# Patient Record
Sex: FEMALE | Race: WHITE | Employment: UNEMPLOYED | URBAN - METROPOLITAN AREA
[De-identification: names, ages, dates, MRNs, and addresses within clinical notes are randomized per-mention and may not be internally consistent; named-entity substitution may affect disease eponyms.]

---

## 2018-05-22 ENCOUNTER — HOSPITAL ENCOUNTER (OUTPATIENT)
Dept: AUDIOLOGY | Age: 58
Discharge: HOME | End: 2018-05-22
Payer: COMMERCIAL

## 2018-11-20 ENCOUNTER — HOSPITAL ENCOUNTER (OUTPATIENT)
Dept: AUDIOLOGY | Age: 58
Discharge: HOME | End: 2018-11-20
Payer: COMMERCIAL

## 2018-11-20 PROCEDURE — V5266 BATTERY FOR HEARING DEVICE: HCPCS | Performed by: AUDIOLOGIST

## 2018-12-12 ENCOUNTER — HOSPITAL ENCOUNTER (OUTPATIENT)
Dept: AUDIOLOGY | Age: 58
Discharge: HOME | End: 2018-12-12
Payer: COMMERCIAL

## 2018-12-12 PROCEDURE — V5264 EAR MOLD/INSERT: HCPCS | Performed by: AUDIOLOGIST

## 2019-01-09 ENCOUNTER — HOSPITAL ENCOUNTER (OUTPATIENT)
Dept: AUDIOLOGY | Age: 59
Discharge: HOME | End: 2019-01-09
Payer: COMMERCIAL

## 2019-01-09 PROCEDURE — 9990000010 HC NO CHARGE VISIT: Performed by: AUDIOLOGIST

## 2019-05-14 ENCOUNTER — HOSPITAL ENCOUNTER (OUTPATIENT)
Dept: AUDIOLOGY | Age: 59
Discharge: HOME | End: 2019-05-14
Payer: COMMERCIAL

## 2019-05-14 PROCEDURE — 9990000010 HC NO CHARGE VISIT: Performed by: AUDIOLOGIST

## 2019-05-14 NOTE — PROGRESS NOTES
Hearing aid check and tubing change. All is well with the hearing aids. Follow up in 6 months for hearing aid check, scheduled 11/5/2019 at 2 pm.  She will call if needed. Electronically signed by JOHANNA Kiser on 5/14/2019 at 2:19 PM

## 2019-11-05 ENCOUNTER — HOSPITAL ENCOUNTER (OUTPATIENT)
Dept: AUDIOLOGY | Age: 59
Discharge: HOME | End: 2019-11-05
Payer: COMMERCIAL

## 2019-11-05 PROCEDURE — V5266 BATTERY FOR HEARING DEVICE: HCPCS | Performed by: AUDIOLOGIST

## 2019-11-05 PROCEDURE — 9990000010 HC NO CHARGE VISIT: Performed by: AUDIOLOGIST

## 2020-06-16 ENCOUNTER — HOSPITAL ENCOUNTER (OUTPATIENT)
Dept: AUDIOLOGY | Age: 60
Discharge: HOME | End: 2020-06-16
Payer: COMMERCIAL

## 2020-06-16 PROCEDURE — V5266 BATTERY FOR HEARING DEVICE: HCPCS | Performed by: AUDIOLOGIST

## 2020-12-15 ENCOUNTER — HOSPITAL ENCOUNTER (OUTPATIENT)
Dept: AUDIOLOGY | Age: 60
Discharge: HOME | End: 2020-12-15
Payer: COMMERCIAL

## 2020-12-15 PROCEDURE — 9990000010 HC NO CHARGE VISIT: Performed by: AUDIOLOGIST

## 2021-06-15 ENCOUNTER — HOSPITAL ENCOUNTER (OUTPATIENT)
Dept: AUDIOLOGY | Age: 61
Discharge: HOME | End: 2021-06-15
Payer: COMMERCIAL

## 2021-06-15 PROCEDURE — 9990000010 HC NO CHARGE VISIT: Performed by: AUDIOLOGIST

## 2021-06-15 NOTE — PROGRESS NOTES
Hearing aid check and tubing change. Discussed process for new hearing aids. She is eligible for new 4/2021. She will get script and then call for appt.   Electronically signed by Gianna Hummel on 6/15/2021 at 3:21 PM

## 2021-07-19 ENCOUNTER — HOSPITAL ENCOUNTER (OUTPATIENT)
Dept: AUDIOLOGY | Age: 61
Discharge: HOME | End: 2021-07-19
Payer: COMMERCIAL

## 2021-07-19 PROCEDURE — 92567 TYMPANOMETRY: CPT | Performed by: AUDIOLOGIST

## 2021-07-19 PROCEDURE — 92557 COMPREHENSIVE HEARING TEST: CPT | Performed by: AUDIOLOGIST

## 2021-07-19 NOTE — PROGRESS NOTES
AUDIOMETRIC EVALUATION    REASON FOR REFERRAL:  This patient was referred for audiometric testing by HOLLIS Diaz to monitor hearing sensitivity. Alvaro Carter has worn hearing aids for many years and is eligible for a new hearing aid prior authorization through [employer]. She was accompanied by her mother Joaquín De Guzman. RESULTS:  Pure tone audiometric testing using earphones was carried out. Results revealed air conduction thresholds averaging 25 dBHL at 250 Hz sloping to 55 dBHL at 500 Hz and then averaging 80 dBHL through 4000 Hz and 110 dBHL at 8000 Hz. Speech reception thresholds were obtained at 60 dBHL . Speech discrimination testing was performed at 90 dBHL. Obtained were scores of 84%. (Un)Masked Bone Conduction Testing revealed thresholds equal to air conduction thresholds. Tympanometry was administered and revealed tympanograms within normal limits. IMPRESSION:  Oseas Palomo results revealed a mild sloping to profound sensorineural hearing loss. This test is comparable to testing completed in 2015. Speech reception thresholds were in agreement with the pure tone averages. Speech discrimination was good. Tympanometry was within normal limits. An ENT consult is suggested if you feel it is clinically warranted. A re-evaluation is recommended if a change in hearing is noted. Once medical clearance is obtained a prior authorization for new binaural BTE hearing aids will be submitted to [employer]. The above results were reviewed with the patient and her mother. If I can be of further assistance or provide additional information, please do not hesitate to contact this office.       Thank you for the referral.        ___________________________________

## 2021-09-13 ENCOUNTER — HOSPITAL ENCOUNTER (OUTPATIENT)
Dept: AUDIOLOGY | Age: 61
Discharge: HOME | End: 2021-09-13
Payer: COMMERCIAL

## 2021-09-13 PROCEDURE — 9990000010 HC NO CHARGE VISIT: Performed by: AUDIOLOGIST

## 2021-09-13 NOTE — PROGRESS NOTES
No charge hearing aid fitting for 30 day trial.  Reviewed use and care. Adjusted programs down a lot for gain. They were too loud. Also adjusted audiogram in computer to help with gain. She said they were very comfortable.   Follow up 10/5/21 at 2 pm.  Electronically signed by Gianna Eastman on 9/13/2021 at 4:42 PM

## 2021-09-14 ENCOUNTER — HOSPITAL ENCOUNTER (OUTPATIENT)
Age: 61
Discharge: HOME | End: 2021-09-16

## 2021-09-14 PROCEDURE — 88360 TUMOR IMMUNOHISTOCHEM/MANUAL: CPT

## 2021-09-14 PROCEDURE — 88305 TISSUE EXAM BY PATHOLOGIST: CPT

## 2021-09-14 PROCEDURE — 88342 IMHCHEM/IMCYTCHM 1ST ANTB: CPT

## 2021-09-29 DIAGNOSIS — C50.911 INVASIVE DUCTAL CARCINOMA OF RIGHT BREAST (HCC): Primary | ICD-10-CM

## 2021-09-30 NOTE — PROGRESS NOTES
History reviewed. No pertinent past medical history. Past Surgical History:   Procedure Laterality Date    EYE SURGERY      US BREAST NEEDLE BIOPSY RIGHT  2021       Current Outpatient Medications   Medication Sig Dispense Refill    Cholecalciferol (VITAMIN D3) 50 MCG (2000 UT) CAPS Take by mouth      Garlic 10 MG CAPS Take by mouth      loratadine (CLARITIN) 10 MG capsule Take 10 mg by mouth daily      acetaminophen (TYLENOL) 500 MG tablet Take 500 mg by mouth every 6 hours as needed for Pain       No current facility-administered medications for this visit. No Known Allergies    History reviewed. No pertinent family history. Social History     Socioeconomic History    Marital status: Single     Spouse name: Not on file    Number of children: Not on file    Years of education: Not on file    Highest education level: Not on file   Occupational History    Not on file   Tobacco Use    Smoking status: Never Smoker    Smokeless tobacco: Never Used   Vaping Use    Vaping Use: Never used   Substance and Sexual Activity    Alcohol use: Never    Drug use: Never    Sexual activity: Not on file   Other Topics Concern    Not on file   Social History Narrative    Not on file     Social Determinants of Health     Financial Resource Strain:     Difficulty of Paying Living Expenses:    Food Insecurity:     Worried About Running Out of Food in the Last Year:     [address] in the Last Year:    Transportation Needs:     Lack of Transportation (Medical):      Lack of Transportation (Non-Medical):    Physical Activity:     Days of Exercise per Week:     Minutes of Exercise per Session:    Stress:     Feeling of Stress :    Social Connections:     Frequency of Communication with Friends and Family:     Frequency of Social Gatherings with Friends and Family:     Attends Nondenominational Services:     Active Member of Clubs or Organizations:     Attends Club or Organization Meetings:    [facility] Marital Status:    Intimate Partner Violence:     Fear of Current or Ex-Partner:     Emotionally Abused:     Physically Abused:     Sexually Abused:        Occupation: weed, build puzzles, 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 Good appetite and energy level. ENMT: Eyes: No diplopia; Nose: No epistaxis. Mouth: No sore throat. RESPIRATORY: No hemoptysis, shortness of breath, cough. CARDIOVASCULAR: No chest pain, pressure, or palpitations. GASTROINTESTINAL: No nausea/vomiting, abdominal pain, diarrhea/constipation. No blood in the stools. GENITOURINARY: No dysuria, urinary frequency, hematuria. NEURO: No syncope, presyncope, headache. Remainder:  ROS NEGATIVE    Patient denies previous history of DVT/PE. Review of systems as noted above completely reviewed with patient. No changes. Mother answers many of the questions. Objective:   Physical Exam  Vitals and nursing note reviewed. Exam conducted with a chaperone present. Constitutional:       General: She is not in acute distress. Appearance: She is well-developed. She is not diaphoretic. HENT:      Head: Normocephalic and atraumatic. Eyes:      Conjunctiva/sclera: Conjunctivae normal.      Pupils: Pupils are equal, round, and reactive to light. Neck:      Thyroid: No thyromegaly. Trachea: No tracheal deviation. Cardiovascular:      Rate and Rhythm: Normal rate and regular rhythm. Heart sounds: Normal heart sounds. Pulmonary:      Effort: Pulmonary effort is normal. No respiratory distress. Breath sounds: Normal breath sounds. Chest:      Breasts: Breasts are symmetrical.         Right: Mass present. No inverted nipple, nipple discharge, skin change or tenderness. Left: No inverted nipple, mass, nipple discharge, skin change or tenderness. Abdominal:      General: There is no distension. Palpations: Abdomen is soft.    Musculoskeletal:      Right shoulder: Normal range of motion. Left shoulder: Normal range of motion. Cervical back: Normal range of motion and neck supple. Lymphadenopathy:      Cervical: No cervical adenopathy. Upper Body:      Right upper body: No supraclavicular adenopathy. Left upper body: No supraclavicular adenopathy. Skin:     General: Skin is warm and dry. Coloration: Skin is not pale. Findings: No erythema. Neurological:      Mental Status: She is alert and oriented to person, place, and time. Psychiatric:         Behavior: Behavior normal.         Thought Content: Thought content normal.         Judgment: Judgment normal.         Assessment:      64 y.o. woman who underwent an US guided right breast core biopsy at 3 o'clock position on September 20 , 2021. 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  Preliminary Allan   score 3+2+1 = 6     Comment:   Tumor cells stain strongly positive for e-cadherin   immunostain. Intradepartmental consultation is obtained. Breast Cancer Marker Studies:         Estrogen Receptors (ER):       -Positive (>10% of cells demonstrate nuclear positivity):       Percentage of cells positive: >90%       Intensity: Strong         Progesterone Receptors (TX):       -Positive:       Percentage of cells positive: 2%       Intensity: Weak         Her-2/marquise (c-erb B-2) protein expression: Negative (1+)       MAMMOGRAM ULTRASOUND BIOPSY; [facility]      9/14/2021: MAMMOGRAM, RIGHT BREAST ULTRASOUND: [facility]            9/14/2021: LEFT BREAST ULTRASOUND: [facility]    Imaging reviewed with patient and her mother. Mass is barely visible on mammogram due to its posterior location. Clinical T2, stage II at this point. Long discussion about options going forward.   Patient needs to be transported by her mother, and her mother does not drive when the weather is bad. We discussed conservative therapy versus mastectomy, risks and benefits of both. Place consultation to radiation oncology so patient and her mother could decide whether they were interested in pursuing conservative therapy. Questions answered to the best my ability. Plan:      1. Metastatic workup to include CXR, CBC, CMP, completed with this visit. 2. Patient has met with our Breast Navigator for information and to receive literature. 3. If indicated outside slides will be obtained and reviewed by Pathology at [facility]. 4. She would likely be a candidate for surgical therapy based on consultation with radiation oncology and additional discussion with me. .    5. We had a discussion of the treatment options for breast cancer including risks, benefits, and recurrence rates for breast conservation therapy versus mastectomy. We discussed the indications and risks of sentinel lymph node biopsy and possibility of axillary lymph node dissection. We also discussed the possible role of systemic and radiation therapy. NCCN guidelines were utilized in our discussion. The patient was given the appropriate contact numbers and will call with any questions or concerns. Face-to-face time 55 minutes, greater than 50% in counseling, education, and coordination of care. My final recommendation will be communicated back to the referring physician by way of shared medical record and/or written letter via [address] [facility] mail. I personally and independently saw and examined patient and reviewed all pertinent laboratory data and imaging studies. I have reviewed and agree with the CNP history and review of systems as documented in the above note. This document is generated, in part, by voice recognition software and thus syntax and grammatical errors are possible. Cata Zepeda MD [facility]  October 4, 2021

## 2021-10-04 ENCOUNTER — TELEPHONE (OUTPATIENT)
Dept: CASE MANAGEMENT | Age: 61
End: 2021-10-04

## 2021-10-04 ENCOUNTER — OFFICE VISIT (OUTPATIENT)
Dept: BREAST CENTER | Age: 61
End: 2021-10-04
Payer: COMMERCIAL

## 2021-10-04 ENCOUNTER — HOSPITAL ENCOUNTER (OUTPATIENT)
Dept: GENERAL RADIOLOGY | Age: 61
Discharge: HOME | End: 2021-10-06
Payer: COMMERCIAL

## 2021-10-04 VITALS
WEIGHT: 119 LBS | RESPIRATION RATE: 20 BRPM | BODY MASS INDEX: 24.98 KG/M2 | TEMPERATURE: 98.4 F | OXYGEN SATURATION: 98 % | HEART RATE: 101 BPM | DIASTOLIC BLOOD PRESSURE: 82 MMHG | SYSTOLIC BLOOD PRESSURE: 126 MMHG | HEIGHT: 58 IN

## 2021-10-04 DIAGNOSIS — C50.911 MALIGNANT NEOPLASM OF RIGHT BREAST IN FEMALE, ESTROGEN RECEPTOR POSITIVE, UNSPECIFIED SITE OF BREAST (HCC): ICD-10-CM

## 2021-10-04 DIAGNOSIS — Z17.0 MALIGNANT NEOPLASM OF RIGHT BREAST IN FEMALE, ESTROGEN RECEPTOR POSITIVE, UNSPECIFIED SITE OF BREAST (HCC): ICD-10-CM

## 2021-10-04 DIAGNOSIS — C50.911 INVASIVE DUCTAL CARCINOMA OF RIGHT BREAST (HCC): ICD-10-CM

## 2021-10-04 LAB
ALBUMIN SERPL-MCNC: 4.5 G/DL (ref 3.5–5.2)
ALP BLD-CCNC: 80 U/L (ref 35–104)
ALT SERPL-CCNC: 15 U/L (ref 0–32)
ANION GAP SERPL CALCULATED.3IONS-SCNC: 11 MMOL/L (ref 7–16)
AST SERPL-CCNC: 23 U/L (ref 0–31)
BASOPHILS ABSOLUTE: 0.03 E9/L (ref 0–0.2)
BASOPHILS RELATIVE PERCENT: 0.3 % (ref 0–2)
BILIRUB SERPL-MCNC: 0.4 MG/DL (ref 0–1.2)
BUN BLDV-MCNC: 11 MG/DL (ref 6–23)
CALCIUM SERPL-MCNC: 9.7 MG/DL (ref 8.6–10.2)
CHLORIDE BLD-SCNC: 104 MMOL/L (ref 98–107)
CO2: 26 MMOL/L (ref 22–29)
CREAT SERPL-MCNC: 0.8 MG/DL (ref 0.5–1)
EOSINOPHILS ABSOLUTE: 0.05 E9/L (ref 0.05–0.5)
EOSINOPHILS RELATIVE PERCENT: 0.6 % (ref 0–6)
GFR AFRICAN AMERICAN: >60
GFR NON-AFRICAN AMERICAN: >60 ML/MIN/1.73
GLUCOSE BLD-MCNC: 111 MG/DL (ref 74–99)
HCT VFR BLD CALC: 46.6 % (ref 34–48)
HEMOGLOBIN: 15.1 G/DL (ref 11.5–15.5)
IMMATURE GRANULOCYTES #: 0.02 E9/L
IMMATURE GRANULOCYTES %: 0.2 % (ref 0–5)
LYMPHOCYTES ABSOLUTE: 1.74 E9/L (ref 1.5–4)
LYMPHOCYTES RELATIVE PERCENT: 19.9 % (ref 20–42)
MCH RBC QN AUTO: 28.2 PG (ref 26–35)
MCHC RBC AUTO-ENTMCNC: 32.4 % (ref 32–34.5)
MCV RBC AUTO: 86.9 FL (ref 80–99.9)
MONOCYTES ABSOLUTE: 0.58 E9/L (ref 0.1–0.95)
MONOCYTES RELATIVE PERCENT: 6.6 % (ref 2–12)
NEUTROPHILS ABSOLUTE: 6.34 E9/L (ref 1.8–7.3)
NEUTROPHILS RELATIVE PERCENT: 72.4 % (ref 43–80)
PDW BLD-RTO: 13.8 FL (ref 11.5–15)
PLATELET # BLD: 236 E9/L (ref 130–450)
PMV BLD AUTO: 10.3 FL (ref 7–12)
POTASSIUM SERPL-SCNC: 4.6 MMOL/L (ref 3.5–5)
RBC # BLD: 5.36 E12/L (ref 3.5–5.5)
SODIUM BLD-SCNC: 141 MMOL/L (ref 132–146)
TOTAL PROTEIN: 7.2 G/DL (ref 6.4–8.3)
WBC # BLD: 8.8 E9/L (ref 4.5–11.5)

## 2021-10-04 PROCEDURE — G8484 FLU IMMUNIZE NO ADMIN: HCPCS | Performed by: SURGERY

## 2021-10-04 PROCEDURE — 36415 COLL VENOUS BLD VENIPUNCTURE: CPT | Performed by: SURGERY

## 2021-10-04 PROCEDURE — 36415 COLL VENOUS BLD VENIPUNCTURE: CPT

## 2021-10-04 PROCEDURE — 71046 X-RAY EXAM CHEST 2 VIEWS: CPT

## 2021-10-04 PROCEDURE — 85025 COMPLETE CBC W/AUTO DIFF WBC: CPT

## 2021-10-04 PROCEDURE — 3017F COLORECTAL CA SCREEN DOC REV: CPT | Performed by: SURGERY

## 2021-10-04 PROCEDURE — 80053 COMPREHEN METABOLIC PANEL: CPT

## 2021-10-04 PROCEDURE — G8427 DOCREV CUR MEDS BY ELIG CLIN: HCPCS | Performed by: SURGERY

## 2021-10-04 PROCEDURE — G8420 CALC BMI NORM PARAMETERS: HCPCS | Performed by: SURGERY

## 2021-10-04 PROCEDURE — 99203 OFFICE O/P NEW LOW 30 MIN: CPT | Performed by: SURGERY

## 2021-10-04 PROCEDURE — 99204 OFFICE O/P NEW MOD 45 MIN: CPT | Performed by: SURGERY

## 2021-10-04 PROCEDURE — 1036F TOBACCO NON-USER: CPT | Performed by: SURGERY

## 2021-10-04 RX ORDER — ACETAMINOPHEN 500 MG
500 TABLET ORAL EVERY 6 HOURS PRN
COMMUNITY

## 2021-10-04 RX ORDER — ACETAMINOPHEN 160 MG
TABLET,DISINTEGRATING ORAL DAILY
COMMUNITY

## 2021-10-04 RX ORDER — LORATADINE 10 MG/1
10 CAPSULE, LIQUID FILLED ORAL DAILY
COMMUNITY

## 2021-10-04 NOTE — PATIENT INSTRUCTIONS
RELEASE OF RESULTS AND RECORDS  As of October 1, 2020, all results (x-ray reports, labwork, pathology reports) will be released through [address] in real time per federal law. Once your test results are final, they will be automatically released to your electronic medical record [facility] where you will be able to see those results and any clinical notes associated with that result. In some cases, you may see those results and notes before your provider or the staff have had a chance to review. We will make every effort to contact you, especially about any abnormal or confusing results. If you view a result before we have contacted you, please wait up to one business day for us to reach you before calling with questions. If anything in your notes seems inaccurate, please message us through [employer] with potential corrections. If you see a term or language in your clinical note that doesn't make sense, please use the online health library reference tool in your MyChart (under the Health tab)  to help you interpret the note.       Patient will be referred to Radiation Oncology to determine surgical path

## 2021-10-04 NOTE — TELEPHONE ENCOUNTER
Met with patient and mother regarding her recent breast cancer diagnosis at surgical consultation appointment with Dr. Zacarias Baltazar. Reviewed pathology report. Instructed on next steps including breast surgery options per Dr. Wolfgang Perez recommendations and any additional imaging that may be required. Provided with extensive literature including \"Be A Survivor: Your guide to breast cancer treatment\", chapter 4 reviewed,  Your Guide to Your Breast Cancer Pathology Report, American Cancer Society Exercises after breast surgery and Lymphedema Early Signs and Symptoms. Today patient received copy of their pathology report as well as a list of local medical oncology providers, information on diagnosis and local support group resources. Patient and mother also received list of local transportation resources. Mother states that she does not drive well at night and is worried about getting patient to her appointments. Provided patient with my contact information and encouraged to call me with questions or concerns. Patient verbalizes understanding and appreciative of nurse navigator visit.

## 2021-10-04 NOTE — LETTER
[address] [facility] [address] [address]  Phone: [phone]  Fax: [phone]    Julianne Clements MD    October 5, 2021     Herbert Ly, [address] [facility] [address]    Patient: George Can   MR Number: [id number]   YOB: 1960   Date of Visit: 10/4/2021       Dear Herbert Ly: Thank you for referring Charly De Leon to me for evaluation/treatment. Below are the relevant portions of my assessment and plan of care. 64 y.o. woman who underwent an US guided right breast core biopsy at 3 o'clock position on September 20 , 2021. 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  Preliminary Ola   score 3+2+1 = 6     Comment:   Tumor cells stain strongly positive for e-cadherin   immunostain. Intradepartmental consultation is obtained. Breast Cancer Marker Studies:         Estrogen Receptors (ER):       -Positive (>10% of cells demonstrate nuclear positivity):       Percentage of cells positive: >90%       Intensity: Strong         Progesterone Receptors ([place]):       -Positive:       Percentage of cells positive: 2%       Intensity: Weak         Her-2/marquise (c-erb B-2) protein expression: Negative (1+)       MAMMOGRAM ULTRASOUND BIOPSY; [facility]      9/14/2021: MAMMOGRAM, RIGHT BREAST ULTRASOUND: [facility]            9/14/2021: LEFT BREAST ULTRASOUND: [facility]    Imaging reviewed with patient and her mother. Mass is barely visible on mammogram due to its posterior location. Clinical T2, stage II at this point. Long discussion about options going forward. Patient needs to be transported by her mother, and her mother does not drive when the weather is bad. We discussed conservative therapy versus mastectomy, risks and benefits of both.   Place consultation to radiation oncology so patient and her mother could decide whether they were interested in pursuing conservative therapy. Questions answered to the best my ability. 1. Metastatic workup to include CXR, CBC, CMP, completed with this visit. 2. Patient has met with our Breast Navigator for information and to receive literature. 3. If indicated outside slides will be obtained and reviewed by Pathology at [facility]. 4. She would likely be a candidate for surgical therapy based on consultation with radiation oncology and additional discussion with me. .    5. We had a discussion of the treatment options for breast cancer including risks, benefits, and recurrence rates for breast conservation therapy versus mastectomy. We discussed the indications and risks of sentinel lymph node biopsy and possibility of axillary lymph node dissection. We also discussed the possible role of systemic and radiation therapy. NCCN guidelines were utilized in our discussion. The patient was given the appropriate contact numbers and will call with any questions or concerns. If you have questions, please do not hesitate to call me. I look forward to following Linh Riddle along with you.     Sincerely,  Bam Braxton MD

## 2021-10-05 ENCOUNTER — HOSPITAL ENCOUNTER (OUTPATIENT)
Dept: AUDIOLOGY | Age: 61
Discharge: HOME | End: 2021-10-05
Payer: COMMERCIAL

## 2021-10-05 PROCEDURE — V5261 HEARING AID, DIGIT, BIN, BTE: HCPCS | Performed by: AUDIOLOGIST

## 2021-10-05 PROCEDURE — V5160 DISPENSING FEE BINAURAL: HCPCS | Performed by: AUDIOLOGIST

## 2021-10-05 NOTE — PROGRESS NOTES
Hearing aid check - billing to insurance. She is pleased with hearing aids. She feels she hears better. The only issue is she states they restarted on own at random times. Called [employer] they had no explanation. She is going to track it and if continues Truett Null will do an express exchange. She will call me as needed.  Electronically signed by Gianna Bennett on 10/5/2021 at 2:30 PM

## 2021-10-11 ENCOUNTER — TELEPHONE (OUTPATIENT)
Dept: BREAST CENTER | Age: 61
End: 2021-10-11

## 2021-10-11 NOTE — TELEPHONE ENCOUNTER
Patient mother called office stating patient has decided on surgery. Informed patient mother we will let Dr Raimundo Ceballos know of this decision.     NL lumpectomy/SLN

## 2021-10-12 DIAGNOSIS — Z17.0 MALIGNANT NEOPLASM OF RIGHT BREAST IN FEMALE, ESTROGEN RECEPTOR POSITIVE, UNSPECIFIED SITE OF BREAST (HCC): Primary | ICD-10-CM

## 2021-10-12 DIAGNOSIS — C50.911 MALIGNANT NEOPLASM OF RIGHT BREAST IN FEMALE, ESTROGEN RECEPTOR POSITIVE, UNSPECIFIED SITE OF BREAST (HCC): Primary | ICD-10-CM

## 2021-10-21 ENCOUNTER — HOSPITAL ENCOUNTER (OUTPATIENT)
Dept: RADIATION ONCOLOGY | Age: 61
Discharge: HOME | End: 2021-10-21
Payer: COMMERCIAL

## 2021-10-21 VITALS
HEART RATE: 97 BPM | TEMPERATURE: 98.2 F | WEIGHT: 118.5 LBS | DIASTOLIC BLOOD PRESSURE: 80 MMHG | RESPIRATION RATE: 18 BRPM | OXYGEN SATURATION: 97 % | SYSTOLIC BLOOD PRESSURE: 146 MMHG | BODY MASS INDEX: 24.77 KG/M2

## 2021-10-21 PROCEDURE — 99205 OFFICE O/P NEW HI 60 MIN: CPT

## 2021-10-21 PROCEDURE — 99243 OFF/OP CNSLTJ NEW/EST LOW 30: CPT | Performed by: RADIOLOGY

## 2021-10-21 RX ORDER — GARLIC 400 MG
TABLET ORAL DAILY
COMMUNITY
End: 2022-08-19

## 2021-10-21 SDOH — ECONOMIC STABILITY: HOUSING INSECURITY: PLEASE ASSESS YOUR PATIENT'S LEVEL OF DISTRESS CONCERNING HOUSING (SCALE FROM 1-10): 0 (NONE)

## 2021-10-21 ASSESSMENT — PAIN DESCRIPTION - LOCATION: LOCATION: BREAST

## 2021-10-21 ASSESSMENT — PAIN DESCRIPTION - ORIENTATION: ORIENTATION: RIGHT;INNER

## 2021-10-21 ASSESSMENT — PAIN DESCRIPTION - DESCRIPTORS: DESCRIPTORS: OTHER (COMMENT)

## 2021-10-21 ASSESSMENT — PAIN SCALES - GENERAL: PAINLEVEL_OUTOF10: 2

## 2021-10-21 NOTE — CONSULTS
Radiation Oncology Consult Note         10/21/2021    Alex Guzman  64 y.o.   1960    REFERRING PROVIDER: Chema Richter    PCP:  Kolleen Gosselin, DO    CHIEF COMPLAINT:     ' I am here to get your recommendation. I do have a cancer in my right  breast and I prefer not to have mastectomy'      DIAGNOSIS:     Carcinoma of the right breast  biopsy-proven ,surgical staging pending    STAGING: Cancer Staging    Final staging pending      HISTORY OF PRESENT ILLNESS:     Ms. Nelli Hanley  is a 64 y.o. nulliparous woman with an abnormal annual mammogram.      Subsequently  underwent an US guided right breast core biopsy at 3 o'clock position on September 20 , 2021    Patient is here to discuss treatment options. She also had some questions regarding the radiation therapy. 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  Preliminary [place]     score 3+2+1 = 6 ar ol. ER CT positive and Her-2   Negative. Patient is here to discuss surgical options. IMPRESSION: DISCUSSION/PLAN:    55-year-old female with biopsy-proven carcinoma of the right breast was seen in consultation,     to discuss the treatment options. Patient has moderate-sized breasts. The positioning of the breasts on the chest wall allows us to treat with radiation therapy after lumpectomy and surgical staging, without significant difficulty,    This was  patient's choice as well. Patient and family had questions and they were all  answered to their satisfaction. We will , with the help of the the nurse navigator  and Dr. Selene Morales office arrange the surgery date. PAST MEDICAL HISTORY:    Patient has a deafness since age 6. She was using one hearing aid for 50 some years. Most recently she was given bilateral hearing aids and seems to be functioning extremely well.         Diagnosis Date    Breast cancer [facility])     Right breast    Cancer ([facility] [address]     Hyperlipidemia        PAST SURGICAL HISTORY:      Procedure Laterality Date    EYE SURGERY      US BREAST NEEDLE BIOPSY RIGHT       Gynecological history:     0 para 0 Menarche age 6 Menopause age 48  She has not taken any replacement hormonal therapy for hot flashes and other menopausal symptoms. .    Denies any history of nipple inversion or nipple discharge. No Known Allergies    MEDICATIONS:  Medications reviewed and reconciled. Current Outpatient Medications   Medication Sig Dispense Refill    Garlic (GARLIQUE) 764 MG TBEC Take by mouth      Cholecalciferol (VITAMIN D3) 50 MCG ( UT) CAPS Take by mouth      Garlic 10 MG CAPS Take by mouth      loratadine (CLARITIN) 10 MG capsule Take 10 mg by mouth daily      acetaminophen (TYLENOL) 500 MG tablet Take 500 mg by mouth every 6 hours as needed for Pain       No current facility-administered medications for this encounter. FAMILY HISTORY:  Family History   Problem Relation Age of Onset    Heart Disease Mother     High Blood Pressure Mother     High Cholesterol Mother     Other Father         COPD       SOCIAL HISTORY:       reports that she has never smoked. She has never used smokeless tobacco..   reports no history of alcohol use. .   reports no history of drug use. REVIEW OF SYSTEMS:     Obtained from the patient, chart review and nursing assessment. General / Breas : denies any symptoms related to the lump in her breast that was noted on mammogram.  Since the biopsy she does have pain which is not unexpected. Psychological extremely anxious  Ophthalmic no visual symptoms reported. ENT has deafness since childhood and recently started having better hearing since bilateral hearing aids .     Allergy and Immunology none applicable  Hematological and Lymphatic no symptoms related to lymph nodes  Endocrine not applicable   Respiratory is any respiratory symptoms  Cardiovascular denies any chest pain or chest pressure  Gastrointestinal no GI symptoms  Genito-Urinary no  symptoms  Musculoskeletal no leg pains or arthritic pains. Neurological no neurological symptoms  Dermatological denies skin problems. Review of other systems is unremarkable otherwise. PHYSICAL EXAMINATION:      Vitals:    10/21/21 0945   BP: (!) 146/80   Pulse: 97   Resp: 18   Temp: 98.2 °F (36.8 °C)   TempSrc: Temporal   SpO2: 97%   Weight: 118 lb 8 oz (53.8 kg)       Wt Readings from Last 3 Encounters:   10/21/21 118 lb 8 oz (53.8 kg)   10/04/21 119 lb (54 kg)       KARNOSKY PERFORMANCE STATUS: ECOG 0    Constitutional: A well developed, well nourished 64 y.o. female who is alert, oriented, cooperative and in no apparent distress. EENT:   Bilateral hearing aids otherwise unremarkable. EOMI VALENTE. No icterus. No conjunctival injections. External canals are patent and no discharge was appreciated. Septum was midline, mucosa was without erythema, exudates or cobblestoning. No thrush was noted. Neck: Supple without thyromegaly. Trachea was midline. No carotid bruits. No stridor or subglottic wheeze. Respiratory: Chest was symmetrical without dullness to percussion. Breath sounds bilaterally were clear to auscultation. No wheezes, rhonchi or rales. No intercostal retraction or use of accessory muscles. No egophony noted. Breasts:   Right breast upper inner quadrant is extremely tender from recent biopsy and bruise was noted. No nipple inversion  Left breast examination negative for palpable masses. Patient has moderate-sized breasts and lumpectomy would be ideal for her in view of the size and shape of the breasts. Cardiovascular: Nonpalpable PMI. Regular without murmur. Abdomen: Obese, rounded and soft without organomegaly. No rebound, guarding or  rigidity. Lymphatic: No lymphadenopathy. Musculoskeletal: Ambulates without assistance. Normal curvature of the spine. No gross muscle weakness.   Muscle size, tone and strength are normal. No involuntary movements. Extremities:  No lower extremity edema, ulcerations, tenderness, varicosities or erythema. Coordination appears adequate. Sensory function appears intact. Skin:  Warm and dry. Examination of color, turgor and pigmentation was relatively normal. No bruises or skin rashes. Old surgical scars are noted. Neurological/Psychiatric: General behavior, level of consciousness, thought content is normal. The patient's emotional status is normal.  Cranial nerves II-XII are grossly intact. PATHOLOGY REVIEWED:    09/20/21    Right breast, 3 o'clock position, core biopsies: Invasive carcinoma of no   special type (ductal with lobular features).  Preliminary [place]   score 3+2+1 = 6         TUMOR MARKERS:       Breast Cancer Marker Studies:      Estrogen Receptors (ER): -Positive (>10% of cells demonstrate nuclear positivity):   Percentage of cells positive: >90%     Intensity: Strong    Progesterone Receptors (CT):   -Positive:    Percentage of cells positive: 2%  Intensity: Weak   Hormone receptor studies are performed by immunohistochemistry on   formalin-fixed, paraffin-embedded tissue (Roche Benchmark Immunostainer,   [place] anti-ER clone [id number], anti-CT clone [id number], polymer-based detection   Comment:   Tumor cells stain strongly positive for e-cadherin   immunostain. Intradepartmental consultation is obtained. chemistry).  ER and CT are evaluated based on the percentage of cells   showing nuclear staining with >1% considered positive for each.       Her-2/marquise (c-erb B-2) protein expression: Negative (1+)     RADIATION SAFETY AND ONCOLOGIC TREATMENT SUPPORT:      - Previous radiation history:  No  - History of autoimmune or connective tissue disease:  No  - Nutritional support/ PEG:  Not applicable  - Dental evaluation:  Not applicable  -  requested:  Not asked for.  - Oncology Nurse navigator requested:  - Transportation for daily treatment:  Self      Thank you for the opportunity to participate in multidisciplinary management of this remarkable and pleasant patient. Please do not hesitate to contact me if you have any questions.     Your's cordially,    Teresita Law MD [facility]    Department of Radiation Oncology  PHYSICIANS [facility]) [facility]: [phone] (RIW: [phone])  [address] [place] [address] [facility]: [phone] (GYZ: [phone])  [facility]) [facility]:  [phone] (MDR:  [phone])

## 2021-10-21 NOTE — PROGRESS NOTES
Madiha  1960 64 y.o. Referring Physician: Reva Arguello    PCP: Janak Pacheco,      There were no vitals filed for this visit. Wt Readings from Last 3 Encounters:   10/04/21 119 lb (54 kg)        There is no height or weight on file to calculate BMI. Chief Complaint: No chief complaint on file. Cancer Staging  No matching staging information was found for the patient. Prior Radiation Therapy? NO    Concurrent Chemo/radiation? NO    Prior Chemotherapy? NO    Prior Hormonal Therapy? NO    Head and Neck Cancer? No, patient does NOT have HN cancer. LMP: 52's    Age at first Menses: 6    : 0    Para: 0        Current Outpatient Medications   Medication Sig Dispense Refill    Cholecalciferol (VITAMIN D3) 50 MCG (2000 UT) CAPS Take by mouth      Garlic 10 MG CAPS Take by mouth      loratadine (CLARITIN) 10 MG capsule Take 10 mg by mouth daily      acetaminophen (TYLENOL) 500 MG tablet Take 500 mg by mouth every 6 hours as needed for Pain       No current facility-administered medications for this encounter. No past medical history on file. Past Surgical History:   Procedure Laterality Date    EYE SURGERY      US BREAST NEEDLE BIOPSY RIGHT         No family history on file.     Social History     Socioeconomic History    Marital status: Single     Spouse name: Not on file    Number of children: Not on file    Years of education: Not on file    Highest education level: Not on file   Occupational History    Not on file   Tobacco Use    Smoking status: Never Smoker    Smokeless tobacco: Never Used   Vaping Use    Vaping Use: Never used   Substance and Sexual Activity    Alcohol use: Never    Drug use: Never    Sexual activity: Not on file   Other Topics Concern    Not on file   Social History Narrative    Not on file     Social Determinants of Health     Financial Resource Strain:     Difficulty of Paying Living Expenses:    Food Insecurity:     Worried About Running Out of Food in the Last Year:     [address] in the Last Year:    Transportation Needs:     Lack of Transportation (Medical):  Lack of Transportation (Non-Medical):    Physical Activity:     Days of Exercise per Week:     Minutes of Exercise per Session:    Stress:     Feeling of Stress :    Social Connections:     Frequency of Communication with Friends and Family:     Frequency of Social Gatherings with Friends and Family:     Attends Nondenominational Services:     Active Member of Clubs or Organizations:     Attends Club or Organization Meetings:     Marital Status:    Intimate Partner Violence:     Fear of Current or Ex-Partner:     Emotionally Abused:     Physically Abused:     Sexually Abused:            Occupation: doesn't work lives with mom  Retired:  NO        REVIEW OF SYSTEMS: <<For Level 5, 10 or more systems>>Celeste is a very pleasant 64years old female, she is here today with her mother Mark Ruffin to discuss receiving radiation therapy to right breast R/T breast cancer. She is alert and oriented x 4, c/o pain to right inner breast started after biopsy 2/10.  8/24/21 She underwent mammogram screening, results was addend 8/31/21 which states an irregular increased density lesion noted posterior medial aspect right breast.  9/14/21 US Right breast showed  A 2.6 x 1.9 x 1.8 cm mass corresponding to mammogram abnormality. The appearence  Can be seen secondary to an infectious process or malignancy. 9/14/21 she underwent  Right breast biopsy which revealed @ 3 O'clock position revealed Invasive carcinoma of no special type (ductal with lobular features) ER+ (90%), NE+ (2%), HER-2 negative. 10/04/21 Chest Xray completed for metastatic work up which showed No acute process. Patient and family is waiting notification from Dr Collin Marquis [facility] re: schedule for right breast lumpectomy.   There was a consultation scheduled for Dr Jens Skaggs which was cancelled and the reason was  \"was not needing referral to med/onc only Rad/Onc. Approximately spent > 20 minutes with patient and mom, answered all questions based on nursing perspective, they both verbalizes understanding. No other complaints, Dr Cely Villeda updated. I gave them samples and coupon for Aquaphor. Pacemaker/Defibulator/ICD:  No    Mediport: No        FALLS RISK SCREENING ASSESSMENT    Instructions:  Assess the patient and enter the appropriate indicators that are present for fall risk identification. Total the numbers entered and assign a fall risk score from Table 2.  Reassess patient at a minimum every 12 weeks or with status change. Assessment   Date  10/21/2021     1. Mental Ability: confusion/cognitively impaired No - 0       2. Elimination Issues: incontinence, frequency No - 0       3. Ambulatory: use of assistive devices (walker, cane, off-loading devices), attached to equipment (IV pole, oxygen) No - 0     4. Sensory Limitations: dizziness, vertigo, impaired vision No - 0       5. Age Less than 65 years - 0       6. Medication: diuretics, strong analgesics, hypnotics, sedatives, antihypertensive agents   No - 0   7. Falls:  recent history of falls within the last 3 months (not to include slipping or tripping)   No - 0   TOTAL 0    If score of 4 or greater was education given? No       TABLE 2   Risk Score Risk Level Plan of Care   0-3 Little or  No Risk 1. Provide assistance as indicated for ambulation activities  2. Reorient confused/cognitively impaired patient  3. Call-light/bell within patient's reach  4. Chair/bed in low position, stretcher/bed with siderails up except when performing patient care activities  5. Educate patient/family/caregiver on falls prevention  6.  Reassess in 12 weeks or with any noted change in patient condition which places them at a risk for a fall   4-6 Moderate Risk 1. Provide assistance as indicated for ambulation activities  2.   Reorient confused/cognitively impaired patient  3. Call-light/bell within patient's reach  4. Chair/bed in low position, stretcher/bed with siderails up except when performing patient care activities  5. Educate patient/family/caregiver on falls prevention  6. Falls risk precaution (Yellow sticker Level II) placed on patient chart   7 or   Higher High Risk 1. Place patient in easily observable treatment room  2. Patient attended at all times by family member or staff  3. Provide assistance as indicated for ambulation activities  4. Reorient confused/cognitively impaired patient  5. Call-light/bell within patient's reach  6. Chair/bed in low position, stretcher/bed with siderails up except when performing patient care activities  7. Educate patient/family/caregiver on falls prevention  8. Falls risk precaution (Yellow sticker Level III) placed on patient chart           MALNUTRITION RISK SCREENING ASSESSMENT    Instructions:  Assess the patient and enter the appropriate indicators that are present for nutrition risk identification. Total the numbers entered and assign a risk score. Follow the appropriate action for total score listed below. Assessment   Date  10/21/2021     1. Have you lost weight without trying? 1- Yes, 0.5 kg to 5 kg/     2. Have you been eating poorly because of a decreased appetite? 0- No   3. Do you have a diagnosis of head and neck cancer?       0- No                                                                                    TOTAL 1          Score of 0-1: No action  Score 2 or greater:  · For Non-Diabetic Patient: Recommend adding Ensure Complete 2 x daily and provide patient with Ensure wellness bag with coupons  · For Diabetic Patient: Recommend adding Glucerna Shake 2 x daily and provide patient with Glucerna Wellness bag with coupons  · Route to the dietitian via [facility]    · Are you having difficulty performing daily routine tasks due to fatigue or weakness (ie: bathing/showering, dressing, housework, meal prep, work, , etc): No     · Do you have any arm flexibility/ROM restrictions, swelling or pain that limit activity: No     · Any changes in memory, attention/focus that impact daily activities: No     · Do you avoid participation in leisure/social activity due to weakness, fatigue or pain: No     ARE ANY OF THE ABOVE ARE ANSWERED YES: No          PT ASSESSMENT FOR REFERRAL    · Have you had any recent falls in the past 2 months: No     · Do you have difficulty going up/down stairs: No     · Are you having difficulty walking: No     · Do you often hold onto furniture/environmental supports or feel off balance when you are walking: no     · Do you need to take rest breaks when you are walking: No     · Any pain on a scale of 1-10 that limits your mobility: Yes 2/10    ARE ANY OF THE ABOVE ARE ANSWERED YES: Yes - but NO PT referral request sent due to patient refusal.           LYMPHEDEMA SCREENING ASSESSMENT FOR PATIENTS WITH BREAST CANCER    The patient reports the following signs/symptoms of lymphedema: None    Please ask the provider to assess patient for lymphedema for any reported signs or symptoms so a referral to Lymphedema Therapy can be considered. PREHAB AUDIOLOGY REFERRAL    - Is patient planned to receive Cisplatin? No. This patient is not planned to start Cisplatin. - Is patient planned to receive radiation therapy that may be directed toward auditory canals or nerves? No. Patient is not planned to start radiation therapy to auditory canals or nerves. - Is patient complaining of new onset hearing loss? No. Patient is not complaining of new onset hearing loss. Patient education given on Radiation therapy, side effects and fall safety prevention utilizing slides and handouts. The patient expresses understanding and acceptance of instructions.  Alex Monreal RN 10/21/2021 9:44 AM Marybeth Bruner RN

## 2021-10-25 ENCOUNTER — TELEPHONE (OUTPATIENT)
Dept: BREAST CENTER | Age: 61
End: 2021-10-25

## 2021-10-25 NOTE — TELEPHONE ENCOUNTER
Patient surgery has been scheduled with surgery scheduling  11/3/21 @ 1:00pm / Nuclear 10:30am / Arrival 9:00am - Right breast lumpectomy - blue dye inj - right axillary SLN excision - possible right axillary dissection - General - neoprobe - US machine in OR. Patient notified of date and time of surgery. Patient was instructed to enter the [facility] entrance of the hospital. Patient was instructed NPO after midnight the night prior to surgery. Patient was instructed NO ASA products or blood thinners 3-5days prior to surgery. Patient was instructed to bring a sports bra with her day of surgery. Patient will be instructed by PAT about Covid 19 test.  Patient will be tested 72-96 hours prior to surgery, unless they have had their 2nd covid vaccine and are 2 weeks out from it. Prior authorization will be obtained.   Post op visit  11/16/21 @ 9:00am [facility]

## 2021-10-26 NOTE — PROGRESS NOTES
Patients mom states has received the last dose of the COVID vaccine and is 2 weeks post last dose. Patient instructed to bring the go2 media card or a picture of the card,  Day of surgery. Patients mom verbalized understanding.

## 2021-10-28 NOTE — PROGRESS NOTES
Kaden 36 PRE-ADMISSION TESTING GENERAL INSTRUCTIONS- [facility]-phone number:[phone]    GENERAL INSTRUCTIONS  [x] Antibacterial Soap shower Night before and/or AM of Surgery  [] Milo wipe instruction sheet and wipes given. [x] Nothing by mouth after midnight, including gum, candy, mints, or water. [x] You may brush your teeth, gargle, but do NOT swallow water. []Hibiclens shower  the night before and the morning of surgery. Do not use             Hibiclens on your face or head. []No smoking, chewing tobacco, illegal drugs, or alcohol within 24 hours of your surgery. [x] Jewelry, valuables or body piercing's should not be brought to the hospital. All body and/or tongue piercing's must be removed prior to arriving to hospital.  ALL hair pins must be removed. [x] Do not wear makeup, lotions, powders, deodorant. Nail polish as directed by the nurse. [x] Arrange transportation with a responsible adult  to and from the hospital. If you do not have a responsible adult  to transport you, you will need to make arrangements with a medical transportation company (i.e. [employer]. A Uber/taxi/bus is not appropriate unless you are accompanied by a responsible adult ). Arrange for someone to be with you for the remainder of the day and for 24 hours after your procedure due to having had anesthesia. Who will be your  for transportation?_____MOM_____________   Who will be staying with you for 24 hrs after your procedure?________BROTHER__________  [x] Bring insurance card and photo ID.  [] Transfusion Bracelet: Please bring with you to hospital, day of surgery  [] Bring urine specimen day of surgery. Any small container is acceptable. [] Use inhalers the morning of surgery and bring with you to hospital.  [] Bring copy of living will or healthcare power of  papers to be placed in your electronic record.   [] CPAP/BI-PAP: Please bring your machine if you are to spend the night in the hospital.     PARKING INSTRUCTIONS:   [x] Arrival Time:_1000____________  · [x] Parking lot '\"I\"  is located on [facility] (the corner of [facility] and [facility]). To enter, press the button and the gate will lift. A free token will be provided to exit the lot. One car per patient is allowed to park in this lot. All other cars are to park on [address] [place] [address] either in the parking garage or the handicap lot. [] To reach the [facility] lobby from [address] [place] [address], upon entering the hospital, take elevator B to the 3rd floor. EDUCATION INSTRUCTIONS:      [] Knee or hip replacement booklet & exercise pamphlets given. [] Michelle Horton placed in chart. [] Pre-admission Testing educational folder given  [] Incentive Spirometry,coughing & deep breathing exercises reviewed. []Medication information sheet(s)   []Fluoroscopy-Xray used in surgery reviewed with patient. Educational pamphlet placed in chart. []Pain: Post-op pain is normal and to be expected. You will be asked to rate your pain from 0-10(a zero is not acceptable-education is needed). Your post-op pain goal is:4  [] Ask your nurse for your pain medication. [] Joint camp offered. [] Joint replacement booklets given. [] Other:___________________________    MEDICATION INSTRUCTIONS:   [x]Bring a complete list of your medications, please write the last time you took the medicine, give this list to the nurse. [x] Take the following medications the morning of surgery with 1-2 ounces of water:   [x] Stop herbal supplements and vitamins 5 days before your surgery. [] DO NOT take any diabetic medicine the morning of surgery. Follow instructions for insulin the day before surgery. [] If you are diabetic and your blood sugar is low or you feel symptomatic, you may drink 1-2 ounces of apple juice or take a glucose tablet.   The morning of your procedure, you may call the pre-op area if you have concerns about your blood sugar [phone]. [] Use your inhalers the morning of surgery. Bring your emergency inhaler with you day of surgery. [] Follow physician instructions regarding any blood thinners you may be taking. WHAT TO EXPECT:  [x] The day of surgery you will be greeted and checked in by the [facility].  In addition, you will be registered in the [facility] by a Patient Access Representative. Please bring your photo ID and insurance card. A nurse will greet you in accordance to the time you are needed in the pre-op area to prepare you for surgery. Please do not be discouraged if you are not greeted in the order you arrive as there are many variables that are involved in patient preparation. Your patience is greatly appreciated as you wait for your nurse. Please bring in items such as: books, magazines, newspapers, electronics, or any other items  to occupy your time in the waiting area. [x]  Delays may occur with surgery and staff will make a sincere effort to keep you informed of delays. If any delays occur with your procedure, we apologize ahead of time for your inconvenience as we recognize the value of your time.

## 2021-11-01 ENCOUNTER — PREP FOR PROCEDURE (OUTPATIENT)
Dept: SURGERY | Age: 61
End: 2021-11-01

## 2021-11-01 RX ORDER — SODIUM CHLORIDE 0.9 % (FLUSH) 0.9 %
5-40 SYRINGE (ML) INJECTION PRN
Status: CANCELLED | OUTPATIENT
Start: 2021-11-01

## 2021-11-01 RX ORDER — SODIUM CHLORIDE 0.9 % (FLUSH) 0.9 %
5-40 SYRINGE (ML) INJECTION EVERY 12 HOURS SCHEDULED
Status: CANCELLED | OUTPATIENT
Start: 2021-11-01

## 2021-11-01 RX ORDER — SODIUM CHLORIDE 9 MG/ML
25 INJECTION, SOLUTION INTRAVENOUS PRN
Status: CANCELLED | OUTPATIENT
Start: 2021-11-01

## 2021-11-01 RX ORDER — SODIUM CHLORIDE, SODIUM LACTATE, POTASSIUM CHLORIDE, CALCIUM CHLORIDE 600; 310; 30; 20 MG/100ML; MG/100ML; MG/100ML; MG/100ML
INJECTION, SOLUTION INTRAVENOUS CONTINUOUS
Status: CANCELLED | OUTPATIENT
Start: 2021-11-01

## 2021-11-01 RX ORDER — ACETAMINOPHEN 325 MG/1
1000 TABLET ORAL ONCE
Status: CANCELLED | OUTPATIENT
Start: 2021-11-01 | End: 2021-11-01

## 2021-11-01 RX ORDER — CELECOXIB 200 MG/1
200 CAPSULE ORAL ONCE
Status: CANCELLED | OUTPATIENT
Start: 2021-11-01 | End: 2021-11-01

## 2021-11-01 NOTE — H&P
Patient ID: Leatha Escamilla is a 64 y.o. female.     HPI     History and Physical     Patient's Name/Date of Birth: Leatha Escamilla / 1960         Leatha Escamilla presents for conservative therapy for right breast invasive ductal cancer. (ER+/HI+/HER2-).       PCP: Baljinder Mckinnon DO, Gynecologist: never.     The lesion is located in the 3 o'clock position of the Right breast. The lesion was discovered by mammogram. The patient has not noted a change in BSE since presentation. Patient denies nipple discharge. Patient denies a personal history of breast cancer. Breast cancer risk factors include mom with lumpectomy, no cancer, gender, nulliparus  Ashkenazi Yazidism Ancestry: No.     Patient is hard of hearing. Her mother has never brought her to a gynecologist.     OBSTETRIC RELATED HISTORY:  Age of menarche was 6. Age of menopause was 48. Patient denies hormonal therapy. Patient is . Age of first live birth was NA  Patient NA breast feed. Is patient interested in fertility information about fertility preservation? No     CANCER SURVEILLANCE HISTORY:  Mammograms: Yes  Breast MRI's: No   Breast Biopsies: Yes   Colonoscopy: No   GI Polyps: No   EGD: No   Pelvic Exam: No never  Pap Smear: No never  Dermatology: Yes   Lung screening: no        Have you received the flu vaccination this year? Yes  Have you received the Pneumococcal vaccination in the last 5 years? yes  Have you received the COVID 19 vaccination this year? No     Estimated body mass index is 24.87 kg/m² as calculated from the following:    Height as of this encounter: 4' 10\" (1.473 m). Weight as of this encounter: 119 lb (54 kg). Bra Size: 34A     Patient drinks no caffeinated beverages. Patient does not smoke cigarettes. Patient does not use recreational drugs.        Lymphedema screening completed. See documentation in the flow sheets.         Past Medical History   History reviewed. No pertinent past medical history. 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 Good appetite and energy level. ENMT: Eyes: No diplopia; Nose: No epistaxis. Mouth: No sore throat. RESPIRATORY: No hemoptysis, shortness of breath, cough. CARDIOVASCULAR: No chest pain, pressure, or palpitations. GASTROINTESTINAL: No nausea/vomiting, abdominal pain, diarrhea/constipation. No blood in the stools. GENITOURINARY: No dysuria, urinary frequency, hematuria. NEURO: No syncope, presyncope, headache. Remainder:  ROS NEGATIVE     Patient denies previous history of DVT/PE. Review of systems as noted above completely reviewed with patient. No changes. Mother answers many of the questions.        Objective:   Physical Exam  Vitals and nursing note reviewed. Exam conducted with a chaperone present. Constitutional:       General: She is not in acute distress. Appearance: She is well-developed. She is not diaphoretic. HENT:      Head: Normocephalic and atraumatic. Eyes:      Conjunctiva/sclera: Conjunctivae normal.      Pupils: Pupils are equal, round, and reactive to light. Neck:      Thyroid: No thyromegaly. Trachea: No tracheal deviation. Cardiovascular:      Rate and Rhythm: Normal rate and regular rhythm. Heart sounds: Normal heart sounds. Pulmonary:      Effort: Pulmonary effort is normal. No respiratory distress. Breath sounds: Normal breath sounds. Chest:      Breasts: Breasts are symmetrical.         Right: Mass present. No inverted nipple, nipple discharge, skin change or tenderness. Left: No inverted nipple, mass, nipple discharge, skin change or tenderness.        Abdominal:      General: There is no distension. Palpations: Abdomen is soft. Musculoskeletal:      Right shoulder: Normal range of motion. Left shoulder: Normal range of motion. Cervical back: Normal range of motion and neck supple. Lymphadenopathy:      Cervical: No cervical adenopathy. Upper Body:      Right upper body: No supraclavicular adenopathy. Left upper body: No supraclavicular adenopathy. Skin:     General: Skin is warm and dry. Coloration: Skin is not pale. Findings: No erythema. Neurological:      Mental Status: She is alert and oriented to person, place, and time. Psychiatric:         Behavior: Behavior normal.         Thought Content: Thought content normal.         Judgment: Judgment normal.            Assessment:   64 y.o. woman who underwent an US guided right breast core biopsy at 3 o'clock position on September 20 , 2021. Pathological evaluation completed at [facility]:  PATHOLOGY  Diagnosis:   Right breast, 3 o'clock position, core biopsies: Invasive carcinoma of no   special type (ductal with lobular features).  Preliminary [place]   score 3+2+1 = 6     Comment:   Tumor cells stain strongly positive for e-cadherin   immunostain. Intradepartmental consultation is obtained. Breast Cancer Marker Studies:         Estrogen Receptors (ER):       -Positive (>10% of cells demonstrate nuclear positivity):       Percentage of cells positive: >90%       Intensity: Strong         Progesterone Receptors (LA):       -Positive:       Percentage of cells positive: 2%       Intensity: Weak         Her-2/marquise (c-erb B-2) protein expression: Negative (1+)        MAMMOGRAM ULTRASOUND BIOPSY; [facility]       9/14/2021: MAMMOGRAM, RIGHT BREAST ULTRASOUND: [facility]                9/14/2021: LEFT BREAST ULTRASOUND: [facility]    Imaging reviewed with patient and her mother. Mass is barely visible on mammogram due to its posterior location. Clinical T2, stage II at this point.   Long discussion about options going forward. Patient needs to be transported by her mother, and her mother does not drive when the weather is bad. We discussed conservative therapy versus mastectomy, risks and benefits of both. Place consultation to radiation oncology so patient and her mother could decide whether they were interested in pursuing conservative therapy. Questions answered to the best my ability. Plan: Right breast lumpectomy, blue dye injection, right axillary sentinel lymph node excision, possible right axillary dissection. The risks, benefits, expected outcomes, and alternatives to this procedure have been discussed with the patient, which include but are not limited to bleeding, infection, flap necrosis and medical complications to anesthesia or surgery such as pneumonia, heart problems, blood clots, etc. Patient also was told that with minimally invasive procedures adequate margins are not always obtained with the first operation, and she has a >20% chance of requiring a second procedure to obtain clear margins around her tumor.  Patient understood and desires to undergo the procedure.

## 2021-11-03 ENCOUNTER — HOSPITAL ENCOUNTER (OUTPATIENT)
Dept: NUCLEAR MEDICINE | Age: 61
Discharge: HOME | End: 2021-11-05
Payer: COMMERCIAL

## 2021-11-03 ENCOUNTER — HOSPITAL ENCOUNTER (OUTPATIENT)
Age: 61
Setting detail: OUTPATIENT SURGERY
Discharge: HOME | End: 2021-11-03
Attending: SURGERY | Admitting: SURGERY
Payer: COMMERCIAL

## 2021-11-03 ENCOUNTER — ANESTHESIA EVENT (OUTPATIENT)
Dept: OPERATING ROOM | Age: 61
End: 2021-11-03
Payer: COMMERCIAL

## 2021-11-03 ENCOUNTER — ANESTHESIA (OUTPATIENT)
Dept: OPERATING ROOM | Age: 61
End: 2021-11-03
Payer: COMMERCIAL

## 2021-11-03 VITALS
HEART RATE: 74 BPM | HEIGHT: 58 IN | TEMPERATURE: 98 F | SYSTOLIC BLOOD PRESSURE: 98 MMHG | BODY MASS INDEX: 24.77 KG/M2 | WEIGHT: 118 LBS | OXYGEN SATURATION: 95 % | RESPIRATION RATE: 16 BRPM | DIASTOLIC BLOOD PRESSURE: 60 MMHG

## 2021-11-03 VITALS — TEMPERATURE: 95.7 F | DIASTOLIC BLOOD PRESSURE: 66 MMHG | OXYGEN SATURATION: 97 % | SYSTOLIC BLOOD PRESSURE: 106 MMHG

## 2021-11-03 DIAGNOSIS — C50.911 MALIGNANT NEOPLASM OF RIGHT BREAST IN FEMALE, ESTROGEN RECEPTOR POSITIVE, UNSPECIFIED SITE OF BREAST (HCC): ICD-10-CM

## 2021-11-03 DIAGNOSIS — Z17.0 MALIGNANT NEOPLASM OF RIGHT BREAST IN FEMALE, ESTROGEN RECEPTOR POSITIVE, UNSPECIFIED SITE OF BREAST (HCC): ICD-10-CM

## 2021-11-03 PROCEDURE — 19301 PARTIAL MASTECTOMY: CPT | Performed by: SURGERY

## 2021-11-03 PROCEDURE — 2720000010 HC SURG SUPPLY STERILE: Performed by: SURGERY

## 2021-11-03 PROCEDURE — 2500000003 HC RX 250 WO HCPCS: Performed by: SURGERY

## 2021-11-03 PROCEDURE — 3700000001 HC ADD 15 MINUTES (ANESTHESIA): Performed by: SURGERY

## 2021-11-03 PROCEDURE — 3600000003 HC SURGERY LEVEL 3 BASE: Performed by: SURGERY

## 2021-11-03 PROCEDURE — 7100000010 HC PHASE II RECOVERY - FIRST 15 MIN: Performed by: SURGERY

## 2021-11-03 PROCEDURE — A9520 TC99 TILMANOCEPT DIAG 0.5MCI: HCPCS | Performed by: RADIOLOGY

## 2021-11-03 PROCEDURE — 6360000002 HC RX W HCPCS

## 2021-11-03 PROCEDURE — 2709999900 HC NON-CHARGEABLE SUPPLY: Performed by: SURGERY

## 2021-11-03 PROCEDURE — 3700000000 HC ANESTHESIA ATTENDED CARE: Performed by: SURGERY

## 2021-11-03 PROCEDURE — 88307 TISSUE EXAM BY PATHOLOGIST: CPT

## 2021-11-03 PROCEDURE — 7100000011 HC PHASE II RECOVERY - ADDTL 15 MIN: Performed by: SURGERY

## 2021-11-03 PROCEDURE — 6360000002 HC RX W HCPCS: Performed by: ANESTHESIOLOGY

## 2021-11-03 PROCEDURE — 2580000003 HC RX 258: Performed by: SURGERY

## 2021-11-03 PROCEDURE — 6360000002 HC RX W HCPCS: Performed by: SURGERY

## 2021-11-03 PROCEDURE — 6370000000 HC RX 637 (ALT 250 FOR IP)

## 2021-11-03 PROCEDURE — 38525 BIOPSY/REMOVAL LYMPH NODES: CPT | Performed by: SURGERY

## 2021-11-03 PROCEDURE — 7100000001 HC PACU RECOVERY - ADDTL 15 MIN: Performed by: SURGERY

## 2021-11-03 PROCEDURE — 3600000013 HC SURGERY LEVEL 3 ADDTL 15MIN: Performed by: SURGERY

## 2021-11-03 PROCEDURE — 7100000000 HC PACU RECOVERY - FIRST 15 MIN: Performed by: SURGERY

## 2021-11-03 PROCEDURE — 6370000000 HC RX 637 (ALT 250 FOR IP): Performed by: SURGERY

## 2021-11-03 PROCEDURE — 38792 RA TRACER ID OF SENTINL NODE: CPT

## 2021-11-03 PROCEDURE — 3430000000 HC RX DIAGNOSTIC RADIOPHARMACEUTICAL: Performed by: RADIOLOGY

## 2021-11-03 PROCEDURE — 38900 IO MAP OF SENT LYMPH NODE: CPT | Performed by: SURGERY

## 2021-11-03 RX ORDER — DEXAMETHASONE SODIUM PHOSPHATE 10 MG/ML
INJECTION INTRAMUSCULAR; INTRAVENOUS PRN
Status: DISCONTINUED | OUTPATIENT
Start: 2021-11-03 | End: 2021-11-03 | Stop reason: SDUPTHER

## 2021-11-03 RX ORDER — PROPOFOL 10 MG/ML
INJECTION, EMULSION INTRAVENOUS PRN
Status: DISCONTINUED | OUTPATIENT
Start: 2021-11-03 | End: 2021-11-03 | Stop reason: SDUPTHER

## 2021-11-03 RX ORDER — MIDAZOLAM HYDROCHLORIDE 1 MG/ML
INJECTION INTRAMUSCULAR; INTRAVENOUS PRN
Status: DISCONTINUED | OUTPATIENT
Start: 2021-11-03 | End: 2021-11-03 | Stop reason: SDUPTHER

## 2021-11-03 RX ORDER — METHYLENE BLUE 10 MG/ML
INJECTION INTRAVENOUS PRN
Status: DISCONTINUED | OUTPATIENT
Start: 2021-11-03 | End: 2021-11-03 | Stop reason: ALTCHOICE

## 2021-11-03 RX ORDER — SODIUM CHLORIDE 0.9 % (FLUSH) 0.9 %
5-40 SYRINGE (ML) INJECTION EVERY 12 HOURS SCHEDULED
Status: DISCONTINUED | OUTPATIENT
Start: 2021-11-03 | End: 2021-11-03 | Stop reason: HOSPADM

## 2021-11-03 RX ORDER — SODIUM CHLORIDE 9 MG/ML
25 INJECTION, SOLUTION INTRAVENOUS PRN
Status: DISCONTINUED | OUTPATIENT
Start: 2021-11-03 | End: 2021-11-03 | Stop reason: HOSPADM

## 2021-11-03 RX ORDER — ACETAMINOPHEN 500 MG
1000 TABLET ORAL ONCE
Status: COMPLETED | OUTPATIENT
Start: 2021-11-03 | End: 2021-11-03

## 2021-11-03 RX ORDER — HYDROCODONE BITARTRATE AND ACETAMINOPHEN 5; 325 MG/1; MG/1
1 TABLET ORAL EVERY 6 HOURS PRN
Status: COMPLETED | OUTPATIENT
Start: 2021-11-03 | End: 2021-11-03

## 2021-11-03 RX ORDER — PROMETHAZINE HYDROCHLORIDE 25 MG/ML
6.25 INJECTION, SOLUTION INTRAMUSCULAR; INTRAVENOUS
Status: DISCONTINUED | OUTPATIENT
Start: 2021-11-03 | End: 2021-11-03 | Stop reason: HOSPADM

## 2021-11-03 RX ORDER — SODIUM CHLORIDE 0.9 % (FLUSH) 0.9 %
5-40 SYRINGE (ML) INJECTION PRN
Status: DISCONTINUED | OUTPATIENT
Start: 2021-11-03 | End: 2021-11-03 | Stop reason: HOSPADM

## 2021-11-03 RX ORDER — CELECOXIB 100 MG/1
200 CAPSULE ORAL ONCE
Status: COMPLETED | OUTPATIENT
Start: 2021-11-03 | End: 2021-11-03

## 2021-11-03 RX ORDER — BUPIVACAINE HYDROCHLORIDE 2.5 MG/ML
INJECTION, SOLUTION EPIDURAL; INFILTRATION; INTRACAUDAL PRN
Status: DISCONTINUED | OUTPATIENT
Start: 2021-11-03 | End: 2021-11-03 | Stop reason: ALTCHOICE

## 2021-11-03 RX ORDER — LABETALOL HYDROCHLORIDE 5 MG/ML
5 INJECTION, SOLUTION INTRAVENOUS EVERY 10 MIN PRN
Status: DISCONTINUED | OUTPATIENT
Start: 2021-11-03 | End: 2021-11-03 | Stop reason: HOSPADM

## 2021-11-03 RX ORDER — LIDOCAINE AND PRILOCAINE 25; 25 MG/G; MG/G
CREAM TOPICAL ONCE
Status: DISCONTINUED | OUTPATIENT
Start: 2021-11-03 | End: 2021-11-03 | Stop reason: HOSPADM

## 2021-11-03 RX ORDER — KETOROLAC TROMETHAMINE 30 MG/ML
INJECTION, SOLUTION INTRAMUSCULAR; INTRAVENOUS PRN
Status: DISCONTINUED | OUTPATIENT
Start: 2021-11-03 | End: 2021-11-03 | Stop reason: SDUPTHER

## 2021-11-03 RX ORDER — FENTANYL CITRATE 50 UG/ML
INJECTION, SOLUTION INTRAMUSCULAR; INTRAVENOUS PRN
Status: DISCONTINUED | OUTPATIENT
Start: 2021-11-03 | End: 2021-11-03 | Stop reason: SDUPTHER

## 2021-11-03 RX ORDER — ONDANSETRON 2 MG/ML
INJECTION INTRAMUSCULAR; INTRAVENOUS PRN
Status: DISCONTINUED | OUTPATIENT
Start: 2021-11-03 | End: 2021-11-03 | Stop reason: SDUPTHER

## 2021-11-03 RX ORDER — HYDRALAZINE HYDROCHLORIDE 20 MG/ML
5 INJECTION INTRAMUSCULAR; INTRAVENOUS EVERY 10 MIN PRN
Status: DISCONTINUED | OUTPATIENT
Start: 2021-11-03 | End: 2021-11-03 | Stop reason: HOSPADM

## 2021-11-03 RX ORDER — MEPERIDINE HYDROCHLORIDE 25 MG/ML
12.5 INJECTION INTRAMUSCULAR; INTRAVENOUS; SUBCUTANEOUS EVERY 5 MIN PRN
Status: DISCONTINUED | OUTPATIENT
Start: 2021-11-03 | End: 2021-11-03 | Stop reason: HOSPADM

## 2021-11-03 RX ORDER — SODIUM CHLORIDE, SODIUM LACTATE, POTASSIUM CHLORIDE, CALCIUM CHLORIDE 600; 310; 30; 20 MG/100ML; MG/100ML; MG/100ML; MG/100ML
INJECTION, SOLUTION INTRAVENOUS CONTINUOUS
Status: DISCONTINUED | OUTPATIENT
Start: 2021-11-03 | End: 2021-11-03 | Stop reason: HOSPADM

## 2021-11-03 RX ORDER — LIDOCAINE HYDROCHLORIDE 20 MG/ML
INJECTION, SOLUTION INTRAVENOUS PRN
Status: DISCONTINUED | OUTPATIENT
Start: 2021-11-03 | End: 2021-11-03 | Stop reason: SDUPTHER

## 2021-11-03 RX ADMIN — KETOROLAC TROMETHAMINE 30 MG: 30 INJECTION, SOLUTION INTRAMUSCULAR; INTRAVENOUS at 14:03

## 2021-11-03 RX ADMIN — HYDROMORPHONE HYDROCHLORIDE 0.5 MG: 1 INJECTION, SOLUTION INTRAMUSCULAR; INTRAVENOUS; SUBCUTANEOUS at 14:40

## 2021-11-03 RX ADMIN — ONDANSETRON HYDROCHLORIDE 4 MG: 2 INJECTION, SOLUTION INTRAMUSCULAR; INTRAVENOUS at 12:54

## 2021-11-03 RX ADMIN — SODIUM CHLORIDE, POTASSIUM CHLORIDE, SODIUM LACTATE AND CALCIUM CHLORIDE: 600; 310; 30; 20 INJECTION, SOLUTION INTRAVENOUS at 10:26

## 2021-11-03 RX ADMIN — MIDAZOLAM 2 MG: 1 INJECTION INTRAMUSCULAR; INTRAVENOUS at 12:45

## 2021-11-03 RX ADMIN — FENTANYL CITRATE 100 MCG: 50 INJECTION, SOLUTION INTRAMUSCULAR; INTRAVENOUS at 12:48

## 2021-11-03 RX ADMIN — PROPOFOL 200 MG: 10 INJECTION, EMULSION INTRAVENOUS at 12:48

## 2021-11-03 RX ADMIN — CELECOXIB 200 MG: 100 CAPSULE ORAL at 10:18

## 2021-11-03 RX ADMIN — HYDROMORPHONE HYDROCHLORIDE 0.5 MG: 1 INJECTION, SOLUTION INTRAMUSCULAR; INTRAVENOUS; SUBCUTANEOUS at 14:50

## 2021-11-03 RX ADMIN — LIDOCAINE HYDROCHLORIDE 100 MG: 20 INJECTION, SOLUTION INTRAVENOUS at 12:48

## 2021-11-03 RX ADMIN — ACETAMINOPHEN 1000 MG: 500 TABLET ORAL at 10:19

## 2021-11-03 RX ADMIN — DEXAMETHASONE SODIUM PHOSPHATE 10 MG: 10 INJECTION INTRAMUSCULAR; INTRAVENOUS at 12:54

## 2021-11-03 RX ADMIN — HYDROCODONE BITARTRATE AND ACETAMINOPHEN 1 TABLET: 5; 325 TABLET ORAL at 15:56

## 2021-11-03 RX ADMIN — SODIUM CHLORIDE, POTASSIUM CHLORIDE, SODIUM LACTATE AND CALCIUM CHLORIDE: 600; 310; 30; 20 INJECTION, SOLUTION INTRAVENOUS at 13:23

## 2021-11-03 RX ADMIN — TILMANOCEPT 0.51 MILLICURIE: KIT at 12:31

## 2021-11-03 RX ADMIN — Medication 2000 MG: at 12:54

## 2021-11-03 ASSESSMENT — PAIN DESCRIPTION - DESCRIPTORS
DESCRIPTORS: ACHING;DISCOMFORT
DESCRIPTORS: OTHER (COMMENT)
DESCRIPTORS: ACHING
DESCRIPTORS: ACHING

## 2021-11-03 ASSESSMENT — PULMONARY FUNCTION TESTS
PIF_VALUE: 15
PIF_VALUE: 15
PIF_VALUE: 17
PIF_VALUE: 2
PIF_VALUE: 16
PIF_VALUE: 15
PIF_VALUE: 2
PIF_VALUE: 2
PIF_VALUE: 16
PIF_VALUE: 15
PIF_VALUE: 17
PIF_VALUE: 2
PIF_VALUE: 2
PIF_VALUE: 0
PIF_VALUE: 23
PIF_VALUE: 17
PIF_VALUE: 17
PIF_VALUE: 2
PIF_VALUE: 2
PIF_VALUE: 3
PIF_VALUE: 16
PIF_VALUE: 2
PIF_VALUE: 15
PIF_VALUE: 16
PIF_VALUE: 15
PIF_VALUE: 16
PIF_VALUE: 17
PIF_VALUE: 17
PIF_VALUE: 15
PIF_VALUE: 2
PIF_VALUE: 16
PIF_VALUE: 2
PIF_VALUE: 16
PIF_VALUE: 2
PIF_VALUE: 16
PIF_VALUE: 3
PIF_VALUE: 17
PIF_VALUE: 16
PIF_VALUE: 2
PIF_VALUE: 18
PIF_VALUE: 3
PIF_VALUE: 17
PIF_VALUE: 15
PIF_VALUE: 15
PIF_VALUE: 16
PIF_VALUE: 2
PIF_VALUE: 1
PIF_VALUE: 1
PIF_VALUE: 2
PIF_VALUE: 15
PIF_VALUE: 16
PIF_VALUE: 2
PIF_VALUE: 17
PIF_VALUE: 15
PIF_VALUE: 2
PIF_VALUE: 16
PIF_VALUE: 16
PIF_VALUE: 17
PIF_VALUE: 15
PIF_VALUE: 0
PIF_VALUE: 16
PIF_VALUE: 2
PIF_VALUE: 3
PIF_VALUE: 13
PIF_VALUE: 16
PIF_VALUE: 2
PIF_VALUE: 16
PIF_VALUE: 2
PIF_VALUE: 2
PIF_VALUE: 1
PIF_VALUE: 2
PIF_VALUE: 15
PIF_VALUE: 2
PIF_VALUE: 17
PIF_VALUE: 16
PIF_VALUE: 19
PIF_VALUE: 2
PIF_VALUE: 15
PIF_VALUE: 17
PIF_VALUE: 4
PIF_VALUE: 2
PIF_VALUE: 16
PIF_VALUE: 2
PIF_VALUE: 15
PIF_VALUE: 15

## 2021-11-03 ASSESSMENT — PAIN DESCRIPTION - LOCATION
LOCATION: BREAST

## 2021-11-03 ASSESSMENT — PAIN SCALES - GENERAL
PAINLEVEL_OUTOF10: 6
PAINLEVEL_OUTOF10: 8
PAINLEVEL_OUTOF10: 8
PAINLEVEL_OUTOF10: 4
PAINLEVEL_OUTOF10: 4
PAINLEVEL_OUTOF10: 0

## 2021-11-03 ASSESSMENT — PAIN DESCRIPTION - FREQUENCY
FREQUENCY: CONTINUOUS

## 2021-11-03 ASSESSMENT — PAIN DESCRIPTION - PAIN TYPE
TYPE: SURGICAL PAIN

## 2021-11-03 ASSESSMENT — PAIN DESCRIPTION - ORIENTATION
ORIENTATION: RIGHT;ANTERIOR
ORIENTATION: RIGHT
ORIENTATION: RIGHT;ANTERIOR

## 2021-11-03 ASSESSMENT — PAIN - FUNCTIONAL ASSESSMENT: PAIN_FUNCTIONAL_ASSESSMENT: 0-10

## 2021-11-03 ASSESSMENT — LIFESTYLE VARIABLES: SMOKING_STATUS: 0

## 2021-11-03 ASSESSMENT — PAIN DESCRIPTION - ONSET: ONSET: ON-GOING

## 2021-11-03 ASSESSMENT — PAIN DESCRIPTION - PROGRESSION: CLINICAL_PROGRESSION: GRADUALLY IMPROVING

## 2021-11-03 NOTE — PROGRESS NOTES
1630-Patient and family verbaize understanding of given discharge instructions. 1640- patient assisted in dressing and ambulates to bathroom to void large quantity. 1655- Patient discharged per wheelchair to car driven by brother in stable condition.

## 2021-11-03 NOTE — ANESTHESIA PRE PROCEDURE
Department of Anesthesiology  Preprocedure Note       Name:  Song Tobias   Age:  64 y.o.  :  1960                                          MRN:  [id number]         Date:  11/3/2021      Surgeon: Luis Lambert):  Carlitos Mai MD    Procedure: Procedure(s):  RIGHT BREAST LUMPECTOMY, BLUE DYE INJECTION, RIGHT AXILLARY SENTINEL LYMPH NODE INCSION, POSSIBLE RIGHT AXILLARY DISSECTION - Sam Zaidi, ULTRASOUND IN ROOM    Medications prior to admission:   Prior to Admission medications    Medication Sig Start Date End Date Taking?  Authorizing Provider   Garlic (GARLIQUE) 067 MG TBEC Take by mouth   Yes Historical Provider, MD   Cholecalciferol (VITAMIN D3) 50 MCG ( [place]) CAPS Take by mouth   Yes Historical Provider, MD   Garlic 10 MG CAPS Take by mouth    Yes Historical Provider, MD   loratadine (CLARITIN) 10 MG capsule Take 10 mg by mouth daily   Yes Historical Provider, MD   acetaminophen (TYLENOL) 500 MG tablet Take 500 mg by mouth every 6 hours as needed for Pain   Yes Historical Provider, MD       Current medications:    Current Facility-Administered Medications   Medication Dose Route Frequency Provider Last Rate Last Admin    0.9 % sodium chloride infusion  25 mL IntraVENous PRN Carlitos Mai MD        ceFAZolin (ANCEF) 2000 mg in sterile water 20 mL IV syringe  2,000 mg IntraVENous On Call to [address] [place] [address], MD        lactated ringers infusion   IntraVENous Continuous Carlitos Mai  mL/hr at 21 1026 New Bag at 21 1026    sodium chloride flush 0.9 % injection 5-40 mL  5-40 mL IntraVENous 2 times per day Carlitos Mai MD        sodium chloride flush 0.9 % injection 5-40 mL  5-40 mL IntraVENous PRN Carltios Mai MD         Facility-Administered Medications Ordered in Other Encounters   Medication Dose Route Frequency Provider Last Rate Last Admin    technetium Tc 99m tilmanocept (LYMPHOSEEK) injection 9.98 millicurie  734 micro curie Injection ONCE PRN Neo Kincaid DO           Allergies: No Known Allergies    Problem List:  There is no problem list on file for this patient. Past Medical History:        Diagnosis Date    Breast cancer ([facility] [address]     Right breast    Cancer ([facility] [address]     Hearing loss     Hyperlipidemia        Past Surgical History:        Procedure Laterality Date    EYE SURGERY      US BREAST NEEDLE BIOPSY RIGHT  2021       Social History:    Social History     Tobacco Use    Smoking status: Never Smoker    Smokeless tobacco: Never Used   Substance Use Topics    Alcohol use: Never                                Counseling given: Not Answered      Vital Signs (Current):   Vitals:    10/28/21 0947 11/03/21 1010   BP:  (!) 189/95   Pulse:  98   Resp:  20   SpO2:  99%   Weight: 118 lb (53.5 kg) 118 lb (53.5 kg)   Height: 4' 10\" (1.473 m) 4' 10\" (1.473 m)                                              BP Readings from Last 3 Encounters:   11/03/21 (!) 189/95   10/21/21 (!) 146/80   10/04/21 126/82       NPO Status: Time of last liquid consumption: 2000                        Time of last solid consumption: 1700                        Date of last liquid consumption: 11/02/21                        Date of last solid food consumption: 11/02/21    BMI:   Wt Readings from Last 3 Encounters:   11/03/21 118 lb (53.5 kg)   10/21/21 118 lb 8 oz (53.8 kg)   10/04/21 119 lb (54 kg)     Body mass index is 24.66 kg/m².     CBC:   Lab Results   Component Value Date    WBC 8.8 10/04/2021    RBC 5.36 10/04/2021    HGB 15.1 10/04/2021    HCT 46.6 10/04/2021    MCV 86.9 10/04/2021    RDW 13.8 10/04/2021     10/04/2021       CMP:   Lab Results   Component Value Date     10/04/2021    K 4.6 10/04/2021     10/04/2021    CO2 26 10/04/2021    BUN 11 10/04/2021    CREATININE 0.8 10/04/2021    GFRAA >60 10/04/2021    LABGLOM >60 10/04/2021    GLUCOSE 111 10/04/2021    PROT 7.2 10/04/2021    CALCIUM 9.7 10/04/2021    BILITOT 0.4 10/04/2021    ALKPHOS 80 10/04/2021    AST 23 10/04/2021    ALT 15 10/04/2021       POC Tests: No results for input(s): POCGLU, POCNA, POCK, POCCL, POCBUN, POCHEMO, POCHCT in the last 72 hours. Coags: No results found for: PROTIME, INR, APTT    HCG (If Applicable): No results found for: PREGTESTUR, PREGSERUM, HCG, HCGQUANT     ABGs: No results found for: PHART, PO2ART, YZV7NQB, RTJ2TAU, BEART, V1CJIIIL     Type & Screen (If Applicable):  No results found for: LABABO, LABRH    Drug/Infectious Status (If Applicable):  No results found for: HIV, HEPCAB    COVID-19 Screening (If Applicable): No results found for: COVID19        Anesthesia Evaluation  Patient summary reviewed and Nursing notes reviewed no history of anesthetic complications:   Airway: Mallampati: III  TM distance: <3 FB   Neck ROM: full  Mouth opening: > = 3 FB Dental:      Comment: Pt denies any loose, chipped or missing teeth    Pulmonary:Negative Pulmonary ROS breath sounds clear to auscultation  (+) decreased breath sounds,      (-) not a current smoker                           Cardiovascular:  Exercise tolerance: good (>4 METS),   (+) hyperlipidemia        Rhythm: regular  Rate: normal                    Neuro/Psych:   Negative Neuro/Psych ROS              GI/Hepatic/Renal: Neg GI/Hepatic/Renal ROS            Endo/Other:    (+) malignancy/cancer (breast ca). ROS comment: Hard of hearing  Abdominal:             Vascular: negative vascular ROS. Other Findings:             Anesthesia Plan      general     ASA 2       Induction: intravenous. Anesthetic plan and risks discussed with patient. Plan discussed with CRNA and attending. Ирина Mccormack RN   11/3/2021      DOS STAFF ADDENDUM:    Patient seen and chart reviewed. Physical exam and history updated as indicated. NPO status confirmed. Anesthesia options and plan discussed including risks benefits with patient/legal guardian and family as available. Concerns and questions addressed.   Consent verbalized to proceed.   Anesthesia plan, options and intraoperative/postoperative concerns discussed with care team.    Sameera Carter MD, MD  11/3/2021  12:10 PM

## 2021-11-03 NOTE — H&P
Patient ID: Cherrie Guzman is a 64 y.o. female.     HPI     History and Physical     Patient's Name/Date of Birth: Cherrie Guzman / 1960         Cherrie Guzman presents for conservative therapy for medial right breast invasive ductal cancer. (ER+/WA+/HER2-).        PCP: Robert Diaz DO, Gynecologist: never.     The lesion is located in the 3 o'clock position of the Right breast. The lesion was discovered by mammogram. The patient has not noted a change in BSE since presentation.  Patient denies nipple discharge. Riya Breaux personal history of breast cancer.  Breast cancer risk factors include mom with lumpectomy, no cancer, gender, nulliparus  Ashkenazi Yazidism Ancestry: No.     Patient is hard of hearing. Jarod Castle mother has never brought her to a gynecologist.     OBSTETRIC RELATED HISTORY:  Age of menarche was 11. Age of menopause was 55.    Patient denies hormonal therapy. Patient is . Age of first live birth was NA  Patient NA breast feed. Is patient interested in fertility information about fertility preservation? No     CANCER SURVEILLANCE HISTORY:  Mammograms: Yes  Breast MRI's: No   Breast Biopsies: Yes   Colonoscopy: No   GI Polyps: No   EGD: No   Pelvic Exam: No never  Pap Smear: No never  Dermatology: Yes   Lung screening: no        Have you received the flu vaccination this year? Yes  Have you received the Pneumococcal vaccination in the last 5 years? yes  Have you received the COVID 19 vaccination this year? No     Estimated body mass index is 24.87 kg/m² as calculated from the following:    Height as of this encounter: 4' 10\" (1.473 m).    Weight as of this encounter: 119 lb (54 kg). Bra Size: 34A     Patient drinks no caffeinated beverages. Patient does not smoke cigarettes. Patient does not use recreational drugs.        Lymphedema screening completed. See documentation in the flow sheets.         Past Medical History   History reviewed.  No pertinent past medical history.         Past Surgical History             Past Surgical History:   Procedure Laterality Date    EYE SURGERY        US BREAST NEEDLE BIOPSY RIGHT   2021            Current Facility-Administered Medications               Current Outpatient Medications   Medication Sig Dispense Refill    Cholecalciferol (VITAMIN D3) 50 MCG (2000 UT) CAPS Take by mouth        Garlic 10 MG CAPS Take by mouth        loratadine (CLARITIN) 10 MG capsule Take 10 mg by mouth daily        acetaminophen (TYLENOL) 500 MG tablet Take 500 mg by mouth every 6 hours as needed for Pain          No current facility-administered medications for this visit.            No Known Allergies     Family History   History reviewed. No pertinent family history.             Social History                   Socioeconomic History    Marital status: Single       Spouse name: Not on file    Number of children: Not on file    Years of education: Not on file    Highest education level: Not on file   Occupational History    Not on file   Tobacco Use    Smoking status: Never Smoker    Smokeless tobacco: Never Used   Vaping Use    Vaping Use: Never used   Substance and Sexual Activity    Alcohol use: Never    Drug use: Never    Sexual activity: Not on file   Other Topics Concern    Not on file   Social History Narrative    Not on file      Social Determinants of Health            Financial Resource Strain:     Difficulty of Paying Living Expenses:    Food Insecurity:     Worried About Running Out of Food in the Last Year:     [address] in the Last Year:    Transportation Needs:     Lack of Transportation (Medical):      Lack of Transportation (Non-Medical):    Physical Activity:     Days of Exercise per Week:     Minutes of Exercise per Session:    Stress:     Feeling of Stress :    Social Connections:     Frequency of Communication with Friends and Family:     Frequency of Social Gatherings with Friends and Family:     Attends Mor[employer] Services:     Active Member of Clubs or Organizations:     Attends Club or Organization Meetings:     Marital Status:    Intimate Partner Violence:     Fear of Current or Ex-Partner:     Emotionally Abused:     Physically Abused:     Sexually Abused:              Occupation: weed, build puzzles, 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 Good appetite and energy level. ENMT: Eyes: No diplopia; Nose: No epistaxis. Mouth: No sore throat. RESPIRATORY: No hemoptysis, shortness of breath, cough. CARDIOVASCULAR: No chest pain, pressure, or palpitations. GASTROINTESTINAL: No nausea/vomiting, abdominal pain, diarrhea/constipation.  No blood in the stools. GENITOURINARY: No dysuria, urinary frequency, hematuria. NEURO: No syncope, presyncope, headache. Remainder:  ROS NEGATIVE     Patient denies previous history of DVT/PE. Review of systems as noted above completely reviewed with patient.  No changes. Mother answers many of the questions.        Objective:   Physical Exam  Vitals and nursing note reviewed. Exam conducted with a chaperone present. Constitutional:       General: She is not in acute distress.     Appearance: She is well-developed. She is not diaphoretic. HENT:      Head: Normocephalic and atraumatic. Eyes:      Conjunctiva/sclera: Conjunctivae normal.      Pupils: Pupils are equal, round, and reactive to light. Neck:      Thyroid: No thyromegaly.      Trachea: No tracheal deviation. Cardiovascular:      Rate and Rhythm: Normal rate and regular rhythm.      Heart sounds: Normal heart sounds. Pulmonary:      Effort: Pulmonary effort is normal. No respiratory distress.      Breath sounds: Normal breath sounds. Chest:      Breasts: Breasts are symmetrical.         Right: Mass present.  No inverted nipple, nipple discharge, skin change or tenderness.         Left: No inverted nipple, mass, nipple discharge, skin change or tenderness.       Abdominal:      General: There is no distension.      Palpations: Abdomen is soft. Musculoskeletal:      Right shoulder: Normal range of motion.      Left shoulder: Normal range of motion.      Cervical back: Normal range of motion and neck supple. Lymphadenopathy:      Cervical: No cervical adenopathy.      Upper Body:      Right upper body: No supraclavicular adenopathy.      Left upper body: No supraclavicular adenopathy. Skin:     General: Skin is warm and dry.      Coloration: Skin is not pale.      Findings: No erythema. Neurological:      Mental Status: She is alert and oriented to person, place, and time. Psychiatric:         BehaviorJodine Abo     Thought Content: Thought content normal.         Judgment: Judgment normal.            Assessment:   61 y.o. woman who underwent an US guided right breast core biopsy at 3 o'clock position on September 20 , 2021. Pathological evaluation completed at [facility]:  PATHOLOGY  Diagnosis:   Right breast, 3 o'clock position, core biopsies: Invasive carcinoma of no   special type (ductal with lobular features).  Preliminary Bryant   score 3+2+1 = 6     Comment:   Tumor cells stain strongly positive for e-cadherin   immunostain. Intradepartmental consultation is obtained. Breast Cancer Marker Studies:         Estrogen Receptors (ER):       -Positive (>10% of cells demonstrate nuclear positivity):       Percentage of cells positive: >90%       Intensity: Strong         Progesterone Receptors (TN):       -Positive:       Percentage of cells positive: 2%       Intensity: Weak         Her-2/marquise (c-erb B-2) protein expression: Negative (1+)        MAMMOGRAM ULTRASOUND BIOPSY; [facility]       9/14/2021: MAMMOGRAM, RIGHT BREAST ULTRASOUND: [facility]                9/14/2021: LEFT BREAST ULTRASOUND: [facility]    Imaging reviewed with patient and her mother. Shannon Naik is barely visible on mammogram due to its posterior location.  Clinical T2, stage II at this point.  Long discussion about options going forward.  Patient needs to be transported by her mother, and her mother does not drive when the weather is bad.  We discussed conservative therapy versus mastectomy, risks and benefits of both.  Place consultation to radiation oncology so patient and her mother could decide whether they were interested in pursuing conservative therapy.  Questions answered to the best my ability.     Plan: Right breast lumpectomy, blue dye injection, right axillary sentinel lymph node excision, possible right axillary dissection.     The risks, benefits, expected outcomes, and alternatives to this procedure have been discussed with the patient, which include but are not limited to bleeding, infection, flap necrosis and medical complications to anesthesia or surgery such as pneumonia, heart problems, blood clots, etc. Patient also was told that with minimally invasive procedures adequate margins are not always obtained with the first operation, and she has a >20% chance of requiring a second procedure to obtain clear margins around her tumor.  Patient understood and desires to undergo the procedure.

## 2021-11-03 NOTE — PROGRESS NOTES
Patient admitted to [facility] from PACU. Cart locked and in low position. Side rails are up. Call light is within reach. Patient and family oriented to [facility] routine proir to discharge.

## 2021-11-04 ENCOUNTER — TELEPHONE (OUTPATIENT)
Dept: SURGERY | Age: 61
End: 2021-11-04

## 2021-11-11 NOTE — PROGRESS NOTES
Subjective:      Patient ID: Chandler Nguyen is a 64 y.o. female. HPI     History and Physical    Patient's Name/Date of Birth: Chandler Nguyen / 1960    Date:November 16, 2021  PCP: Bibi Barger DO, Gynecologist: never. Patient is here for a post-operative visit. She underwent right breast lumpectomy, blue dye injection, right axillary sentinel lymph node incision on November 3, 2021. Pathological evaluation completed at [facility]):    Pathology report discussed. Diagnosis:   A.  Right breast, 3:00, excision: Invasive carcinoma, no special type   (ductal), see comment. B.  [place] lymph node #1: Single lymph node, negative for neoplasm. Comment:   CANCER CASE SUMMARY   Procedure -excision   Specimen Laterality -right   Tumor Site, Invasive Carcinoma-3:00   Histologic Type-invasive carcinoma, no special type (ductal)   Histologic Grade ([place] Histologic Score):                    Tubule differentiation- score-3                    Nuclear pleomorphism- score 2                    Mitotic rate- score 2                    Overall Grade- grade 2, (score 7)   Tumor Size: Size of Largest Invasive Carcinoma-30 mm   Ductal Carcinoma In Situ (DCIS)-not identified   Tumor Extent-carcinoma invades skeletal muscle   Treatment Effect in the Breast-no known presurgical therapy   Margin status for invasive carcinoma: Inferior margin involved by   invasive carcinoma.  Posterior margin less than 1 mm from invasive   carcinoma. Regional Lymph Nodes: All regional lymph nodes negative for tumor   Total number of lymph nodes examined-1   Number of sentinel nodes examined-1   Pathologic Stage Classification (AJCC 8th Edition)- pT2 p(sn)N0   Additional Pathologic Findings-lymphovascular and perineural space   invasion   Special studies-ER positive, PA positive, HER-2 negative ([id number])   Additional Note: Intradepartmental consultation is obtained.          HISTORY  Chandler Nguyen presents for evaluation of newly diagnosed right breast invasive ductal cancer. (ER+/UT+/HER2-). Here today with her Mother, who she resides with. The lesion is located in the 3 o'clock position of the Right breast. The lesion was discovered by mammogram. The patient has not noted a change in BSE since presentation. Patient denies nipple discharge. Patient denies a personal history of breast cancer. Breast cancer risk factors include mom with lumpectomy, no cancer, gender, nulliparus  Ashkenazi Religious Ancestry: No.    Patient is hard of hearing. Her mother has never brought her to a gynecologist.    Jose Baumannjayden HISTORY:  Age of menarche was 6. Age of menopause was 48. Patient denies hormonal therapy. Patient is . Age of first live birth was NA  Patient NA breast feed. Is patient interested in fertility information about fertility preservation? No    CANCER SURVEILLANCE HISTORY:  Mammograms: Yes  Breast MRI's: No   Breast Biopsies: Yes   Colonoscopy: No   GI Polyps: No   EGD: No   Pelvic Exam: No never  Pap Smear: No never  Dermatology: Yes   Lung screening: no      Have you received the flu vaccination this year? Yes  Have you received the Pneumococcal vaccination in the last 5 years? yes  Have you received the COVID 19 vaccination this year? No    Estimated body mass index is 24.66 kg/m² as calculated from the following:    Height as of 11/3/21: 4' 10\" (1.473 m). Weight as of 11/3/21: 118 lb (53.5 kg). Bra Size: 34A    Patient drinks no caffeinated beverages. Patient does not smoke cigarettes. Patient does not use recreational drugs. Lymphedema screening completed. See documentation in the flow sheets.        Past Medical History:   Diagnosis Date    Breast cancer ([address]     Right breast    Cancer ([address]     Hearing loss     Hyperlipidemia        Past Surgical History:   Procedure Laterality Date    BREAST LUMPECTOMY Right 11/3/2021    RIGHT BREAST LUMPECTOMY, BLUE DYE INJECTION, RIGHT AXILLARY SENTINEL LYMPH NODE INCISION performed by Bam Braxton MD at [facility] RIGHT  2021       Current Outpatient Medications   Medication Sig Dispense Refill    Garlic (GARLIQUE) 579 MG TBEC Take by mouth      Cholecalciferol (VITAMIN D3) 50 MCG (2000 UT) CAPS Take by mouth      Garlic 10 MG CAPS Take by mouth       loratadine (CLARITIN) 10 MG capsule Take 10 mg by mouth daily      acetaminophen (TYLENOL) 500 MG tablet Take 500 mg by mouth every 6 hours as needed for Pain       No current facility-administered medications for this visit. No Known Allergies    Family History   Problem Relation Age of Onset    Heart Disease Mother     High Blood Pressure Mother     High Cholesterol Mother     Other Father         COPD       Social History     Socioeconomic History    Marital status: Single     Spouse name: Not on file    Number of children: Not on file    Years of education: Not on file    Highest education level: Not on file   Occupational History    Not on file   Tobacco Use    Smoking status: Never Smoker    Smokeless tobacco: Never Used   Vaping Use    Vaping Use: Never used   Substance and Sexual Activity    Alcohol use: Never    Drug use: Never    Sexual activity: Not on file   Other Topics Concern    Not on file   Social History Narrative    Not on file     Social Determinants of Health     Financial Resource Strain:     Difficulty of Paying Living Expenses: Not on file   Food Insecurity:     Worried About Running Out of Food in the Last Year: Not on file    Rae of Food in the Last Year: Not on file   Transportation Needs:     Lack of Transportation (Medical): Not on file    Lack of Transportation (Non-Medical):  Not on file   Physical Activity:     Days of Exercise per Week: Not on file    Minutes of Exercise per Session: Not on file   Stress:     Feeling of Stress : Not on file   Social Connections:     Frequency of Communication with Friends and Family: Not on file    Frequency of Social Gatherings with Friends and Family: Not on file    Attends Cheondoism Services: Not on file    Active Member of Clubs or Organizations: Not on file    Attends Club or Organization Meetings: Not on file    Marital Status: Not on file   Intimate Partner Violence:     Fear of Current or Ex-Partner: Not on file    Emotionally Abused: Not on file    Physically Abused: Not on file    Sexually Abused: Not on file   Housing Stability:     Unable to Pay for Housing in the Last Year: Not on file    Number of [place] in the Last Year: Not on file    Unstable Housing in the Last Year: Not on file       Occupation: weeds, build puzzles, 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 Good appetite and energy level. ENMT: Eyes: No diplopia; Nose: No epistaxis. Mouth: No sore throat. RESPIRATORY: No hemoptysis, shortness of breath, cough. CARDIOVASCULAR: No chest pain, pressure, or palpitations. GASTROINTESTINAL: No nausea/vomiting, abdominal pain, diarrhea/constipation. No blood in the stools. GENITOURINARY: No dysuria, urinary frequency, hematuria. NEURO: No syncope, presyncope, headache. Remainder:  ROS NEGATIVE    Patient denies previous history of DVT/PE. Review of systems as noted above completely reviewed with patient. No changes. Mother answers many of the questions. The patient voices no complaints. With questioning, she denies significant pain, fever, chills, wound drainage or discharge. Objective:   Well-healed breast and axillary incisions. No erythema, drainage, or seroma. Full range of motion right shoulder. Assessment:       64 y.o. woman who underwent right breast lumpectomy, blue dye injection, right axillary sentinel lymph node incision on November 3, 2021. Pathological evaluation [facility] at [facility]):    Pathology report discussed.     Diagnosis:   A.  Right breast, 3:00, excision: Invasive carcinoma, no special type   (ductal), see comment. B.  [place] lymph node #1: Single lymph node, negative for neoplasm. Comment:   CANCER CASE SUMMARY   Procedure -excision   Specimen Laterality -right   Tumor Site, Invasive Carcinoma-3:00   Histologic Type-invasive carcinoma, no special type (ductal)   Histologic Grade ([place] Histologic Score):                    Tubule differentiation- score-3                    Nuclear pleomorphism- score 2                    Mitotic rate- score 2                    Overall Grade- grade 2, (score 7)   Tumor Size: Size of Largest Invasive Carcinoma-30 mm   Ductal Carcinoma In Situ (DCIS)-not identified   Tumor Extent-carcinoma invades skeletal muscle   Treatment Effect in the Breast-no known presurgical therapy   Margin status for invasive carcinoma: Inferior margin involved by   invasive carcinoma.  Posterior margin less than 1 mm from invasive   carcinoma. Regional Lymph Nodes: All regional lymph nodes negative for tumor   Total number of lymph nodes examined-1   Number of sentinel nodes examined-1   Pathologic Stage Classification (AJCC 8th Edition)- pT2 p(sn)N0   Additional Pathologic Findings-lymphovascular and perineural space   invasion   Special studies-ER positive, NM positive, HER-2 negative ([id number])   Additional Note: Intradepartmental consultation is obtained. HISTORY  64 y.o. woman who underwent an US guided right breast core biopsy at 3 o'clock position on September 20 , 2021. Pathological evaluation [facility] at [facility]:  PATHOLOGY  Diagnosis:   Right breast, 3 o'clock position, core biopsies: Invasive carcinoma of no   special type (ductal with lobular features).  Preliminary [place]   score 3+2+1 = 6     Comment:   Tumor cells stain strongly positive for e-cadherin   immunostain. Intradepartmental consultation is obtained.      Breast Cancer Marker Studies:         Estrogen Receptors (ER):   Buggl  -Positive (>10% of cells demonstrate nuclear positivity):       Percentage of cells positive: >90%       Intensity: Strong         Progesterone Receptors (SD):       -Positive:       Percentage of cells positive: 2%       Intensity: Weak         Her-2/marquise (c-erb B-2) protein expression: Negative (1+)       MAMMOGRAM ULTRASOUND BIOPSY; [facility]      9/14/2021: MAMMOGRAM, RIGHT BREAST ULTRASOUND: [facility]            9/14/2021: LEFT BREAST ULTRASOUND: [facility]    Stage IA right breast cancer involving pectoralis muscle, a margin of which was excised with the specimen. Inferior margin of right breast lumpectomy will be reexcised. Patient has already seen radiation oncology in consultation. Referral placed to medical oncology. Mother requests to be seen in [facility]. Questions answered to the best my ability. Office visit after reexcision. Plan:       Plan:   1. Patient instructed to keep incision clean and dry well after bathing. Patient can start scar massage once incision is completely healed and strong enough to handle the motion (usually 10 - 14 days post operatively). Rub in a circular motion on and around the scar with firm, even pressure for 5 minutes four times per day. Use lotion or moisturizer to do the scar massage to allow ease with motion over the scar and prevent friction at the area. 2. Continue monthly breast self examination. Bring any changes to your physician's attention. 3. Routine screening imaging in August 2022.  4. Range of motion exercises for right shoulder encouraged. Lymphedema precautions discussed. 5. Education/Lifestyle recommendations: A regular exercise program is encouraged. Avoid excessive caffeine intake. Maintain a diet rich in vegetables and fruits avoiding processed and fast food. 6. Consultations: Oncology appointment will be scheduled with Oncologist of patient's and/or PCP's preference.   7. Continue regular appointments with PCP & Gynecologist.  8. Office follow-up visit should be scheduled postoperatively, in 6 months and PRN. I personally and independently saw and examined patient and reviewed all pertinent laboratory data and imaging studies. I have reviewed and agree with the CNP history and review of systems as documented in the above note. This document is generated, in part, by voice recognition software and thus syntax and grammatical errors are possible. Cata Tellez MD [place] [id number]  November 16, 2021

## 2021-11-12 ENCOUNTER — TELEPHONE (OUTPATIENT)
Dept: BREAST CENTER | Age: 61
End: 2021-11-12

## 2021-11-12 ENCOUNTER — TELEPHONE (OUTPATIENT)
Dept: SURGERY | Age: 61
End: 2021-11-12

## 2021-11-16 ENCOUNTER — OFFICE VISIT (OUTPATIENT)
Dept: BREAST CENTER | Age: 61
End: 2021-11-16

## 2021-11-16 VITALS
TEMPERATURE: 97.7 F | DIASTOLIC BLOOD PRESSURE: 70 MMHG | WEIGHT: 118 LBS | HEIGHT: 58 IN | RESPIRATION RATE: 20 BRPM | OXYGEN SATURATION: 98 % | SYSTOLIC BLOOD PRESSURE: 134 MMHG | HEART RATE: 58 BPM | BODY MASS INDEX: 24.77 KG/M2

## 2021-11-16 DIAGNOSIS — C50.211 MALIGNANT NEOPLASM OF UPPER-INNER QUADRANT OF RIGHT BREAST IN FEMALE, ESTROGEN RECEPTOR POSITIVE (HCC): ICD-10-CM

## 2021-11-16 DIAGNOSIS — Z17.0 MALIGNANT NEOPLASM OF UPPER-INNER QUADRANT OF RIGHT BREAST IN FEMALE, ESTROGEN RECEPTOR POSITIVE (HCC): ICD-10-CM

## 2021-11-16 DIAGNOSIS — C80.1 CANCER (HCC): ICD-10-CM

## 2021-11-16 DIAGNOSIS — Z17.0 MALIGNANT NEOPLASM OF RIGHT BREAST IN FEMALE, ESTROGEN RECEPTOR POSITIVE, UNSPECIFIED SITE OF BREAST (HCC): Primary | ICD-10-CM

## 2021-11-16 DIAGNOSIS — C50.911 MALIGNANT NEOPLASM OF RIGHT BREAST IN FEMALE, ESTROGEN RECEPTOR POSITIVE, UNSPECIFIED SITE OF BREAST (HCC): Primary | ICD-10-CM

## 2021-11-16 PROCEDURE — 99024 POSTOP FOLLOW-UP VISIT: CPT | Performed by: SURGERY

## 2021-11-16 NOTE — LETTER
[address] [address]. [address]  Phone: [phone]  Fax: [phone]    Corby Reyes MD    November 16, 2021     Benjie Mendoza, [address] [facility] [address]    Patient: Precious Nieto   MR Number: [id number]   YOB: 1960   Date of Visit: 11/16/2021       Dear Benjie Mendoza: Thank you for referring Abhijit Zhang to me for evaluation/treatment. Below are the relevant portions of my assessment and plan of care. 64 y.o. woman who underwent right breast lumpectomy, blue dye injection, right axillary sentinel lymph node incision on November 3, 2021. Pathological evaluation completed at [facility]):    Diagnosis:   A.  Right breast, 3:00, excision: Invasive carcinoma, no special type   (ductal), see comment. B.  Curtis lymph node #1: Single lymph node, negative for neoplasm. Comment:   CANCER CASE SUMMARY   Procedure -excision   Specimen Laterality -right   Tumor Site, Invasive Carcinoma-3:00   Histologic Type-invasive carcinoma, no special type (ductal)   Histologic Grade (Allan Histologic Score):                    Tubule differentiation- score-3                    Nuclear pleomorphism- score 2                    Mitotic rate- score 2                    Overall Grade- grade 2, (score 7)   Tumor Size: Size of Largest Invasive Carcinoma-30 mm   Ductal Carcinoma In Situ (DCIS)-not identified   Tumor Extent-carcinoma invades skeletal muscle   Treatment Effect in the Breast-no known presurgical therapy   Margin status for invasive carcinoma: Inferior margin involved by   invasive carcinoma.  Posterior margin less than 1 mm from invasive   carcinoma. Regional Lymph Nodes:  All regional lymph nodes negative for tumor   Total number of lymph nodes examined-1   Number of sentinel nodes examined-1   Pathologic Stage Classification (AJCC 8th Edition)- pT2 p(sn)N0   Additional Pathologic Findings-lymphovascular and perineural space   invasion   Special studies-ER positive, TX positive, HER-2 negative ([id number])   Additional Note: Intradepartmental consultation is obtained. HISTORY  64 y.o. woman who underwent an US guided right breast core biopsy at 3 o'clock position on September 20 , 2021. Pathological evaluation completed at [facility]:  PATHOLOGY  Diagnosis:   Right breast, 3 o'clock position, core biopsies: Invasive carcinoma of no   special type (ductal with lobular features).  Preliminary Allan   score 3+2+1 = 6     Comment:   Tumor cells stain strongly positive for e-cadherin   immunostain. Intradepartmental consultation is obtained. Breast Cancer Marker Studies:         Estrogen Receptors (ER):       -Positive (>10% of cells demonstrate nuclear positivity):       Percentage of cells positive: >90%       Intensity: Strong         Progesterone Receptors (TX):       -Positive:       Percentage of cells positive: 2%       Intensity: Weak         Her-2/marquise (c-erb B-2) protein expression: Negative (1+)       MAMMOGRAM ULTRASOUND BIOPSY; [facility]      9/14/2021: MAMMOGRAM, RIGHT BREAST ULTRASOUND: [facility]            9/14/2021: LEFT BREAST ULTRASOUND: [facility]    Stage IA right breast cancer involving pectoralis muscle, a margin of which was excised with the specimen. Inferior margin of right breast lumpectomy will be reexcised. Patient has already seen radiation oncology in consultation. Referral placed to medical oncology. Mother requests to be seen in [facility]. Questions answered to the best my ability. Office visit after reexcision. Plan:   1. Patient instructed to keep incision clean and dry well after bathing. Patient can start scar massage once incision is completely healed and strong enough to handle the motion (usually 10 - 14 days post operatively). Rub in a circular motion on and around the scar with firm, even pressure for 5 minutes four times per day.  Use lotion or moisturizer to do the scar massage to allow ease with motion over the scar and prevent friction at the area. 2. Continue monthly breast self examination. Bring any changes to your physician's attention. 3. Routine screening imaging in August 2022.  4. Range of motion exercises for right shoulder encouraged. Lymphedema precautions discussed. 5. Education/Lifestyle recommendations: A regular exercise program is encouraged. Avoid excessive caffeine intake. Maintain a diet rich in vegetables and fruits avoiding processed and fast food. 6. Consultations: Oncology appointment will be scheduled with Oncologist of patient's and/or PCP's preference. 7. Continue regular appointments with PCP & Gynecologist.  8. Office follow-up visit should be scheduled postoperatively, in 6 months and PRN. If you have questions, please do not hesitate to call me. I look forward to following Nury Beck along with you.     Sincerely,  Nj Wilkins MD

## 2021-11-24 NOTE — PROGRESS NOTES
[facility] 36 PRE-ADMISSION TESTING GENERAL INSTRUCTIONS- [facility]-phone number:[phone]    GENERAL INSTRUCTIONS  [x] Antibacterial Soap shower Night before and/or AM of Surgery  [] Milo wipe instruction sheet and wipes given. [x] Nothing by mouth after midnight, including gum, candy, mints, or water. [x] You may brush your teeth, gargle, but do NOT swallow water. []Hibiclens shower  the night before and the morning of surgery. Do not use             Hibiclens on your face or head. [x]No smoking, chewing tobacco, illegal drugs, or alcohol within 24 hours of your surgery. [x] Jewelry, valuables or body piercing's should not be brought to the hospital. All body and/or tongue piercing's must be removed prior to arriving to hospital.  ALL hair pins must be removed. [x] Do not wear makeup, lotions, powders, deodorant. Nail polish as directed by the nurse. [x] Arrange transportation with a responsible adult  to and from the hospital. If you do not have a responsible adult  to transport you, you will need to make arrangements with a medical transportation company (i.e. [employer]. A Uber/taxi/bus is not appropriate unless you are accompanied by a responsible adult ). Arrange for someone to be with you for the remainder of the day and for 24 hours after your procedure due to having had anesthesia. Who will be your  for transportation?____Mary______________   Who will be staying with you for 24 hrs after your procedure?__________________  [x] Bring insurance card and photo ID.  [] Transfusion Bracelet: Please bring with you to hospital, day of surgery  [] Bring urine specimen day of surgery. Any small container is acceptable. [] Use inhalers the morning of surgery and bring with you to hospital.  [] Bring copy of living will or healthcare power of  papers to be placed in your electronic record.   [] CPAP/BI-PAP: Please bring your machine if you are to spend the night in the hospital.     PARKING INSTRUCTIONS:   [x] Arrival Time:__1200___________  · [x] Parking lot '\"I\"  is located on [facility] (the corner of [facility] and [facility]). To enter, press the button and the gate will lift. A free token will be provided to exit the lot. One car per patient is allowed to park in this lot. All other cars are to park on [address] [place] [address] either in the parking garage or the handicap lot. [] To reach the [facility] lobby from [address] [place] [address], upon entering the hospital, take elevator B to the 3rd floor. EDUCATION INSTRUCTIONS:      [] Knee or hip replacement booklet & exercise pamphlets given. [] Michelle Horton placed in chart. [] Pre-admission Testing educational folder given  [] Incentive Spirometry,coughing & deep breathing exercises reviewed. []Medication information sheet(s)   []Fluoroscopy-Xray used in surgery reviewed with patient. Educational pamphlet placed in chart. []Pain: Post-op pain is normal and to be expected. You will be asked to rate your pain from 0-10(a zero is not acceptable-education is needed). Your post-op pain goal is:  [] Ask your nurse for your pain medication. [] Joint camp offered. [] Joint replacement booklets given. [] Other:___________________________    MEDICATION INSTRUCTIONS:   [x]Bring a complete list of your medications, please write the last time you took the medicine, give this list to the nurse. [x] Take the following medications the morning of surgery with 1-2 ounces of water: Tylenol (if needed)  [x] Stop herbal supplements and vitamins 5 days before your surgery. [] DO NOT take any diabetic medicine the morning of surgery. Follow instructions for insulin the day before surgery. [] If you are diabetic and your blood sugar is low or you feel symptomatic, you may drink 1-2 ounces of apple juice or take a glucose tablet.   The morning of your procedure, you may call the pre-op area if you have concerns about your blood sugar [phone]. [] Use your inhalers the morning of surgery. Bring your emergency inhaler with you day of surgery. [x] Follow physician instructions regarding any blood thinners you may be taking. WHAT TO EXPECT:  [x] The day of surgery you will be greeted and checked in by the [employer].  In addition, you will be registered in the [place] by a Patient Access Representative. Please bring your photo ID and insurance card. A nurse will greet you in accordance to the time you are needed in the pre-op area to prepare you for surgery. Please do not be discouraged if you are not greeted in the order you arrive as there are many variables that are involved in patient preparation. Your patience is greatly appreciated as you wait for your nurse. Please bring in items such as: books, magazines, newspapers, electronics, or any other items  to occupy your time in the waiting area. [x]  Delays may occur with surgery and staff will make a sincere effort to keep you informed of delays. If any delays occur with your procedure, we apologize ahead of time for your inconvenience as we recognize the value of your time.

## 2021-11-29 ENCOUNTER — TELEPHONE (OUTPATIENT)
Dept: BREAST CENTER | Age: 61
End: 2021-11-29

## 2021-11-29 NOTE — TELEPHONE ENCOUNTER
Patient surgery has been scheduled with surgery scheduling 12/1/21 @ 2:00pm / Arrival 12:00pm - Re-excision right breast inferior margin - LMAC. Patient notified of date and time of surgery. Patient was instructed to enter the [facility] entrance of the hospital. Patient was instructed NPO after midnight the night prior to surgery. Patient was instructed NO ASA products or blood thinners 3-5days prior to surgery. Patient was instructed to bring a sports bra with her day of surgery. Patient will be instructed by PAT about Covid 19 test.  Patient will be tested 72-96 hours prior to surgery, unless they have had their 2nd covid vaccine and are 2 weeks out from it. No prior authorization required. Post op visit 12/20/21 @ 2:00pm [facility].

## 2021-11-30 RX ORDER — SODIUM CHLORIDE 0.9 % (FLUSH) 0.9 %
5-40 SYRINGE (ML) INJECTION PRN
Status: CANCELLED | OUTPATIENT
Start: 2021-11-30

## 2021-11-30 RX ORDER — SODIUM CHLORIDE, SODIUM LACTATE, POTASSIUM CHLORIDE, CALCIUM CHLORIDE 600; 310; 30; 20 MG/100ML; MG/100ML; MG/100ML; MG/100ML
INJECTION, SOLUTION INTRAVENOUS CONTINUOUS
Status: CANCELLED | OUTPATIENT
Start: 2021-11-30

## 2021-11-30 RX ORDER — SODIUM CHLORIDE 0.9 % (FLUSH) 0.9 %
5-40 SYRINGE (ML) INJECTION EVERY 12 HOURS SCHEDULED
Status: CANCELLED | OUTPATIENT
Start: 2021-11-30

## 2021-11-30 RX ORDER — SODIUM CHLORIDE 9 MG/ML
25 INJECTION, SOLUTION INTRAVENOUS PRN
Status: CANCELLED | OUTPATIENT
Start: 2021-11-30

## 2021-11-30 RX ORDER — ACETAMINOPHEN 325 MG/1
1000 TABLET ORAL ONCE
Status: CANCELLED | OUTPATIENT
Start: 2021-11-30 | End: 2021-11-30

## 2021-12-01 ENCOUNTER — ANESTHESIA EVENT (OUTPATIENT)
Dept: OPERATING ROOM | Age: 61
End: 2021-12-01
Payer: COMMERCIAL

## 2021-12-01 ENCOUNTER — ANESTHESIA (OUTPATIENT)
Dept: OPERATING ROOM | Age: 61
End: 2021-12-01
Payer: COMMERCIAL

## 2021-12-01 ENCOUNTER — HOSPITAL ENCOUNTER (OUTPATIENT)
Age: 61
Setting detail: OUTPATIENT SURGERY
Discharge: HOME | End: 2021-12-01
Attending: SURGERY | Admitting: SURGERY
Payer: COMMERCIAL

## 2021-12-01 VITALS
SYSTOLIC BLOOD PRESSURE: 127 MMHG | DIASTOLIC BLOOD PRESSURE: 66 MMHG | HEART RATE: 82 BPM | OXYGEN SATURATION: 98 % | RESPIRATION RATE: 16 BRPM | TEMPERATURE: 98.5 F | HEIGHT: 56 IN | BODY MASS INDEX: 26.54 KG/M2 | WEIGHT: 118 LBS

## 2021-12-01 VITALS
SYSTOLIC BLOOD PRESSURE: 90 MMHG | RESPIRATION RATE: 16 BRPM | DIASTOLIC BLOOD PRESSURE: 66 MMHG | OXYGEN SATURATION: 99 %

## 2021-12-01 PROCEDURE — 3700000000 HC ANESTHESIA ATTENDED CARE: Performed by: SURGERY

## 2021-12-01 PROCEDURE — 2500000003 HC RX 250 WO HCPCS: Performed by: SURGERY

## 2021-12-01 PROCEDURE — 3600000013 HC SURGERY LEVEL 3 ADDTL 15MIN: Performed by: SURGERY

## 2021-12-01 PROCEDURE — 2580000003 HC RX 258: Performed by: SURGERY

## 2021-12-01 PROCEDURE — 3600000003 HC SURGERY LEVEL 3 BASE: Performed by: SURGERY

## 2021-12-01 PROCEDURE — 6360000002 HC RX W HCPCS: Performed by: ANESTHESIOLOGIST ASSISTANT

## 2021-12-01 PROCEDURE — 2720000010 HC SURG SUPPLY STERILE: Performed by: SURGERY

## 2021-12-01 PROCEDURE — 3700000001 HC ADD 15 MINUTES (ANESTHESIA): Performed by: SURGERY

## 2021-12-01 PROCEDURE — 19301 PARTIAL MASTECTOMY: CPT | Performed by: SURGERY

## 2021-12-01 PROCEDURE — 88305 TISSUE EXAM BY PATHOLOGIST: CPT

## 2021-12-01 PROCEDURE — 7100000010 HC PHASE II RECOVERY - FIRST 15 MIN: Performed by: SURGERY

## 2021-12-01 PROCEDURE — 7100000011 HC PHASE II RECOVERY - ADDTL 15 MIN: Performed by: SURGERY

## 2021-12-01 PROCEDURE — 2709999900 HC NON-CHARGEABLE SUPPLY: Performed by: SURGERY

## 2021-12-01 PROCEDURE — 6360000002 HC RX W HCPCS: Performed by: SURGERY

## 2021-12-01 RX ORDER — BUPIVACAINE HYDROCHLORIDE 5 MG/ML
INJECTION, SOLUTION EPIDURAL; INTRACAUDAL PRN
Status: DISCONTINUED | OUTPATIENT
Start: 2021-12-01 | End: 2021-12-01 | Stop reason: ALTCHOICE

## 2021-12-01 RX ORDER — SODIUM CHLORIDE 9 MG/ML
25 INJECTION, SOLUTION INTRAVENOUS PRN
Status: DISCONTINUED | OUTPATIENT
Start: 2021-12-01 | End: 2021-12-01 | Stop reason: HOSPADM

## 2021-12-01 RX ORDER — ACETAMINOPHEN 500 MG
1000 TABLET ORAL ONCE
Status: DISCONTINUED | OUTPATIENT
Start: 2021-12-01 | End: 2021-12-01 | Stop reason: HOSPADM

## 2021-12-01 RX ORDER — SODIUM CHLORIDE 0.9 % (FLUSH) 0.9 %
5-40 SYRINGE (ML) INJECTION EVERY 12 HOURS SCHEDULED
Status: DISCONTINUED | OUTPATIENT
Start: 2021-12-01 | End: 2021-12-01 | Stop reason: HOSPADM

## 2021-12-01 RX ORDER — PROPOFOL 10 MG/ML
INJECTION, EMULSION INTRAVENOUS PRN
Status: DISCONTINUED | OUTPATIENT
Start: 2021-12-01 | End: 2021-12-01 | Stop reason: SDUPTHER

## 2021-12-01 RX ORDER — LIDOCAINE HYDROCHLORIDE 20 MG/ML
INJECTION, SOLUTION INTRAVENOUS PRN
Status: DISCONTINUED | OUTPATIENT
Start: 2021-12-01 | End: 2021-12-01 | Stop reason: SDUPTHER

## 2021-12-01 RX ORDER — FENTANYL CITRATE 50 UG/ML
INJECTION, SOLUTION INTRAMUSCULAR; INTRAVENOUS PRN
Status: DISCONTINUED | OUTPATIENT
Start: 2021-12-01 | End: 2021-12-01 | Stop reason: SDUPTHER

## 2021-12-01 RX ORDER — SODIUM CHLORIDE, SODIUM LACTATE, POTASSIUM CHLORIDE, CALCIUM CHLORIDE 600; 310; 30; 20 MG/100ML; MG/100ML; MG/100ML; MG/100ML
INJECTION, SOLUTION INTRAVENOUS CONTINUOUS
Status: DISCONTINUED | OUTPATIENT
Start: 2021-12-01 | End: 2021-12-01 | Stop reason: HOSPADM

## 2021-12-01 RX ORDER — MIDAZOLAM HYDROCHLORIDE 1 MG/ML
INJECTION INTRAMUSCULAR; INTRAVENOUS PRN
Status: DISCONTINUED | OUTPATIENT
Start: 2021-12-01 | End: 2021-12-01 | Stop reason: SDUPTHER

## 2021-12-01 RX ORDER — SODIUM CHLORIDE 0.9 % (FLUSH) 0.9 %
5-40 SYRINGE (ML) INJECTION PRN
Status: DISCONTINUED | OUTPATIENT
Start: 2021-12-01 | End: 2021-12-01 | Stop reason: HOSPADM

## 2021-12-01 RX ADMIN — SODIUM CHLORIDE 25 ML: 9 INJECTION, SOLUTION INTRAVENOUS at 12:23

## 2021-12-01 RX ADMIN — MIDAZOLAM 2 MG: 1 INJECTION INTRAMUSCULAR; INTRAVENOUS at 15:07

## 2021-12-01 RX ADMIN — Medication 2000 MG: at 15:15

## 2021-12-01 RX ADMIN — FENTANYL CITRATE 50 MCG: 50 INJECTION, SOLUTION INTRAMUSCULAR; INTRAVENOUS at 15:13

## 2021-12-01 RX ADMIN — PROPOFOL 20 MG: 10 INJECTION, EMULSION INTRAVENOUS at 15:27

## 2021-12-01 RX ADMIN — LIDOCAINE HYDROCHLORIDE 80 MG: 20 INJECTION, SOLUTION INTRAVENOUS at 15:13

## 2021-12-01 RX ADMIN — SODIUM CHLORIDE: 9 INJECTION, SOLUTION INTRAVENOUS at 15:07

## 2021-12-01 RX ADMIN — PROPOFOL 30 MG: 10 INJECTION, EMULSION INTRAVENOUS at 15:13

## 2021-12-01 RX ADMIN — FENTANYL CITRATE 50 MCG: 50 INJECTION, SOLUTION INTRAMUSCULAR; INTRAVENOUS at 15:07

## 2021-12-01 ASSESSMENT — PULMONARY FUNCTION TESTS
PIF_VALUE: 17
PIF_VALUE: 32
PIF_VALUE: 2
PIF_VALUE: 31
PIF_VALUE: 18
PIF_VALUE: 1
PIF_VALUE: 79
PIF_VALUE: 79
PIF_VALUE: 46
PIF_VALUE: 0
PIF_VALUE: 0
PIF_VALUE: 79
PIF_VALUE: 79
PIF_VALUE: 2
PIF_VALUE: 0
PIF_VALUE: 23
PIF_VALUE: 0
PIF_VALUE: 5
PIF_VALUE: 21
PIF_VALUE: 0
PIF_VALUE: 32
PIF_VALUE: 2
PIF_VALUE: 0
PIF_VALUE: 46
PIF_VALUE: 29
PIF_VALUE: 25
PIF_VALUE: 0
PIF_VALUE: 31
PIF_VALUE: 29
PIF_VALUE: 0
PIF_VALUE: 20

## 2021-12-01 ASSESSMENT — LIFESTYLE VARIABLES: SMOKING_STATUS: 0

## 2021-12-01 ASSESSMENT — PAIN SCALES - GENERAL
PAINLEVEL_OUTOF10: 0

## 2021-12-01 ASSESSMENT — PAIN - FUNCTIONAL ASSESSMENT: PAIN_FUNCTIONAL_ASSESSMENT: 0-10

## 2021-12-01 NOTE — OP NOTE
Operative Note      Patient: Jacklyn Cruz  YOB: 1960  MRN: [id number]    Date of Procedure: 12/1/2021    Pre-Op Diagnosis: BREAST CANCER    Post-Op Diagnosis: Same       Procedure(s):  RE-EXCISION RIGHT BREAST INFERIOR MARGIN    Surgeon(s):  Jeannette Ames MD    Assistant:   Resident: Sherell Duane, MD; Leighton Art MD    Anesthesia: Monitor Anesthesia Care    Estimated Blood Loss (mL): Minimal    Complications: None    Specimens:   ID Type Source Tests Collected by Time Destination   A : RIGHT BREAST INFERIOR MARGIN Tissue Breast SURGICAL PATHOLOGY Jeannette Ames MD 12/1/2021 1531      Implants:  * No implants in log *      Drains: * No LDAs found *    Findings: small seroma cavity. Inferior margin from lumpectomy cavity sent    History: This is a 64year old female with history of right breast invasive ductal cancer (ER+/CT+/HER2-). She underwent lumpectomy with sentinel lymph node biopsy on November 3 and had a positive inferior margin on pathology. After discussion with the patient and her mother, she opted for right breast lumpectomy with reexcision of inferior margin. Risks, benefits, and alternatives were discussed with she and her patient understood and were agreeable to proceed. Detailed Description of Procedure: The patient was then taken to the operating room and positioned supine on the operating table. Pneumatic compression devices were placed on the lower extremities bilaterally. The patient was placed under deep conscious sedation and administered Ancef intravenously on induction. The chest, neck and axillae were prepped with ChloraPrep and draped in the usual sterile fashion after 3 minutes. Prior to skin incision, a timeout was performed. 0.25% Marcaine was infiltrated for local anesthesia. Skin incision was made with PEAK Plasmablade cautery through the previous lumpectomy scar. Dissection was carried down with cautery to the lumpectomy cavity.  Seroma fluid was evacuated. The inferior wall of the cavity was outlined with cautery as the inferior margin for lumpectomy. The margin was excised with cautery and oriented with 6 colors of paint. The cavity was irrigated with saline and hemostasis was assured. The incision was closed with deep dermal 4-0 vicryl suture and running subcuticular 5-0 Vicryl. Steri strips and sterile dressings were placed. The patient was awakened from anesthesia and transferred to PACU in stable condition. Discharge home is anticipated. Dr. Saeid Mark was present for the procedure.     Lissa Tiwari MD  12/1/2021  4:04 PM

## 2021-12-01 NOTE — ANESTHESIA POSTPROCEDURE EVALUATION
Department of Anesthesiology  Postprocedure Note    Patient: Kristina Davidson  MRN: [id number]  YOB: 1960  Date of evaluation: 12/1/2021  Time:  12:48 PM     Procedure Summary     Date: 12/01/21 Room / Location: [facility] OR [facility] / [facility]    Anesthesia Start:  Anesthesia Stop:     Procedure: RE-EXCISION RIGHT BREAST INFERIOR MARGIN (Right ) Diagnosis: (BREAST CANCER)    Surgeons: Anne-Marie Dawn MD Responsible Provider:     Anesthesia Type: Not recorded ASA Status: Not recorded          Anesthesia Type: No value filed. Carola Phase I: Carola Score: 10    Carola Phase II:      Last vitals: Reviewed and per EMR flowsheets.        Anesthesia Post Evaluation    Patient location during evaluation: PACU  Patient participation: complete - patient participated  Level of consciousness: awake  Pain score: 3  Airway patency: patent  Nausea & Vomiting: no nausea and no vomiting  Complications: no  Cardiovascular status: blood pressure returned to baseline  Respiratory status: acceptable  Hydration status: euvolemic

## 2021-12-01 NOTE — ANESTHESIA PRE PROCEDURE
Department of Anesthesiology  Preprocedure Note       Name:  Nakul Jackson   Age:  64 y.o.  :  1960                                          MRN:  [id number]         Date:  2021      Surgeon: Rajani Jaquez):  Juana Alvarez MD    Procedure: Procedure(s):  RE-EXCISION RIGHT BREAST INFERIOR MARGIN    Medications prior to admission:   Prior to Admission medications    Medication Sig Start Date End Date Taking?  Authorizing Provider   Garlic (GARLIQUE) 829 MG TBEC Take by mouth daily    Yes Historical Provider, MD   Cholecalciferol (VITAMIN D3) 50 MCG (2000) CAPS Take by mouth daily    Yes Historical Provider, MD   loratadine (CLARITIN) 10 MG capsule Take 10 mg by mouth daily   Yes Historical Provider, MD   acetaminophen (TYLENOL) 500 MG tablet Take 500 mg by mouth every 6 hours as needed for Pain   Yes Historical Provider, MD       Current medications:    Current Facility-Administered Medications   Medication Dose Route Frequency Provider Last Rate Last Admin    0.9 % sodium chloride infusion  25 mL IntraVENous PRN Juana Alvarez  mL/hr at 21 1223 25 mL at 21 1223    acetaminophen (TYLENOL) tablet 1,000 mg  1,000 mg Oral Once Juana Alvarez MD        ceFAZolin (ANCEF) 2000 mg in sterile water 20 mL IV syringe  2,000 mg IntraVENous On Call to [address] [place] [address], MD        lactated ringers infusion   IntraVENous Continuous Juana Alvarez MD        sodium chloride flush 0.9 % injection 5-40 mL  5-40 mL IntraVENous 2 times per day Juana Alvarez MD        sodium chloride flush 0.9 % injection 5-40 mL  5-40 mL IntraVENous PRN Juana Alvarez MD           Allergies:  No Known Allergies    Problem List:    Patient Active Problem List   Diagnosis Code    Cancer [facility] C80.1    Malignant neoplasm of upper-inner quadrant of right breast in female, estrogen receptor positive ([facility] C50.211, Z17.0       Past Medical History:        Diagnosis Date    Breast cancer ([facility]     Right breast    Cancer ([facility]     Hearing loss     Hyperlipidemia        Past Surgical History:        Procedure Laterality Date    BREAST LUMPECTOMY Right 11/3/2021    RIGHT BREAST LUMPECTOMY, BLUE DYE INJECTION, RIGHT AXILLARY SENTINEL LYMPH NODE INCISION performed by Nora Barriga MD at [place] [address] RIGHT  2021       Social History:    Social History     Tobacco Use    Smoking status: Never Smoker    Smokeless tobacco: Never Used   Substance Use Topics    Alcohol use: Never                                Counseling given: Not Answered      Vital Signs (Current):   Vitals:    11/24/21 1543 12/01/21 1212 12/01/21 1215   BP:  (!) 177/91 (!) 161/85   Pulse:  91    Resp:  16    Temp:  99.2 °F (37.3 °C)    TempSrc:  Temporal    SpO2:  96%    Weight: 118 lb (53.5 kg) 118 lb (53.5 kg)    Height: 4' 10\" (1.473 m) 4' 8\" (1.422 m)                                               BP Readings from Last 3 Encounters:   12/01/21 (!) 161/85   11/16/21 134/70   11/03/21 98/60       NPO Status: Time of last liquid consumption: 2000                        Time of last solid consumption: 2000                        Date of last liquid consumption: 11/30/21                        Date of last solid food consumption: 11/30/21    BMI:   Wt Readings from Last 3 Encounters:   12/01/21 118 lb (53.5 kg)   11/16/21 118 lb (53.5 kg)   11/03/21 118 lb (53.5 kg)     Body mass index is 26.46 kg/m².     CBC:   Lab Results   Component Value Date    WBC 8.8 10/04/2021    RBC 5.36 10/04/2021    HGB 15.1 10/04/2021    HCT 46.6 10/04/2021    MCV 86.9 10/04/2021    RDW 13.8 10/04/2021     10/04/2021       CMP:   Lab Results   Component Value Date     10/04/2021    K 4.6 10/04/2021     10/04/2021    CO2 26 10/04/2021    BUN 11 10/04/2021    CREATININE 0.8 10/04/2021    GFRAA >60 10/04/2021    LABGLOM >60 10/04/2021    GLUCOSE 111 10/04/2021    PROT 7.2 10/04/2021    CALCIUM 9.7 10/04/2021    BILITOT 0.4 10/04/2021    ALKPHOS 80 10/04/2021    AST 23 10/04/2021    ALT 15 10/04/2021       POC Tests: No results for input(s): POCGLU, POCNA, POCK, POCCL, POCBUN, POCHEMO, POCHCT in the last 72 hours. Coags: No results found for: PROTIME, INR, APTT    HCG (If Applicable): No results found for: PREGTESTUR, PREGSERUM, HCG, HCGQUANT     ABGs: No results found for: PHART, PO2ART, AOZ9YPU, WCT4YEK, BEART, H2HRYRLO     Type & Screen (If Applicable):  No results found for: LABABO, LABRH    Drug/Infectious Status (If Applicable):  No results found for: HIV, HEPCAB    COVID-19 Screening (If Applicable): No results found for: COVID19        Anesthesia Evaluation  Patient summary reviewed and Nursing notes reviewed no history of anesthetic complications:   Airway: Mallampati: III  TM distance: <3 FB   Neck ROM: full  Mouth opening: > = 3 FB Dental:      Comment: Pt denies any loose, chipped or missing teeth    Pulmonary:Negative Pulmonary ROS breath sounds clear to auscultation  (+) decreased breath sounds,      (-) not a current smoker                           Cardiovascular:  Exercise tolerance: good (>4 METS),   (+) hyperlipidemia        Rhythm: regular  Rate: normal                    Neuro/Psych:   Negative Neuro/Psych ROS              GI/Hepatic/Renal: Neg GI/Hepatic/Renal ROS            Endo/Other:    (+) malignancy/cancer (breast ca). ROS comment: Hard of hearing  Abdominal:             Vascular: negative vascular ROS. Other Findings:               Anesthesia Plan      general and MAC     ASA 2       Induction: intravenous. Anesthetic plan and risks discussed with patient. Plan discussed with CRNA. Kishore Nichole MD   12/1/2021      DOS STAFF ADDENDUM:    Patient seen and chart reviewed. Physical exam and history updated as indicated. NPO status confirmed. Anesthesia options and plan discussed including risks benefits with patient/legal guardian and family as available.   Concerns and questions addressed. Consent verbalized to proceed.   Anesthesia plan, options and intraoperative/postoperative concerns discussed with care team.    Jolly Alvares MD, MD  12/1/2021  2:11 PM

## 2021-12-01 NOTE — H&P
Patient ID: Cherrie Guzman is a 64 y.o. female.     HPI     History and Physical     Patient's Name/Date of Birth: Cherrie Guzman / 1960       Cherrie Gzuman presents for margin re-excision after conservative therapy for medial right breast invasive ductal cancer. (ER+/NV+/HER2-).        PCP: Robert Diaz DO, Gynecologist: never.     The lesion was located in the 3 o'clock position of the Right breast. The lesion was discovered by mammogram. The patient has not noted a change in BSE since presentation.  Patient denies nipple discharge. Riya Breaux personal history of breast cancer.  Breast cancer risk factors include mom with lumpectomy, no cancer, gender, nulliparus  Ashkenazi [employer] Ancestry: No.     Patient is hard of hearing. Jarod Castle mother has never brought her to a gynecologist.     OBSTETRIC RELATED HISTORY:  Age of menarche was 11. Age of menopause was 55.    Patient denies hormonal therapy. Patient is . Age of first live birth was NA  Patient NA breast feed. Is patient interested in fertility information about fertility preservation? No     CANCER SURVEILLANCE HISTORY:  Mammograms: Yes  Breast MRI's: No   Breast Biopsies: Yes   Colonoscopy: No   GI Polyps: No   EGD: No   Pelvic Exam: No never  Pap Smear: No never  Dermatology: Yes   Lung screening: no        Have you received the flu vaccination this year? Yes  Have you received the Pneumococcal vaccination in the last 5 years? yes  Have you received the COVID 19 vaccination this year? No     Estimated body mass index is 24.87 kg/m² as calculated from the following:    Height as of this encounter: 4' 10\" (1.473 m).    Weight as of this encounter: 119 lb (54 kg). Bra Size: 34A     Patient drinks no caffeinated beverages. Patient does not smoke cigarettes. Patient does not use recreational drugs.        Lymphedema screening completed. See documentation in the flow sheets.         Past Medical History   History reviewed.  No pertinent past medical history.         Past Surgical History             Past Surgical History:   Procedure Laterality Date    EYE SURGERY        US BREAST NEEDLE BIOPSY RIGHT   2021            Current Facility-Administered Medications               Current Outpatient Medications   Medication Sig Dispense Refill    Cholecalciferol (VITAMIN D3) 50 MCG (2000 UT) CAPS Take by mouth        Garlic 10 MG CAPS Take by mouth        loratadine (CLARITIN) 10 MG capsule Take 10 mg by mouth daily        acetaminophen (TYLENOL) 500 MG tablet Take 500 mg by mouth every 6 hours as needed for Pain          No current facility-administered medications for this visit.            No Known Allergies     Family History   History reviewed. No pertinent family history.             Social History                   Socioeconomic History    Marital status: Single       Spouse name: Not on file    Number of children: Not on file    Years of education: Not on file    Highest education level: Not on file   Occupational History    Not on file   Tobacco Use    Smoking status: Never Smoker    Smokeless tobacco: Never Used   Vaping Use    Vaping Use: Never used   Substance and Sexual Activity    Alcohol use: Never    Drug use: Never    Sexual activity: Not on file   Other Topics Concern    Not on file   Social History Narrative    Not on file      Social Determinants of Health            Financial Resource Strain:     Difficulty of Paying Living Expenses:    Food Insecurity:     Worried About Running Out of Food in the Last Year:     [address] in the Last Year:    Transportation Needs:     Lack of Transportation (Medical):      Lack of Transportation (Non-Medical):    Physical Activity:     Days of Exercise per Week:     Minutes of Exercise per Session:    Stress:     Feeling of Stress :    Social Connections:     Frequency of Communication with Friends and Family:     Frequency of Social Gatherings with Friends and Family:     Attends Jew Services:     Active Member of Clubs or Organizations:     Attends Club or Organization Meetings:     Marital Status:    Intimate Partner Violence:     Fear of Current or Ex-Partner:     Emotionally Abused:     Physically Abused:     Sexually Abused:              Occupation: weed, build puzzles, 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 Good appetite and energy level. ENMT: Eyes: No diplopia; Nose: No epistaxis. Mouth: No sore throat. RESPIRATORY: No hemoptysis, shortness of breath, cough. CARDIOVASCULAR: No chest pain, pressure, or palpitations. GASTROINTESTINAL: No nausea/vomiting, abdominal pain, diarrhea/constipation.  No blood in the stools. GENITOURINARY: No dysuria, urinary frequency, hematuria. NEURO: No syncope, presyncope, headache. Remainder:  ROS NEGATIVE     Patient denies previous history of DVT/PE. Review of systems as noted above completely reviewed with patient.  No changes. Mother answers many of the questions.        Objective:   Physical Exam  Vitals and nursing note reviewed. Exam conducted with a chaperone present. Constitutional:       General: She is not in acute distress.     Appearance: She is well-developed. She is not diaphoretic. HENT:      Head: Normocephalic and atraumatic. Eyes:      Conjunctiva/sclera: Conjunctivae normal.      Pupils: Pupils are equal, round, and reactive to light. Neck:      Thyroid: No thyromegaly.      Trachea: No tracheal deviation. Cardiovascular:      Rate and Rhythm: Normal rate and regular rhythm.      Heart sounds: Normal heart sounds. Pulmonary:      Effort: Pulmonary effort is normal. No respiratory distress.      Breath sounds: Normal breath sounds. Chest:      Breasts: Breasts are symmetrical.         Right: Healing incision present.  No inverted nipple, nipple discharge, skin change or tenderness.         Left: No inverted nipple, mass, nipple discharge, skin change or tenderness.   Abdominal:      General: There is no distension.      Palpations: Abdomen is soft. Musculoskeletal:      Right shoulder: Normal range of motion.      Left shoulder: Normal range of motion.      Cervical back: Normal range of motion and neck supple. Lymphadenopathy:      Cervical: No cervical adenopathy.      Upper Body:      Right upper body: No supraclavicular adenopathy.      Left upper body: No supraclavicular adenopathy. Skin:     General: Skin is warm and dry.      Coloration: Skin is not pale.      Findings: No erythema. Neurological:      Mental Status: She is alert and oriented to person, place, and time. Psychiatric:         BehaviorReotonielca Goltz     Thought Content: Thought content normal.         Judgment: Judgment normal.            Assessment:   61 y.o. woman who underwent an US guided right breast core biopsy at 3 o'clock position on September 20 , 2021. Pathological evaluation completed at [facility]:  PATHOLOGY  Diagnosis:   Right breast, 3 o'clock position, core biopsies: Invasive carcinoma of no   special type (ductal with lobular features).  Preliminary [place]   score 3+2+1 = 6     Comment:   Tumor cells stain strongly positive for e-cadherin   immunostain. Intradepartmental consultation is obtained.      Breast Cancer Marker Studies:         Estrogen Receptors (ER):       -Positive (>10% of cells demonstrate nuclear positivity):       Percentage of cells positive: >90%       Intensity: Strong         Progesterone Receptors ([place]):       -Positive:       Percentage of cells positive: 2%       Intensity: Weak         Her-2/marquise (c-erb B-2) protein expression: Negative (1+)        MAMMOGRAM ULTRASOUND BIOPSY; [facility]       9/14/2021: MAMMOGRAM, RIGHT BREAST ULTRASOUND: [facility]                9/14/2021: LEFT BREAST ULTRASOUND: [facility]    Imaging reviewed with patient and her mother. Roxy Garcia is barely visible on mammogram due to its posterior location.  Clinical T2, stage II at this point.  Long discussion about options going forward.  Patient needs to be transported by her mother, and her mother does not drive when the weather is bad.  We discussed conservative therapy versus mastectomy, risks and benefits of both.  Place consultation to radiation oncology so patient and her mother could decide whether they were interested in pursuing conservative therapy.  Questions answered to the best my ability.     11/3/21 Right breast lumpectomy, blue dye injection, right axillary sentinel lymph node excision:   Comment:   CANCER CASE SUMMARY   Procedure -excision   Specimen Laterality -right   Tumor Site, Invasive Carcinoma-3:00   Histologic Type-invasive carcinoma, no special type (ductal)   Histologic Grade ([place] Histologic Score):                    Tubule differentiation- score-3                    Nuclear pleomorphism- score 2                    Mitotic rate- score 2                    Overall Grade- grade 2, (score 7)   Tumor Size: Size of Largest Invasive Carcinoma-30 mm   Ductal Carcinoma In Situ (DCIS)-not identified   Tumor Extent-carcinoma invades skeletal muscle   Treatment Effect in the Breast-no known presurgical therapy   Margin status for invasive carcinoma: Inferior margin involved by   invasive carcinoma.  Posterior margin less than 1 mm from invasive carcinoma. Regional Lymph Nodes: All regional lymph nodes negative for tumor   Total number of lymph nodes examined-1   Number of sentinel nodes examined-1   Pathologic Stage Classification (AJCC 8th Edition)- pT2 p(sn)N0   Additional Pathologic Findings-lymphovascular and perineural space   invasion   Special studies-ER positive, [place] positive, HER-2 negative ([id number])     Plan: Inferior right breast lumpectomy margin re-excision.      The risks, benefits, expected outcomes, and alternatives to this procedure have been discussed with the patient, which include but are not limited to bleeding, infection, flap necrosis and medical complications to anesthesia or surgery such as pneumonia, heart problems, blood clots, etc. Patient also was told that with minimally invasive procedures adequate margins are not always obtained with the first operation, and she has a >20% chance of requiring a second procedure to obtain clear margins around her tumor.  Patient understood and desires to undergo the procedure.

## 2021-12-02 ENCOUNTER — TELEPHONE (OUTPATIENT)
Dept: SURGERY | Age: 61
End: 2021-12-02

## 2021-12-02 NOTE — ANESTHESIA POSTPROCEDURE EVALUATION
Department of Anesthesiology  Postprocedure Note    Patient: Luis Quinteros  MRN: [id number]  YOB: 1960  Date of evaluation: 12/2/2021  Time:  6:09 AM     Procedure Summary     Date: 12/01/21 Room / Location: [place] [address] / [facility]    Anesthesia Start: 1500 Anesthesia Stop: 8337    Procedure: RE-EXCISION RIGHT BREAST INFERIOR MARGIN (Right Breast) Diagnosis: (BREAST CANCER)    Surgeons: Serena Martinez MD Responsible Provider: Rosa Teague DO    Anesthesia Type: general, MAC ASA Status: 2          Anesthesia Type: general, MAC    Carola Phase I: Carola Score: 10    Carola Phase II: Carola Score: 10    Last vitals: Reviewed and per EMR flowsheets.        Anesthesia Post Evaluation    Patient location during evaluation: PACU  Patient participation: complete - patient participated  Level of consciousness: awake and alert  Pain score: 1  Airway patency: patent  Nausea & Vomiting: no nausea and no vomiting  Complications: no  Cardiovascular status: hemodynamically stable  Respiratory status: acceptable  Hydration status: euvolemic

## 2021-12-02 NOTE — TELEPHONE ENCOUNTER
Called to check on patient post-op. Per Mother, Patient has voiced no complaints. Reviewed post-operative instructions.

## 2021-12-07 ENCOUNTER — TELEPHONE (OUTPATIENT)
Dept: BREAST CENTER | Age: 61
End: 2021-12-07

## 2021-12-08 ENCOUNTER — TELEPHONE (OUTPATIENT)
Dept: BREAST CENTER | Age: 61
End: 2021-12-08

## 2021-12-08 NOTE — TELEPHONE ENCOUNTER
Mom returned call from Dr. Herb Rothman. Discussed pathology results and clear margins, mom very appreciative.

## 2021-12-14 ENCOUNTER — TELEPHONE (OUTPATIENT)
Dept: CASE MANAGEMENT | Age: 61
End: 2021-12-14

## 2021-12-14 NOTE — TELEPHONE ENCOUNTER
Patient new consult education packet assembled for upcoming appointment at [address] [place] [address] with Dr. Celina Castillo.   Nurse Navigator is scheduled to meet with patient at consultation appointment on 12/15/2021 at 2:15 pm.

## 2021-12-15 ENCOUNTER — TELEPHONE (OUTPATIENT)
Dept: CASE MANAGEMENT | Age: 61
End: 2021-12-15

## 2021-12-15 ENCOUNTER — OFFICE VISIT (OUTPATIENT)
Dept: ONCOLOGY | Age: 61
End: 2021-12-15
Payer: COMMERCIAL

## 2021-12-15 ENCOUNTER — TELEPHONE (OUTPATIENT)
Dept: ONCOLOGY | Age: 61
End: 2021-12-15

## 2021-12-15 VITALS
OXYGEN SATURATION: 100 % | WEIGHT: 119 LBS | TEMPERATURE: 98.1 F | DIASTOLIC BLOOD PRESSURE: 90 MMHG | RESPIRATION RATE: 18 BRPM | HEART RATE: 97 BPM | HEIGHT: 56 IN | BODY MASS INDEX: 26.77 KG/M2 | SYSTOLIC BLOOD PRESSURE: 150 MMHG

## 2021-12-15 DIAGNOSIS — Z17.0 MALIGNANT NEOPLASM OF UPPER-INNER QUADRANT OF RIGHT BREAST IN FEMALE, ESTROGEN RECEPTOR POSITIVE (HCC): Primary | ICD-10-CM

## 2021-12-15 DIAGNOSIS — C50.211 MALIGNANT NEOPLASM OF UPPER-INNER QUADRANT OF RIGHT BREAST IN FEMALE, ESTROGEN RECEPTOR POSITIVE (HCC): Primary | ICD-10-CM

## 2021-12-15 PROCEDURE — G8419 CALC BMI OUT NRM PARAM NOF/U: HCPCS | Performed by: INTERNAL MEDICINE

## 2021-12-15 PROCEDURE — G8484 FLU IMMUNIZE NO ADMIN: HCPCS | Performed by: INTERNAL MEDICINE

## 2021-12-15 PROCEDURE — 1036F TOBACCO NON-USER: CPT | Performed by: INTERNAL MEDICINE

## 2021-12-15 PROCEDURE — G8427 DOCREV CUR MEDS BY ELIG CLIN: HCPCS | Performed by: INTERNAL MEDICINE

## 2021-12-15 PROCEDURE — 99205 OFFICE O/P NEW HI 60 MIN: CPT | Performed by: INTERNAL MEDICINE

## 2021-12-15 PROCEDURE — 3017F COLORECTAL CA SCREEN DOC REV: CPT | Performed by: INTERNAL MEDICINE

## 2021-12-15 NOTE — TELEPHONE ENCOUNTER
Met with pt and pt's mother in conjunction with initial medical oncology visit re: positive distress screen. Pt is 78-year-old female being evaluated for breast CA. Pt's mood appeared euthymic with full affect, she appeared A&Ox4, and she was willing/able to participate in session. She appeared appropriately dressed/groomed and was able to ambulate/transfer without assistance. Pt's mother expressed concern about transportation to tx. Stated that she has her own health issues and is not comfortable driving in the snow. Provided information on local transportation resources including transportation benefit under her [employer] Mother expressed concern about pt having to wait for pickup following radiation tx. Encouraged mother to contact company to inquire about policy re: radiation tx/short appointment. Will await tx plan and be available to provide additional resources as needed.       Donna Peraza, MSW, LISW-S  Oncology Social Worker

## 2021-12-15 NOTE — PROGRESS NOTES
[facility] MEDICAL ONCOLOGY  [address] [place] [id number]  Dept: [phone]  Loc: [phone]  Attending Consult Note      Reason for Visit: Right breast cancer. Referring Physician: Remington Cai MD    PCP:  Patrice Daley DO    History of Present Illness:    Ale Gonzáles is a pleasant 71-year-old female patient, with a past medical history significant for hearing loss and hyperlipidemia, who had presented with an abnormal screening mammogram,  MAMMOGRAM ULTRASOUND BIOPSY; [facility]       9/14/2021: MAMMOGRAM, RIGHT BREAST ULTRASOUND: [facility]                9/14/2021: LEFT BREAST ULTRASOUND: [facility]    She underwent an US guided right breast core biopsy at 3 o'clock position on September 20 , 2021. Pathological evaluation completed at [facility]:  PATHOLOGY  Diagnosis:   Right breast, 3 o'clock position, core biopsies: Invasive carcinoma of no   special type (ductal with lobular features).  Preliminary [place]   score 3+2+1 = 6     Comment:   Tumor cells stain strongly positive for e-cadherin   immunostain. Intradepartmental consultation is obtained.      Breast Cancer Marker Studies:         Estrogen Receptors (ER):       -Positive (>10% of cells demonstrate nuclear positivity):       Percentage of cells positive: >90%       Intensity: Strong         Progesterone Receptors ([place]):       -Positive:       Percentage of cells positive: 2%       Intensity: Weak         Her-2/marquise (c-erb B-2) protein expression: Negative (1+)     The patient underwent on 11/3/2021 right breast lumpectomy with axillary sentinel lymph node biopsy, pathology:    CANCER CASE SUMMARY   Procedure -excision   Specimen Laterality -right   Tumor Site, Invasive Carcinoma-3:00   Histologic Type-invasive carcinoma, no special type (ductal)   Histologic Grade ([place] Histologic Score):                    Tubule differentiation- score-3                    Nuclear pleomorphism- score 2                    Mitotic rate- score 2                    Overall Grade- grade 2, (score 7)   Tumor Size: Size of Largest Invasive Carcinoma-30 mm   Ductal Carcinoma In Situ (DCIS)-not identified   Tumor Extent-carcinoma invades skeletal muscle   Treatment Effect in the Breast-no known presurgical therapy   Margin status for invasive carcinoma: Inferior margin involved by   invasive carcinoma.  Posterior margin less than 1 mm from invasive   carcinoma. Regional Lymph Nodes: All regional lymph nodes negative for tumor   Total number of lymph nodes examined-1   Number of sentinel nodes examined-1   Pathologic Stage Classification (AJCC 8th Edition)- pT2 p(sn)N0   Additional Pathologic Findings-lymphovascular and perineural space   invasion   Special studies-ER positive, [place] positive, HER-2 negative     The patient underwent on 12/1/2021 lumpectomy margin reexcision, was negative for residual carcinoma. Oncotype DX was done, recurrence score is 26, distant recurrence risk at 9 years with endocrine therapy alone is 16%, absolute chemotherapy benefit is greater than 15%. The patient is doing well overall, she is accompanied by her mother. She is anxious. Review of Systems;  CONSTITUTIONAL: No fever, chills. Good appetite and energy level. ENMT: Eyes: No diplopia; Nose: No epistaxis. Mouth: No sore throat. RESPIRATORY: No hemoptysis, shortness of breath, cough. CARDIOVASCULAR: No chest pain, palpitations. GASTROINTESTINAL: No nausea/vomiting, abdominal pain, diarrhea/constipation. GENITOURINARY: No dysuria, urinary frequency, hematuria. NEURO: No syncope, presyncope, headache.   Remainder:  ROS NEGATIVE    Past Medical History:      Diagnosis Date    Breast cancer ([facility] [address]     Right breast    Cancer ([address]     Hearing impaired     Hearing loss     Hyperlipidemia      Patient Active Problem List   Diagnosis    Cancer ([address]    Malignant neoplasm of upper-inner quadrant of right breast in female, estrogen receptor positive [facility])        Past Surgical History:      Procedure Laterality Date    BREAST LUMPECTOMY Right 11/3/2021    RIGHT BREAST LUMPECTOMY, BLUE DYE INJECTION, RIGHT AXILLARY SENTINEL LYMPH NODE INCISION performed by Salma Cortes MD at . [address] LUMPECTOMY Right 12/1/2021    RE-EXCISION RIGHT BREAST INFERIOR MARGIN performed by Salma Cortes MD at [facility] RIGHT  2021       Family History:  Family History   Problem Relation Age of Onset    Heart Disease Mother     High Blood Pressure Mother     High Cholesterol Mother     Other Father         COPD    Dementia Maternal Grandmother        Medications:  Reviewed and reconciled. Social History:  Social History     Socioeconomic History    Marital status: Single     Spouse name: Not on file    Number of children: Not on file    Years of education: Not on file    Highest education level: Not on file   Occupational History    Not on file   Tobacco Use    Smoking status: Never Smoker    Smokeless tobacco: Never Used   Vaping Use    Vaping Use: Never used   Substance and Sexual Activity    Alcohol use: Never    Drug use: Never    Sexual activity: Not on file   Other Topics Concern    Not on file   Social History Narrative    Not on file     Social Determinants of Health     Financial Resource Strain:     Difficulty of Paying Living Expenses: Not on file   Food Insecurity:     Worried About [address] in the Last Year: Not on file    Rae of Food in the Last Year: Not on file   Transportation Needs:     Lack of Transportation (Medical): Not on file    Lack of Transportation (Non-Medical):  Not on file   Physical Activity:     Days of Exercise per Week: Not on file    Minutes of Exercise per Session: Not on file   Stress:     Feeling of Stress : Not on file   Social Connections:     Frequency of Communication with Friends and Family: Not on file    Frequency of Social Gatherings with Friends and Family: Not on file    Attends Faith Services: Not on file    Active Member of Clubs or Organizations: Not on file    Attends Club or Organization Meetings: Not on file    Marital Status: Not on file   Intimate Partner Violence:     Fear of Current or Ex-Partner: Not on file    Emotionally Abused: Not on file    Physically Abused: Not on file    Sexually Abused: Not on file   Housing Stability:     Unable to Pay for Housing in the Last Year: Not on file    Number of [place] in the Last Year: Not on file    Unstable Housing in the Last Year: Not on file       Allergies:  No Known Allergies    Physical Exam:  BP (!) 150/90 (Site: Left Upper Arm, Position: Sitting, Cuff Size: Medium Adult)   Pulse 97   Temp 98.1 °F (36.7 °C) (Infrared)   Resp 18   Ht 4' 8\" (1.422 m)   Wt 119 lb (54 kg)   LMP 10/28/2021   SpO2 100%   BMI 26.68 kg/m²   GENERAL: Alert, oriented x 3, not in acute distress. HEENT: PERRLA; EOMI. Oropharynx clear. NECK: Supple. No palpable cervical or supraclavicular lymphadenopathy. LUNGS: Good air entry bilaterally. No wheezing, crackles or rhonchi. BREASTS: Left breast exam is negative for any skin changes, no nipple discharge, no palpable masses, no palpable left axillary lymphadenopathy, the right breast exam is remarkable for an incision from her lumpectomy, it is healing nicely, no nipple discharge, no palpable masses, no palpable right axillary lymphadenopathy. CARDIOVASCULAR: Regular rate. No murmurs, rubs or gallops. ABDOMEN: Soft. Non-tender, non-distended. Positive bowel sounds. EXTREMITIES: Without clubbing, cyanosis, or edema. NEUROLOGIC: No focal deficits.      ECOG PS 1      Impression/Plan:    Jose Luis Phoenix is a pleasant 49-year-old female patient, with a past medical history significant for hearing loss and hyperlipidemia, who had presented with an abnormal screening mammogram, she was diagnosed with a right breast invasive ductal carcinoma, she underwent on 11/3/2021 right breast lumpectomy with axillary sentinel lymph node biopsy, tumor size was 3 cm, grade 2, carcinoma invaded skeletal muscle, inferior margin was positive, posterior margin was less than 1 mm from invasive carcinoma, she had margins reexcision done, was negative for residual carcinoma, 1 sentinel lymph node was removed, was negative for metastatic disease, final pathologic stage pT2 p(sn)N0, ER positive greater than 90%, OR +2%, HER-2/marquise negative, 1+ by IHC, Oncotype DX was done, recurrence score is 26, risk of distant recurrence at 9 years with endocrine therapy alone is 16%, chemotherapy benefit is greater than 15%. I discussed with the patient and her mother her diagnosis, characteristics of her tumor, prognosis and recommendations for treatment, adjuvant endocrine therapy with an AI is recommended, the side effects of the Arimidex were reviewed with the patient. We reviewed the Oncotype DX results, recurrence score is 26, adjuvant chemotherapy is recommended, benefit is greater than 15%, I discussed with the patient TC regimen, the side effects and the schedule were reviewed with her, she will have a chemo teach done. She would like to start after the holidays. RTC with chemo start. Thank you for allowing us to participate in the care of Ms. Guzman.     Krystle Rodriguez MD   HEMATOLOGY/MEDICAL ONCOLOGY  [facility] MEDICAL ONCOLOGY  [address] [place] [id number]  Dept: [id number]: [phone]

## 2021-12-15 NOTE — PROGRESS NOTES
Madiha  1960 64 y.o. Referring Physician: Dr. Vania Piña    PCP: Janak Pacheco,     Vitals:    12/15/21 1423   BP: (!) 150/90   Pulse: 97   Resp: 18   Temp: 98.1 °F (36.7 °C)   SpO2: 100%        Wt Readings from Last 3 Encounters:   12/15/21 119 lb (54 kg)   21 118 lb (53.5 kg)   21 118 lb (53.5 kg)        Body mass index is 26.68 kg/m². Chief Complaint:   Chief Complaint   Patient presents with    New Patient     rt breast CA         Cancer Staging  Malignant neoplasm of upper-inner quadrant of right breast in female, estrogen receptor positive ([facility]  Staging form: Breast, AJCC 8th Edition  - Clinical: No stage assigned - Unsigned  - Pathologic stage from 2021: Stage IA (pT2, pN0(sn), cM0, G2, ER+, SD+, HER2-) - Signed by Marychuy Velasquez MD on 2021      Prior Radiation Therapy? NO    Concurrent Chemo/radiation? NO    Prior Chemotherapy? NO    Prior Hormonal Therapy? NO    Head and Neck Cancer? No, patient does NOT have HN cancer.       LMP: 48    Age at first Menses: 6    : 0    Para: 0          Current Outpatient Medications:     Garlic (GARLIQUE) 585 MG TBEC, Take by mouth daily , Disp: , Rfl:     Cholecalciferol (VITAMIN D3) 50 MCG ( UT) CAPS, Take by mouth daily , Disp: , Rfl:     loratadine (CLARITIN) 10 MG capsule, Take 10 mg by mouth daily, Disp: , Rfl:     acetaminophen (TYLENOL) 500 MG tablet, Take 500 mg by mouth every 6 hours as needed for Pain, Disp: , Rfl:        Past Medical History:   Diagnosis Date    Breast cancer ([facility]     Right breast    Cancer ([facility]     Hearing loss     Hyperlipidemia        Past Surgical History:   Procedure Laterality Date    BREAST LUMPECTOMY Right 11/3/2021    RIGHT BREAST LUMPECTOMY, BLUE DYE INJECTION, RIGHT AXILLARY SENTINEL LYMPH NODE INCISION performed by Marychuy Velasquez MD at . [address] LUMPECTOMY Right 2021    RE-EXCISION RIGHT BREAST INFERIOR MARGIN performed by [facility]chuy Velasquez MD at [facility] OR    EYE SURGERY       BREAST NEEDLE BIOPSY RIGHT  2021       Family History   Problem Relation Age of Onset    Heart Disease Mother     High Blood Pressure Mother     High Cholesterol Mother     Other Father         COPD       Social History     Socioeconomic History    Marital status: Single     Spouse name: Not on file    Number of children: Not on file    Years of education: Not on file    Highest education level: Not on file   Occupational History    Not on file   Tobacco Use    Smoking status: Never Smoker    Smokeless tobacco: Never Used   Vaping Use    Vaping Use: Never used   Substance and Sexual Activity    Alcohol use: Never    Drug use: Never    Sexual activity: Not on file   Other Topics Concern    Not on file   Social History Narrative    Not on file     Social Determinants of Health     Financial Resource Strain:     Difficulty of Paying Living Expenses: Not on file   Food Insecurity:     Worried About [address] in the Last Year: Not on file    [address] [employer] in the Last Year: Not on file   Transportation Needs:     Lack of Transportation (Medical): Not on file    Lack of Transportation (Non-Medical):  Not on file   Physical Activity:     Days of Exercise per Week: Not on file    Minutes of Exercise per Session: Not on file   Stress:     Feeling of Stress : Not on file   Social Connections:     Frequency of Communication with Friends and Family: Not on file    Frequency of Social Gatherings with Friends and Family: Not on file    Attends Sikhism Services: Not on file    Active Member of Clubs or Organizations: Not on file    Attends Club or Organization Meetings: Not on file    Marital Status: Not on file   Intimate Partner Violence:     Fear of Current or Ex-Partner: Not on file    Emotionally Abused: Not on file    Physically Abused: Not on file    Sexually Abused: Not on file   Housing Stability:     Unable to Pay for Housing in the Last Year: Not on file    Number of Places Lived in the Last Year: Not on file    Unstable Housing in the Last Year: Not on file           Occupation: Does not work  Retired:  NO          Pacemaker/Defibulator/ICD:  No    Mediport: No           FALLS RISK SCREENING ASSESSMENT    Instructions:  Assess the patient and [facility] the appropriate indicators that are present for fall risk identification. Total the numbers circled and assign a fall risk score from Table 2.  Reassess patient at a minimum every 12 weeks or with status change. Assessment   Date  12/15/2021     1. Mental Ability: confusion/cognitively impaired No - 0       2. Elimination Issues: incontinence, frequency No - 0       3. Ambulatory: use of assistive devices (walker, cane, off-loading devices), attached to equipment (IV pole, oxygen) No - 0     4. Sensory Limitations: dizziness, vertigo, impaired vision No - 0       5. Age Less than 65 years - 0       6. Medication: diuretics, strong analgesics, hypnotics, sedatives, antihypertensive agents   No - 0   7. Falls:  recent history of falls within the last 3 months (not to include slipping or tripping)   No - 0   TOTAL 0    If score of 4 or greater was education given? No       TABLE 2   Risk Score Risk Level Plan of Care   0-3 Little or  No Risk 1. Provide assistance as indicated for ambulation activities  2. Reorient confused/cognitively impaired patient  3. Call-light/bell within patient's reach  4. Chair/bed in low position, stretcher/bed with siderails up except when performing patient care activities  5. Educate patient/family/caregiver on falls prevention  6.  Reassess in 12 weeks or with any noted change in patient condition which places them at a risk for a fall   4-6 Moderate Risk 1. Provide assistance as indicated for ambulation activities  2. Reorient confused/cognitively impaired patient  3. Call-light/bell within patient's reach  4.   Chair/bed in low position, stretcher/bed with siderails up except when performing patient care activities  5. Educate patient/family/caregiver on falls prevention  6. Falls risk precaution (Yellow sticker Level II) placed on patient chart   7 or   Higher High Risk 1. Place patient in easily observable treatment room  2. Patient attended at all times by family member or staff  3. Provide assistance as indicated for ambulation activities  4. Reorient confused/cognitively impaired patient  5. Call-light/bell within patient's reach  6. Chair/bed in low position, stretcher/bed with siderails up except when performing patient care activities  7. Educate patient/family/caregiver on falls prevention  8. Falls risk precaution (Yellow sticker Level III) placed on patient chart           MALNUTRITION RISK SCREENING ASSESSMENT    Instructions:  Assess the patient and enter the appropriate indicators that are present for nutrition risk identification. Total the numbers entered and assign a risk score. Follow the appropriate action for total score listed below. Assessment   Date  12/15/2021     1. Have you lost weight without trying? 1- Yes, 0.5 kg to 5 kg     2. Have you been eating poorly because of a decreased appetite? 0- No   3. Do you have a diagnosis of head and neck cancer?       0- No                                                                                    TOTAL 1        Score of 0-1: No action  Score 2 or greater:  · For Non-Diabetic Patient: Recommend adding Ensure Enlive 2 x daily and provide patient with Ensure wellness bag with coupons  · For Diabetic Patient: Recommend adding Glucerna Shake 2 x daily and provide patient with Glucerna Wellness bag with coupons  · Route to the dietitian via [address]    · Are you having  difficulty performing daily routine tasks  due to fatigue or weakness (ie: bathing/showering, dressing, housework, meal prep, work, child Singh Breaux): No     · Do you have any arm flexibility/ROM restrictions, swelling or pain that limit activity: No     · Any changes in memory, attention/focus that impact daily activities: No     · Do you avoid participation in leisure/social activity due weakness, fatigue or pain: No     ARE ANY OF THE ABOVE ARE ANSWERED YES: No          PT ASSESSMENT FOR REFERRAL    · Have you had any recent falls in past 2 months: No     · Do you have difficulty  going up/down stairs: No     · Are you having difficulty walking: No     · Do you often hold onto furniture/environmental supports or feel off balance when you are walking: No     · Do you need to take rest breaks when you are walking: No     · Any pain on scale of 1-10 that limits your mobility: No 0/10    ARE ANY OF THE ABOVE ARE ANSWERED YES: No           PREHAB REFERRALS FOR NEOADJUVANT BREAST CANCER PATIENTS    Is this patient a breast cancer patient requiring neoadjuvant chemotherapy: No, this patient does NOT require neoadjuvant chemotherapy. LYMPHEDEMA SCREENING ASSESSMENT FOR PATIENTS WITH BREAST CANCER    The patient reports the following signs/symptoms of lymphedema: None    Please ask the provider to assess patient for lymphedema for any reported signs or symptoms so a referral to Lymphedema Therapy can be considered. PREHAB AUDIOLOGY REFERRAL    - Is patient planned to receive Cisplatin? No. This patient is not planned to start Cisplatin. - Is patient complaining of new onset hearing loss? No. Patient is not complaining of new onset hearing loss.       Theodora Witt RN

## 2021-12-16 NOTE — TELEPHONE ENCOUNTER
Met with patient and Mother, [facility], during her initial consultation with Dr.El Mota for her recent breast cancer diagnosis. Introduced myself and explained my role with patients receiving treatment at our center. Patient was friendly and receptive. Completed nursing assessment for the center including medication review and medical, social, and surgical histories. Discussed additional ancillary services available at the center. Declines any referrals at this time. Provided information regarding recommended next steps per Dr Shelly Buchanan, provided literature on docetaxel and cyclophosphamide via IV cancer cancer treatment education website. Additionally, patient and mother provided with extensive literature including Patient Resource Breast Cancer booklet, Lymphedema handout, [address] pamphlet, ACS Chemotherapy booklet, ACS For Women Facing Breast Cancer booklet, ACS TLC catalog, cosmetic and comfort tips during chemotherapy, eating and drinking during radiation and chemotherapy, frankly speaking eating well during cancer treatments booklet. Provided patient with my contact information, office hours, and encouragement to call me with questions or concerns. Patient verbalizes understanding and appreciative of nurse navigator visit. Emotional support provided and greater than 45 minutes spent with patient. Nurse navigator will continue to follow. Nelly Tiwari.  SANDRO HermanN, RN - Oncology Nurse Navigator

## 2021-12-20 ENCOUNTER — OFFICE VISIT (OUTPATIENT)
Dept: BREAST CENTER | Age: 61
End: 2021-12-20

## 2021-12-20 VITALS
DIASTOLIC BLOOD PRESSURE: 76 MMHG | BODY MASS INDEX: 26.97 KG/M2 | HEIGHT: 56 IN | WEIGHT: 119.9 LBS | SYSTOLIC BLOOD PRESSURE: 128 MMHG | HEART RATE: 98 BPM | TEMPERATURE: 97.7 F | RESPIRATION RATE: 20 BRPM | OXYGEN SATURATION: 99 %

## 2021-12-20 DIAGNOSIS — Z48.817 AFTERCARE FOLLOWING SURGERY OF THE SKIN OR SUBCUTANEOUS TISSUE: Primary | ICD-10-CM

## 2021-12-20 PROCEDURE — 99024 POSTOP FOLLOW-UP VISIT: CPT | Performed by: SURGERY

## 2021-12-22 ENCOUNTER — TELEPHONE (OUTPATIENT)
Dept: INFUSION THERAPY | Age: 61
End: 2021-12-22

## 2021-12-22 NOTE — TELEPHONE ENCOUNTER
Left a message for  [facility] [phone] regards to [address] case number [id number], with regards will order this medication if prior authorization is obtained. Awaiting a return call to [phone].

## 2021-12-22 NOTE — TELEPHONE ENCOUNTER
Spoke with Dr. Narciso Méndez with regards to patient's Shirlie Cart. He will approve this case, awaiting final approval fax.

## 2021-12-22 NOTE — TELEPHONE ENCOUNTER
Dr. Lurdes Ibrahim had returned call but was on another line and missed call. Left a message for doctor to please return call at [phone].

## 2022-01-04 ENCOUNTER — HOSPITAL ENCOUNTER (OUTPATIENT)
Age: 62
Discharge: HOME | End: 2022-01-04
Payer: COMMERCIAL

## 2022-01-04 DIAGNOSIS — C50.211 MALIGNANT NEOPLASM OF UPPER-INNER QUADRANT OF RIGHT BREAST IN FEMALE, ESTROGEN RECEPTOR POSITIVE (HCC): Primary | ICD-10-CM

## 2022-01-04 DIAGNOSIS — Z17.0 MALIGNANT NEOPLASM OF UPPER-INNER QUADRANT OF RIGHT BREAST IN FEMALE, ESTROGEN RECEPTOR POSITIVE (HCC): ICD-10-CM

## 2022-01-04 DIAGNOSIS — Z17.0 MALIGNANT NEOPLASM OF UPPER-INNER QUADRANT OF RIGHT BREAST IN FEMALE, ESTROGEN RECEPTOR POSITIVE (HCC): Primary | ICD-10-CM

## 2022-01-04 DIAGNOSIS — C50.211 MALIGNANT NEOPLASM OF UPPER-INNER QUADRANT OF RIGHT BREAST IN FEMALE, ESTROGEN RECEPTOR POSITIVE (HCC): ICD-10-CM

## 2022-01-04 LAB
ALBUMIN SERPL-MCNC: 4.4 G/DL (ref 3.5–5.2)
ALP BLD-CCNC: 78 U/L (ref 35–104)
ALT SERPL-CCNC: 11 U/L (ref 0–32)
ANION GAP SERPL CALCULATED.3IONS-SCNC: 14 MMOL/L (ref 7–16)
AST SERPL-CCNC: 16 U/L (ref 0–31)
BASOPHILS ABSOLUTE: 0.04 E9/L (ref 0–0.2)
BASOPHILS RELATIVE PERCENT: 0.4 % (ref 0–2)
BILIRUB SERPL-MCNC: 0.3 MG/DL (ref 0–1.2)
BUN BLDV-MCNC: 15 MG/DL (ref 6–23)
CALCIUM SERPL-MCNC: 9.1 MG/DL (ref 8.6–10.2)
CHLORIDE BLD-SCNC: 102 MMOL/L (ref 98–107)
CO2: 23 MMOL/L (ref 22–29)
CREAT SERPL-MCNC: 0.8 MG/DL (ref 0.5–1)
EOSINOPHILS ABSOLUTE: 0.1 E9/L (ref 0.05–0.5)
EOSINOPHILS RELATIVE PERCENT: 1.1 % (ref 0–6)
GFR AFRICAN AMERICAN: >60
GFR NON-AFRICAN AMERICAN: >60 ML/MIN/1.73
GLUCOSE BLD-MCNC: 106 MG/DL (ref 74–99)
HCT VFR BLD CALC: 46.7 % (ref 34–48)
HEMOGLOBIN: 15 G/DL (ref 11.5–15.5)
IMMATURE GRANULOCYTES #: 0.02 E9/L
IMMATURE GRANULOCYTES %: 0.2 % (ref 0–5)
LYMPHOCYTES ABSOLUTE: 2.1 E9/L (ref 1.5–4)
LYMPHOCYTES RELATIVE PERCENT: 23.6 % (ref 20–42)
MCH RBC QN AUTO: 27.7 PG (ref 26–35)
MCHC RBC AUTO-ENTMCNC: 32.1 % (ref 32–34.5)
MCV RBC AUTO: 86.2 FL (ref 80–99.9)
MONOCYTES ABSOLUTE: 0.57 E9/L (ref 0.1–0.95)
MONOCYTES RELATIVE PERCENT: 6.4 % (ref 2–12)
NEUTROPHILS ABSOLUTE: 6.06 E9/L (ref 1.8–7.3)
NEUTROPHILS RELATIVE PERCENT: 68.3 % (ref 43–80)
PDW BLD-RTO: 13.9 FL (ref 11.5–15)
PLATELET # BLD: 296 E9/L (ref 130–450)
PMV BLD AUTO: 10.4 FL (ref 7–12)
POTASSIUM SERPL-SCNC: 3.8 MMOL/L (ref 3.5–5)
RBC # BLD: 5.42 E12/L (ref 3.5–5.5)
SODIUM BLD-SCNC: 139 MMOL/L (ref 132–146)
TOTAL PROTEIN: 7.1 G/DL (ref 6.4–8.3)
WBC # BLD: 8.9 E9/L (ref 4.5–11.5)

## 2022-01-04 PROCEDURE — 80053 COMPREHEN METABOLIC PANEL: CPT

## 2022-01-04 PROCEDURE — 85025 COMPLETE CBC W/AUTO DIFF WBC: CPT

## 2022-01-04 PROCEDURE — 36415 COLL VENOUS BLD VENIPUNCTURE: CPT

## 2022-01-04 NOTE — PROGRESS NOTES
Spoke with patient about adjuvant chemotherapy (docetaxel and cyclophosphamide). We went over the protocol to be used, what to expect as far as side effects, and when to call with problems. This was intended to reinforce the education done by Dr. Erica Baxter. Patient was provided medication handouts to the patient to take home and told patient to call if there are any questions once they had a chance to absorb the information. Patient had the opportunity to ask questions with all questions being answered to their satisfaction. The patient expresses understanding and acceptance of instructions.     Electronically signed by Armin Soto [facility] on 1/4/2022 at 2:31 PM

## 2022-01-05 ENCOUNTER — TELEPHONE (OUTPATIENT)
Dept: CASE MANAGEMENT | Age: 62
End: 2022-01-05

## 2022-01-05 ENCOUNTER — OFFICE VISIT (OUTPATIENT)
Dept: ONCOLOGY | Age: 62
End: 2022-01-05
Payer: COMMERCIAL

## 2022-01-05 ENCOUNTER — HOSPITAL ENCOUNTER (OUTPATIENT)
Dept: INFUSION THERAPY | Age: 62
Discharge: HOME | End: 2022-01-05
Payer: COMMERCIAL

## 2022-01-05 VITALS
HEIGHT: 58 IN | BODY MASS INDEX: 24.98 KG/M2 | HEART RATE: 85 BPM | WEIGHT: 119 LBS | DIASTOLIC BLOOD PRESSURE: 90 MMHG | SYSTOLIC BLOOD PRESSURE: 154 MMHG | OXYGEN SATURATION: 100 % | TEMPERATURE: 97.9 F | RESPIRATION RATE: 12 BRPM

## 2022-01-05 VITALS — SYSTOLIC BLOOD PRESSURE: 127 MMHG | HEART RATE: 89 BPM | DIASTOLIC BLOOD PRESSURE: 81 MMHG | RESPIRATION RATE: 14 BRPM

## 2022-01-05 DIAGNOSIS — Z17.0 MALIGNANT NEOPLASM OF UPPER-INNER QUADRANT OF RIGHT BREAST IN FEMALE, ESTROGEN RECEPTOR POSITIVE (HCC): Primary | ICD-10-CM

## 2022-01-05 DIAGNOSIS — C50.211 MALIGNANT NEOPLASM OF UPPER-INNER QUADRANT OF RIGHT BREAST IN FEMALE, ESTROGEN RECEPTOR POSITIVE (HCC): Primary | ICD-10-CM

## 2022-01-05 PROCEDURE — G8484 FLU IMMUNIZE NO ADMIN: HCPCS | Performed by: INTERNAL MEDICINE

## 2022-01-05 PROCEDURE — 99214 OFFICE O/P EST MOD 30 MIN: CPT | Performed by: INTERNAL MEDICINE

## 2022-01-05 PROCEDURE — G8427 DOCREV CUR MEDS BY ELIG CLIN: HCPCS | Performed by: INTERNAL MEDICINE

## 2022-01-05 PROCEDURE — 96417 CHEMO IV INFUS EACH ADDL SEQ: CPT

## 2022-01-05 PROCEDURE — 6360000002 HC RX W HCPCS: Performed by: INTERNAL MEDICINE

## 2022-01-05 PROCEDURE — 1036F TOBACCO NON-USER: CPT | Performed by: INTERNAL MEDICINE

## 2022-01-05 PROCEDURE — 2580000003 HC RX 258: Performed by: INTERNAL MEDICINE

## 2022-01-05 PROCEDURE — 96372 THER/PROPH/DIAG INJ SC/IM: CPT

## 2022-01-05 PROCEDURE — G8420 CALC BMI NORM PARAMETERS: HCPCS | Performed by: INTERNAL MEDICINE

## 2022-01-05 PROCEDURE — 96375 TX/PRO/DX INJ NEW DRUG ADDON: CPT

## 2022-01-05 PROCEDURE — 3017F COLORECTAL CA SCREEN DOC REV: CPT | Performed by: INTERNAL MEDICINE

## 2022-01-05 PROCEDURE — 96413 CHEMO IV INFUSION 1 HR: CPT

## 2022-01-05 RX ORDER — DEXAMETHASONE 4 MG/1
4 TABLET ORAL 2 TIMES DAILY WITH MEALS
Qty: 36 TABLET | Refills: 1 | Status: SHIPPED | OUTPATIENT
Start: 2022-01-05 | End: 2022-01-08

## 2022-01-05 RX ORDER — ONDANSETRON 2 MG/ML
8 INJECTION INTRAMUSCULAR; INTRAVENOUS
Status: CANCELLED | OUTPATIENT
Start: 2022-01-05

## 2022-01-05 RX ORDER — ALBUTEROL SULFATE 90 UG/1
4 AEROSOL, METERED RESPIRATORY (INHALATION) PRN
Status: CANCELLED | OUTPATIENT
Start: 2022-01-05

## 2022-01-05 RX ORDER — SODIUM CHLORIDE 9 MG/ML
INJECTION, SOLUTION INTRAVENOUS CONTINUOUS
Status: CANCELLED | OUTPATIENT
Start: 2022-01-05

## 2022-01-05 RX ORDER — HEPARIN SODIUM (PORCINE) LOCK FLUSH IV SOLN 100 UNIT/ML 100 UNIT/ML
500 SOLUTION INTRAVENOUS PRN
Status: CANCELLED | OUTPATIENT
Start: 2022-01-05

## 2022-01-05 RX ORDER — ACETAMINOPHEN 325 MG/1
650 TABLET ORAL
Status: CANCELLED | OUTPATIENT
Start: 2022-01-05

## 2022-01-05 RX ORDER — DIPHENHYDRAMINE HYDROCHLORIDE 50 MG/ML
50 INJECTION INTRAMUSCULAR; INTRAVENOUS
Status: CANCELLED | OUTPATIENT
Start: 2022-01-05

## 2022-01-05 RX ORDER — SODIUM CHLORIDE 9 MG/ML
20 INJECTION, SOLUTION INTRAVENOUS ONCE
Status: CANCELLED | OUTPATIENT
Start: 2022-01-05 | End: 2022-01-05

## 2022-01-05 RX ORDER — SODIUM CHLORIDE 0.9 % (FLUSH) 0.9 %
5-40 SYRINGE (ML) INJECTION PRN
Status: DISCONTINUED | OUTPATIENT
Start: 2022-01-05 | End: 2022-01-06 | Stop reason: HOSPADM

## 2022-01-05 RX ORDER — DEXAMETHASONE SODIUM PHOSPHATE 10 MG/ML
10 INJECTION INTRAMUSCULAR; INTRAVENOUS ONCE
Status: COMPLETED | OUTPATIENT
Start: 2022-01-05 | End: 2022-01-05

## 2022-01-05 RX ORDER — MEPERIDINE HYDROCHLORIDE 50 MG/ML
12.5 INJECTION INTRAMUSCULAR; INTRAVENOUS; SUBCUTANEOUS PRN
Status: CANCELLED | OUTPATIENT
Start: 2022-01-05

## 2022-01-05 RX ORDER — DIPHENHYDRAMINE HYDROCHLORIDE 50 MG/ML
INJECTION INTRAMUSCULAR; INTRAVENOUS
Status: DISCONTINUED
Start: 2022-01-05 | End: 2022-01-05 | Stop reason: WASHOUT

## 2022-01-05 RX ORDER — EPINEPHRINE 1 MG/ML
0.3 INJECTION, SOLUTION, CONCENTRATE INTRAVENOUS PRN
Status: CANCELLED | OUTPATIENT
Start: 2022-01-05

## 2022-01-05 RX ORDER — SODIUM CHLORIDE 0.9 % (FLUSH) 0.9 %
5-40 SYRINGE (ML) INJECTION PRN
Status: CANCELLED | OUTPATIENT
Start: 2022-01-05

## 2022-01-05 RX ORDER — SODIUM CHLORIDE 9 MG/ML
25 INJECTION, SOLUTION INTRAVENOUS PRN
Status: CANCELLED | OUTPATIENT
Start: 2022-01-05

## 2022-01-05 RX ORDER — SODIUM CHLORIDE 9 MG/ML
20 INJECTION, SOLUTION INTRAVENOUS ONCE
Status: COMPLETED | OUTPATIENT
Start: 2022-01-05 | End: 2022-01-05

## 2022-01-05 RX ORDER — PALONOSETRON 0.05 MG/ML
0.25 INJECTION, SOLUTION INTRAVENOUS ONCE
Status: CANCELLED | OUTPATIENT
Start: 2022-01-05

## 2022-01-05 RX ORDER — SODIUM CHLORIDE 9 MG/ML
5-40 INJECTION INTRAVENOUS PRN
Status: CANCELLED | OUTPATIENT
Start: 2022-01-05

## 2022-01-05 RX ORDER — PROCHLORPERAZINE MALEATE 10 MG
10 TABLET ORAL EVERY 6 HOURS PRN
Qty: 30 TABLET | Refills: 2 | Status: SHIPPED
Start: 2022-01-05 | End: 2022-06-20

## 2022-01-05 RX ORDER — PALONOSETRON 0.05 MG/ML
0.25 INJECTION, SOLUTION INTRAVENOUS ONCE
Status: COMPLETED | OUTPATIENT
Start: 2022-01-05 | End: 2022-01-05

## 2022-01-05 RX ORDER — ONDANSETRON HYDROCHLORIDE 8 MG/1
8 TABLET, FILM COATED ORAL EVERY 12 HOURS PRN
Qty: 30 TABLET | Refills: 1 | Status: SHIPPED
Start: 2022-01-05 | End: 2022-06-20

## 2022-01-05 RX ADMIN — CYCLOPHOSPHAMIDE 800 MG: 200 INJECTION, SOLUTION INTRAVENOUS at 13:39

## 2022-01-05 RX ADMIN — PEGFILGRASTIM-BMEZ 6 MG: 6 INJECTION SUBCUTANEOUS at 14:18

## 2022-01-05 RX ADMIN — PALONOSETRON HYDROCHLORIDE 0.25 MG: 0.25 INJECTION, SOLUTION INTRAVENOUS at 11:49

## 2022-01-05 RX ADMIN — DEXAMETHASONE SODIUM PHOSPHATE 10 MG: 10 INJECTION INTRAMUSCULAR; INTRAVENOUS at 11:49

## 2022-01-05 RX ADMIN — SODIUM CHLORIDE, PRESERVATIVE FREE 10 ML: 5 INJECTION INTRAVENOUS at 11:49

## 2022-01-05 RX ADMIN — DOCETAXEL 110 MG: 20 INJECTION INTRAVENOUS at 12:23

## 2022-01-05 RX ADMIN — SODIUM CHLORIDE 20 ML/HR: 9 INJECTION, SOLUTION INTRAVENOUS at 11:40

## 2022-01-05 RX ADMIN — SODIUM CHLORIDE, PRESERVATIVE FREE 10 ML: 5 INJECTION INTRAVENOUS at 11:40

## 2022-01-05 NOTE — PROGRESS NOTES
[facility] [facility]  [address] [place], [place] [address] [address]  Dept: [phone]  Loc: [phone]  Attending Progress Note      Reason for Visit: Right breast cancer. Referring Physician: Karie Ramey MD    PCP:  Saige Sams DO    History of Present Illness:     Edgar Gallo is a pleasant 70-year-old female patient, with a past medical history significant for hearing loss and hyperlipidemia, who had presented with an abnormal screening mammogram,  MAMMOGRAM ULTRASOUND BIOPSY; [facility]       9/14/2021: MAMMOGRAM, RIGHT BREAST ULTRASOUND: [facility]                9/14/2021: LEFT BREAST ULTRASOUND: [facility]    She underwent an US guided right breast core biopsy at 3 o'clock position on September 20 , 2021. Pathological evaluation completed at 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  Preliminary Allan   score 3+2+1 = 6     Comment:   Tumor cells stain strongly positive for e-cadherin   immunostain. Intradepartmental consultation is obtained.      Breast Cancer Marker Studies:         Estrogen Receptors (ER):       -Positive (>10% of cells demonstrate nuclear positivity):       Percentage of cells positive: >90%       Intensity: Strong         Progesterone Receptors (IL):       -Positive:       Percentage of cells positive: 2%       Intensity: Weak         Her-2/marquise (c-erb B-2) protein expression: Negative (1+)     The patient underwent on 11/3/2021 right breast lumpectomy with axillary sentinel lymph node biopsy, pathology:    CANCER CASE SUMMARY   Procedure -excision   Specimen Laterality -right   Tumor Site, Invasive Carcinoma-3:00   Histologic Type-invasive carcinoma, no special type (ductal)   Histologic Grade (Allan Histologic Score):                    Tubule differentiation- score-3                    Nuclear pleomorphism- score 2                    Mitotic rate- score 2                    Overall Grade- grade 2, (score 7)   Tumor Size: Size of Largest Invasive Carcinoma-30 mm   Ductal Carcinoma In Situ (DCIS)-not identified   Tumor Extent-carcinoma invades skeletal muscle   Treatment Effect in the Breast-no known presurgical therapy   Margin status for invasive carcinoma: Inferior margin involved by   invasive carcinoma.  Posterior margin less than 1 mm from invasive   carcinoma. Regional Lymph Nodes: All regional lymph nodes negative for tumor   Total number of lymph nodes examined-1   Number of sentinel nodes examined-1   Pathologic Stage Classification (AJCC 8th Edition)- pT2 p(sn)N0   Additional Pathologic Findings-lymphovascular and perineural space   invasion   Special studies-ER positive, ID positive, HER-2 negative     The patient underwent on 12/1/2021 lumpectomy margin reexcision, was negative for residual carcinoma. Oncotype DX was done, recurrence score is 26, distant recurrence risk at 9 years with endocrine therapy alone is 16%, absolute chemotherapy benefit is greater than 15%. The patient is doing well overall, she is accompanied by her mother. Will be started today 1/5/2022 on adjuvant chemotherapy with Taxotere and Cytoxan. Review of Systems;  CONSTITUTIONAL: No fever, chills. Good appetite and energy level. ENMT: Eyes: No diplopia; Nose: No epistaxis. Mouth: No sore throat. RESPIRATORY: No hemoptysis, shortness of breath, cough. CARDIOVASCULAR: No chest pain, palpitations. GASTROINTESTINAL: No nausea/vomiting, abdominal pain, diarrhea/constipation. GENITOURINARY: No dysuria, urinary frequency, hematuria. NEURO: No syncope, presyncope, headache.   Remainder:  ROS NEGATIVE    Past Medical History:      Diagnosis Date    Breast cancer ([facility]     Right breast    Cancer ([facility]     Hearing impaired     Hearing loss     Hyperlipidemia      Patient Active Problem List   Diagnosis    [facility]    Malignant neoplasm of upper-inner quadrant of right breast in female, estrogen receptor positive ([facility]        Past Surgical History:      Procedure Laterality Date    BREAST LUMPECTOMY Right 11/3/2021    RIGHT BREAST LUMPECTOMY, BLUE DYE INJECTION, RIGHT AXILLARY SENTINEL LYMPH NODE INCISION performed by Elizabeth Izquierdo MD at . [address] LUMPECTOMY Right 12/1/2021    RE-EXCISION RIGHT BREAST INFERIOR MARGIN performed by Elizabeth Izquierdo MD at [facility] RIGHT  2021       Family History:  Family History   Problem Relation Age of Onset    Heart Disease Mother     High Blood Pressure Mother     High Cholesterol Mother     Other Father         COPD    Dementia Maternal Grandmother        Medications:  Reviewed and reconciled. Social History:  Social History     Socioeconomic History    Marital status: Single     Spouse name: Not on file    Number of children: Not on file    Years of education: Not on file    Highest education level: Not on file   Occupational History    Not on file   Tobacco Use    Smoking status: Never Smoker    Smokeless tobacco: Never Used   Vaping Use    Vaping Use: Never used   Substance and Sexual Activity    Alcohol use: Never    Drug use: Never    Sexual activity: Not on file   Other Topics Concern    Not on file   Social History Narrative    Not on file     Social Determinants of Health     Financial Resource Strain:     Difficulty of Paying Living Expenses: Not on file   Food Insecurity:     Worried About [address] in the Last Year: Not on file    [employer] in the Last Year: Not on file   Transportation Needs:     Lack of Transportation (Medical): Not on file    Lack of Transportation (Non-Medical):  Not on file   Physical Activity:     Days of Exercise per Week: Not on file    Minutes of Exercise per Session: Not on file   Stress:     Feeling of Stress : Not on file   Social Connections:  Frequency of Communication with Friends and Family: Not on file    Frequency of Social Gatherings with Friends and Family: Not on file    Attends Buddhism Services: Not on file    Active Member of Clubs or Organizations: Not on file    Attends Club or Organization Meetings: Not on file    Marital Status: Not on file   Intimate Partner Violence:     Fear of Current or Ex-Partner: Not on file    Emotionally Abused: Not on file    Physically Abused: Not on file    Sexually Abused: Not on file   Housing Stability:     Unable to Pay for Housing in the Last Year: Not on file    Number of [place] in the Last Year: Not on file    Unstable Housing in the Last Year: Not on file       Allergies:  No Known Allergies    Physical Exam:  BP (!) 154/90 (Site: Right Upper Arm, Position: Sitting)   Pulse 85   Temp 97.9 °F (36.6 °C) (Temporal)   Resp 12   Ht 4' 10\" (1.473 m)   Wt 119 lb (54 kg)   LMP 10/28/2021   SpO2 100%   BMI 24.87 kg/m²   GENERAL: Alert, oriented x 3, not in acute distress. HEENT: PERRLA; EOMI. Oropharynx clear. NECK: Supple. No palpable cervical or supraclavicular lymphadenopathy. LUNGS: Good air entry bilaterally. No wheezing, crackles or rhonchi. CARDIOVASCULAR: Regular rate. No murmurs, rubs or gallops. ABDOMEN: Soft. Non-tender, non-distended. Positive bowel sounds. EXTREMITIES: Without clubbing, cyanosis, or edema. NEUROLOGIC: No focal deficits.      ECOG PS 1      Impression/Plan:    Todd Amador is a pleasant 26-year-old female patient, with a past medical history significant for hearing loss and hyperlipidemia, who had presented with an abnormal screening mammogram, she was diagnosed with a right breast invasive ductal carcinoma, she underwent on 11/3/2021 right breast lumpectomy with axillary sentinel lymph node biopsy, tumor size was 3 cm, grade 2, carcinoma invaded skeletal muscle, inferior margin was positive, posterior margin was less than 1 mm from invasive carcinoma, she had margins reexcision done, was negative for residual carcinoma, 1 sentinel lymph node was removed, was negative for metastatic disease, final pathologic stage pT2 p(sn)N0, ER positive greater than 90%, SC +2%, HER-2/marquise negative, 1+ by IHC, Oncotype DX was done, recurrence score is 26, risk of distant recurrence at 9 years with endocrine therapy alone is 16%, chemotherapy benefit is greater than 15%. I discussed with the patient and her mother her diagnosis, characteristics of her tumor, prognosis and recommendations for treatment, adjuvant endocrine therapy with an AI is recommended, the side effects of the Arimidex were reviewed with the patient. We reviewed the Oncotype DX results, recurrence score is 26, adjuvant chemotherapy is recommended, benefit is greater than 15%, I discussed with the patient TC regimen, the side effects and the schedule were reviewed with he. Today 1/5/2022, the patient received on adjuvant chemotherapy with TC, labs reviewed, proceed with treatment, reviewed with her again the potential side effects, antiemetics prescriptions were sent to her pharmacy. RTC in 1 week. Thank you for allowing us to participate in the care of Ms. Guzman.     Nessa Patel MD   HEMATOLOGY/MEDICAL [address] [place] [address] MEDICAL ONCOLOGY  [address] [place] [id number]  Dept: [phone]: [phone]

## 2022-01-05 NOTE — DISCHARGE INSTR - COC
WUDRKV:597463926}    Nursing Mobility/ADLs:  Walking   {P DME AVPU:829669314}  Transfer  {P DME SXCP:606827334}  Bathing  {P DME ALWA:482230259}  Dressing  {CHP DME AEFQ:374847718}  Toileting  {P DME DHZY:043156731}  Feeding  {[facility] DME AZZH:312614405}  Med Admin  {P DME TCTC:423209766}  Med Delivery   {[facility] MED Delivery:833964890}    Wound Care Documentation and Therapy:        Elimination:  Continence: Bowel: {YES / OY:91698}  Bladder: {YES / RB:24326}  Urinary Catheter: {Urinary Catheter:225380690}   Colostomy/Ileostomy/Ileal Conduit: {YES / MQ:50824}       Date of Last BM: ***  No intake or output data in the 24 hours ending 22 1435  No intake/output data recorded.     Safety Concerns:     [address] [employer] Safety Concerns:751243855}    Impairments/Disabilities:      [address] [employer] Impairments/Disabilities:214626653}    Nutrition Therapy:  Current Nutrition Therapy:   [address] [employer] Diet List:857530434}    Routes of Feeding: {[facility] DME Other Feedings:636411586}  Liquids: {Slp liquid thickness:17359}  Daily Fluid Restriction: {P DME Yes amt example:067450135}  Last Modified Barium Swallow with Video (Video Swallowing Test): {Done Not Done ZTKO:205059407}    Treatments at the Time of Hospital Discharge:   Respiratory Treatments: ***  Oxygen Therapy:  {Therapy; copd oxygen:69454}  Ventilator:    {[facility] Vent SYRZ:407722850}    Rehab Therapies: {THERAPEUTIC INTERVENTION:8510797896}  Weight Bearing Status/Restrictions: [employer] Weight Bearin}  Other Medical Equipment (for information only, NOT a DME order):  {EQUIPMENT:649883618}  Other Treatments: ***    Patient's personal belongings (please select all that are sent with patient):  {[facility] DME Belongings:773015434}    RN SIGNATURE:  {Esignature:135625965}    CASE MANAGEMENT/SOCIAL WORK SECTION    Inpatient Status Date: ***    Readmission Risk Assessment Score:  Readmission Risk              Risk of Unplanned Readmission:  0           Discharging to Facility/ Agency Name:   Address:  Phone:  Fax:    Dialysis Facility (if applicable)   Name:  Address:  Dialysis Schedule:  Phone:  Fax:    / signature: {Esignature:702993156}    PHYSICIAN SECTION    Prognosis: {Prognosis:1099643397}    Condition at Discharge: Deanna Zaragoza Patient Condition:428065753}    Rehab Potential (if transferring to Rehab): {Prognosis:6368519486}    Recommended Labs or Other Treatments After Discharge: ***    Physician Certification: I certify the above information and transfer of Cherylene Bookman  is necessary for the continuing treatment of the diagnosis listed and that she requires {Admit to Appropriate Level of Care:82963} for {GREATER/LESS:230422674} 30 days.      Update Admission H&P: {[facility]514[facility]}    PHYSICIAN SIGNATURE:  {Esignature:240414276}

## 2022-01-05 NOTE — TELEPHONE ENCOUNTER
Met with patient and mother during first chemotherapy infusion. Provided with Chemotherapy and You booklet, local wig resources, food examples to eat and avoid during chemotherapy, and what you should know after chemotherapy. Patients mother states she does the cooking at home and very rarely eat out and denies needing dietary at this time but support is available, if needed. Provided patient with [facility]) Lab sites and encouraged patient to get labs 1-2 days prior to appointments. Patients mother has concerns about nausea/vomiting at home, instructed to  antinausea medications at pharmacy. Encouraged to call if patient is experiencing severe vomiting for additional IV hydration if needed. Patient and mother verbalized understanding and had no further questions or concerns at this time. Encouraged to call with any concerns, will continue to follow. Kanchan Arora.  SANDRO AlvarezN, RN - Oncology Nurse Navigator

## 2022-01-06 ENCOUNTER — TELEPHONE (OUTPATIENT)
Dept: CASE MANAGEMENT | Age: 62
End: 2022-01-06

## 2022-01-06 NOTE — TELEPHONE ENCOUNTER
Received call from patients mother, Angela Tapia, who had some concerns and needed prescription clarification on the decadron. Reinforced prescription instructions that the decadron is to be taken for 3 days, day before chemotherapy, day of chemo and following day after chemo and it to be taken 1 pill twice a day. Kim verbalized understanding and repeated correct instructions back to me. Patient states she is feeling good after chemotherapy treatment yesterday. No further concerns, will continue to follow. Gretta Ferguson.  SANDRO KhannaN, RN - Oncology Nurse Navigator

## 2022-01-12 ENCOUNTER — HOSPITAL ENCOUNTER (OUTPATIENT)
Dept: INFUSION THERAPY | Age: 62
Discharge: HOME | End: 2022-01-12
Payer: COMMERCIAL

## 2022-01-12 ENCOUNTER — OFFICE VISIT (OUTPATIENT)
Dept: ONCOLOGY | Age: 62
End: 2022-01-12
Payer: COMMERCIAL

## 2022-01-12 ENCOUNTER — TELEPHONE (OUTPATIENT)
Dept: ONCOLOGY | Age: 62
End: 2022-01-12

## 2022-01-12 VITALS
TEMPERATURE: 97.8 F | HEIGHT: 56 IN | SYSTOLIC BLOOD PRESSURE: 130 MMHG | BODY MASS INDEX: 26.32 KG/M2 | HEART RATE: 117 BPM | DIASTOLIC BLOOD PRESSURE: 96 MMHG | WEIGHT: 117 LBS | RESPIRATION RATE: 16 BRPM | OXYGEN SATURATION: 99 %

## 2022-01-12 DIAGNOSIS — Z17.0 MALIGNANT NEOPLASM OF UPPER-INNER QUADRANT OF RIGHT BREAST IN FEMALE, ESTROGEN RECEPTOR POSITIVE (HCC): Primary | ICD-10-CM

## 2022-01-12 DIAGNOSIS — C50.211 MALIGNANT NEOPLASM OF UPPER-INNER QUADRANT OF RIGHT BREAST IN FEMALE, ESTROGEN RECEPTOR POSITIVE (HCC): Primary | ICD-10-CM

## 2022-01-12 DIAGNOSIS — Z17.0 MALIGNANT NEOPLASM OF UPPER-INNER QUADRANT OF RIGHT BREAST IN FEMALE, ESTROGEN RECEPTOR POSITIVE (HCC): ICD-10-CM

## 2022-01-12 DIAGNOSIS — C50.211 MALIGNANT NEOPLASM OF UPPER-INNER QUADRANT OF RIGHT BREAST IN FEMALE, ESTROGEN RECEPTOR POSITIVE (HCC): ICD-10-CM

## 2022-01-12 LAB
ALBUMIN SERPL-MCNC: 4.1 G/DL (ref 3.5–5.2)
ALP BLD-CCNC: 150 U/L (ref 35–104)
ALT SERPL-CCNC: 17 U/L (ref 0–32)
ANION GAP SERPL CALCULATED.3IONS-SCNC: 11 MMOL/L (ref 7–16)
AST SERPL-CCNC: 23 U/L (ref 0–31)
ATYPICAL LYMPHOCYTE RELATIVE PERCENT: 4 % (ref 0–4)
BASOPHILS ABSOLUTE: 0 E9/L (ref 0–0.2)
BASOPHILS RELATIVE PERCENT: 0 % (ref 0–2)
BILIRUB SERPL-MCNC: 0.3 MG/DL (ref 0–1.2)
BUN BLDV-MCNC: 15 MG/DL (ref 6–23)
CALCIUM SERPL-MCNC: 9.1 MG/DL (ref 8.6–10.2)
CHLORIDE BLD-SCNC: 102 MMOL/L (ref 98–107)
CO2: 24 MMOL/L (ref 22–29)
CREAT SERPL-MCNC: 1 MG/DL (ref 0.5–1)
EOSINOPHILS ABSOLUTE: 0 E9/L (ref 0.05–0.5)
EOSINOPHILS RELATIVE PERCENT: 0 % (ref 0–6)
GFR AFRICAN AMERICAN: >60
GFR NON-AFRICAN AMERICAN: 56 ML/MIN/1.73
GLUCOSE BLD-MCNC: 136 MG/DL (ref 74–99)
HCT VFR BLD CALC: 45.7 % (ref 34–48)
HEMOGLOBIN: 15.2 G/DL (ref 11.5–15.5)
LYMPHOCYTES ABSOLUTE: 4.44 E9/L (ref 1.5–4)
LYMPHOCYTES RELATIVE PERCENT: 10 % (ref 20–42)
MCH RBC QN AUTO: 28.5 PG (ref 26–35)
MCHC RBC AUTO-ENTMCNC: 33.3 % (ref 32–34.5)
MCV RBC AUTO: 85.6 FL (ref 80–99.9)
METAMYELOCYTES RELATIVE PERCENT: 5 % (ref 0–1)
MONOCYTES ABSOLUTE: 1.9 E9/L (ref 0.1–0.95)
MONOCYTES RELATIVE PERCENT: 6 % (ref 2–12)
MYELOCYTE PERCENT: 3 % (ref 0–0)
NEUTROPHILS ABSOLUTE: 22.82 E9/L (ref 1.8–7.3)
NEUTROPHILS RELATIVE PERCENT: 64 % (ref 43–80)
OVALOCYTES: ABNORMAL
PDW BLD-RTO: 14 FL (ref 11.5–15)
PLATELET # BLD: 183 E9/L (ref 130–450)
PMV BLD AUTO: 10.6 FL (ref 7–12)
POIKILOCYTES: ABNORMAL
POTASSIUM SERPL-SCNC: 4.3 MMOL/L (ref 3.5–5)
PROMYELOCYTES PERCENT: 8 % (ref 0–0)
RBC # BLD: 5.34 E12/L (ref 3.5–5.5)
SODIUM BLD-SCNC: 137 MMOL/L (ref 132–146)
TOTAL PROTEIN: 6.5 G/DL (ref 6.4–8.3)
WBC # BLD: 31.7 E9/L (ref 4.5–11.5)

## 2022-01-12 PROCEDURE — G8427 DOCREV CUR MEDS BY ELIG CLIN: HCPCS | Performed by: INTERNAL MEDICINE

## 2022-01-12 PROCEDURE — 1036F TOBACCO NON-USER: CPT | Performed by: INTERNAL MEDICINE

## 2022-01-12 PROCEDURE — G8419 CALC BMI OUT NRM PARAM NOF/U: HCPCS | Performed by: INTERNAL MEDICINE

## 2022-01-12 PROCEDURE — 3017F COLORECTAL CA SCREEN DOC REV: CPT | Performed by: INTERNAL MEDICINE

## 2022-01-12 PROCEDURE — 99214 OFFICE O/P EST MOD 30 MIN: CPT | Performed by: INTERNAL MEDICINE

## 2022-01-12 PROCEDURE — G8484 FLU IMMUNIZE NO ADMIN: HCPCS | Performed by: INTERNAL MEDICINE

## 2022-01-12 PROCEDURE — 36415 COLL VENOUS BLD VENIPUNCTURE: CPT

## 2022-01-12 PROCEDURE — 85025 COMPLETE CBC W/AUTO DIFF WBC: CPT

## 2022-01-12 PROCEDURE — 80053 COMPREHEN METABOLIC PANEL: CPT

## 2022-01-12 PROCEDURE — 36415 COLL VENOUS BLD VENIPUNCTURE: CPT | Performed by: INTERNAL MEDICINE

## 2022-01-12 PROCEDURE — 99212 OFFICE O/P EST SF 10 MIN: CPT | Performed by: INTERNAL MEDICINE

## 2022-01-12 NOTE — TELEPHONE ENCOUNTER
Met with pt and pt's mother in conjunction with medical oncology as SW follow up. Pt is 80-year-old female being treated for breast cancer. Pt's mood appeared euthymic with full affect, she appeared A&Ox4, and she was willing/able to participate in session. She appeared appropriately dressed/groomed and was able to ambulate/transfer without assistance. Pt and pt's mother discussed recent event in which pt got dizzy and \"almost blacked out. \"  Pt stated that she has been sore and has had increased fatigue  Discussed importance of self care and setting reasonable expectations while undergoing treatment. Mother discussed ongoing concerns with her own health and difficulty managing appointments. No additional needs identified at this time. Reviewed role of oncology SW and encouraged pt to notify this provider if additional needs arise.     SHANNAN Cisneros, JUDITH-S  Oncology Social Worker

## 2022-01-18 ENCOUNTER — TELEPHONE (OUTPATIENT)
Dept: CASE MANAGEMENT | Age: 62
End: 2022-01-18

## 2022-01-26 ENCOUNTER — HOSPITAL ENCOUNTER (OUTPATIENT)
Dept: INFUSION THERAPY | Age: 62
Discharge: HOME | End: 2022-01-26
Payer: COMMERCIAL

## 2022-01-26 ENCOUNTER — OFFICE VISIT (OUTPATIENT)
Dept: ONCOLOGY | Age: 62
End: 2022-01-26
Payer: COMMERCIAL

## 2022-01-26 ENCOUNTER — HOSPITAL ENCOUNTER (OUTPATIENT)
Dept: INFUSION THERAPY | Age: 62
End: 2022-01-26

## 2022-01-26 ENCOUNTER — TELEPHONE (OUTPATIENT)
Dept: CASE MANAGEMENT | Age: 62
End: 2022-01-26

## 2022-01-26 VITALS — DIASTOLIC BLOOD PRESSURE: 88 MMHG | SYSTOLIC BLOOD PRESSURE: 116 MMHG | RESPIRATION RATE: 16 BRPM | HEART RATE: 99 BPM

## 2022-01-26 VITALS
WEIGHT: 119 LBS | DIASTOLIC BLOOD PRESSURE: 83 MMHG | HEART RATE: 85 BPM | OXYGEN SATURATION: 100 % | BODY MASS INDEX: 26.77 KG/M2 | TEMPERATURE: 98.1 F | HEIGHT: 56 IN | SYSTOLIC BLOOD PRESSURE: 135 MMHG

## 2022-01-26 DIAGNOSIS — C50.211 MALIGNANT NEOPLASM OF UPPER-INNER QUADRANT OF RIGHT BREAST IN FEMALE, ESTROGEN RECEPTOR POSITIVE (HCC): ICD-10-CM

## 2022-01-26 DIAGNOSIS — C50.211 MALIGNANT NEOPLASM OF UPPER-INNER QUADRANT OF RIGHT BREAST IN FEMALE, ESTROGEN RECEPTOR POSITIVE (HCC): Primary | ICD-10-CM

## 2022-01-26 DIAGNOSIS — Z17.0 MALIGNANT NEOPLASM OF UPPER-INNER QUADRANT OF RIGHT BREAST IN FEMALE, ESTROGEN RECEPTOR POSITIVE (HCC): Primary | ICD-10-CM

## 2022-01-26 DIAGNOSIS — Z17.0 MALIGNANT NEOPLASM OF UPPER-INNER QUADRANT OF RIGHT BREAST IN FEMALE, ESTROGEN RECEPTOR POSITIVE (HCC): ICD-10-CM

## 2022-01-26 LAB
ALBUMIN SERPL-MCNC: 4.3 G/DL (ref 3.5–5.2)
ALP BLD-CCNC: 73 U/L (ref 35–104)
ALT SERPL-CCNC: 13 U/L (ref 0–32)
ANION GAP SERPL CALCULATED.3IONS-SCNC: 12 MMOL/L (ref 7–16)
AST SERPL-CCNC: 14 U/L (ref 0–31)
BASOPHILS ABSOLUTE: 0.04 E9/L (ref 0–0.2)
BASOPHILS RELATIVE PERCENT: 0.2 % (ref 0–2)
BILIRUB SERPL-MCNC: 0.3 MG/DL (ref 0–1.2)
BUN BLDV-MCNC: 12 MG/DL (ref 6–23)
CALCIUM SERPL-MCNC: 9.5 MG/DL (ref 8.6–10.2)
CHLORIDE BLD-SCNC: 105 MMOL/L (ref 98–107)
CO2: 22 MMOL/L (ref 22–29)
CREAT SERPL-MCNC: 0.7 MG/DL (ref 0.5–1)
EOSINOPHILS ABSOLUTE: 0 E9/L (ref 0.05–0.5)
EOSINOPHILS RELATIVE PERCENT: 0 % (ref 0–6)
GFR AFRICAN AMERICAN: >60
GFR NON-AFRICAN AMERICAN: >60 ML/MIN/1.73
GLUCOSE BLD-MCNC: 182 MG/DL (ref 74–99)
HCT VFR BLD CALC: 42.6 % (ref 34–48)
HEMOGLOBIN: 13.5 G/DL (ref 11.5–15.5)
IMMATURE GRANULOCYTES #: 0.12 E9/L
IMMATURE GRANULOCYTES %: 0.6 % (ref 0–5)
LYMPHOCYTES ABSOLUTE: 0.96 E9/L (ref 1.5–4)
LYMPHOCYTES RELATIVE PERCENT: 4.8 % (ref 20–42)
MCH RBC QN AUTO: 28.1 PG (ref 26–35)
MCHC RBC AUTO-ENTMCNC: 31.7 % (ref 32–34.5)
MCV RBC AUTO: 88.6 FL (ref 80–99.9)
MONOCYTES ABSOLUTE: 0.8 E9/L (ref 0.1–0.95)
MONOCYTES RELATIVE PERCENT: 4 % (ref 2–12)
NEUTROPHILS ABSOLUTE: 18.25 E9/L (ref 1.8–7.3)
NEUTROPHILS RELATIVE PERCENT: 90.4 % (ref 43–80)
PDW BLD-RTO: 15.3 FL (ref 11.5–15)
PLATELET # BLD: 428 E9/L (ref 130–450)
PMV BLD AUTO: 9.6 FL (ref 7–12)
POTASSIUM SERPL-SCNC: 4.5 MMOL/L (ref 3.5–5)
RBC # BLD: 4.81 E12/L (ref 3.5–5.5)
SODIUM BLD-SCNC: 139 MMOL/L (ref 132–146)
TOTAL PROTEIN: 6.9 G/DL (ref 6.4–8.3)
WBC # BLD: 20.2 E9/L (ref 4.5–11.5)

## 2022-01-26 PROCEDURE — 96411 CHEMO IV PUSH ADDL DRUG: CPT

## 2022-01-26 PROCEDURE — 99214 OFFICE O/P EST MOD 30 MIN: CPT | Performed by: INTERNAL MEDICINE

## 2022-01-26 PROCEDURE — 85025 COMPLETE CBC W/AUTO DIFF WBC: CPT

## 2022-01-26 PROCEDURE — 80053 COMPREHEN METABOLIC PANEL: CPT

## 2022-01-26 PROCEDURE — 96372 THER/PROPH/DIAG INJ SC/IM: CPT

## 2022-01-26 PROCEDURE — 6360000002 HC RX W HCPCS: Performed by: INTERNAL MEDICINE

## 2022-01-26 PROCEDURE — 96417 CHEMO IV INFUS EACH ADDL SEQ: CPT

## 2022-01-26 PROCEDURE — G8427 DOCREV CUR MEDS BY ELIG CLIN: HCPCS | Performed by: INTERNAL MEDICINE

## 2022-01-26 PROCEDURE — G8484 FLU IMMUNIZE NO ADMIN: HCPCS | Performed by: INTERNAL MEDICINE

## 2022-01-26 PROCEDURE — 36415 COLL VENOUS BLD VENIPUNCTURE: CPT

## 2022-01-26 PROCEDURE — 3017F COLORECTAL CA SCREEN DOC REV: CPT | Performed by: INTERNAL MEDICINE

## 2022-01-26 PROCEDURE — 1036F TOBACCO NON-USER: CPT | Performed by: INTERNAL MEDICINE

## 2022-01-26 PROCEDURE — 2580000003 HC RX 258: Performed by: INTERNAL MEDICINE

## 2022-01-26 PROCEDURE — 96413 CHEMO IV INFUSION 1 HR: CPT

## 2022-01-26 PROCEDURE — G8419 CALC BMI OUT NRM PARAM NOF/U: HCPCS | Performed by: INTERNAL MEDICINE

## 2022-01-26 RX ORDER — PALONOSETRON 0.05 MG/ML
0.25 INJECTION, SOLUTION INTRAVENOUS ONCE
Status: COMPLETED | OUTPATIENT
Start: 2022-01-26 | End: 2022-01-26

## 2022-01-26 RX ORDER — HEPARIN SODIUM (PORCINE) LOCK FLUSH IV SOLN 100 UNIT/ML 100 UNIT/ML
500 SOLUTION INTRAVENOUS PRN
Status: CANCELLED | OUTPATIENT
Start: 2022-01-26

## 2022-01-26 RX ORDER — SODIUM CHLORIDE 9 MG/ML
INJECTION, SOLUTION INTRAVENOUS CONTINUOUS
Status: CANCELLED | OUTPATIENT
Start: 2022-01-26

## 2022-01-26 RX ORDER — EPINEPHRINE 1 MG/ML
0.3 INJECTION, SOLUTION, CONCENTRATE INTRAVENOUS PRN
Status: CANCELLED | OUTPATIENT
Start: 2022-01-26

## 2022-01-26 RX ORDER — SODIUM CHLORIDE 0.9 % (FLUSH) 0.9 %
5-40 SYRINGE (ML) INJECTION PRN
Status: DISCONTINUED | OUTPATIENT
Start: 2022-01-26 | End: 2022-01-27 | Stop reason: HOSPADM

## 2022-01-26 RX ORDER — SODIUM CHLORIDE 9 MG/ML
20 INJECTION, SOLUTION INTRAVENOUS ONCE
Status: CANCELLED | OUTPATIENT
Start: 2022-01-26 | End: 2022-01-26

## 2022-01-26 RX ORDER — SODIUM CHLORIDE 9 MG/ML
20 INJECTION, SOLUTION INTRAVENOUS ONCE
Status: DISCONTINUED | OUTPATIENT
Start: 2022-01-26 | End: 2022-01-27 | Stop reason: HOSPADM

## 2022-01-26 RX ORDER — SODIUM CHLORIDE 9 MG/ML
25 INJECTION, SOLUTION INTRAVENOUS PRN
Status: CANCELLED | OUTPATIENT
Start: 2022-01-26

## 2022-01-26 RX ORDER — DEXAMETHASONE SODIUM PHOSPHATE 10 MG/ML
10 INJECTION INTRAMUSCULAR; INTRAVENOUS ONCE
Status: COMPLETED | OUTPATIENT
Start: 2022-01-26 | End: 2022-01-26

## 2022-01-26 RX ORDER — SODIUM CHLORIDE 0.9 % (FLUSH) 0.9 %
5-40 SYRINGE (ML) INJECTION PRN
Status: CANCELLED | OUTPATIENT
Start: 2022-01-26

## 2022-01-26 RX ORDER — PALONOSETRON 0.05 MG/ML
0.25 INJECTION, SOLUTION INTRAVENOUS ONCE
Status: CANCELLED | OUTPATIENT
Start: 2022-01-26

## 2022-01-26 RX ORDER — ONDANSETRON 2 MG/ML
8 INJECTION INTRAMUSCULAR; INTRAVENOUS
Status: CANCELLED | OUTPATIENT
Start: 2022-01-26

## 2022-01-26 RX ORDER — MEPERIDINE HYDROCHLORIDE 50 MG/ML
12.5 INJECTION INTRAMUSCULAR; INTRAVENOUS; SUBCUTANEOUS PRN
Status: CANCELLED | OUTPATIENT
Start: 2022-01-26

## 2022-01-26 RX ORDER — DIPHENHYDRAMINE HYDROCHLORIDE 50 MG/ML
50 INJECTION INTRAMUSCULAR; INTRAVENOUS
Status: CANCELLED | OUTPATIENT
Start: 2022-01-26

## 2022-01-26 RX ORDER — SODIUM CHLORIDE 9 MG/ML
5-40 INJECTION INTRAVENOUS PRN
Status: CANCELLED | OUTPATIENT
Start: 2022-01-26

## 2022-01-26 RX ORDER — ACETAMINOPHEN 325 MG/1
650 TABLET ORAL
Status: CANCELLED | OUTPATIENT
Start: 2022-01-26

## 2022-01-26 RX ORDER — ALBUTEROL SULFATE 90 UG/1
4 AEROSOL, METERED RESPIRATORY (INHALATION) PRN
Status: CANCELLED | OUTPATIENT
Start: 2022-01-26

## 2022-01-26 RX ADMIN — CYCLOPHOSPHAMIDE 800 MG: 200 INJECTION, SOLUTION INTRAVENOUS at 13:15

## 2022-01-26 RX ADMIN — PALONOSETRON HYDROCHLORIDE 0.25 MG: 0.25 INJECTION, SOLUTION INTRAVENOUS at 11:26

## 2022-01-26 RX ADMIN — DOCETAXEL 110 MG: 20 INJECTION, SOLUTION INTRAVENOUS at 11:53

## 2022-01-26 RX ADMIN — SODIUM CHLORIDE, PRESERVATIVE FREE 10 ML: 5 INJECTION INTRAVENOUS at 11:28

## 2022-01-26 RX ADMIN — DEXAMETHASONE SODIUM PHOSPHATE 10 MG: 10 INJECTION INTRAMUSCULAR; INTRAVENOUS at 11:27

## 2022-01-26 RX ADMIN — PEGFILGRASTIM-BMEZ 6 MG: 6 INJECTION SUBCUTANEOUS at 13:57

## 2022-01-26 RX ADMIN — SODIUM CHLORIDE 20 ML/HR: 9 INJECTION, SOLUTION INTRAVENOUS at 11:13

## 2022-01-26 NOTE — TELEPHONE ENCOUNTER
Met with patient and mother, Barbara Dutton, during chemotherapy infusion today. Patient states she is doing well and has no concerns or needs at this time. Reports hair loss since last treatment and that she has upcoming appointment with [facility] next week to obtain a wig. Patient reports good appetite and denies difficulty staying hydrated. Patient encouraged to call clinic if any problems or concerns arise, verbalized understanding. Shae Garcia.  SANDRO ArroyoN, RN - Oncology Nurse Navigator

## 2022-01-26 NOTE — PROGRESS NOTES
[address] Counts include 234 beds at the [facility] MEDICAL ONCOLOGY  [address] [place], [place] [address] [address]  Dept: [phone]  Loc: [phone]  Attending Progress Note      Reason for Visit: Right breast cancer. Referring Physician: Johan Moran MD    PCP:  Cindy Rojas DO    History of Present Illness:     Monique García is a pleasant 31-year-old female patient, with a past medical history significant for hearing loss and hyperlipidemia, who had presented with an abnormal screening mammogram,  MAMMOGRAM ULTRASOUND BIOPSY; [facility]       9/14/2021: MAMMOGRAM, RIGHT BREAST ULTRASOUND: [facility]                9/14/2021: LEFT BREAST ULTRASOUND: [facility]    She underwent an US guided right breast core biopsy at 3 o'clock position on September 20 , 2021. 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  Preliminary [place]   score 3+2+1 = 6     Comment:   Tumor cells stain strongly positive for e-cadherin   immunostain. Intradepartmental consultation is obtained.      Breast Cancer Marker Studies:         Estrogen Receptors (ER):       -Positive (>10% of cells demonstrate nuclear positivity):       Percentage of cells positive: >90%       Intensity: Strong         Progesterone Receptors (WV):       -Positive:       Percentage of cells positive: 2%       Intensity: Weak         Her-2/marquise (c-erb B-2) protein expression: Negative (1+)     The patient underwent on 11/3/2021 right breast lumpectomy with axillary sentinel lymph node biopsy, pathology:    CANCER CASE SUMMARY   Procedure -excision   Specimen Laterality -right   Tumor Site, Invasive Carcinoma-3:00   Histologic Type-invasive carcinoma, no special type (ductal)   Histologic Grade (Allan Histologic Score):                    Tubule differentiation- score-3                    Nuclear pleomorphism- score 2                    Mitotic rate- score 2                    Overall Grade- grade 2, (score 7)   Tumor Size: Size of Largest Invasive Carcinoma-30 mm   Ductal Carcinoma In Situ (DCIS)-not identified   Tumor Extent-carcinoma invades skeletal muscle   Treatment Effect in the Breast-no known presurgical therapy   Margin status for invasive carcinoma: Inferior margin involved by   invasive carcinoma.  Posterior margin less than 1 mm from invasive   carcinoma. Regional Lymph Nodes: All regional lymph nodes negative for tumor   Total number of lymph nodes examined-1   Number of sentinel nodes examined-1   Pathologic Stage Classification (AJCC 8th Edition)- pT2 p(sn)N0   Additional Pathologic Findings-lymphovascular and perineural space   invasion   Special studies-ER positive, WV positive, HER-2 negative     The patient underwent on 12/1/2021 lumpectomy margin reexcision, was negative for residual carcinoma. Oncotype DX was done, recurrence score is 26, distant recurrence risk at 9 years with endocrine therapy alone is 16%, absolute chemotherapy benefit is greater than 15%. Patient was started on adjuvant chemotherapy with Taxotere and Cytoxan on 1/5/2022, she is accompanied by her mother, she had nausea, and bony pain. She is feeling well at this time, has no complaints. Review of Systems;  CONSTITUTIONAL: No fever, chills. Good appetite and energy level. ENMT: Eyes: No diplopia; Nose: No epistaxis. Mouth: No sore throat. RESPIRATORY: No hemoptysis, shortness of breath, cough. CARDIOVASCULAR: No chest pain, palpitations. GASTROINTESTINAL: No nausea or vomiting at this time, no abdominal pain, diarrhea/constipation. GENITOURINARY: No dysuria, urinary frequency, hematuria. NEURO: No syncope, presyncope, headache.   Remainder:  ROS NEGATIVE    Past Medical History:      Diagnosis Date    [facility] ([facility]     Right breast    Cancer ([facility]     Hearing impaired     Hearing loss     Hyperlipidemia      Patient Active Problem List   Diagnosis    Cancer ([facility]    Malignant neoplasm of upper-inner quadrant of right breast in female, estrogen receptor positive ([facility]        Past Surgical History:      Procedure Laterality Date    BREAST LUMPECTOMY Right 11/3/2021    RIGHT BREAST LUMPECTOMY, BLUE DYE INJECTION, RIGHT AXILLARY SENTINEL LYMPH NODE INCISION performed by Tae Douglas MD at . [address] LUMPECTOMY Right 12/1/2021    RE-EXCISION RIGHT BREAST INFERIOR MARGIN performed by [facility] Douglas MD [facility] RIGHT  2021       Family History:  Family History   Problem Relation Age of Onset    Heart Disease Mother     High Blood Pressure Mother     High Cholesterol Mother     Other Father         COPD    Dementia Maternal Grandmother        Medications:  Reviewed and reconciled. Social History:  Social History     Socioeconomic History    Marital status: Single     Spouse name: Not on file    Number of children: Not on file    Years of education: Not on file    Highest education level: Not on file   Occupational History    Not on file   Tobacco Use    Smoking status: Never Smoker    Smokeless tobacco: Never Used   Vaping Use    Vaping Use: Never used   Substance and Sexual Activity    Alcohol use: Never    Drug use: Never    Sexual activity: Not on file   Other Topics Concern    Not on file   Social History Narrative    Not on file     Social Determinants of Health     Financial Resource Strain:     Difficulty of Paying Living Expenses: Not on file   Food Insecurity:     Worried About [address] in the Last Year: Not on file    Rae of Food in the Last Year: Not on file   Transportation Needs:     Lack of Transportation (Medical): Not on file    Lack of Transportation (Non-Medical):  Not on file   Physical Activity:     Days of Exercise per Week: Not on file    Minutes of Exercise per Session: Not on file   Stress:     Feeling of Stress : Not on file   Social Connections:     Frequency of Communication with Friends and Family: Not on file    Frequency of Social Gatherings with Friends and Family: Not on file    Attends Jew Services: Not on file    Active Member of [address] [place] [address] [employer] or Organizations: Not on file    Attends Club or Organization Meetings: Not on file    Marital Status: Not on file   Intimate Partner Violence:     Fear of Current or Ex-Partner: Not on file    Emotionally Abused: Not on file    Physically Abused: Not on file    Sexually Abused: Not on file   Housing Stability:     Unable to Pay for Housing in the Last Year: Not on file    Number of [place] in the Last Year: Not on file    Unstable Housing in the Last Year: Not on file       Allergies:  No Known Allergies    Physical Exam:  /83   Pulse 85   Temp 98.1 °F (36.7 °C)   Ht 4' 8\" (1.422 m)   Wt 119 lb (54 kg)   LMP 10/28/2021   SpO2 100%   BMI 26.68 kg/m²   GENERAL: Alert, oriented x 3, not in acute distress. HEENT: PERRLA; EOMI. Oropharynx clear. NECK: Supple. No palpable cervical or supraclavicular lymphadenopathy. LUNGS: Good air entry bilaterally. No wheezing, crackles or rhonchi. CARDIOVASCULAR: Regular rate. No murmurs, rubs or gallops. ABDOMEN: Soft. Non-tender, non-distended. Positive bowel sounds. EXTREMITIES: Without clubbing, cyanosis, or edema. NEUROLOGIC: No focal deficits.      ECOG PS 1      Impression/Plan:    Yareli Valerio is a pleasant 66-year-old female patient, with a past medical history significant for hearing loss and hyperlipidemia, who had presented with an abnormal screening mammogram, she was diagnosed with a right breast invasive ductal carcinoma, she underwent on 11/3/2021 right breast lumpectomy with axillary sentinel lymph node biopsy, tumor size was 3 cm, grade 2, carcinoma invaded skeletal muscle, inferior margin was positive, posterior margin was less than 1 mm from invasive carcinoma, she had margins reexcision done, was negative for residual carcinoma, 1 sentinel lymph node was removed, was negative for metastatic disease, final pathologic stage pT2 p(sn)N0, ER positive greater than 90%, NV +2%, HER-2/marquise negative, 1+ by IHC, Oncotype DX was done, recurrence score is 26, risk of distant recurrence at 9 years with endocrine therapy alone is 16%, chemotherapy benefit is greater than 15%. I discussed with the patient and her mother her diagnosis, characteristics of her tumor, prognosis and recommendations for treatment, adjuvant endocrine therapy with an AI is recommended, the side effects of the Arimidex were reviewed with the patient. We reviewed the Oncotype DX results, recurrence score is 26, adjuvant chemotherapy is recommended, benefit is greater than 15%, I discussed with the patient TC regimen, the side effects and the schedule were reviewed with he. The patient was started on adjuvant chemotherapy with Taxotere and Cytoxan on 1/5/2022, she is accompanied by her mother, she is feeling well at this time, labs reviewed, blood counts are adequate for treatment, proceed with chemotherapy, TC cycle #2 today 1/26/2022, patient will take antiemetics as needed, Claritin and NSAIDs for bony pain. She will let me know if she has any new problems. RTC in 3 weeks. Thank you for allowing us to participate in the care of Ms. Guzman.     Josephine Love MD   HEMATOLOGY/MEDICAL [address] MEDICAL ONCOLOGY  [address] [place] [address]  Dept: [id number]: [phone]

## 2022-02-15 ENCOUNTER — HOSPITAL ENCOUNTER (OUTPATIENT)
Age: 62
Discharge: HOME | End: 2022-02-15
Payer: COMMERCIAL

## 2022-02-15 DIAGNOSIS — Z17.0 MALIGNANT NEOPLASM OF UPPER-INNER QUADRANT OF RIGHT BREAST IN FEMALE, ESTROGEN RECEPTOR POSITIVE (HCC): ICD-10-CM

## 2022-02-15 DIAGNOSIS — C50.211 MALIGNANT NEOPLASM OF UPPER-INNER QUADRANT OF RIGHT BREAST IN FEMALE, ESTROGEN RECEPTOR POSITIVE (HCC): ICD-10-CM

## 2022-02-15 LAB
ALBUMIN SERPL-MCNC: 4.2 G/DL (ref 3.5–5.2)
ALP BLD-CCNC: 72 U/L (ref 35–104)
ALT SERPL-CCNC: 9 U/L (ref 0–32)
ANION GAP SERPL CALCULATED.3IONS-SCNC: 13 MMOL/L (ref 7–16)
AST SERPL-CCNC: 13 U/L (ref 0–31)
BASOPHILS ABSOLUTE: 0.1 E9/L (ref 0–0.2)
BASOPHILS RELATIVE PERCENT: 0.8 % (ref 0–2)
BILIRUB SERPL-MCNC: 0.3 MG/DL (ref 0–1.2)
BUN BLDV-MCNC: 9 MG/DL (ref 6–23)
CALCIUM SERPL-MCNC: 9.4 MG/DL (ref 8.6–10.2)
CHLORIDE BLD-SCNC: 104 MMOL/L (ref 98–107)
CO2: 22 MMOL/L (ref 22–29)
CREAT SERPL-MCNC: 0.7 MG/DL (ref 0.5–1)
EOSINOPHILS ABSOLUTE: 0.03 E9/L (ref 0.05–0.5)
EOSINOPHILS RELATIVE PERCENT: 0.2 % (ref 0–6)
GFR AFRICAN AMERICAN: >60
GFR NON-AFRICAN AMERICAN: >60 ML/MIN/1.73
GLUCOSE BLD-MCNC: 136 MG/DL (ref 74–99)
HCT VFR BLD CALC: 40.9 % (ref 34–48)
HEMOGLOBIN: 13 G/DL (ref 11.5–15.5)
IMMATURE GRANULOCYTES #: 0.07 E9/L
IMMATURE GRANULOCYTES %: 0.5 % (ref 0–5)
LYMPHOCYTES ABSOLUTE: 0.63 E9/L (ref 1.5–4)
LYMPHOCYTES RELATIVE PERCENT: 4.9 % (ref 20–42)
MCH RBC QN AUTO: 28.5 PG (ref 26–35)
MCHC RBC AUTO-ENTMCNC: 31.8 % (ref 32–34.5)
MCV RBC AUTO: 89.7 FL (ref 80–99.9)
MONOCYTES ABSOLUTE: 0.34 E9/L (ref 0.1–0.95)
MONOCYTES RELATIVE PERCENT: 2.6 % (ref 2–12)
NEUTROPHILS ABSOLUTE: 11.77 E9/L (ref 1.8–7.3)
NEUTROPHILS RELATIVE PERCENT: 91 % (ref 43–80)
PDW BLD-RTO: 16 FL (ref 11.5–15)
PLATELET # BLD: 444 E9/L (ref 130–450)
PMV BLD AUTO: 9.7 FL (ref 7–12)
POTASSIUM SERPL-SCNC: 4.3 MMOL/L (ref 3.5–5)
RBC # BLD: 4.56 E12/L (ref 3.5–5.5)
SODIUM BLD-SCNC: 139 MMOL/L (ref 132–146)
TOTAL PROTEIN: 7 G/DL (ref 6.4–8.3)
WBC # BLD: 12.9 E9/L (ref 4.5–11.5)

## 2022-02-15 PROCEDURE — 85025 COMPLETE CBC W/AUTO DIFF WBC: CPT

## 2022-02-15 PROCEDURE — 80053 COMPREHEN METABOLIC PANEL: CPT

## 2022-02-15 PROCEDURE — 36415 COLL VENOUS BLD VENIPUNCTURE: CPT

## 2022-02-16 ENCOUNTER — OFFICE VISIT (OUTPATIENT)
Dept: ONCOLOGY | Age: 62
End: 2022-02-16
Payer: COMMERCIAL

## 2022-02-16 ENCOUNTER — TELEPHONE (OUTPATIENT)
Dept: ONCOLOGY | Age: 62
End: 2022-02-16

## 2022-02-16 ENCOUNTER — HOSPITAL ENCOUNTER (OUTPATIENT)
Dept: INFUSION THERAPY | Age: 62
Discharge: HOME | End: 2022-02-16
Payer: COMMERCIAL

## 2022-02-16 VITALS
DIASTOLIC BLOOD PRESSURE: 71 MMHG | RESPIRATION RATE: 16 BRPM | HEART RATE: 101 BPM | SYSTOLIC BLOOD PRESSURE: 117 MMHG | TEMPERATURE: 97.7 F

## 2022-02-16 VITALS
OXYGEN SATURATION: 98 % | HEART RATE: 114 BPM | TEMPERATURE: 97.3 F | DIASTOLIC BLOOD PRESSURE: 86 MMHG | WEIGHT: 119 LBS | SYSTOLIC BLOOD PRESSURE: 162 MMHG | HEIGHT: 56 IN | BODY MASS INDEX: 26.77 KG/M2

## 2022-02-16 DIAGNOSIS — C50.211 MALIGNANT NEOPLASM OF UPPER-INNER QUADRANT OF RIGHT BREAST IN FEMALE, ESTROGEN RECEPTOR POSITIVE (HCC): Primary | ICD-10-CM

## 2022-02-16 DIAGNOSIS — Z17.0 MALIGNANT NEOPLASM OF UPPER-INNER QUADRANT OF RIGHT BREAST IN FEMALE, ESTROGEN RECEPTOR POSITIVE (HCC): Primary | ICD-10-CM

## 2022-02-16 PROCEDURE — G8419 CALC BMI OUT NRM PARAM NOF/U: HCPCS | Performed by: INTERNAL MEDICINE

## 2022-02-16 PROCEDURE — 96413 CHEMO IV INFUSION 1 HR: CPT

## 2022-02-16 PROCEDURE — 96417 CHEMO IV INFUS EACH ADDL SEQ: CPT

## 2022-02-16 PROCEDURE — 1036F TOBACCO NON-USER: CPT | Performed by: INTERNAL MEDICINE

## 2022-02-16 PROCEDURE — 99214 OFFICE O/P EST MOD 30 MIN: CPT | Performed by: INTERNAL MEDICINE

## 2022-02-16 PROCEDURE — G8427 DOCREV CUR MEDS BY ELIG CLIN: HCPCS | Performed by: INTERNAL MEDICINE

## 2022-02-16 PROCEDURE — 96372 THER/PROPH/DIAG INJ SC/IM: CPT

## 2022-02-16 PROCEDURE — 2580000003 HC RX 258: Performed by: INTERNAL MEDICINE

## 2022-02-16 PROCEDURE — G8484 FLU IMMUNIZE NO ADMIN: HCPCS | Performed by: INTERNAL MEDICINE

## 2022-02-16 PROCEDURE — 3017F COLORECTAL CA SCREEN DOC REV: CPT | Performed by: INTERNAL MEDICINE

## 2022-02-16 PROCEDURE — 6360000002 HC RX W HCPCS: Performed by: INTERNAL MEDICINE

## 2022-02-16 PROCEDURE — 96375 TX/PRO/DX INJ NEW DRUG ADDON: CPT

## 2022-02-16 RX ORDER — EPINEPHRINE 1 MG/ML
0.3 INJECTION, SOLUTION, CONCENTRATE INTRAVENOUS PRN
Status: CANCELLED | OUTPATIENT
Start: 2022-02-16

## 2022-02-16 RX ORDER — ONDANSETRON 2 MG/ML
8 INJECTION INTRAMUSCULAR; INTRAVENOUS
Status: CANCELLED | OUTPATIENT
Start: 2022-02-16

## 2022-02-16 RX ORDER — SODIUM CHLORIDE 9 MG/ML
20 INJECTION, SOLUTION INTRAVENOUS ONCE
Status: DISCONTINUED | OUTPATIENT
Start: 2022-02-16 | End: 2022-02-17 | Stop reason: HOSPADM

## 2022-02-16 RX ORDER — SODIUM CHLORIDE 9 MG/ML
25 INJECTION, SOLUTION INTRAVENOUS PRN
Status: CANCELLED | OUTPATIENT
Start: 2022-02-16

## 2022-02-16 RX ORDER — PALONOSETRON HYDROCHLORIDE 0.05 MG/ML
0.25 INJECTION, SOLUTION INTRAVENOUS ONCE
Status: COMPLETED | OUTPATIENT
Start: 2022-02-16 | End: 2022-02-16

## 2022-02-16 RX ORDER — ACETAMINOPHEN 325 MG/1
650 TABLET ORAL
Status: CANCELLED | OUTPATIENT
Start: 2022-02-16

## 2022-02-16 RX ORDER — SODIUM CHLORIDE 9 MG/ML
INJECTION, SOLUTION INTRAVENOUS CONTINUOUS
Status: CANCELLED | OUTPATIENT
Start: 2022-02-16

## 2022-02-16 RX ORDER — SODIUM CHLORIDE 0.9 % (FLUSH) 0.9 %
5-40 SYRINGE (ML) INJECTION PRN
Status: DISCONTINUED | OUTPATIENT
Start: 2022-02-16 | End: 2022-02-17 | Stop reason: HOSPADM

## 2022-02-16 RX ORDER — MEPERIDINE HYDROCHLORIDE 50 MG/ML
12.5 INJECTION INTRAMUSCULAR; INTRAVENOUS; SUBCUTANEOUS PRN
Status: CANCELLED | OUTPATIENT
Start: 2022-02-16

## 2022-02-16 RX ORDER — TRIAMCINOLONE ACETONIDE 1 MG/G
CREAM TOPICAL
Qty: 80 G | Refills: 1 | Status: ON HOLD
Start: 2022-02-16 | End: 2022-08-19 | Stop reason: HOSPADM

## 2022-02-16 RX ORDER — HEPARIN SODIUM (PORCINE) LOCK FLUSH IV SOLN 100 UNIT/ML 100 UNIT/ML
500 SOLUTION INTRAVENOUS PRN
Status: CANCELLED | OUTPATIENT
Start: 2022-02-16

## 2022-02-16 RX ORDER — DEXAMETHASONE SODIUM PHOSPHATE 10 MG/ML
10 INJECTION INTRAMUSCULAR; INTRAVENOUS ONCE
Status: COMPLETED | OUTPATIENT
Start: 2022-02-16 | End: 2022-02-16

## 2022-02-16 RX ORDER — PALONOSETRON 0.05 MG/ML
0.25 INJECTION, SOLUTION INTRAVENOUS ONCE
Status: CANCELLED | OUTPATIENT
Start: 2022-02-16

## 2022-02-16 RX ORDER — SODIUM CHLORIDE 9 MG/ML
20 INJECTION, SOLUTION INTRAVENOUS ONCE
Status: CANCELLED | OUTPATIENT
Start: 2022-02-16 | End: 2022-02-16

## 2022-02-16 RX ORDER — ALBUTEROL SULFATE 90 UG/1
4 AEROSOL, METERED RESPIRATORY (INHALATION) PRN
Status: CANCELLED | OUTPATIENT
Start: 2022-02-16

## 2022-02-16 RX ORDER — DIPHENHYDRAMINE HYDROCHLORIDE 50 MG/ML
50 INJECTION INTRAMUSCULAR; INTRAVENOUS
Status: CANCELLED | OUTPATIENT
Start: 2022-02-16

## 2022-02-16 RX ORDER — SODIUM CHLORIDE 0.9 % (FLUSH) 0.9 %
5-40 SYRINGE (ML) INJECTION PRN
Status: CANCELLED | OUTPATIENT
Start: 2022-02-16

## 2022-02-16 RX ORDER — SODIUM CHLORIDE 9 MG/ML
5-40 INJECTION INTRAVENOUS PRN
Status: CANCELLED | OUTPATIENT
Start: 2022-02-16

## 2022-02-16 RX ADMIN — DOCETAXEL 100 MG: 20 INJECTION, SOLUTION, CONCENTRATE INTRAVENOUS at 10:38

## 2022-02-16 RX ADMIN — DEXAMETHASONE SODIUM PHOSPHATE 10 MG: 10 INJECTION INTRAMUSCULAR; INTRAVENOUS at 10:18

## 2022-02-16 RX ADMIN — CYCLOPHOSPHAMIDE 800 MG: 200 INJECTION, SOLUTION INTRAVENOUS at 12:01

## 2022-02-16 RX ADMIN — PALONOSETRON 0.25 MG: 0.25 INJECTION, SOLUTION INTRAVENOUS at 10:13

## 2022-02-16 RX ADMIN — PEGFILGRASTIM-BMEZ 6 MG: 6 INJECTION SUBCUTANEOUS at 12:47

## 2022-02-16 RX ADMIN — SODIUM CHLORIDE, PRESERVATIVE FREE 10 ML: 5 INJECTION INTRAVENOUS at 10:05

## 2022-02-16 RX ADMIN — SODIUM CHLORIDE, PRESERVATIVE FREE 10 ML: 5 INJECTION INTRAVENOUS at 10:18

## 2022-02-16 NOTE — PROGRESS NOTES
[address] [facility] MEDICAL ONCOLOGY  [address] [place] [address]  [place] [address]  Dept: [phone]  Loc: [phone]  Attending Progress Note      Reason for Visit: Right breast cancer. Referring Physician: Alma Rosa Cantu MD    PCP:  Desirae Devine DO    History of Present Illness:     Boo Melgar is a pleasant 60-year-old female patient, with a past medical history significant for hearing loss and hyperlipidemia, who had presented with an abnormal screening mammogram,  MAMMOGRAM ULTRASOUND BIOPSY; [facility]       9/14/2021: MAMMOGRAM, RIGHT BREAST ULTRASOUND: [facility]                9/14/2021: LEFT BREAST ULTRASOUND: [facility]    She underwent an US guided right breast core biopsy at 3 o'clock position on September 20 , 2021. Pathological evaluation completed at THE [facility]:  PATHOLOGY  Diagnosis:   Right breast, 3 o'clock position, core biopsies: Invasive carcinoma of no   special type (ductal with lobular features).  Preliminary [place]   score 3+2+1 = 6     Comment:   Tumor cells stain strongly positive for e-cadherin   immunostain. Intradepartmental consultation is obtained.      Breast Cancer Marker Studies:         Estrogen Receptors (ER):       -Positive (>10% of cells demonstrate nuclear positivity):       Percentage of cells positive: >90%       Intensity: Strong         Progesterone Receptors (NM):       -Positive:       Percentage of cells positive: 2%       Intensity: Weak         Her-2/marquise (c-erb B-2) protein expression: Negative (1+)     The patient underwent on 11/3/2021 right breast lumpectomy with axillary sentinel lymph node biopsy, pathology:    CANCER CASE SUMMARY   Procedure -excision   Specimen Laterality -right   Tumor Site, Invasive Carcinoma-3:00   Histologic Type-invasive carcinoma, no special type (ductal)   Histologic Grade ([place] Histologic Score):                    Tubule differentiation- score-3                    Nuclear pleomorphism- score 2                    Mitotic rate- score 2                    Overall Grade- grade 2, (score 7)   Tumor Size: Size of Largest Invasive Carcinoma-30 mm   Ductal Carcinoma In Situ (DCIS)-not identified   Tumor Extent-carcinoma invades skeletal muscle   Treatment Effect in the Breast-no known presurgical therapy   Margin status for invasive carcinoma: Inferior margin involved by   invasive carcinoma.  Posterior margin less than 1 mm from invasive   carcinoma. Regional Lymph Nodes: All regional lymph nodes negative for tumor   Total number of lymph nodes examined-1   Number of sentinel nodes examined-1   Pathologic Stage Classification (AJCC 8th Edition)- pT2 p(sn)N0   Additional Pathologic Findings-lymphovascular and perineural space   invasion   Special studies-ER positive, AL positive, HER-2 negative     The patient underwent on 12/1/2021 lumpectomy margin reexcision, was negative for residual carcinoma. Oncotype DX was done, recurrence score is 26, distant recurrence risk at 9 years with endocrine therapy alone is 16%, absolute chemotherapy benefit is greater than 15%. Patient was started on adjuvant chemotherapy with Taxotere and Cytoxan on 1/5/2022, she is accompanied by her mother, she had skin rash, and fatigue with the last treatment    Review of Systems;  CONSTITUTIONAL: No fever, chills. Good appetite, feeling tired. ENMT: Eyes: No diplopia; Nose: No epistaxis. Mouth: No sore throat. RESPIRATORY: No hemoptysis, shortness of breath, cough. CARDIOVASCULAR: No chest pain, palpitations. GASTROINTESTINAL: No nausea or vomiting at this time, no abdominal pain, diarrhea/constipation. GENITOURINARY: No dysuria, urinary frequency, hematuria. NEURO: No syncope, presyncope, headache.   Remainder:  ROS NEGATIVE    Past Medical History:      Diagnosis Date    [facility] ([facility]     Right breast    Cancer ([facility]     Hearing impaired     Hearing loss     Hyperlipidemia Patient Active Problem List   Diagnosis    Cancer [facility] [facility]    Malignant neoplasm of upper-inner quadrant of right breast in female, estrogen receptor positive ([facility] [facility]        Past Surgical History:      Procedure Laterality Date    BREAST LUMPECTOMY Right 11/3/2021    RIGHT BREAST LUMPECTOMY, BLUE DYE INJECTION, RIGHT AXILLARY SENTINEL LYMPH NODE INCISION performed by Radha Weeks MD at . [address] LUMPECTOMY Right 12/1/2021    RE-EXCISION RIGHT BREAST INFERIOR MARGIN performed by Radha Weeks MD at [facility] 46 RIGHT  2021       Family History:  Family History   Problem Relation Age of Onset    Heart Disease Mother     High Blood Pressure Mother     High Cholesterol Mother     Other Father         COPD    Dementia Maternal Grandmother        Medications:  Reviewed and reconciled. Social History:  Social History     Socioeconomic History    Marital status: Single     Spouse name: Not on file    Number of children: Not on file    Years of education: Not on file    Highest education level: Not on file   Occupational History    Not on file   Tobacco Use    Smoking status: Never Smoker    Smokeless tobacco: Never Used   Vaping Use    Vaping Use: Never used   Substance and Sexual Activity    Alcohol use: Never    Drug use: Never    Sexual activity: Not on file   Other Topics Concern    Not on file   Social History Narrative    Not on file     Social Determinants of Health     Financial Resource Strain:     Difficulty of Paying Living Expenses: Not on file   Food Insecurity:     Worried About [address] in the Last Year: Not on file    Rae of Food in the Last Year: Not on file   Transportation Needs:     Lack of Transportation (Medical): Not on file    Lack of Transportation (Non-Medical):  Not on file   Physical Activity:     Days of Exercise per Week: Not on file    Minutes of Exercise per Session: Not on file   Stress:     Feeling of Stress : Not on file   Social Connections:     Frequency of Communication with Friends and Family: Not on file    Frequency of Social Gatherings with Friends and Family: Not on file    Attends Hoahaoism Services: Not on file    Active Member of [address] [place] [address] [employer] or Organizations: Not on file    Attends Club or Organization Meetings: Not on file    Marital Status: Not on file   Intimate Partner Violence:     Fear of Current or Ex-Partner: Not on file    Emotionally Abused: Not on file    Physically Abused: Not on file    Sexually Abused: Not on file   Housing Stability:     Unable to Pay for Housing in the Last Year: Not on file    Number of [place] in the Last Year: Not on file    Unstable Housing in the Last Year: Not on file       Allergies:  No Known Allergies    Physical Exam:  BP (!) 162/86   Pulse 114   Temp 97.3 °F (36.3 °C)   Ht 4' 8\" (1.422 m)   Wt 119 lb (54 kg)   LMP 10/28/2021   SpO2 98%   BMI 26.68 kg/m²   GENERAL: Alert, oriented x 3, not in acute distress. HEENT: PERRLA; EOMI. Oropharynx clear. NECK: Supple. No palpable cervical or supraclavicular lymphadenopathy. LUNGS: Good air entry bilaterally. No wheezing, crackles or rhonchi. CARDIOVASCULAR: Regular rhythm, tachycardic. ABDOMEN: Soft. Non-tender, non-distended. Positive bowel sounds. EXTREMITIES: Without clubbing, cyanosis, or edema. NEUROLOGIC: No focal deficits.      ECOG PS 1      Impression/Plan:    Antwan Phoenix is a pleasant 70-year-old female patient, with a past medical history significant for hearing loss and hyperlipidemia, who had presented with an abnormal screening mammogram, she was diagnosed with a right breast invasive ductal carcinoma, she underwent on 11/3/2021 right breast lumpectomy with axillary sentinel lymph node biopsy, tumor size was 3 cm, grade 2, carcinoma invaded skeletal muscle, inferior margin was positive, posterior margin was less than 1 mm from invasive carcinoma, she had margins reexcision done, was negative for residual carcinoma, 1 sentinel lymph node was removed, was negative for metastatic disease, final pathologic stage pT2 p(sn)N0, ER positive greater than 90%, [place] +2%, HER-2/marquise negative, 1+ by IHC, Oncotype DX was done, recurrence score is 26, risk of distant recurrence at 9 years with endocrine therapy alone is 16%, chemotherapy benefit is greater than 15%. I discussed with the patient and her mother her diagnosis, characteristics of her tumor, prognosis and recommendations for treatment, adjuvant endocrine therapy with an AI is recommended, the side effects of the Arimidex were reviewed with the patient. We reviewed the Oncotype DX results, recurrence score is 26, adjuvant chemotherapy is recommended, benefit is greater than 15%, I discussed with the patient TC regimen, the side effects and the schedule were reviewed with he. The patient was started on adjuvant chemotherapy with Taxotere and Cytoxan on 1/5/2022, she is accompanied by her mother, she is feeling well at this time, labs reviewed, blood counts are adequate for treatment, proceed with chemotherapy, TC cycle #3 today 2/16/2022, prescribed topical triamcinolone to use as needed, the rash was likely secondary to Taxotere, and she has recurrent rash that does not resolve with the topical steroid she will let me know and we will prescribe oral steroids. She will use Claritin and NSAIDs for bony pain. She will let me know if she has any new problems. We will refer her to radiation oncology when she returns for her next visit. RTC in 3 weeks. Thank you for allowing us to participate in the care of Ms. Guzman.     Lety Vargas MD   HEMATOLOGY/MEDICAL [address] MEDICAL ONCOLOGY  [address] [place], [place] [address] [address]  Dept: [phone] [place]: [phone]

## 2022-02-16 NOTE — TELEPHONE ENCOUNTER
Met with pt and pt's mother in conjunction with chemotherapy infusion. Pt is 51-year-old female being treated for breast cancer. Pt's mother inquired about [facility] application. Application and certification letter provided (letter to be scanned to chart). Provided support to pt and attempted to engage pt in grounding exercises due to high anxiety surrounding IV insertion. No additional needs identified at this time. Reviewed role of oncology SW and encouraged pt to notify this provider if additional needs arise.     Johana Martins MSW, BEBEW-S  Oncology Social Worker

## 2022-03-08 ENCOUNTER — HOSPITAL ENCOUNTER (OUTPATIENT)
Age: 62
Discharge: HOME | End: 2022-03-08
Payer: COMMERCIAL

## 2022-03-08 DIAGNOSIS — C50.211 MALIGNANT NEOPLASM OF UPPER-INNER QUADRANT OF RIGHT BREAST IN FEMALE, ESTROGEN RECEPTOR POSITIVE (HCC): ICD-10-CM

## 2022-03-08 DIAGNOSIS — Z17.0 MALIGNANT NEOPLASM OF UPPER-INNER QUADRANT OF RIGHT BREAST IN FEMALE, ESTROGEN RECEPTOR POSITIVE (HCC): ICD-10-CM

## 2022-03-08 LAB
ALBUMIN SERPL-MCNC: 4.3 G/DL (ref 3.5–5.2)
ALP BLD-CCNC: 71 U/L (ref 35–104)
ALT SERPL-CCNC: 11 U/L (ref 0–32)
ANION GAP SERPL CALCULATED.3IONS-SCNC: 12 MMOL/L (ref 7–16)
AST SERPL-CCNC: 15 U/L (ref 0–31)
BASOPHILS ABSOLUTE: 0.06 E9/L (ref 0–0.2)
BASOPHILS RELATIVE PERCENT: 0.6 % (ref 0–2)
BILIRUB SERPL-MCNC: 0.3 MG/DL (ref 0–1.2)
BUN BLDV-MCNC: 12 MG/DL (ref 6–23)
CALCIUM SERPL-MCNC: 9.7 MG/DL (ref 8.6–10.2)
CHLORIDE BLD-SCNC: 105 MMOL/L (ref 98–107)
CO2: 22 MMOL/L (ref 22–29)
CREAT SERPL-MCNC: 0.7 MG/DL (ref 0.5–1)
EOSINOPHILS ABSOLUTE: 0.04 E9/L (ref 0.05–0.5)
EOSINOPHILS RELATIVE PERCENT: 0.4 % (ref 0–6)
GFR AFRICAN AMERICAN: >60
GFR NON-AFRICAN AMERICAN: >60 ML/MIN/1.73
GLUCOSE BLD-MCNC: 152 MG/DL (ref 74–99)
HCT VFR BLD CALC: 40.3 % (ref 34–48)
HEMOGLOBIN: 12.8 G/DL (ref 11.5–15.5)
IMMATURE GRANULOCYTES #: 0.04 E9/L
IMMATURE GRANULOCYTES %: 0.4 % (ref 0–5)
LYMPHOCYTES ABSOLUTE: 0.68 E9/L (ref 1.5–4)
LYMPHOCYTES RELATIVE PERCENT: 6.9 % (ref 20–42)
MCH RBC QN AUTO: 29 PG (ref 26–35)
MCHC RBC AUTO-ENTMCNC: 31.8 % (ref 32–34.5)
MCV RBC AUTO: 91.2 FL (ref 80–99.9)
MONOCYTES ABSOLUTE: 0.37 E9/L (ref 0.1–0.95)
MONOCYTES RELATIVE PERCENT: 3.8 % (ref 2–12)
NEUTROPHILS ABSOLUTE: 8.67 E9/L (ref 1.8–7.3)
NEUTROPHILS RELATIVE PERCENT: 87.9 % (ref 43–80)
PDW BLD-RTO: 16.4 FL (ref 11.5–15)
PLATELET # BLD: 388 E9/L (ref 130–450)
PMV BLD AUTO: 9.4 FL (ref 7–12)
POTASSIUM SERPL-SCNC: 4.3 MMOL/L (ref 3.5–5)
RBC # BLD: 4.42 E12/L (ref 3.5–5.5)
SODIUM BLD-SCNC: 139 MMOL/L (ref 132–146)
TOTAL PROTEIN: 6.8 G/DL (ref 6.4–8.3)
WBC # BLD: 9.9 E9/L (ref 4.5–11.5)

## 2022-03-08 PROCEDURE — 85025 COMPLETE CBC W/AUTO DIFF WBC: CPT

## 2022-03-08 PROCEDURE — 36415 COLL VENOUS BLD VENIPUNCTURE: CPT

## 2022-03-08 PROCEDURE — 80053 COMPREHEN METABOLIC PANEL: CPT

## 2022-03-09 ENCOUNTER — OFFICE VISIT (OUTPATIENT)
Dept: ONCOLOGY | Age: 62
End: 2022-03-09
Payer: COMMERCIAL

## 2022-03-09 ENCOUNTER — HOSPITAL ENCOUNTER (OUTPATIENT)
Dept: INFUSION THERAPY | Age: 62
Discharge: HOME | End: 2022-03-09

## 2022-03-09 VITALS
SYSTOLIC BLOOD PRESSURE: 157 MMHG | DIASTOLIC BLOOD PRESSURE: 97 MMHG | HEART RATE: 113 BPM | WEIGHT: 119 LBS | BODY MASS INDEX: 26.77 KG/M2 | HEIGHT: 56 IN | OXYGEN SATURATION: 98 % | TEMPERATURE: 97.6 F

## 2022-03-09 DIAGNOSIS — Z17.0 MALIGNANT NEOPLASM OF UPPER-INNER QUADRANT OF RIGHT BREAST IN FEMALE, ESTROGEN RECEPTOR POSITIVE (HCC): Primary | ICD-10-CM

## 2022-03-09 DIAGNOSIS — C50.211 MALIGNANT NEOPLASM OF UPPER-INNER QUADRANT OF RIGHT BREAST IN FEMALE, ESTROGEN RECEPTOR POSITIVE (HCC): Primary | ICD-10-CM

## 2022-03-09 PROCEDURE — G8484 FLU IMMUNIZE NO ADMIN: HCPCS | Performed by: INTERNAL MEDICINE

## 2022-03-09 PROCEDURE — 3017F COLORECTAL CA SCREEN DOC REV: CPT | Performed by: INTERNAL MEDICINE

## 2022-03-09 PROCEDURE — 99212 OFFICE O/P EST SF 10 MIN: CPT

## 2022-03-09 PROCEDURE — G8419 CALC BMI OUT NRM PARAM NOF/U: HCPCS | Performed by: INTERNAL MEDICINE

## 2022-03-09 PROCEDURE — 1036F TOBACCO NON-USER: CPT | Performed by: INTERNAL MEDICINE

## 2022-03-09 PROCEDURE — 99214 OFFICE O/P EST MOD 30 MIN: CPT | Performed by: INTERNAL MEDICINE

## 2022-03-09 PROCEDURE — G8427 DOCREV CUR MEDS BY ELIG CLIN: HCPCS | Performed by: INTERNAL MEDICINE

## 2022-03-09 RX ORDER — HYDROXYZINE HYDROCHLORIDE 25 MG/1
25 TABLET, FILM COATED ORAL EVERY 8 HOURS PRN
Qty: 30 TABLET | Refills: 0 | Status: SHIPPED | OUTPATIENT
Start: 2022-03-09 | End: 2022-03-19

## 2022-03-09 RX ORDER — CEPHALEXIN 500 MG/1
500 CAPSULE ORAL 4 TIMES DAILY
Qty: 28 CAPSULE | Refills: 0 | Status: SHIPPED
Start: 2022-03-09 | End: 2022-06-01 | Stop reason: ALTCHOICE

## 2022-03-09 NOTE — PROGRESS NOTES
[address] [facility] MEDICAL ONCOLOGY  [address] [place], [place] [address] [address]  Dept: [phone]  Loc: [phone]  Attending Progress Note      Reason for Visit: Right breast cancer. Referring Physician: Alma Rosa Cantu MD    PCP:  Desirae Devine DO    History of Present Illness:     Boo Melgar is a pleasant 59-year-old female patient, with a past medical history significant for hearing loss and hyperlipidemia, who had presented with an abnormal screening mammogram,  MAMMOGRAM ULTRASOUND BIOPSY; [facility]       9/14/2021: MAMMOGRAM, RIGHT BREAST ULTRASOUND: [facility]                9/14/2021: LEFT BREAST ULTRASOUND: [facility]    She underwent an US guided right breast core biopsy at 3 o'clock position on September 20 , 2021. 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  Preliminary [place]   score 3+2+1 = 6     Comment:   Tumor cells stain strongly positive for e-cadherin   immunostain. Intradepartmental consultation is obtained.      Breast Cancer Marker Studies:         Estrogen Receptors (ER):       -Positive (>10% of cells demonstrate nuclear positivity):       Percentage of cells positive: >90%       Intensity: Strong         Progesterone Receptors (CT):       -Positive:       Percentage of cells positive: 2%       Intensity: Weak         Her-2/marquise (c-erb B-2) protein expression: Negative (1+)     The patient underwent on 11/3/2021 right breast lumpectomy with axillary sentinel lymph node biopsy, pathology:    CANCER CASE SUMMARY   Procedure -excision   Specimen Laterality -right   Tumor Site, Invasive Carcinoma-3:00   Histologic Type-invasive carcinoma, no special type (ductal)   Histologic Grade ([place] Histologic Score):                    Tubule differentiation- score-3                    Nuclear pleomorphism- score 2                    Mitotic rate- score 2                    Overall Grade- grade 2, (score 7)   Tumor Size: Size of Largest Invasive Carcinoma-30 mm   Ductal Carcinoma In Situ (DCIS)-not identified   Tumor Extent-carcinoma invades skeletal muscle   Treatment Effect in the Breast-no known presurgical therapy   Margin status for invasive carcinoma: Inferior margin involved by   invasive carcinoma.  Posterior margin less than 1 mm from invasive   carcinoma. Regional Lymph Nodes: All regional lymph nodes negative for tumor   Total number of lymph nodes examined-1   Number of sentinel nodes examined-1   Pathologic Stage Classification (AJCC 8th Edition)- pT2 p(sn)N0   Additional Pathologic Findings-lymphovascular and perineural space   invasion   Special studies-ER positive, [place] positive, HER-2 negative     The patient underwent on 12/1/2021 lumpectomy margin reexcision, was negative for residual carcinoma. Oncotype DX was done, recurrence score is 26, distant recurrence risk at 9 years with endocrine therapy alone is 16%, absolute chemotherapy benefit is greater than 15%. Patient was started on adjuvant chemotherapy with Taxotere and Cytoxan on 1/5/2022, she is accompanied by her mother, she had skin rash, it is itchy, she has been scratching her skin. Review of Systems;  CONSTITUTIONAL: No fever, chills. Good appetite, feeling tired. ENMT: Eyes: No diplopia; Nose: No epistaxis. Mouth: No sore throat. RESPIRATORY: No hemoptysis, shortness of breath, cough. CARDIOVASCULAR: No chest pain, palpitations. GASTROINTESTINAL: No nausea or vomiting at this time, no abdominal pain, diarrhea/constipation. GENITOURINARY: No dysuria, urinary frequency, hematuria. NEURO: No syncope, presyncope, headache.   Remainder:  ROS NEGATIVE    Past Medical History:      Diagnosis Date    Breast cancer ([address]     Right breast    Cancer ([address]     Hearing impaired     Hearing loss     Hyperlipidemia      Patient Active Problem List   Diagnosis    Cancer ([facility] [address] [facility]    Malignant neoplasm of upper-inner quadrant of right breast in female, estrogen receptor positive ([facility] [address]        Past Surgical History:      Procedure Laterality Date    BREAST LUMPECTOMY Right 11/3/2021    RIGHT BREAST LUMPECTOMY, BLUE DYE INJECTION, RIGHT AXILLARY SENTINEL LYMPH NODE INCISION performed by Marii Chaudhry MD at . [address] LUMPECTOMY Right 12/1/2021    RE-EXCISION RIGHT BREAST INFERIOR MARGIN performed by Marii Chaudhry MD at [facility] 46 RIGHT  2021       Family History:  Family History   Problem Relation Age of Onset    Heart Disease Mother     High Blood Pressure Mother     High Cholesterol Mother     Other Father         COPD    Dementia Maternal Grandmother        Medications:  Reviewed and reconciled. Social History:  Social History     Socioeconomic History    Marital status: Single     Spouse name: Not on file    Number of children: Not on file    Years of education: Not on file    Highest education level: Not on file   Occupational History    Not on file   Tobacco Use    Smoking status: Never Smoker    Smokeless tobacco: Never Used   Vaping Use    Vaping Use: Never used   Substance and Sexual Activity    Alcohol use: Never    Drug use: Never    Sexual activity: Not on file   Other Topics Concern    Not on file   Social History Narrative    Not on file     Social Determinants of Health     Financial Resource Strain:     Difficulty of Paying Living Expenses: Not on file   Food Insecurity:     Worried About [address] in the Last Year: Not on file    Rae [employer] Food in the Last Year: Not on file   Transportation Needs:     Lack of Transportation (Medical): Not on file    Lack of Transportation (Non-Medical):  Not on file   Physical Activity:     Days of Exercise per Week: Not on file    Minutes of Exercise per Session: Not on file Stress:     Feeling of Stress : Not on file   Social Connections:     Frequency of Communication with Friends and Family: Not on file    Frequency of Social Gatherings with Friends and Family: Not on file    Attends Buddhism Services: Not on file    Active Member of [address] [place] [address] [employer] or Organizations: Not on file    Attends Club or Organization Meetings: Not on file    Marital Status: Not on file   Intimate Partner Violence:     Fear of Current or Ex-Partner: Not on file    Emotionally Abused: Not on file    Physically Abused: Not on file    Sexually Abused: Not on file   Housing Stability:     Unable to Pay for Housing in the Last Year: Not on file    Number of [place] in the Last Year: Not on file    Unstable Housing in the Last Year: Not on file       Allergies:  No Known Allergies    Physical Exam:  BP (!) 157/97   Pulse 113   Temp 97.6 °F (36.4 °C)   Ht 4' 8\" (1.422 m)   Wt 119 lb (54 kg)   LMP 10/28/2021   SpO2 98%   BMI 26.68 kg/m²   GENERAL: Alert, oriented x 3, not in acute distress. HEENT: PERRLA; EOMI. Oropharynx clear. NECK: Supple. No palpable cervical or supraclavicular lymphadenopathy. LUNGS: Good air entry bilaterally. No wheezing, crackles or rhonchi. CARDIOVASCULAR: Regular rhythm, tachycardic. ABDOMEN: Soft. Non-tender, non-distended. Positive bowel sounds. EXTREMITIES: Without clubbing, cyanosis, or edema. SKIN: Rash on her upper extremities, neck, abdomen, some of the lesions are getting infected. NEUROLOGIC: No focal deficits.      ECOG PS 1      Impression/Plan:    [address] is a pleasant 42-year-old female patient, with a past medical history significant for hearing loss and hyperlipidemia, who had presented with an abnormal screening mammogram, she was diagnosed with a right breast invasive ductal carcinoma, she underwent on 11/3/2021 right breast lumpectomy with axillary sentinel lymph node biopsy, tumor size was 3 cm, grade 2, carcinoma invaded skeletal muscle, inferior margin was positive, posterior margin was less than 1 mm from invasive carcinoma, she had margins reexcision done, was negative for residual carcinoma, 1 sentinel lymph node was removed, was negative for metastatic disease, final pathologic stage pT2 p(sn)N0, ER positive greater than 90%, [place] +2%, HER-2/marquise negative, 1+ by IHC, Oncotype DX was done, recurrence score is 26, risk of distant recurrence at 9 years with endocrine therapy alone is 16%, chemotherapy benefit is greater than 15%. I discussed with the patient and her mother her diagnosis, characteristics of her tumor, prognosis and recommendations for treatment, adjuvant endocrine therapy with an AI is recommended, the side effects of the Arimidex were reviewed with the patient. We reviewed the Oncotype DX results, recurrence score is 26, adjuvant chemotherapy is recommended, benefit is greater than 15%, I discussed with the patient TC regimen, the side effects and the schedule were reviewed with he. The patient was started on adjuvant chemotherapy with Taxotere and Cytoxan on 1/5/2022, she had received 3 cycles to date, today 3/9/2022, the patient scheduled for the last cycle of chemotherapy, she has skin rash secondary to the chemotherapy, but she has lesions are getting infected, will hold the chemotherapy, prescribed Keflex and Atarax. Hold the chemotherapy today, will reschedule for next week. She will let me know if she has any new problems. We will refer her to radiation oncology when she returns for her next visit. RTC in 1 week. Thank you for allowing us to participate in the care of Ms. Guzman.     Tejal Zavala MD   HEMATOLOGY/MEDICAL [address] MEDICAL ONCOLOGY  [address] [place], [place] [address] [address]  Dept: [phone]: [phone]

## 2022-03-15 ENCOUNTER — HOSPITAL ENCOUNTER (OUTPATIENT)
Age: 62
Discharge: HOME | End: 2022-03-15
Payer: COMMERCIAL

## 2022-03-15 DIAGNOSIS — C50.211 MALIGNANT NEOPLASM OF UPPER-INNER QUADRANT OF RIGHT BREAST IN FEMALE, ESTROGEN RECEPTOR POSITIVE (HCC): ICD-10-CM

## 2022-03-15 DIAGNOSIS — Z17.0 MALIGNANT NEOPLASM OF UPPER-INNER QUADRANT OF RIGHT BREAST IN FEMALE, ESTROGEN RECEPTOR POSITIVE (HCC): ICD-10-CM

## 2022-03-15 LAB
ALBUMIN SERPL-MCNC: 4.3 G/DL (ref 3.5–5.2)
ALP BLD-CCNC: 73 U/L (ref 35–104)
ALT SERPL-CCNC: 15 U/L (ref 0–32)
ANION GAP SERPL CALCULATED.3IONS-SCNC: 11 MMOL/L (ref 7–16)
ANISOCYTOSIS: ABNORMAL
AST SERPL-CCNC: 19 U/L (ref 0–31)
BASOPHILS ABSOLUTE: 0.05 E9/L (ref 0–0.2)
BASOPHILS RELATIVE PERCENT: 0.5 % (ref 0–2)
BILIRUB SERPL-MCNC: 0.5 MG/DL (ref 0–1.2)
BUN BLDV-MCNC: 13 MG/DL (ref 6–23)
BURR CELLS: ABNORMAL
CALCIUM SERPL-MCNC: 9.6 MG/DL (ref 8.6–10.2)
CHLORIDE BLD-SCNC: 103 MMOL/L (ref 98–107)
CO2: 23 MMOL/L (ref 22–29)
CREAT SERPL-MCNC: 0.8 MG/DL (ref 0.5–1)
EOSINOPHILS ABSOLUTE: 0.12 E9/L (ref 0.05–0.5)
EOSINOPHILS RELATIVE PERCENT: 1.1 % (ref 0–6)
GFR AFRICAN AMERICAN: >60
GFR NON-AFRICAN AMERICAN: >60 ML/MIN/1.73
GLUCOSE BLD-MCNC: 144 MG/DL (ref 74–99)
HCT VFR BLD CALC: 43.5 % (ref 34–48)
HEMOGLOBIN: 14.2 G/DL (ref 11.5–15.5)
IMMATURE GRANULOCYTES #: 0.02 E9/L
IMMATURE GRANULOCYTES %: 0.2 % (ref 0–5)
LYMPHOCYTES ABSOLUTE: 0.56 E9/L (ref 1.5–4)
LYMPHOCYTES RELATIVE PERCENT: 5.3 % (ref 20–42)
MCH RBC QN AUTO: 29.4 PG (ref 26–35)
MCHC RBC AUTO-ENTMCNC: 32.6 % (ref 32–34.5)
MCV RBC AUTO: 90.1 FL (ref 80–99.9)
MONOCYTES ABSOLUTE: 0.21 E9/L (ref 0.1–0.95)
MONOCYTES RELATIVE PERCENT: 2 % (ref 2–12)
NEUTROPHILS ABSOLUTE: 9.58 E9/L (ref 1.8–7.3)
NEUTROPHILS RELATIVE PERCENT: 90.9 % (ref 43–80)
OVALOCYTES: ABNORMAL
PDW BLD-RTO: 16.6 FL (ref 11.5–15)
PLATELET # BLD: 336 E9/L (ref 130–450)
PMV BLD AUTO: 9.5 FL (ref 7–12)
POIKILOCYTES: ABNORMAL
POTASSIUM SERPL-SCNC: 4.4 MMOL/L (ref 3.5–5)
RBC # BLD: 4.83 E12/L (ref 3.5–5.5)
SODIUM BLD-SCNC: 137 MMOL/L (ref 132–146)
TOTAL PROTEIN: 6.8 G/DL (ref 6.4–8.3)
WBC # BLD: 10.5 E9/L (ref 4.5–11.5)

## 2022-03-15 PROCEDURE — 80053 COMPREHEN METABOLIC PANEL: CPT

## 2022-03-15 PROCEDURE — 85025 COMPLETE CBC W/AUTO DIFF WBC: CPT

## 2022-03-15 PROCEDURE — 36415 COLL VENOUS BLD VENIPUNCTURE: CPT

## 2022-03-16 ENCOUNTER — OFFICE VISIT (OUTPATIENT)
Dept: ONCOLOGY | Age: 62
End: 2022-03-16
Payer: COMMERCIAL

## 2022-03-16 ENCOUNTER — TELEPHONE (OUTPATIENT)
Dept: ONCOLOGY | Age: 62
End: 2022-03-16

## 2022-03-16 ENCOUNTER — HOSPITAL ENCOUNTER (OUTPATIENT)
Dept: INFUSION THERAPY | Age: 62
Discharge: HOME | End: 2022-03-16
Payer: COMMERCIAL

## 2022-03-16 VITALS
DIASTOLIC BLOOD PRESSURE: 95 MMHG | WEIGHT: 119 LBS | HEIGHT: 56 IN | TEMPERATURE: 97.2 F | BODY MASS INDEX: 26.77 KG/M2 | HEART RATE: 124 BPM | SYSTOLIC BLOOD PRESSURE: 154 MMHG | OXYGEN SATURATION: 99 %

## 2022-03-16 VITALS — TEMPERATURE: 97.6 F | HEART RATE: 100 BPM | DIASTOLIC BLOOD PRESSURE: 71 MMHG | SYSTOLIC BLOOD PRESSURE: 120 MMHG

## 2022-03-16 DIAGNOSIS — Z17.0 MALIGNANT NEOPLASM OF UPPER-INNER QUADRANT OF RIGHT BREAST IN FEMALE, ESTROGEN RECEPTOR POSITIVE (HCC): Primary | ICD-10-CM

## 2022-03-16 DIAGNOSIS — C50.211 MALIGNANT NEOPLASM OF UPPER-INNER QUADRANT OF RIGHT BREAST IN FEMALE, ESTROGEN RECEPTOR POSITIVE (HCC): Primary | ICD-10-CM

## 2022-03-16 DIAGNOSIS — Z78.0 POSTMENOPAUSAL: ICD-10-CM

## 2022-03-16 PROCEDURE — 96413 CHEMO IV INFUSION 1 HR: CPT

## 2022-03-16 PROCEDURE — 3017F COLORECTAL CA SCREEN DOC REV: CPT | Performed by: INTERNAL MEDICINE

## 2022-03-16 PROCEDURE — 6360000002 HC RX W HCPCS: Performed by: INTERNAL MEDICINE

## 2022-03-16 PROCEDURE — G8484 FLU IMMUNIZE NO ADMIN: HCPCS | Performed by: INTERNAL MEDICINE

## 2022-03-16 PROCEDURE — 96375 TX/PRO/DX INJ NEW DRUG ADDON: CPT

## 2022-03-16 PROCEDURE — G8427 DOCREV CUR MEDS BY ELIG CLIN: HCPCS | Performed by: INTERNAL MEDICINE

## 2022-03-16 PROCEDURE — 96372 THER/PROPH/DIAG INJ SC/IM: CPT

## 2022-03-16 PROCEDURE — 2580000003 HC RX 258: Performed by: INTERNAL MEDICINE

## 2022-03-16 PROCEDURE — 1036F TOBACCO NON-USER: CPT | Performed by: INTERNAL MEDICINE

## 2022-03-16 PROCEDURE — 96417 CHEMO IV INFUS EACH ADDL SEQ: CPT

## 2022-03-16 PROCEDURE — 99214 OFFICE O/P EST MOD 30 MIN: CPT | Performed by: INTERNAL MEDICINE

## 2022-03-16 PROCEDURE — G8419 CALC BMI OUT NRM PARAM NOF/U: HCPCS | Performed by: INTERNAL MEDICINE

## 2022-03-16 RX ORDER — ACETAMINOPHEN 325 MG/1
650 TABLET ORAL
Status: CANCELLED | OUTPATIENT
Start: 2022-03-16

## 2022-03-16 RX ORDER — SODIUM CHLORIDE 9 MG/ML
5-40 INJECTION INTRAVENOUS PRN
Status: CANCELLED | OUTPATIENT
Start: 2022-03-16

## 2022-03-16 RX ORDER — SODIUM CHLORIDE 9 MG/ML
20 INJECTION, SOLUTION INTRAVENOUS ONCE
Status: CANCELLED | OUTPATIENT
Start: 2022-03-16 | End: 2022-03-16

## 2022-03-16 RX ORDER — SODIUM CHLORIDE 0.9 % (FLUSH) 0.9 %
5-40 SYRINGE (ML) INJECTION PRN
Status: CANCELLED | OUTPATIENT
Start: 2022-03-16

## 2022-03-16 RX ORDER — ALBUTEROL SULFATE 90 UG/1
4 AEROSOL, METERED RESPIRATORY (INHALATION) PRN
Status: CANCELLED | OUTPATIENT
Start: 2022-03-16

## 2022-03-16 RX ORDER — SODIUM CHLORIDE 9 MG/ML
25 INJECTION, SOLUTION INTRAVENOUS PRN
Status: CANCELLED | OUTPATIENT
Start: 2022-03-16

## 2022-03-16 RX ORDER — DIPHENHYDRAMINE HYDROCHLORIDE 50 MG/ML
50 INJECTION INTRAMUSCULAR; INTRAVENOUS
Status: CANCELLED | OUTPATIENT
Start: 2022-03-16

## 2022-03-16 RX ORDER — HEPARIN SODIUM (PORCINE) LOCK FLUSH IV SOLN 100 UNIT/ML 100 UNIT/ML
500 SOLUTION INTRAVENOUS PRN
Status: CANCELLED | OUTPATIENT
Start: 2022-03-16

## 2022-03-16 RX ORDER — PALONOSETRON HYDROCHLORIDE 0.05 MG/ML
0.25 INJECTION, SOLUTION INTRAVENOUS ONCE
Status: COMPLETED | OUTPATIENT
Start: 2022-03-16 | End: 2022-03-16

## 2022-03-16 RX ORDER — SODIUM CHLORIDE 9 MG/ML
20 INJECTION, SOLUTION INTRAVENOUS ONCE
Status: DISCONTINUED | OUTPATIENT
Start: 2022-03-16 | End: 2022-03-17 | Stop reason: HOSPADM

## 2022-03-16 RX ORDER — SODIUM CHLORIDE 9 MG/ML
INJECTION, SOLUTION INTRAVENOUS CONTINUOUS
Status: CANCELLED | OUTPATIENT
Start: 2022-03-16

## 2022-03-16 RX ORDER — DEXAMETHASONE SODIUM PHOSPHATE 10 MG/ML
10 INJECTION INTRAMUSCULAR; INTRAVENOUS ONCE
Status: COMPLETED | OUTPATIENT
Start: 2022-03-16 | End: 2022-03-16

## 2022-03-16 RX ORDER — ONDANSETRON 2 MG/ML
8 INJECTION INTRAMUSCULAR; INTRAVENOUS
Status: CANCELLED | OUTPATIENT
Start: 2022-03-16

## 2022-03-16 RX ORDER — EPINEPHRINE 1 MG/ML
0.3 INJECTION, SOLUTION, CONCENTRATE INTRAVENOUS PRN
Status: CANCELLED | OUTPATIENT
Start: 2022-03-16

## 2022-03-16 RX ORDER — MEPERIDINE HYDROCHLORIDE 50 MG/ML
12.5 INJECTION INTRAMUSCULAR; INTRAVENOUS; SUBCUTANEOUS PRN
Status: CANCELLED | OUTPATIENT
Start: 2022-03-16

## 2022-03-16 RX ORDER — SODIUM CHLORIDE 0.9 % (FLUSH) 0.9 %
5-40 SYRINGE (ML) INJECTION PRN
Status: DISCONTINUED | OUTPATIENT
Start: 2022-03-16 | End: 2022-03-17 | Stop reason: HOSPADM

## 2022-03-16 RX ORDER — PALONOSETRON 0.05 MG/ML
0.25 INJECTION, SOLUTION INTRAVENOUS ONCE
Status: CANCELLED | OUTPATIENT
Start: 2022-03-16

## 2022-03-16 RX ADMIN — CYCLOPHOSPHAMIDE 800 MG: 200 INJECTION, SOLUTION INTRAVENOUS at 12:47

## 2022-03-16 RX ADMIN — DEXAMETHASONE SODIUM PHOSPHATE 10 MG: 10 INJECTION INTRAMUSCULAR; INTRAVENOUS at 10:55

## 2022-03-16 RX ADMIN — SODIUM CHLORIDE 20 ML/HR: 9 INJECTION, SOLUTION INTRAVENOUS at 10:53

## 2022-03-16 RX ADMIN — DOCETAXEL 100 MG: 20 INJECTION, SOLUTION INTRAVENOUS at 11:20

## 2022-03-16 RX ADMIN — SODIUM CHLORIDE, PRESERVATIVE FREE 10 ML: 5 INJECTION INTRAVENOUS at 10:55

## 2022-03-16 RX ADMIN — PALONOSETRON 0.25 MG: 0.25 INJECTION, SOLUTION INTRAVENOUS at 10:54

## 2022-03-16 RX ADMIN — SODIUM CHLORIDE, PRESERVATIVE FREE 10 ML: 5 INJECTION INTRAVENOUS at 10:53

## 2022-03-16 RX ADMIN — PEGFILGRASTIM-BMEZ 6 MG: 6 INJECTION SUBCUTANEOUS at 13:33

## 2022-03-16 NOTE — TELEPHONE ENCOUNTER
Met with pt and pt's mother in conjunction with chemotherapy infusion as SW follow up. Pt is 60-year-old female being treated for breast cancer. Pt's mood appeared euthymic with full affect, she appeared A&Ox4, and she was willing/able to participate in session. She appeared appropriately dressed/groomed and was able to ambulate/transfer without assistance. Pt expressed excitement over completion of chemotherapy. Noted that she had been awarded financial assistance from [employer] and expressed gratitude for assistance. Discussed upcoming radiation txs. No additional needs identified at this time. Reviewed role of oncology SW and encouraged pt to notify this provider if additional needs arise.     Rosy Lynn, MSW, LISW-S  Oncology Social Worker
DISPLAY PLAN FREE TEXT

## 2022-03-16 NOTE — PROGRESS NOTES
[address] [facility] MEDICAL ONCOLOGY  [address] [place] [address]  Dept: [phone]  Loc: [phone]  Attending Progress Note      Reason for Visit: Right breast cancer. Referring Physician: Joaquín Ramey MD    PCP:  Candance Buck, DO    History of Present Illness:     Shimon England is a pleasant 68-year-old female patient, with a past medical history significant for hearing loss and hyperlipidemia, who had presented with an abnormal screening mammogram,  MAMMOGRAM ULTRASOUND BIOPSY; [facility]       9/14/2021: MAMMOGRAM, RIGHT BREAST ULTRASOUND: [facility]                9/14/2021: LEFT BREAST ULTRASOUND: [facility]    She underwent an US guided right breast core biopsy at 3 o'clock position on September 20 , 2021. Pathological evaluation completed at THE [facility]:  PATHOLOGY  Diagnosis:   Right breast, 3 o'clock position, core biopsies: Invasive carcinoma of no   special type (ductal with lobular features).  Preliminary [place]   score 3+2+1 = 6     Comment:   Tumor cells stain strongly positive for e-cadherin   immunostain. Intradepartmental consultation is obtained.      Breast Cancer Marker Studies:         Estrogen Receptors (ER):       -Positive (>10% of cells demonstrate nuclear positivity):       Percentage of cells positive: >90%       Intensity: Strong         Progesterone Receptors (NE):       -Positive:       Percentage of cells positive: 2%       Intensity: Weak         Her-2/marquise (c-erb B-2) protein expression: Negative (1+)     The patient underwent on 11/3/2021 right breast lumpectomy with axillary sentinel lymph node biopsy, pathology:    CANCER CASE SUMMARY   Procedure -excision   Specimen Laterality -right   Tumor Site, Invasive Carcinoma-3:00   Histologic Type-invasive carcinoma, no special type (ductal)   Histologic Grade (Allan Histologic Score):                    Tubule differentiation- score-3                    Nuclear pleomorphism- score 2                    Mitotic rate- score 2                    Overall Grade- grade 2, (score 7)   Tumor Size: Size of Largest Invasive Carcinoma-30 mm   Ductal Carcinoma In Situ (DCIS)-not identified   Tumor Extent-carcinoma invades skeletal muscle   Treatment Effect in the Breast-no known presurgical therapy   Margin status for invasive carcinoma: Inferior margin involved by   invasive carcinoma.  Posterior margin less than 1 mm from invasive   carcinoma. Regional Lymph Nodes: All regional lymph nodes negative for tumor   Total number of lymph nodes examined-1   Number of sentinel nodes examined-1   Pathologic Stage Classification (AJCC 8th Edition)- pT2 p(sn)N0   Additional Pathologic Findings-lymphovascular and perineural space   invasion   Special studies-ER positive, DC positive, HER-2 negative     The patient underwent on 12/1/2021 lumpectomy margin reexcision, was negative for residual carcinoma. Oncotype DX was done, recurrence score is 26, distant recurrence risk at 9 years with endocrine therapy alone is 16%, absolute chemotherapy benefit is greater than 15%. Patient was started on adjuvant chemotherapy with Taxotere and Cytoxan on 1/5/2022, she is accompanied by her mother, is doing much better there, no new issues with the skin. Review of Systems;  CONSTITUTIONAL: No fever, chills. Good appetite, feeling tired. ENMT: Eyes: No diplopia; Nose: No epistaxis. Mouth: No sore throat. RESPIRATORY: No hemoptysis, shortness of breath, cough. CARDIOVASCULAR: No chest pain, palpitations. GASTROINTESTINAL: No nausea or vomiting at this time, no abdominal pain, diarrhea/constipation. GENITOURINARY: No dysuria, urinary frequency, hematuria. NEURO: No syncope, presyncope, headache.   Remainder:  ROS NEGATIVE    Past Medical History:      Diagnosis Date    Breast cancer ([facility] [address]     Right breast    Cancer ([facility] [address]     Hearing impaired     Hearing loss     Hyperlipidemia Patient Active Problem List   Diagnosis    Cancer ([facility] [address]    Malignant neoplasm of upper-inner quadrant of right breast in female, estrogen receptor positive ([facility] [address]        Past Surgical History:      Procedure Laterality Date    BREAST LUMPECTOMY Right 11/3/2021    RIGHT BREAST LUMPECTOMY, BLUE DYE INJECTION, RIGHT AXILLARY SENTINEL LYMPH NODE INCISION performed by Alfredo Dennis MD at [address] LUMPECTOMY Right 12/1/2021    RE-EXCISION RIGHT BREAST INFERIOR MARGIN performed by Alfredo Dennis MD at [facility] RIGHT  2021       Family History:  Family History   Problem Relation Age of Onset    Heart Disease Mother     High Blood Pressure Mother     High Cholesterol Mother     Other Father         COPD    Dementia Maternal Grandmother        Medications:  Reviewed and reconciled. Social History:  Social History     Socioeconomic History    Marital status: Single     Spouse name: Not on file    Number of children: Not on file    Years of education: Not on file    Highest education level: Not on file   Occupational History    Not on file   Tobacco Use    Smoking status: Never Smoker    Smokeless tobacco: Never Used   Vaping Use    Vaping Use: Never used   Substance and Sexual Activity    Alcohol use: Never    Drug use: Never    Sexual activity: Not on file   Other Topics Concern    Not on file   Social History Narrative    Not on file     Social Determinants of Health     Financial Resource Strain:     Difficulty of Paying Living Expenses: Not on file   Food Insecurity:     Worried About [address] in the Last Year: Not on file    Rae of Food in the Last Year: Not on file   Transportation Needs:     Lack of Transportation (Medical): Not on file    Lack of Transportation (Non-Medical):  Not on file   Physical Activity:     Days of Exercise per Week: Not on file    Minutes of Exercise per Session: Not on file   Stress:     Feeling of Stress : Not on file   Social Connections:     Frequency of Communication with Friends and Family: Not on file    Frequency of Social Gatherings with Friends and Family: Not on file    Attends Restoration Services: Not on file    Active Member of Clubs or Organizations: Not on file    Attends Club or Organization Meetings: Not on file    Marital Status: Not on file   Intimate Partner Violence:     Fear of Current or Ex-Partner: Not on file    Emotionally Abused: Not on file    Physically Abused: Not on file    Sexually Abused: Not on file   Housing Stability:     Unable to Pay for Housing in the Last Year: Not on file    Number of [place] in the Last Year: Not on file    Unstable Housing in the Last Year: Not on file       Allergies:  No Known Allergies    Physical Exam:  BP (!) 154/95   Pulse 124   Temp 97.2 °F (36.2 °C)   Ht 4' 8\" (1.422 m)   Wt 119 lb (54 kg)   LMP 10/28/2021   SpO2 99%   BMI 26.68 kg/m²   GENERAL: Alert, oriented x 3, not in acute distress. HEENT: PERRLA; EOMI. Oropharynx clear. NECK: Supple. No palpable cervical or supraclavicular lymphadenopathy. LUNGS: Good air entry bilaterally. No wheezing, crackles or rhonchi. CARDIOVASCULAR: Regular rhythm, tachycardic. ABDOMEN: Soft. Non-tender, non-distended. Positive bowel sounds. EXTREMITIES: Without clubbing, cyanosis, or edema. NEUROLOGIC: No focal deficits.      ECOG PS 1      Impression/Plan:    Rigo Lezama is a pleasant 57-year-old female patient, with a past medical history significant for hearing loss and hyperlipidemia, who had presented with an abnormal screening mammogram, she was diagnosed with a right breast invasive ductal carcinoma, she underwent on 11/3/2021 right breast lumpectomy with axillary sentinel lymph node biopsy, tumor size was 3 cm, grade 2, carcinoma invaded skeletal muscle, inferior margin was positive, posterior margin was less than 1 mm from invasive carcinoma, she had margins reexcision done, was negative for residual carcinoma, 1 sentinel lymph node was removed, was negative for metastatic disease, final pathologic stage pT2 p(sn)N0, ER positive greater than 90%, MD +2%, HER-2/marquise negative, 1+ by IHC, Oncotype DX was done, recurrence score is 26, risk of distant recurrence at 9 years with endocrine therapy alone is 16%, chemotherapy benefit is greater than 15%. I discussed with the patient and her mother her diagnosis, characteristics of her tumor, prognosis and recommendations for treatment, adjuvant endocrine therapy with an AI is recommended, the side effects of the Arimidex were reviewed with the patient. We reviewed the Oncotype DX results, recurrence score is 26, adjuvant chemotherapy is recommended, benefit is greater than 15%, I discussed with the patient TC regimen, the side effects and the schedule were reviewed with he. The patient was started on adjuvant chemotherapy with Taxotere and Cytoxan on 1/5/2022, she had received 3 cycles to date, today 3/16/2022, the patient is doing well clinically, labs reviewed, blood counts are adequate for treatment, proceed with TC, cycle #4, recheck HR referral was placed to [address] [place] [address]. DEXA scan was ordered. RTC in 1 month. Thank you for allowing us to participate in the care of Ms. Guzman.     Shaquille Bennett MD   HEMATOLOGY/MEDICAL [address] MEDICAL ONCOLOGY  [address] [place] [address]  Dept: [phone] [address] Nik: [phone]

## 2022-03-23 ENCOUNTER — HOSPITAL ENCOUNTER (OUTPATIENT)
Dept: RADIATION ONCOLOGY | Age: 62
Discharge: HOME | End: 2022-03-23
Payer: COMMERCIAL

## 2022-03-23 VITALS
BODY MASS INDEX: 25.87 KG/M2 | OXYGEN SATURATION: 98 % | WEIGHT: 115.4 LBS | HEART RATE: 121 BPM | DIASTOLIC BLOOD PRESSURE: 82 MMHG | TEMPERATURE: 97.4 F | SYSTOLIC BLOOD PRESSURE: 160 MMHG | RESPIRATION RATE: 18 BRPM

## 2022-03-23 DIAGNOSIS — C50.211 MALIGNANT NEOPLASM OF UPPER-INNER QUADRANT OF RIGHT BREAST IN FEMALE, ESTROGEN RECEPTOR POSITIVE (HCC): Primary | ICD-10-CM

## 2022-03-23 DIAGNOSIS — Z17.0 MALIGNANT NEOPLASM OF UPPER-INNER QUADRANT OF RIGHT BREAST IN FEMALE, ESTROGEN RECEPTOR POSITIVE (HCC): Primary | ICD-10-CM

## 2022-03-23 PROCEDURE — 99204 OFFICE O/P NEW MOD 45 MIN: CPT | Performed by: RADIOLOGY

## 2022-03-23 PROCEDURE — 99205 OFFICE O/P NEW HI 60 MIN: CPT

## 2022-03-23 PROCEDURE — 77263 THER RADIOLOGY TX PLNG CPLX: CPT | Performed by: RADIOLOGY

## 2022-03-23 NOTE — PROGRESS NOTES
Madiha  1960 64 y.o. Referring Physician: Dr. Kt Mendez    PCP: Velvet Lu,      Vitals:    22 1050   BP: (!) 160/82   Pulse: 121   Resp: 18   Temp: 97.4 °F (36.3 °C)   SpO2: 98%        Wt Readings from Last 3 Encounters:   22 115 lb 6.4 oz (52.3 kg)   22 119 lb (54 kg)   22 119 lb (54 kg)        Body mass index is 25.87 kg/m². Chief Complaint: No chief complaint on file. Cancer Staging  Malignant neoplasm of upper-inner quadrant of right breast in female, estrogen receptor positive ([facility] [address]  Staging form: Breast, AJCC 8th Edition  - Clinical: No stage assigned - Unsigned  - Pathologic stage from 2021: Stage IA (pT2, pN0(sn), cM0, G2, ER+, UT+, HER2-) - Signed by Lizabeth Alvarez MD on 2021      Prior Radiation Therapy? NO    Concurrent Chemo/radiation? NO    Prior Chemotherapy? YES: Site Treated: Right Breast Cancer          Facility: [facility]          Date: 3/16/22    Prior Hormonal Therapy? NO    Head and Neck Cancer? No, patient does NOT have HN cancer. LMP: early 52's    Age at first Menses: 6 or 15     : 0    Para: 0        Current Outpatient Medications   Medication Sig Dispense Refill    cephALEXin (KEFLEX) 500 MG capsule Take 1 capsule by mouth 4 times daily 28 capsule 0    triamcinolone (KENALOG) 0.1 % cream Apply topically 2 times daily.  80 g 1    prochlorperazine (COMPAZINE) 10 MG tablet Take 1 tablet by mouth every 6 hours as needed (nausea, vomiting) 30 tablet 2    ondansetron (ZOFRAN) 8 MG tablet Take 1 tablet by mouth every 12 hours as needed for Nausea or Vomiting 30 tablet 1    Garlic (GARLIQUE) 518 MG TBEC Take by mouth daily       Cholecalciferol (VITAMIN D3) 50 MCG ( UT) CAPS Take by mouth daily       loratadine (CLARITIN) 10 MG capsule Take 10 mg by mouth daily      acetaminophen (TYLENOL) 500 MG tablet Take 500 mg by mouth every 6 hours as needed for Pain       No current facility-administered medications for this encounter. Past Medical History:   Diagnosis Date    Breast cancer ([facility] [address]     Right breast    Cancer ([facility] [address]     Hearing impaired     Hearing loss     Hyperlipidemia        Past Surgical History:   Procedure Laterality Date    BREAST LUMPECTOMY Right 11/3/2021    RIGHT BREAST LUMPECTOMY, BLUE DYE INJECTION, RIGHT AXILLARY SENTINEL LYMPH NODE INCISION performed by Miguel Angel Wu MD at [address] LUMPECTOMY Right 12/1/2021    RE-EXCISION RIGHT BREAST INFERIOR MARGIN performed by Miguel Angel Wu MD at [address] [place] [address] Way BIOPSY RIGHT  2021       Family History   Problem Relation Age of Onset    Heart Disease Mother     High Blood Pressure Mother     High Cholesterol Mother     Other Father         COPD    Dementia Maternal Grandmother     Cancer Maternal Cousin         colon       Social History     Socioeconomic History    Marital status: Single     Spouse name: Not on file    Number of children: 0    Years of education: Not on file    Highest education level: Not on file   Occupational History    Not on file   Tobacco Use    Smoking status: Never Smoker    Smokeless tobacco: Never Used   Vaping Use    Vaping Use: Never used   Substance and Sexual Activity    Alcohol use: Never    Drug use: Never    Sexual activity: Not on file   Other Topics Concern    Not on file   Social History Narrative    Not on file     Social Determinants of Health     Financial Resource Strain:     Difficulty of Paying Living Expenses: Not on file   Food Insecurity:     Worried About [address] in the Last Year: Not on file    [address] in the Last Year: Not on file   Transportation Needs:     Lack of Transportation (Medical): Not on file    Lack of Transportation (Non-Medical):  Not on file   Physical Activity:     Days of Exercise per Week: Not on file    Minutes of Exercise per Session: Not on file   Stress:     Feeling of Stress : Not on file   Social Connections:     Frequency of Communication with Friends and Family: Not on file    Frequency of Social Gatherings with Friends and Family: Not on file    Attends [employer] Services: Not on file    Active Member of Clubs or Organizations: Not on file    Attends Club or Organization Meetings: Not on file    Marital Status: Not on file   Intimate Partner Violence:     Fear of Current or Ex-Partner: Not on file    Emotionally Abused: Not on file    Physically Abused: Not on file    Sexually Abused: Not on file   Housing Stability:     Unable to Pay for Housing in the Last Year: Not on file    Number of [place] in the Last Year: Not on file    Unstable Housing in the Last Year: Not on file           Occupation: Unemployed  Retired:  NO        REVIEW OF SYSTEMS: <<For Level 5, 10 or more systems>>   Approximately 20 minutes spent with patient and her mother today to discuss Radiation Tharapy to the Right Breast. Patient is referred here today from Dr. Anam Fink. Patient had an abnormal mammogram on 9/14/21 that showed a 2.6 x 1.9 x1.8 cm mass to the mammographic abnormality, the appearance can be seen secondary to an infectious process or malignancy. Patient also had an US of Left Breast done on 9/14/21 which was negative . US of Right Breast confirms the hypoechola vascular mass in the 3 o'clock position. Patient then underwent a US guided Right Breast core biopsy on 9/20/21 which showed Right Breast 3 o'clock position IDC with lobular features, Preliminary Allan score =6. ER/MN+, HER-2 Negative. On 11/3/21 the patient then had a Right Breast Lumpectomy with axillary SLN biopsy which pathology showed Invasive Carcinoma at the 3 O'clock position, IDC, [place] grade 2, (score 7). Size of largest invasive Carcinoma-30mmDCIS is not identified, Carcinoma invades skeletal muscle. Margins for invasive carcinoma were inferior margin involved by invasive carcinoma. Posterior margin less than 1 mm from invasive carcinoma. 1 regional lymph node negative for tumor, pT2 p(sn)N0, lymphovascular and perineural space invasion, ER/NC+, HER-2 negative. On 12/01/21 patient then had a Lumpectomy reexcision of Rt. Breast which was negative for residual carcinoma. Oncotype DX score is 26. Patient following Dr. Dudley Gastelum for Med Onc and was started on adjuvant chemotherapy with Taxotere and Cytoxan on 1/05/22 and her last treatment was on 3/16/22. Arimidex recommended, but not started as of yet per patient and her mother. Patient verbalized understanding of care and all questions were answered from a nursing perspective. Consent form for Radiation Therapy to the Right Breast was signed and will plan to schedule CT Simulation in 2 weeks in [facility]. Pacemaker/Defibulator/ICD:  No    Mediport: No        FALLS RISK SCREENING ASSESSMENT    Instructions:  Assess the patient and enter the appropriate indicators that are present for fall risk identification. Total the numbers entered and assign a fall risk score from Table 2.  Reassess patient at a minimum every 12 weeks or with status change. Assessment   Date  3/23/2022     1. Mental Ability: confusion/cognitively impaired Yes - 3       2. Elimination Issues: incontinence, frequency No - 0       3. Ambulatory: use of assistive devices (walker, cane, off-loading devices), attached to equipment (IV pole, oxygen) No - 0     4. Sensory Limitations: dizziness, vertigo, impaired vision No - 0       5. Age Less than 65 years - 0       6. Medication: diuretics, strong analgesics, hypnotics, sedatives, antihypertensive agents   Yes - 3   7. Falls:  recent history of falls within the last 3 months (not to include slipping or tripping)   No - 0   TOTAL 6    If score of 4 or greater was education given? Yes       TABLE 2   Risk Score Risk Level Plan of Care   0-3 Little or  No Risk 1.   Provide assistance as indicated for ambulation activities  2. Reorient confused/cognitively impaired patient  3. Call-light/bell within patient's reach  4. Chair/bed in low position, stretcher/bed with siderails up except when performing patient care activities  5. Educate patient/family/caregiver on falls prevention  6.  Reassess in 12 weeks or with any noted change in patient condition which places them at a risk for a fall   4-6 Moderate Risk 1. Provide assistance as indicated for ambulation activities  2. Reorient confused/cognitively impaired patient  3. Call-light/bell within patient's reach  4. Chair/bed in low position, stretcher/bed with siderails up except when performing patient care activities  5. Educate patient/family/caregiver on falls prevention  6. Falls risk precaution (Yellow sticker Level II) placed on patient chart   7 or   Higher High Risk 1. Place patient in easily observable treatment room  2. Patient attended at all times by family member or staff  3. Provide assistance as indicated for ambulation activities  4. Reorient confused/cognitively impaired patient  5. Call-light/bell within patient's reach  6. Chair/bed in low position, stretcher/bed with siderails up except when performing patient care activities  7. Educate patient/family/caregiver on falls prevention  8. Falls risk precaution (Yellow sticker Level III) placed on patient chart           MALNUTRITION RISK SCREENING ASSESSMENT    Instructions:  Assess the patient and enter the appropriate indicators that are present for nutrition risk identification. Total the numbers entered and assign a risk score. Follow the appropriate action for total score listed below. Assessment   Date  3/23/2022     1. Have you lost weight without trying? 0- No     2. Have you been eating poorly because of a decreased appetite? 0- No   3. Do you have a diagnosis of head and neck cancer?       0- No TOTAL 0          Score of 0-1: No action  Score 2 or greater:  · For Non-Diabetic Patient: Recommend adding Ensure Complete 2 x daily and provide patient with Ensure wellness bag with coupons  · For Diabetic Patient: Recommend adding Glucerna Shake 2 x daily and provide patient with Glucerna Wellness bag with coupons  · Route to the dietitian via [address]    · Are you having difficulty performing daily routine tasks due to fatigue or weakness (ie: bathing/showering, dressing, housework, meal prep, work, , etc): Yes     · Do you have any arm flexibility/ROM restrictions, swelling or pain that limit activity: No     · Any changes in memory, attention/focus that impact daily activities: No     · Do you avoid participation in leisure/social activity due to weakness, fatigue or pain: No     ARE ANY OF THE ABOVE ARE ANSWERED YES: Yes - but NO OT referral request sent due to patient refusal.          PT ASSESSMENT FOR REFERRAL    · Have you had any recent falls in the past 2 months: No     · Do you have difficulty going up/down stairs: No     · Are you having difficulty walking: No     · Do you often hold onto furniture/environmental supports or feel off balance when you are walking: No     · Do you need to take rest breaks when you are walking: No     · Any pain on a scale of 1-10 that limits your mobility: No 0/10    ARE ANY OF THE ABOVE ARE ANSWERED YES: Yes - but NO PT referral request sent due to patient refusal.           LYMPHEDEMA SCREENING ASSESSMENT FOR PATIENTS WITH BREAST CANCER    The patient reports the following signs/symptoms of lymphedema: None    Please ask the provider to assess patient for lymphedema for any reported signs or symptoms so a referral to Lymphedema Therapy can be considered. PREHAB AUDIOLOGY REFERRAL    - Is patient planned to receive Cisplatin? No. This patient is not planned to start Cisplatin.     - Is patient planned to receive radiation therapy that may be directed toward auditory canals or nerves? No. Patient is not planned to start radiation therapy to auditory canals or nerves. - Is patient complaining of new onset hearing loss? No. Patient is not complaining of new onset hearing loss. Patient education given on Radiation Therapy to the Right Breast. The patient expresses understanding and acceptance of instructions.  Margie Talavera RN 3/23/2022 10:54 AM           Margie Talavera RN

## 2022-03-24 NOTE — PROGRESS NOTES
Radiation Oncology Consult Note         3/24/2022    Angeles Guzman  64 y.o.   1960    REFERRING PROVIDER: [employer]    PCP:  Darrin Schmitt DO    CHIEF COMPLAINT: I have breast cancer    DIAGNOSIS: Pathological stage pT2, pN0, M0, G2, invasive ductal carcinoma of the right breast, 3 cm in maximum diameter at 3 o'clock position, SP partial mastectomy and sentinel node biopsy, 1 out of 1 negative sentinel lymph nodes, lymphovascular and perineural space invasion, ER positive (strong), WI positive (weak), HER 2 -, Oncotype DEX score at 26, SP 4 cycles of TC the last 1 on 3/16/2022    STAGING: Cancer Staging  Malignant neoplasm of upper-inner quadrant of right breast in female, estrogen receptor positive ([facility] [address]  Staging form: Breast, AJCC 8th Edition  - Clinical: No stage assigned - Unsigned  - Pathologic stage from 11/16/2021: Stage IA (pT2, pN0(sn), cM0, G2, ER+, WI+, HER2-) - Signed by Marii Chaudhry MD on 11/16/2021      HISTORY OF PRESENT ILLNESS: Ms. Félix Diggs  is a 64y.o. year old female who had an abnormal mammogram on  9/14/2021 that showed at 2.6 mass at the level of the right breast..  The patient underwent core biopsy on 9/20/2021 which showed invasive ductal carcinoma with lobular features and a preliminary Allan score of 6, ER positive, WI weakly positive and HER 2 -. On 11/3/2021 she underwent right partial mastectomy and sentinel lymph node biopsy by Dr. Macy Bullock. According to pathology report, the tumor invaded the skeletal muscle and the inferior margin was positive for invasive carcinoma. The posterior margin was less than 1 mm from the invasive carcinoma. 1 out of 1 sentinel lymph nodes were negative. Final [place] score was 7. The patient underwent reexcision of the margins on 12/1/2021, no residual cancer was found.   An Oncotype test showed a score of 26 with a benefit from chemotherapy of more than 15% and therefore the patient underwent adjuvant chemotherapy with Taxotere and Cytoxan under the care of Dr. Lian English. The last cycle was completed on 3/16/2021. The patient was then referred to me for discussion and consideration of adjuvant radiation therapy. PAST MEDICAL HISTORY:      Diagnosis Date    Breast cancer ([facility] [address]     Right breast    Cancer ([facility] [address]     Hearing impaired     Hearing loss     Hyperlipidemia        PAST SURGICAL HISTORY:      Procedure Laterality Date    BREAST LUMPECTOMY Right 11/3/2021    RIGHT BREAST LUMPECTOMY, BLUE DYE INJECTION, RIGHT AXILLARY SENTINEL LYMPH NODE INCISION performed by Marii Chaudhry MD at . [address] LUMPECTOMY Right 12/1/2021    RE-EXCISION RIGHT BREAST INFERIOR MARGIN performed by Marii Chaudhry MD at [place] [address] RIGHT  2021       No Known Allergies    MEDICATIONS:  Medications reviewed and reconciled. Current Outpatient Medications   Medication Sig Dispense Refill    cephALEXin (KEFLEX) 500 MG capsule Take 1 capsule by mouth 4 times daily 28 capsule 0    triamcinolone (KENALOG) 0.1 % cream Apply topically 2 times daily. 80 g 1    prochlorperazine (COMPAZINE) 10 MG tablet Take 1 tablet by mouth every 6 hours as needed (nausea, vomiting) 30 tablet 2    ondansetron (ZOFRAN) 8 MG tablet Take 1 tablet by mouth every 12 hours as needed for Nausea or Vomiting 30 tablet 1    Garlic (GARLIQUE) 080 MG TBEC Take by mouth daily       Cholecalciferol (VITAMIN D3) 50 MCG (2000 UT) CAPS Take by mouth daily       loratadine (CLARITIN) 10 MG capsule Take 10 mg by mouth daily      acetaminophen (TYLENOL) 500 MG tablet Take 500 mg by mouth every 6 hours as needed for Pain       No current facility-administered medications for this encounter.        FAMILY HISTORY:  Family History   Problem Relation Age of Onset    Heart Disease Mother     High Blood Pressure Mother     High Cholesterol Mother     Other Father         COPD    Dementia Maternal Grandmother     Cancer Maternal Cousin colon       SOCIAL HISTORY:       reports that she has never smoked. She has never used smokeless tobacco..   reports no history of alcohol use. .   reports no history of drug use. REVIEW OF SYSTEMS:  Obtained from the patient, chart review and nursing assessment. The review of systems is negative    PHYSICAL EXAMINATION:      Vitals:    03/23/22 1050   BP: (!) 160/82   Pulse: 121   Resp: 18   Temp: 97.4 °F (36.3 °C)   TempSrc: Temporal   SpO2: 98%   Weight: 115 lb 6.4 oz (52.3 kg)       Wt Readings from Last 3 Encounters:   03/23/22 115 lb 6.4 oz (52.3 kg)   03/16/22 119 lb (54 kg)   03/09/22 119 lb (54 kg)       KARNOSKY PERFORMANCE STATUS:  90    Constitutional: A well developed, well nourished 64 y.o. female who is alert, oriented, cooperative and in no apparent distress. EENT: EOMI VALENTE. No icterus. No conjunctival injections. External canals are patent and no discharge was appreciated. .    Neck: Supple without thyromegaly  Respiratory: Chest was symmetrical without dullness to percussion. Breath sounds bilaterally were clear to auscultation. No wheezes, rhonchi or rales. Breasts: The right breast presents with a well-healed partial mastectomy scar at the level of the inner quadrant with a bit of a shallow/concave profile. Cardiovascular: Regular without murmur. Abdomen: Obese, rounded and soft without organomegaly. No rebound, guarding or  rigidity. Lymphatic: No lymphadenopathy. Musculoskeletal: Ambulates without assistance. Normal curvature of the spine. No gross muscle weakness. Muscle size, tone and strength are normal. No involuntary movements. Extremities:  No lower extremity edema, ulcerations, tenderness, varicosities or erythema. Coordination appears adequate. Sensory function appears intact. Skin:  Warm and dry. Examination of color, turgor and pigmentation was relatively normal. No bruises or skin rashes.    Neurological/Psychiatric: General behavior, level of (c-erb B-2) protein expression: Negative (1+)   Diagnosis:   A. Right breast, 3:00, excision: Invasive carcinoma, no special type   (ductal), see comment. B. [place] lymph node #1: Single lymph node, negative for neoplasm. Comment:   CANCER CASE SUMMARY   Procedure -excision   Specimen Laterality -right   Tumor Site, Invasive Carcinoma-3:00   Histologic Type-invasive carcinoma, no special type (ductal)   Histologic Grade ([place] Histologic Score):                    Tubule differentiation- score-3                    Nuclear pleomorphism- score 2                    Mitotic rate- score 2                    Overall Grade- grade 2, (score 7)   Tumor Size: Size of Largest Invasive Carcinoma-30 mm   Ductal Carcinoma In Situ (DCIS)-not identified   Tumor Extent-carcinoma invades skeletal muscle   Treatment Effect in the Breast-no known presurgical therapy   Margin status for invasive carcinoma: Inferior margin involved by   invasive carcinoma. Posterior margin less than 1 mm from invasive   carcinoma. Regional Lymph Nodes: All regional lymph nodes negative for tumor   Total number of lymph nodes examined-1   Number of sentinel nodes examined-1   Pathologic Stage Classification (AJCC 8th Edition)- pT2 p(sn)N0   Additional Pathologic Findings-lymphovascular and perineural space   invasion     IMPRESSION: The patient with an early stage HR positive, H ER 2 -, luminal B invasive ductal carcinoma of the right breast, she had a positive margin that was reexcised and a close posterior margin at 1 mm where invasion of the skeletal muscle was found.   The patient is SP adjuvant chemotherapy due to a score of 26 at the Oncotype DX test, she is a good candidate at this point for a course of adjuvant radiation therapy to the entire residual right breast.      DISCUSSION/PLAN:     I have discussed with the patient and with her mom goals and side effects of a course of adjuvant radiation therapy to the residual breast. Since the patient does not drive and the mother is elderly, they prefer to have the simulation done here at [facility]. Given the size of the breast which is a B, I believe that she does not need to be put on a breast board for treatment. I am planning for 42.56 Torres at 2.66 Judeen Schiller per fraction to the entire breast, followed by a boost of dose to the lumpectomy site of 10/15 Judeen Schiller at 2.5 Judeen Schiller per fraction, given the invasion of the skeletal muscle. Consideration will be given to [address] boost should we be able to meet the constraints for normal tissues. NCCN guidelines, version 2020 is used to help review treatment recommendations. Procedures and process involved with CT simulation and daily radiation treatments were explained. Toxicities, both expected and less common, were reviewed in detail. Questions were answered to their apparent satisfaction. Thank you for the opportunity to participate in multidisciplinary management of this remarkable and pleasant patient.       Jessie Davenport MD    Department of Radiation Oncology  PHYSICIANS [facility]) [facility]: [phone] (OOP: [phone])  [facility]) [facility]: [phone] (NCP: [phone])  [facility]) [facility]:  [phone] (SYLVIA:  [phone])

## 2022-03-29 ENCOUNTER — TELEPHONE (OUTPATIENT)
Dept: ONCOLOGY | Age: 62
End: 2022-03-29

## 2022-03-29 NOTE — TELEPHONE ENCOUNTER
Contacted pt's mother, Chiara uBrden at request of radiation oncologist re: transportation concerns. Chiara Burden indicated that she and pt are doing well. Stated that transportation continues to be concern \"depending on the day. \"  Noted that morning are more difficult that afternoons. Provided support and attempted to explore options. Chiara Burden indicated that they have minimal supports in the community to assist with transportation and is unwilling to utilize transportation services due to pt not being comfortable going to appointments alone. Discussed ways that staff at facility could be helpful if pt must use transportation service (e.g. attempting to limit wait times, being available for additional support, etc); Chiara Burden indicated that she is not interested in this at this time. Encouraged Chiara Burden to discuss concerns when scheduling radiation tx to consider afternoon appointments if these are easier for her to make. Chiara Burden additionally expressed concerns about SIM being moved to [facility] rather than [facility] (machine out of service in [facility]). Provided support and discussed ways to make this trip less stressful (e.g. reviewed parking, contact information etc) and reminded her that this does not impact pts ability to be treated in [facility]. No additional needs identified at this time. Reviewed role of oncology SW and encouraged pt to notify this provider if additional needs arise.     Valentino Sánchez, MSW, LISW-S  Oncology Social Worker

## 2022-04-06 ENCOUNTER — APPOINTMENT (OUTPATIENT)
Dept: RADIATION ONCOLOGY | Age: 62
End: 2022-04-06
Attending: RADIOLOGY
Payer: COMMERCIAL

## 2022-04-06 ENCOUNTER — HOSPITAL ENCOUNTER (OUTPATIENT)
Dept: RADIATION ONCOLOGY | Age: 62
Discharge: HOME | End: 2022-04-06
Attending: RADIOLOGY
Payer: COMMERCIAL

## 2022-04-06 PROCEDURE — 77290 THER RAD SIMULAJ FIELD CPLX: CPT | Performed by: RADIOLOGY

## 2022-04-06 PROCEDURE — 77334 RADIATION TREATMENT AID(S): CPT | Performed by: RADIOLOGY

## 2022-04-11 ENCOUNTER — HOSPITAL ENCOUNTER (OUTPATIENT)
Dept: RADIATION ONCOLOGY | Age: 62
Discharge: HOME | End: 2022-04-11
Attending: RADIOLOGY
Payer: COMMERCIAL

## 2022-04-11 PROCEDURE — 77334 RADIATION TREATMENT AID(S): CPT | Performed by: RADIOLOGY

## 2022-04-11 PROCEDURE — 77295 3-D RADIOTHERAPY PLAN: CPT | Performed by: RADIOLOGY

## 2022-04-11 PROCEDURE — 77300 RADIATION THERAPY DOSE PLAN: CPT | Performed by: RADIOLOGY

## 2022-04-13 ENCOUNTER — OFFICE VISIT (OUTPATIENT)
Dept: ONCOLOGY | Age: 62
End: 2022-04-13
Payer: COMMERCIAL

## 2022-04-13 ENCOUNTER — HOSPITAL ENCOUNTER (OUTPATIENT)
Dept: INFUSION THERAPY | Age: 62
Discharge: HOME | End: 2022-04-13

## 2022-04-13 VITALS
TEMPERATURE: 98.1 F | BODY MASS INDEX: 26.95 KG/M2 | HEIGHT: 56 IN | SYSTOLIC BLOOD PRESSURE: 162 MMHG | HEART RATE: 94 BPM | DIASTOLIC BLOOD PRESSURE: 95 MMHG | WEIGHT: 119.8 LBS | OXYGEN SATURATION: 100 %

## 2022-04-13 DIAGNOSIS — Z17.0 MALIGNANT NEOPLASM OF UPPER-INNER QUADRANT OF RIGHT BREAST IN FEMALE, ESTROGEN RECEPTOR POSITIVE (HCC): Primary | ICD-10-CM

## 2022-04-13 DIAGNOSIS — C50.211 MALIGNANT NEOPLASM OF UPPER-INNER QUADRANT OF RIGHT BREAST IN FEMALE, ESTROGEN RECEPTOR POSITIVE (HCC): Primary | ICD-10-CM

## 2022-04-13 PROCEDURE — 99212 OFFICE O/P EST SF 10 MIN: CPT

## 2022-04-13 PROCEDURE — 1036F TOBACCO NON-USER: CPT | Performed by: INTERNAL MEDICINE

## 2022-04-13 PROCEDURE — G8419 CALC BMI OUT NRM PARAM NOF/U: HCPCS | Performed by: INTERNAL MEDICINE

## 2022-04-13 PROCEDURE — 99214 OFFICE O/P EST MOD 30 MIN: CPT | Performed by: INTERNAL MEDICINE

## 2022-04-13 PROCEDURE — 3017F COLORECTAL CA SCREEN DOC REV: CPT | Performed by: INTERNAL MEDICINE

## 2022-04-13 PROCEDURE — G8427 DOCREV CUR MEDS BY ELIG CLIN: HCPCS | Performed by: INTERNAL MEDICINE

## 2022-04-13 RX ORDER — SIMVASTATIN 20 MG
20 TABLET ORAL NIGHTLY
COMMUNITY
Start: 2022-03-23

## 2022-04-13 NOTE — PROGRESS NOTES
[address] [facility] MEDICAL ONCOLOGY  [address]  [facility] [address]  Dept: [phone]  Loc: [phone]  Attending Progress Note      Reason for Visit: Right breast cancer. Referring Physician: Oxana Joyce MD    PCP:  Edinson Alves DO    History of Present Illness:     Angela Olivares is a pleasant 57-year-old female patient, with a past medical history significant for hearing loss and hyperlipidemia, who had presented with an abnormal screening mammogram,  MAMMOGRAM ULTRASOUND BIOPSY; [facility]       9/14/2021: MAMMOGRAM, RIGHT BREAST ULTRASOUND: [facility]                9/14/2021: LEFT BREAST ULTRASOUND: [facility]    She underwent an US guided right breast core biopsy at 3 o'clock position on September 20 , 2021. 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  Preliminary [place]   score 3+2+1 = 6     Comment:   Tumor cells stain strongly positive for e-cadherin   immunostain. Intradepartmental consultation is obtained.      Breast Cancer Marker Studies:         Estrogen Receptors (ER):       -Positive (>10% of cells demonstrate nuclear positivity):       Percentage of cells positive: >90%       Intensity: Strong         Progesterone Receptors (TX):       -Positive:       Percentage of cells positive: 2%       Intensity: Weak         Her-2/marquise (c-erb B-2) protein expression: Negative (1+)     The patient underwent on 11/3/2021 right breast lumpectomy with axillary sentinel lymph node biopsy, pathology:    CANCER CASE SUMMARY   Procedure -excision   Specimen Laterality -right   Tumor Site, Invasive Carcinoma-3:00   Histologic Type-invasive carcinoma, no special type (ductal)   Histologic Grade ([place] Histologic Score):                    Tubule differentiation- score-3                    Nuclear pleomorphism- score 2                    Mitotic rate- score 2                    Overall Grade- grade 2, (score 7)   Tumor Size: Size of Largest Invasive Carcinoma-30 mm   Ductal Carcinoma In Situ (DCIS)-not identified   Tumor Extent-carcinoma invades skeletal muscle   Treatment Effect in the Breast-no known presurgical therapy   Margin status for invasive carcinoma: Inferior margin involved by   invasive carcinoma.  Posterior margin less than 1 mm from invasive   carcinoma. Regional Lymph Nodes: All regional lymph nodes negative for tumor   Total number of lymph nodes examined-1   Number of sentinel nodes examined-1   Pathologic Stage Classification (AJCC 8th Edition)- pT2 p(sn)N0   Additional Pathologic Findings-lymphovascular and perineural space   invasion   Special studies-ER positive, [place] positive, HER-2 negative     The patient underwent on 12/1/2021 lumpectomy margin reexcision, was negative for residual carcinoma. Oncotype DX was done, recurrence score is 26, distant recurrence risk at 9 years with endocrine therapy alone is 16%, absolute chemotherapy benefit is greater than 15%. Patient was started on adjuvant chemotherapy with Taxotere and Cytoxan on 1/5/2022, she completed 4 cycles on 3/16/2022. She is feeling better, no nausea or vomiting, skin rash had improved, she will be started on adjuvant radiotherapy next week. No tingling or numbness of the hands or feet. Review of Systems;  CONSTITUTIONAL: No fever, chills. Good appetite, feeling tired. ENMT: Eyes: No diplopia; Nose: No epistaxis. Mouth: No sore throat. RESPIRATORY: No hemoptysis, shortness of breath, cough. CARDIOVASCULAR: No chest pain, palpitations. GASTROINTESTINAL: No nausea or vomiting at this time, no abdominal pain, diarrhea/constipation. GENITOURINARY: No dysuria, urinary frequency, hematuria. NEURO: No syncope, presyncope, headache.   Remainder:  ROS NEGATIVE    Past Medical History:      Diagnosis Date    Breast cancer ([facility] [address]     Right breast    Cancer ([facility] [address]     Hearing impaired     Hearing loss     Hyperlipidemia      Patient Active Problem List   Diagnosis    Cancer ([facility] [address]    Malignant neoplasm of upper-inner quadrant of right breast in female, estrogen receptor positive ([facility] [address]        Past Surgical History:      Procedure Laterality Date    BREAST LUMPECTOMY Right 11/3/2021    RIGHT BREAST LUMPECTOMY, BLUE DYE INJECTION, RIGHT AXILLARY SENTINEL LYMPH NODE INCISION [facility] Right 12/1/2021    RE-EXCISION RIGHT BREAST INFERIOR MARGIN performed by Dean Bailey MD at [facility] [address] RIGHT  2021       Family History:  Family History   Problem Relation Age of Onset    Heart Disease Mother     High Blood Pressure Mother     High Cholesterol Mother     Other Father         COPD    Dementia Maternal Grandmother     Cancer Maternal Cousin         colon       Medications:  Reviewed and reconciled. Social History:  Social History     Socioeconomic History    Marital status: Single     Spouse name: Not on file    Number of children: 0    Years of education: Not on file    Highest education level: Not on file   Occupational History    Not on file   Tobacco Use    Smoking status: Never Smoker    Smokeless tobacco: Never Used   Vaping Use    Vaping Use: Never used   Substance and Sexual Activity    Alcohol use: Never    Drug use: Never    Sexual activity: Not on file   Other Topics Concern    Not on file   Social History Narrative    Not on file     Social Determinants of Health     Financial Resource Strain:     Difficulty of Paying Living Expenses: Not on file   Food Insecurity:     Worried About Running Out of Food in the Last Year: Not on file    Rae of Food in the Last Year: Not on file   Transportation Needs:     Lack of Transportation (Medical):  Not on file    Lack of Transportation (Non-Medical): Not on file   Physical Activity:     Days of Exercise per Week: Not on file    Minutes of Exercise per Session: Not on file   Stress:     Feeling of Stress : Not on file   Social Connections:     Frequency of Communication with Friends and Family: Not on file    Frequency of Social Gatherings with Friends and Family: Not on file    Attends Christianity Services: Not on file    Active Member of [address] [place] [address] or Organizations: Not on file    Attends Club or Organization Meetings: Not on file    Marital Status: Not on file   Intimate Partner Violence:     Fear of Current or Ex-Partner: Not on file    Emotionally Abused: Not on file    Physically Abused: Not on file    Sexually Abused: Not on file   Housing Stability:     Unable to Pay for Housing in the Last Year: Not on file    Number of [place] in the Last Year: Not on file    Unstable Housing in the Last Year: Not on file       Allergies:  No Known Allergies    Physical Exam:  BP (!) 162/95   Pulse 94   Temp 98.1 °F (36.7 °C)   Ht 4' 8\" (1.422 m)   Wt 119 lb 12.8 oz (54.3 kg)   LMP 10/28/2021   SpO2 100%   BMI 26.86 kg/m²   GENERAL: Alert, oriented x 3, not in acute distress. HEENT: PERRLA; EOMI. Oropharynx clear. NECK: Supple. No palpable cervical or supraclavicular lymphadenopathy. LUNGS: Good air entry bilaterally. No wheezing, crackles or rhonchi. BREASTS: The left breast exam is negative for any skin changes, no nipple discharge, no palpable masses, no palpable left axillary adenopathy, right breast exam is remarkable for a scar from her lumpectomy, no nipple discharge, no palpable mass, no palpable right axilla lymphadenopathy. CARDIOVASCULAR: Regular rhythm, tachycardic. ABDOMEN: Soft. Non-tender, non-distended. Positive bowel sounds. EXTREMITIES: Without clubbing, cyanosis, or edema. NEUROLOGIC: No focal deficits.      ECOG PS 1      Impression/Plan:    Benjamín Hummel is a pleasant 70-year-old female patient, with a past medical history [address]  [address] [facility] MEDICAL ONCOLOGY  [address] [place] [id number]  Dept: [phone] [address] Nik: [phone]

## 2022-04-18 ENCOUNTER — HOSPITAL ENCOUNTER (OUTPATIENT)
Dept: RADIATION ONCOLOGY | Age: 62
Discharge: HOME | End: 2022-04-18
Attending: RADIOLOGY
Payer: COMMERCIAL

## 2022-04-18 PROCEDURE — 77280 THER RAD SIMULAJ FIELD SMPL: CPT | Performed by: RADIOLOGY

## 2022-04-19 ENCOUNTER — HOSPITAL ENCOUNTER (OUTPATIENT)
Dept: RADIATION ONCOLOGY | Age: 62
Discharge: HOME | End: 2022-04-19
Attending: RADIOLOGY
Payer: COMMERCIAL

## 2022-04-19 PROCEDURE — 77412 RADIATION TX DELIVERY LVL 3: CPT | Performed by: RADIOLOGY

## 2022-04-20 ENCOUNTER — HOSPITAL ENCOUNTER (OUTPATIENT)
Dept: MAMMOGRAPHY | Age: 62
Discharge: HOME | End: 2022-04-22
Payer: COMMERCIAL

## 2022-04-20 ENCOUNTER — HOSPITAL ENCOUNTER (OUTPATIENT)
Dept: RADIATION ONCOLOGY | Age: 62
Discharge: HOME | End: 2022-04-20
Attending: RADIOLOGY
Payer: COMMERCIAL

## 2022-04-20 VITALS
RESPIRATION RATE: 20 BRPM | DIASTOLIC BLOOD PRESSURE: 82 MMHG | WEIGHT: 120 LBS | SYSTOLIC BLOOD PRESSURE: 134 MMHG | BODY MASS INDEX: 26.9 KG/M2 | HEART RATE: 102 BPM | OXYGEN SATURATION: 96 % | TEMPERATURE: 97.4 F

## 2022-04-20 DIAGNOSIS — Z78.0 POSTMENOPAUSAL: ICD-10-CM

## 2022-04-20 DIAGNOSIS — Z17.0 MALIGNANT NEOPLASM OF UPPER-INNER QUADRANT OF RIGHT BREAST IN FEMALE, ESTROGEN RECEPTOR POSITIVE (HCC): Primary | ICD-10-CM

## 2022-04-20 DIAGNOSIS — C50.211 MALIGNANT NEOPLASM OF UPPER-INNER QUADRANT OF RIGHT BREAST IN FEMALE, ESTROGEN RECEPTOR POSITIVE (HCC): Primary | ICD-10-CM

## 2022-04-20 PROCEDURE — 77412 RADIATION TX DELIVERY LVL 3: CPT | Performed by: RADIOLOGY

## 2022-04-20 PROCEDURE — 77080 DXA BONE DENSITY AXIAL: CPT

## 2022-04-20 NOTE — PROGRESS NOTES
Mady Antione Guzman  4/20/2022  Wt Readings from Last 3 Encounters:   04/20/22 120 lb (54.4 kg)   04/13/22 119 lb 12.8 oz (54.3 kg)   03/23/22 115 lb 6.4 oz (52.3 kg)     Body mass index is 26.9 kg/m². Treatment Area:right b reast    Patient was seen today for weekly visit. Comfort Alteration  KPS:70%  Fatigue: None    Nutritional Alteration  Anorexia: No   Nausea: No   Vomiting: No     Skin Alteration   Sensation:na    Radiation Dermatitis:  none    Mucous Membrane Alteration  Drainage: No  Lymphedema: No    Emotional  Coping: effective    Sexuality Alteration  na    Injury, potential bleeding or infection: na        Lab Results   Component Value Date    WBC 10.5 03/15/2022     03/15/2022         /82   Pulse 102   Temp 97.4 °F (36.3 °C) (Temporal)   Resp 20   Wt 120 lb (54.4 kg)   LMP 10/28/2021   SpO2 96%   BMI 26.90 kg/m²   BP within normal range?  Yes     Assessment/Plan:  Completed 2/22 fractions; 232/3778 cGy    Renea Phan, RN
nourished 58 y.o. female who is alert, oriented, cooperative and in no apparent distress. HEENT:   Skin:  Warm and dry. No obvious rashes. Vitals:    04/20/22 1456   BP: 134/82   Pulse: 102   Resp: 20   Temp: 97.4 °F (36.3 °C)   TempSrc: Temporal   SpO2: 96%   Weight: 120 lb (54.4 kg)       Wt Readings from Last 3 Encounters:   04/20/22 120 lb (54.4 kg)   04/13/22 119 lb 12.8 oz (54.3 kg)   03/23/22 115 lb 6.4 oz (52.3 kg)       ASSESSMENT/PLAN:     Patient is tolerating treatments well with expected toxicities. Current and planned dose reviewed. Goals of treatment and potential side effects were reviewed with the patient. Treatment imaging has been personally reviewed for accuracy and precision. Questions answered to apparent satisfaction. Treatments will continue as planned. Thank you for the opportunity to participate in multidisciplinary management of this remarkable and pleasant patient.       Quique Arce MD    Department of Radiation Oncology  [facility]) [facility]: [phone] (JDK: [phone])  [address] [place] [address]: [phone] (LQL: [phone])  [facility]:  [phone] (VJV:  [phone])

## 2022-04-21 ENCOUNTER — HOSPITAL ENCOUNTER (OUTPATIENT)
Dept: RADIATION ONCOLOGY | Age: 62
Discharge: HOME | End: 2022-04-21
Attending: RADIOLOGY
Payer: COMMERCIAL

## 2022-04-21 PROCEDURE — 77412 RADIATION TX DELIVERY LVL 3: CPT | Performed by: RADIOLOGY

## 2022-04-22 ENCOUNTER — HOSPITAL ENCOUNTER (OUTPATIENT)
Dept: RADIATION ONCOLOGY | Age: 62
Discharge: HOME | End: 2022-04-22
Attending: RADIOLOGY
Payer: COMMERCIAL

## 2022-04-22 PROCEDURE — 77427 RADIATION TX MANAGEMENT X5: CPT | Performed by: RADIOLOGY

## 2022-04-22 PROCEDURE — 77412 RADIATION TX DELIVERY LVL 3: CPT | Performed by: RADIOLOGY

## 2022-04-25 ENCOUNTER — HOSPITAL ENCOUNTER (OUTPATIENT)
Dept: RADIATION ONCOLOGY | Age: 62
Discharge: HOME | End: 2022-04-25
Attending: RADIOLOGY
Payer: COMMERCIAL

## 2022-04-25 VITALS
RESPIRATION RATE: 18 BRPM | WEIGHT: 120.13 LBS | TEMPERATURE: 97.4 F | SYSTOLIC BLOOD PRESSURE: 132 MMHG | HEART RATE: 88 BPM | BODY MASS INDEX: 26.93 KG/M2 | DIASTOLIC BLOOD PRESSURE: 88 MMHG | OXYGEN SATURATION: 98 %

## 2022-04-25 DIAGNOSIS — C50.211 MALIGNANT NEOPLASM OF UPPER-INNER QUADRANT OF RIGHT BREAST IN FEMALE, ESTROGEN RECEPTOR POSITIVE (HCC): Primary | ICD-10-CM

## 2022-04-25 DIAGNOSIS — Z17.0 MALIGNANT NEOPLASM OF UPPER-INNER QUADRANT OF RIGHT BREAST IN FEMALE, ESTROGEN RECEPTOR POSITIVE (HCC): Primary | ICD-10-CM

## 2022-04-25 PROCEDURE — 77417 THER RADIOLOGY PORT IMAGE(S): CPT | Performed by: RADIOLOGY

## 2022-04-25 PROCEDURE — 77412 RADIATION TX DELIVERY LVL 3: CPT | Performed by: RADIOLOGY

## 2022-04-25 PROCEDURE — 77336 RADIATION PHYSICS CONSULT: CPT | Performed by: RADIOLOGY

## 2022-04-25 NOTE — PROGRESS NOTES
DEPARTMENT OF RADIATION ONCOLOGY   ON TREATMENT VISIT       4/25/2022      NAME:  Clau Guzman    YOB: 1960    DIAGNOSIS: Pathological stage pT2, pN0, M0, G2, invasive ductal carcinoma of the right breast, 3 cm in maximum diameter at 3 o'clock position, SP partial mastectomy and sentinel node biopsy, 1 out of 1 negative sentinel lymph nodes, lymphovascular and perineural space invasion, ER positive (strong), NM positive (weak), HER 2 -, Oncotype DEX score at 26, SP 4 cycles of TC the last 1 on 3/16/2022       SUBJECTIVE:   Miguelina Caruso has now received 1330 cGy in 6 fractions directed to the left breast.      Past medical, surgical, social and family histories reviewed and updated as indicated. PAIN: None    ALLERGIES:  Patient has no known allergies. Current Outpatient Medications   Medication Sig Dispense Refill    simvastatin (ZOCOR) 20 MG tablet Take 20 mg by mouth nightly       cephALEXin (KEFLEX) 500 MG capsule Take 1 capsule by mouth 4 times daily 28 capsule 0    triamcinolone (KENALOG) 0.1 % cream Apply topically 2 times daily. 80 g 1    prochlorperazine (COMPAZINE) 10 MG tablet Take 1 tablet by mouth every 6 hours as needed (nausea, vomiting) 30 tablet 2    ondansetron (ZOFRAN) 8 MG tablet Take 1 tablet by mouth every 12 hours as needed for Nausea or Vomiting 30 tablet 1    Garlic (GARLIQUE) 061 MG TBEC Take by mouth daily       Cholecalciferol (VITAMIN D3) 50 MCG (2000 UT) CAPS Take by mouth daily       loratadine (CLARITIN) 10 MG capsule Take 10 mg by mouth daily      acetaminophen (TYLENOL) 500 MG tablet Take 500 mg by mouth every 6 hours as needed for Pain       No current facility-administered medications for this encounter. OBJECTIVE:  Alert and fully ambulatory. Pleasant and conversant.       Physical Examination:General appearance - alert, well appearing, and in no distress, normal appearing weight and well hydrated:  Constitutional: A well developed, well nourished 58 y.o. female who is alert, oriented, cooperative and in no apparent distress. HEENT:   Skin:  Warm and dry. No obvious rashes. Vitals:    04/25/22 1053   BP: 132/88   Pulse: 88   Resp: 18   Temp: 97.4 °F (36.3 °C)   TempSrc: Temporal   SpO2: 98%   Weight: 120 lb 2 oz (54.5 kg)       Wt Readings from Last 3 Encounters:   04/25/22 120 lb 2 oz (54.5 kg)   04/20/22 120 lb (54.4 kg)   04/13/22 119 lb 12.8 oz (54.3 kg)       ASSESSMENT/PLAN:     Patient is tolerating treatments well with expected toxicities. Current and planned dose reviewed. Goals of treatment and potential side effects were reviewed with the patient. Treatment imaging has been personally reviewed for accuracy and precision. Questions answered to apparent satisfaction. Treatments will continue as planned. Thank you for the opportunity to participate in multidisciplinary management of this remarkable and pleasant patient.       Patt Sanchez MD    Department of Radiation Oncology  [facility]) [facility]: [phone] (BZO: [phone])  [address] [place] [address]) [facility]: [phone] (ENL: [phone])  [facility]) [facility]:  [phone] (YHB:  [phone])

## 2022-04-25 NOTE — PROGRESS NOTES
Florian Guzman  4/25/2022  Wt Readings from Last 3 Encounters:   04/25/22 120 lb 2 oz (54.5 kg)   04/20/22 120 lb (54.4 kg)   04/13/22 119 lb 12.8 oz (54.3 kg)     Body mass index is 26.93 kg/m². Treatment Area:right breast    Patient was seen today for weekly visit. Comfort Alteration  KPS:80%  Fatigue: None    Nutritional Alteration  Anorexia: No   Nausea: No   Vomiting: No     Skin Alteration   Sensation:na    Radiation Dermatitis:  none    Mucous Membrane Alteration  Drainage: No  Lymphedema: No    Emotional  Coping: effective    Sexuality Alteration  na    Injury, potential bleeding or infection: na        Lab Results   Component Value Date    WBC 10.5 03/15/2022     03/15/2022         /88   Pulse 88   Temp 97.4 °F (36.3 °C) (Temporal)   Resp 18   Wt 120 lb 2 oz (54.5 kg)   LMP 10/28/2021   SpO2 98%   BMI 26.93 kg/m²   BP within normal range?  yes     Assessment/Plan:  Completed 6/22 fractions; 1330/5756 cGy    Martha Bradford RN

## 2022-04-26 ENCOUNTER — HOSPITAL ENCOUNTER (OUTPATIENT)
Dept: RADIATION ONCOLOGY | Age: 62
Discharge: HOME | End: 2022-04-26
Attending: RADIOLOGY
Payer: COMMERCIAL

## 2022-04-26 PROCEDURE — 77412 RADIATION TX DELIVERY LVL 3: CPT | Performed by: RADIOLOGY

## 2022-04-27 ENCOUNTER — HOSPITAL ENCOUNTER (OUTPATIENT)
Dept: RADIATION ONCOLOGY | Age: 62
Discharge: HOME | End: 2022-04-27
Attending: RADIOLOGY
Payer: COMMERCIAL

## 2022-04-27 PROCEDURE — 77412 RADIATION TX DELIVERY LVL 3: CPT | Performed by: RADIOLOGY

## 2022-04-28 ENCOUNTER — HOSPITAL ENCOUNTER (OUTPATIENT)
Dept: RADIATION ONCOLOGY | Age: 62
Discharge: HOME | End: 2022-04-28
Attending: RADIOLOGY
Payer: COMMERCIAL

## 2022-04-28 PROCEDURE — 77412 RADIATION TX DELIVERY LVL 3: CPT | Performed by: RADIOLOGY

## 2022-04-29 ENCOUNTER — HOSPITAL ENCOUNTER (OUTPATIENT)
Dept: RADIATION ONCOLOGY | Age: 62
Discharge: HOME | End: 2022-04-29
Attending: RADIOLOGY
Payer: COMMERCIAL

## 2022-04-29 PROCEDURE — 77427 RADIATION TX MANAGEMENT X5: CPT | Performed by: RADIOLOGY

## 2022-04-29 PROCEDURE — 77412 RADIATION TX DELIVERY LVL 3: CPT | Performed by: RADIOLOGY

## 2022-05-02 ENCOUNTER — HOSPITAL ENCOUNTER (OUTPATIENT)
Dept: RADIATION ONCOLOGY | Age: 62
Discharge: HOME | End: 2022-05-02
Attending: RADIOLOGY
Payer: COMMERCIAL

## 2022-05-02 VITALS
RESPIRATION RATE: 18 BRPM | SYSTOLIC BLOOD PRESSURE: 130 MMHG | DIASTOLIC BLOOD PRESSURE: 86 MMHG | TEMPERATURE: 97.3 F | WEIGHT: 119 LBS | HEART RATE: 96 BPM | OXYGEN SATURATION: 96 % | BODY MASS INDEX: 26.68 KG/M2

## 2022-05-02 DIAGNOSIS — Z17.0 MALIGNANT NEOPLASM OF UPPER-INNER QUADRANT OF RIGHT BREAST IN FEMALE, ESTROGEN RECEPTOR POSITIVE (HCC): Primary | ICD-10-CM

## 2022-05-02 DIAGNOSIS — C50.211 MALIGNANT NEOPLASM OF UPPER-INNER QUADRANT OF RIGHT BREAST IN FEMALE, ESTROGEN RECEPTOR POSITIVE (HCC): Primary | ICD-10-CM

## 2022-05-02 PROCEDURE — 77336 RADIATION PHYSICS CONSULT: CPT | Performed by: RADIOLOGY

## 2022-05-02 PROCEDURE — 77417 THER RADIOLOGY PORT IMAGE(S): CPT | Performed by: RADIOLOGY

## 2022-05-02 PROCEDURE — 77412 RADIATION TX DELIVERY LVL 3: CPT | Performed by: RADIOLOGY

## 2022-05-02 NOTE — PROGRESS NOTES
Hardy Guzman  5/2/2022  Wt Readings from Last 3 Encounters:   05/02/22 119 lb (54 kg)   04/25/22 120 lb 2 oz (54.5 kg)   04/20/22 120 lb (54.4 kg)     Body mass index is 26.68 kg/m². Treatment Area:right     Patient was seen today for weekly visit. Comfort Alteration  KPS:80%  Fatigue: None    Nutritional Alteration  Anorexia: No   Nausea: No   Vomiting: No     Skin Alteration   Sensation:na    Radiation Dermatitis:  none    Mucous Membrane Alteration  Drainage: No  Lymphedema: No    Emotional  Coping: effective    Sexuality Alteration  na    Injury, potential bleeding or infection: none        Lab Results   Component Value Date    WBC 10.5 03/15/2022     03/15/2022         /86   Pulse 96   Temp 97.3 °F (36.3 °C) (Temporal)   Resp 18   Wt 119 lb (54 kg)   LMP 10/28/2021   SpO2 96%   BMI 26.68 kg/m²   BP within normal range?  yes       Assessment/Plan:  Completed 10/22 fractions; 2187/9354 cGy  Fredy Collins RN

## 2022-05-03 ENCOUNTER — HOSPITAL ENCOUNTER (OUTPATIENT)
Dept: RADIATION ONCOLOGY | Age: 62
Discharge: HOME | End: 2022-05-03
Attending: RADIOLOGY
Payer: COMMERCIAL

## 2022-05-03 PROCEDURE — 77412 RADIATION TX DELIVERY LVL 3: CPT | Performed by: RADIOLOGY

## 2022-05-04 ENCOUNTER — HOSPITAL ENCOUNTER (OUTPATIENT)
Dept: RADIATION ONCOLOGY | Age: 62
Discharge: HOME | End: 2022-05-04
Attending: RADIOLOGY
Payer: COMMERCIAL

## 2022-05-04 PROCEDURE — 77412 RADIATION TX DELIVERY LVL 3: CPT | Performed by: RADIOLOGY

## 2022-05-05 ENCOUNTER — HOSPITAL ENCOUNTER (OUTPATIENT)
Dept: RADIATION ONCOLOGY | Age: 62
Discharge: HOME | End: 2022-05-05
Attending: RADIOLOGY
Payer: COMMERCIAL

## 2022-05-05 PROCEDURE — 77412 RADIATION TX DELIVERY LVL 3: CPT | Performed by: RADIOLOGY

## 2022-05-06 ENCOUNTER — HOSPITAL ENCOUNTER (OUTPATIENT)
Dept: RADIATION ONCOLOGY | Age: 62
Discharge: HOME | End: 2022-05-06
Attending: RADIOLOGY
Payer: COMMERCIAL

## 2022-05-06 PROCEDURE — 77412 RADIATION TX DELIVERY LVL 3: CPT | Performed by: RADIOLOGY

## 2022-05-06 PROCEDURE — 77427 RADIATION TX MANAGEMENT X5: CPT | Performed by: RADIOLOGY

## 2022-05-09 ENCOUNTER — HOSPITAL ENCOUNTER (OUTPATIENT)
Dept: RADIATION ONCOLOGY | Age: 62
Discharge: HOME | End: 2022-05-09
Attending: RADIOLOGY
Payer: COMMERCIAL

## 2022-05-09 VITALS
BODY MASS INDEX: 26.46 KG/M2 | OXYGEN SATURATION: 97 % | SYSTOLIC BLOOD PRESSURE: 132 MMHG | WEIGHT: 118 LBS | HEART RATE: 84 BPM | DIASTOLIC BLOOD PRESSURE: 82 MMHG | RESPIRATION RATE: 18 BRPM | TEMPERATURE: 97.3 F

## 2022-05-09 DIAGNOSIS — T66.XXXA ADVERSE EFFECT OF RADIATION, INITIAL ENCOUNTER: Primary | ICD-10-CM

## 2022-05-09 DIAGNOSIS — C50.211 MALIGNANT NEOPLASM OF UPPER-INNER QUADRANT OF RIGHT BREAST IN FEMALE, ESTROGEN RECEPTOR POSITIVE (HCC): Primary | ICD-10-CM

## 2022-05-09 DIAGNOSIS — Z17.0 MALIGNANT NEOPLASM OF UPPER-INNER QUADRANT OF RIGHT BREAST IN FEMALE, ESTROGEN RECEPTOR POSITIVE (HCC): Primary | ICD-10-CM

## 2022-05-09 PROCEDURE — 77336 RADIATION PHYSICS CONSULT: CPT | Performed by: RADIOLOGY

## 2022-05-09 PROCEDURE — 77417 THER RADIOLOGY PORT IMAGE(S): CPT | Performed by: RADIOLOGY

## 2022-05-09 PROCEDURE — 77412 RADIATION TX DELIVERY LVL 3: CPT | Performed by: RADIOLOGY

## 2022-05-09 RX ORDER — BETAMETHASONE DIPROPIONATE 0.05 %
OINTMENT (GRAM) TOPICAL
Qty: 50 G | Refills: 1 | Status: SHIPPED | OUTPATIENT
Start: 2022-05-09 | End: 2022-06-20

## 2022-05-09 NOTE — PROGRESS NOTES
Nichole Guzman  5/9/2022  Wt Readings from Last 3 Encounters:   05/09/22 118 lb (53.5 kg)   05/02/22 119 lb (54 kg)   04/25/22 120 lb 2 oz (54.5 kg)     Body mass index is 26.46 kg/m². Treatment Area:right breast    Patient was seen today for weekly visit. Comfort Alteration  KPS:80%  Fatigue: None    Nutritional Alteration  Anorexia: No   Nausea: No   Vomiting: No     Skin Alteration   Sensation:na    Radiation Dermatitis:  none    Mucous Membrane Alteration  Drainage: No  Lymphedema: No    Emotional  Coping: effective    Sexuality Alteration  na    Injury, potential bleeding or infection: na        Lab Results   Component Value Date    WBC 10.5 03/15/2022     03/15/2022         Pulse 84   Temp 97.3 °F (36.3 °C) (Temporal)   Resp 18   Wt 118 lb (53.5 kg)   LMP 10/28/2021   SpO2 97%   BMI 26.46 kg/m²   BP within normal range?  yes       Assessment/Plan: Completed  15/22 fractions; 7854/0945 cGy    Anupama Carnes RN

## 2022-05-09 NOTE — PROGRESS NOTES
DEPARTMENT OF RADIATION ONCOLOGY   ON TREATMENT VISIT       5/9/2022      NAME:  Clau Guzman    YOB: 1960    DIAGNOSIS: Pathological stage pT2, pN0, M0, G2, invasive ductal carcinoma of the right breast, 3 cm in maximum diameter at 3 o'clock position, SP partial mastectomy and sentinel node biopsy, 1 out of 1 negative sentinel lymph nodes, lymphovascular and perineural space invasion, ER positive (strong), VA positive (weak), HER 2 -, Oncotype DEX score at 26, SP 4 cycles of TC the last 1 on 3/16/2022       SUBJECTIVE:   Miguleina Caruso has now received 3990 cGy in 15 fractions directed to the right chest wall. Past medical, surgical, social and family histories reviewed and updated as indicated. PAIN: No    ALLERGIES:  Patient has no known allergies. Current Outpatient Medications   Medication Sig Dispense Refill    betamethasone dipropionate (DIPROLENE) 0.05 % ointment Apply topically daily for no more than 3 weeks. . 50 g 1    simvastatin (ZOCOR) 20 MG tablet Take 20 mg by mouth nightly       cephALEXin (KEFLEX) 500 MG capsule Take 1 capsule by mouth 4 times daily 28 capsule 0    triamcinolone (KENALOG) 0.1 % cream Apply topically 2 times daily. 80 g 1    prochlorperazine (COMPAZINE) 10 MG tablet Take 1 tablet by mouth every 6 hours as needed (nausea, vomiting) 30 tablet 2    ondansetron (ZOFRAN) 8 MG tablet Take 1 tablet by mouth every 12 hours as needed for Nausea or Vomiting 30 tablet 1    Garlic (GARLIQUE) 147 MG TBEC Take by mouth daily       Cholecalciferol (VITAMIN D3) 50 MCG (2000 UT) CAPS Take by mouth daily       loratadine (CLARITIN) 10 MG capsule Take 10 mg by mouth daily      acetaminophen (TYLENOL) 500 MG tablet Take 500 mg by mouth every 6 hours as needed for Pain       No current facility-administered medications for this encounter. OBJECTIVE:  Alert and fully ambulatory. Pleasant and conversant. Initial miliaria.     Physical Examination:General appearance - alert, well appearing, and in no distress, normal appearing weight and well hydrated:  Constitutional: A well developed, well nourished 58 y.o. female who is alert, oriented, cooperative and in no apparent distress. HEENT:   Skin:  Warm and dry. No obvious rashes. Vitals:    22 1032   BP: 132/82   Pulse: 84   Resp: 18   Temp: 97.3 °F (36.3 °C)   TempSrc: Temporal   SpO2: 97%   Weight: 118 lb (53.5 kg)       Wt Readings from Last 3 Encounters:   22 118 lb (53.5 kg)   22 119 lb (54 kg)   22 120 lb 2 oz (54.5 kg)       ASSESSMENT/PLAN:     Patient is tolerating treatments well with expected toxicities. Patient miliaria. Prescribe betamethasone ointment. Current and planned dose reviewed. Goals of treatment and potential side effects were reviewed with the patient. Treatment imaging has been personally reviewed for accuracy and precision. Questions answered to apparent satisfaction. Treatments will continue as planned. Thank you for the opportunity to participate in multidisciplinary management of this remarkable and pleasant patient.       Lyla Benites MD    Department of Radiation Oncology  [facility]) [facility]: [phone] (JLK: [phone])  [address] [place] [address]: [phone] (PLU: [phone])  [facility]:  [phone])

## 2022-05-10 ENCOUNTER — HOSPITAL ENCOUNTER (OUTPATIENT)
Dept: RADIATION ONCOLOGY | Age: 62
Discharge: HOME | End: 2022-05-10
Attending: RADIOLOGY
Payer: COMMERCIAL

## 2022-05-10 PROCEDURE — 77300 RADIATION THERAPY DOSE PLAN: CPT | Performed by: RADIOLOGY

## 2022-05-10 PROCEDURE — 77334 RADIATION TREATMENT AID(S): CPT | Performed by: RADIOLOGY

## 2022-05-10 PROCEDURE — 77412 RADIATION TX DELIVERY LVL 3: CPT | Performed by: RADIOLOGY

## 2022-05-11 ENCOUNTER — HOSPITAL ENCOUNTER (OUTPATIENT)
Dept: RADIATION ONCOLOGY | Age: 62
Discharge: HOME | End: 2022-05-11
Attending: RADIOLOGY
Payer: COMMERCIAL

## 2022-05-11 PROCEDURE — 77412 RADIATION TX DELIVERY LVL 3: CPT | Performed by: RADIOLOGY

## 2022-05-12 ENCOUNTER — HOSPITAL ENCOUNTER (OUTPATIENT)
Dept: RADIATION ONCOLOGY | Age: 62
Discharge: HOME | End: 2022-05-12
Attending: RADIOLOGY
Payer: COMMERCIAL

## 2022-05-12 PROCEDURE — 77412 RADIATION TX DELIVERY LVL 3: CPT | Performed by: RADIOLOGY

## 2022-05-13 ENCOUNTER — HOSPITAL ENCOUNTER (OUTPATIENT)
Dept: RADIATION ONCOLOGY | Age: 62
Discharge: HOME | End: 2022-05-13
Attending: RADIOLOGY
Payer: COMMERCIAL

## 2022-05-13 PROCEDURE — 77412 RADIATION TX DELIVERY LVL 3: CPT | Performed by: RADIOLOGY

## 2022-05-13 PROCEDURE — 77427 RADIATION TX MANAGEMENT X5: CPT | Performed by: RADIOLOGY

## 2022-05-16 ENCOUNTER — HOSPITAL ENCOUNTER (OUTPATIENT)
Dept: RADIATION ONCOLOGY | Age: 62
Discharge: HOME | End: 2022-05-16
Attending: RADIOLOGY
Payer: COMMERCIAL

## 2022-05-16 VITALS
BODY MASS INDEX: 26.63 KG/M2 | OXYGEN SATURATION: 98 % | RESPIRATION RATE: 18 BRPM | TEMPERATURE: 97.1 F | HEART RATE: 80 BPM | SYSTOLIC BLOOD PRESSURE: 142 MMHG | DIASTOLIC BLOOD PRESSURE: 85 MMHG | WEIGHT: 118.8 LBS

## 2022-05-16 DIAGNOSIS — C50.211 MALIGNANT NEOPLASM OF UPPER-INNER QUADRANT OF RIGHT BREAST IN FEMALE, ESTROGEN RECEPTOR POSITIVE (HCC): Primary | ICD-10-CM

## 2022-05-16 DIAGNOSIS — T66.XXXA ADVERSE EFFECT OF RADIATION, INITIAL ENCOUNTER: Primary | ICD-10-CM

## 2022-05-16 DIAGNOSIS — Z17.0 MALIGNANT NEOPLASM OF UPPER-INNER QUADRANT OF RIGHT BREAST IN FEMALE, ESTROGEN RECEPTOR POSITIVE (HCC): Primary | ICD-10-CM

## 2022-05-16 PROCEDURE — 77336 RADIATION PHYSICS CONSULT: CPT | Performed by: RADIOLOGY

## 2022-05-16 PROCEDURE — 77412 RADIATION TX DELIVERY LVL 3: CPT | Performed by: RADIOLOGY

## 2022-05-16 ASSESSMENT — PAIN SCALES - GENERAL: PAINLEVEL_OUTOF10: 4

## 2022-05-16 ASSESSMENT — PAIN DESCRIPTION - PAIN TYPE: TYPE: ACUTE PAIN

## 2022-05-16 ASSESSMENT — PAIN DESCRIPTION - DESCRIPTORS: DESCRIPTORS: BURNING

## 2022-05-16 ASSESSMENT — PAIN DESCRIPTION - ORIENTATION: ORIENTATION: RIGHT

## 2022-05-16 ASSESSMENT — PAIN DESCRIPTION - FREQUENCY: FREQUENCY: INTERMITTENT

## 2022-05-16 ASSESSMENT — PAIN DESCRIPTION - LOCATION: LOCATION: BREAST

## 2022-05-16 NOTE — PROGRESS NOTES
Madeline Garciaky  2022  Wt Readings from Last 3 Encounters:   22 118 lb 12.8 oz (53.9 kg)   22 118 lb (53.5 kg)   22 119 lb (54 kg)     Body mass index is 26.63 kg/m². Treatment Area:Right Breast    Patient was seen today for weekly visit. Comfort Alteration  KPS:80%  Fatigue: Mild    Nutritional Alteration  Anorexia: No   Nausea: No   Vomiting: No     Skin Alteration   Sensation:pink and red speckled and itchy    Radiation Dermatitis:  yes    Mucous Membrane Alteration  Drainage: No  Lymphedema: No    Emotional  Coping: somewhat effective    Sexuality Alteration  na    Injury, potential bleeding or infection: no        Lab Results   Component Value Date    WBC 10.5 03/15/2022     03/15/2022         BP (!) 142/85   Pulse 80   Temp 97.1 °F (36.2 °C) (Temporal)   Resp 18   Wt 118 lb 12.8 oz (53.9 kg)   LMP 10/28/2021   SpO2 98%   BMI 26.63 kg/m²   BP within normal range? no   -if no, manually recheck in 5-10 min  NEW BP readin/87  BP within normal range? yes       Assessment/Plan:fx;5256/5756cGy completed. Encouraged use of Hydrocortisone cream with 1% Lidocaine for itchy skin.     Selvin Taveras RN

## 2022-05-16 NOTE — PROGRESS NOTES
DEPARTMENT OF RADIATION ONCOLOGY   ON TREATMENT VISIT       5/16/2022      NAME:  Renee Guzman    YOB: 1960    DIAGNOSIS: Pathological stage pT2, pN0, M0, G2, invasive ductal carcinoma of the right breast, 3 cm in maximum diameter at 3 o'clock position, SP partial mastectomy and sentinel node biopsy, 1 out of 1 negative sentinel lymph nodes, lymphovascular and perineural space invasion, ER positive (strong), NH positive (weak), HER 2 -, Oncotype DEX score at 26, SP 4 cycles of TC the last 1 on 3/16/2022       SUBJECTIVE:   Rodrick Munguia has now received 5256 cGy in 20 fractions directed to the right lumpectomy site. Past medical, surgical, social and family histories reviewed and updated as indicated. PAIN: No    ALLERGIES:  Patient has no known allergies. Current Outpatient Medications   Medication Sig Dispense Refill    silver sulfADIAZINE (SILVADENE) 1 % cream Apply 1 applicator topically 2 times daily Apply topically daily. 50 g 1    betamethasone dipropionate (DIPROLENE) 0.05 % ointment Apply topically daily for no more than 3 weeks. . 50 g 1    simvastatin (ZOCOR) 20 MG tablet Take 20 mg by mouth nightly       cephALEXin (KEFLEX) 500 MG capsule Take 1 capsule by mouth 4 times daily 28 capsule 0    triamcinolone (KENALOG) 0.1 % cream Apply topically 2 times daily.  80 g 1    prochlorperazine (COMPAZINE) 10 MG tablet Take 1 tablet by mouth every 6 hours as needed (nausea, vomiting) 30 tablet 2    ondansetron (ZOFRAN) 8 MG tablet Take 1 tablet by mouth every 12 hours as needed for Nausea or Vomiting 30 tablet 1    Garlic (GARLIQUE) 352 MG TBEC Take by mouth daily       Cholecalciferol (VITAMIN D3) 50 MCG (2000 UT) CAPS Take by mouth daily       loratadine (CLARITIN) 10 MG capsule Take 10 mg by mouth daily      acetaminophen (TYLENOL) 500 MG tablet Take 500 mg by mouth every 6 hours as needed for Pain       No current facility-administered medications for this encounter. OBJECTIVE:  Alert and fully ambulatory. Pleasant and conversant. Physical Examination:General appearance - alert, well appearing, and in no distress, normal appearing weight and well hydrated:  Constitutional: A well developed, well nourished 58 y.o. female who is alert, oriented, cooperative and in no apparent distress. HEENT:   Skin:  Warm and dry. No obvious rashes. Vitals:    05/16/22 1032   BP: (!) 142/85   Pulse: 80   Resp: 18   Temp: 97.1 °F (36.2 °C)   TempSrc: Temporal   SpO2: 98%   Weight: 118 lb 12.8 oz (53.9 kg)       Wt Readings from Last 3 Encounters:   05/16/22 118 lb 12.8 oz (53.9 kg)   05/09/22 118 lb (53.5 kg)   05/02/22 119 lb (54 kg)       ASSESSMENT/PLAN:     Patient is tolerating treatments well with expected toxicities. She presents with a small area of moist desquamation at the level of the electron boost.  I prescribed Silvadene    Current and planned dose reviewed. Goals of treatment and potential side effects were reviewed with the patient. Treatment imaging has been personally reviewed for accuracy and precision. Questions answered to apparent satisfaction. Treatments will continue as planned. Thank you for the opportunity to participate in multidisciplinary management of this remarkable and pleasant patient.       Ian Seymour MD    Department of Radiation Oncology  [address] [place] [address]) [facility]: [phone] (RTB: [phone])  [address] [place] [address]: [phone] (AIK: [phone])  [facility]) [facility]:  [phone] (ABT:  [phone])

## 2022-05-17 ENCOUNTER — HOSPITAL ENCOUNTER (OUTPATIENT)
Dept: RADIATION ONCOLOGY | Age: 62
Discharge: HOME | End: 2022-05-17
Attending: RADIOLOGY
Payer: COMMERCIAL

## 2022-05-17 PROCEDURE — 77412 RADIATION TX DELIVERY LVL 3: CPT | Performed by: RADIOLOGY

## 2022-05-17 NOTE — PROGRESS NOTES
Rodriguez Guzman  5/17/2022  3:24 PM          Current Outpatient Medications   Medication Sig Dispense Refill    silver sulfADIAZINE (SILVADENE) 1 % cream Apply 1 applicator topically 2 times daily Apply topically daily. 50 g 1    betamethasone dipropionate (DIPROLENE) 0.05 % ointment Apply topically daily for no more than 3 weeks. . 50 g 1    simvastatin (ZOCOR) 20 MG tablet Take 20 mg by mouth nightly       cephALEXin (KEFLEX) 500 MG capsule Take 1 capsule by mouth 4 times daily 28 capsule 0    triamcinolone (KENALOG) 0.1 % cream Apply topically 2 times daily. 80 g 1    prochlorperazine (COMPAZINE) 10 MG tablet Take 1 tablet by mouth every 6 hours as needed (nausea, vomiting) 30 tablet 2    ondansetron (ZOFRAN) 8 MG tablet Take 1 tablet by mouth every 12 hours as needed for Nausea or Vomiting 30 tablet 1    Garlic (GARLIQUE) 801 MG TBEC Take by mouth daily       Cholecalciferol (VITAMIN D3) 50 MCG (2000 UT) CAPS Take by mouth daily       loratadine (CLARITIN) 10 MG capsule Take 10 mg by mouth daily      acetaminophen (TYLENOL) 500 MG tablet Take 500 mg by mouth every 6 hours as needed for Pain       No current facility-administered medications for this encounter. This is an up-to-date medication list.    Please take this list to your next care provider, and discard any previous medication lists.

## 2022-05-18 ENCOUNTER — HOSPITAL ENCOUNTER (OUTPATIENT)
Dept: RADIATION ONCOLOGY | Age: 62
Discharge: HOME | End: 2022-05-18
Attending: RADIOLOGY
Payer: COMMERCIAL

## 2022-05-18 ENCOUNTER — OFFICE VISIT (OUTPATIENT)
Dept: ONCOLOGY | Age: 62
End: 2022-05-18
Payer: COMMERCIAL

## 2022-05-18 ENCOUNTER — HOSPITAL ENCOUNTER (OUTPATIENT)
Dept: INFUSION THERAPY | Age: 62
Discharge: HOME | End: 2022-05-18

## 2022-05-18 ENCOUNTER — CLINICAL DOCUMENTATION (OUTPATIENT)
Dept: RADIATION ONCOLOGY | Age: 62
End: 2022-05-18

## 2022-05-18 ENCOUNTER — TELEPHONE (OUTPATIENT)
Dept: ONCOLOGY | Age: 62
End: 2022-05-18

## 2022-05-18 VITALS
WEIGHT: 118.4 LBS | SYSTOLIC BLOOD PRESSURE: 139 MMHG | TEMPERATURE: 97.2 F | DIASTOLIC BLOOD PRESSURE: 88 MMHG | OXYGEN SATURATION: 100 % | HEART RATE: 80 BPM | HEIGHT: 56 IN | BODY MASS INDEX: 26.64 KG/M2

## 2022-05-18 DIAGNOSIS — C50.211 MALIGNANT NEOPLASM OF UPPER-INNER QUADRANT OF RIGHT BREAST IN FEMALE, ESTROGEN RECEPTOR POSITIVE (HCC): Primary | ICD-10-CM

## 2022-05-18 DIAGNOSIS — Z17.0 MALIGNANT NEOPLASM OF UPPER-INNER QUADRANT OF RIGHT BREAST IN FEMALE, ESTROGEN RECEPTOR POSITIVE (HCC): Primary | ICD-10-CM

## 2022-05-18 PROCEDURE — 77412 RADIATION TX DELIVERY LVL 3: CPT | Performed by: RADIOLOGY

## 2022-05-18 PROCEDURE — 99212 OFFICE O/P EST SF 10 MIN: CPT

## 2022-05-18 PROCEDURE — G8427 DOCREV CUR MEDS BY ELIG CLIN: HCPCS | Performed by: INTERNAL MEDICINE

## 2022-05-18 PROCEDURE — 99214 OFFICE O/P EST MOD 30 MIN: CPT | Performed by: INTERNAL MEDICINE

## 2022-05-18 PROCEDURE — 3017F COLORECTAL CA SCREEN DOC REV: CPT | Performed by: INTERNAL MEDICINE

## 2022-05-18 PROCEDURE — G8419 CALC BMI OUT NRM PARAM NOF/U: HCPCS | Performed by: INTERNAL MEDICINE

## 2022-05-18 PROCEDURE — 1036F TOBACCO NON-USER: CPT | Performed by: INTERNAL MEDICINE

## 2022-05-18 RX ORDER — ANASTROZOLE 1 MG/1
1 TABLET ORAL DAILY
Qty: 30 TABLET | Refills: 3 | Status: SHIPPED
Start: 2022-05-18 | End: 2022-06-22 | Stop reason: SDUPTHER

## 2022-05-18 NOTE — TELEPHONE ENCOUNTER
Met with pt and pt's mother in conjunction with medical oncology visit as social work follow up. Pt is 29-year-old female being treated for breast cancer. Pt's mood appeared euthymic with full affect, she appeared A&Ox4, and she was willing/able to participate in session. She appeared appropriately dressed/groomed and was able to ambulate/transfer without assistance. Pt indicated that she completed radiation treatment on this date. She noted that she is feeling well at this time but is getting frustrated that her hair has not grown back yet. Provided support and encouraged pt to focus on progress she has made throughout her cancer journey. No additional needs identified at this time. Reviewed role of oncology SW and encouraged pt to notify this provider if additional needs arise.     Jah Diego, MSW, JUDITH-S  Oncology Social Worker

## 2022-05-18 NOTE — PROGRESS NOTES
[address] [facility] MEDICAL ONCOLOGY  [address] [place], [place] [address] [address]  Dept: [phone]  Loc: [phone]  Attending Progress Note      Reason for Visit: Right breast cancer. Referring Physician: Maddison Love MD    PCP:  Robyn Raygoza DO    History of Present Illness:     Jesu Romo is a pleasant 51-year-old female patient, with a past medical history significant for hearing loss and hyperlipidemia, who had presented with an abnormal screening mammogram,  MAMMOGRAM ULTRASOUND BIOPSY; [facility]       9/14/2021: MAMMOGRAM, RIGHT BREAST ULTRASOUND: [facility]                9/14/2021: LEFT BREAST ULTRASOUND: [facility]    She underwent an US guided right breast core biopsy at 3 o'clock position on September 20 , 2021. Pathological evaluation completed at [facility]:  PATHOLOGY  Diagnosis:   Right breast, 3 o'clock position, core biopsies: Invasive carcinoma of no   special type (ductal with lobular features).  Preliminary Allan   score 3+2+1 = 6     Comment:   Tumor cells stain strongly positive for e-cadherin   immunostain. Intradepartmental consultation is obtained.      Breast Cancer Marker Studies:         Estrogen Receptors (ER):       -Positive (>10% of cells demonstrate nuclear positivity):       Percentage of cells positive: >90%       Intensity: Strong         Progesterone Receptors (CT):       -Positive:       Percentage of cells positive: 2%       Intensity: Weak         Her-2/marquise (c-erb B-2) protein expression: Negative (1+)     The patient underwent on 11/3/2021 right breast lumpectomy with axillary sentinel lymph node biopsy, pathology:    CANCER CASE SUMMARY   Procedure -excision   Specimen Laterality -right   Tumor Site, Invasive Carcinoma-3:00   Histologic Type-invasive carcinoma, no special type (ductal)   Histologic Grade (Allan Histologic Score):                    Tubule differentiation- score-3                    Nuclear pleomorphism- score 2                    Mitotic rate- score 2                    Overall Grade- grade 2, (score 7)   Tumor Size: Size of Largest Invasive Carcinoma-30 mm   Ductal Carcinoma In Situ (DCIS)-not identified   Tumor Extent-carcinoma invades skeletal muscle   Treatment Effect in the Breast-no known presurgical therapy   Margin status for invasive carcinoma: Inferior margin involved by   invasive carcinoma.  Posterior margin less than 1 mm from invasive   carcinoma. Regional Lymph Nodes: All regional lymph nodes negative for tumor   Total number of lymph nodes examined-1   Number of sentinel nodes examined-1   Pathologic Stage Classification (AJCC 8th Edition)- pT2 p(sn)N0   Additional Pathologic Findings-lymphovascular and perineural space   invasion   Special studies-ER positive, IN positive, HER-2 negative     The patient underwent on 12/1/2021 lumpectomy margin reexcision, was negative for residual carcinoma. Oncotype DX was done, recurrence score is 26, distant recurrence risk at 9 years with endocrine therapy alone is 16%, absolute chemotherapy benefit is greater than 15%. Patient was started on adjuvant chemotherapy with Taxotere and Cytoxan on 1/5/2022, she completed 4 cycles on 3/16/2022. She completed adjuvant RT today 5/18/2022, she has radiation dermatitis, otherwise doing well    Review of Systems;  CONSTITUTIONAL: No fever, chills. Good appetite, feeling tired. ENMT: Eyes: No diplopia; Nose: No epistaxis. Mouth: No sore throat. RESPIRATORY: No hemoptysis, shortness of breath, cough. CARDIOVASCULAR: No chest pain, palpitations. GASTROINTESTINAL: No nausea or vomiting at this time, no abdominal pain, diarrhea/constipation. GENITOURINARY: No dysuria, urinary frequency, hematuria. NEURO: No syncope, presyncope, headache.   Remainder:  ROS NEGATIVE    Past Medical History:      Diagnosis Date    Breast cancer ([address]     Right breast    Cancer ([address]     Hearing impaired  Hearing loss     Hyperlipidemia      Patient Active Problem List   Diagnosis    [facility] [facility]    Malignant neoplasm of upper-inner quadrant of right breast in female, estrogen receptor positive ([facility] [address]        Past Surgical History:      Procedure Laterality Date    BREAST LUMPECTOMY Right 11/3/2021    RIGHT BREAST LUMPECTOMY, BLUE DYE INJECTION, RIGHT AXILLARY SENTINEL LYMPH NODE INCISION performed by Mervin Weston MD at [address] LUMPECTOMY Right 12/1/2021    RE-EXCISION RIGHT BREAST INFERIOR MARGIN performed by Mervin Weston MD at [place] [facility] RIGHT  2021       Family History:  Family History   Problem Relation Age of Onset    Heart Disease Mother     High Blood Pressure Mother     High Cholesterol Mother     Other Father         COPD    Dementia Maternal Grandmother     Cancer Maternal Cousin         colon       Medications:  Reviewed and reconciled. Social History:  Social History     Socioeconomic History    Marital status: Single     Spouse name: Not on file    Number of children: 0    Years of education: Not on file    Highest education level: Not on file   Occupational History    Not on file   Tobacco Use    Smoking status: Never Smoker    Smokeless tobacco: Never Used   Vaping Use    Vaping Use: Never used   Substance and Sexual Activity    Alcohol use: Never    Drug use: Never    Sexual activity: Not on file   Other Topics Concern    Not on file   Social History Narrative    Not on file     Social Determinants of Health     Financial Resource Strain:     Difficulty of Paying Living Expenses: Not on file   Food Insecurity:     Worried About Running Out of Food in the Last Year: Not on file    Rae of Food in the Last Year: Not on file   Transportation Needs:     Lack of Transportation (Medical): Not on file    Lack of Transportation (Non-Medical):  Not on file   Physical Activity:     Days of Exercise per Week: Not on file  Minutes of Exercise per Session: Not on file   Stress:     Feeling of Stress : Not on file   Social Connections:     Frequency of Communication with Friends and Family: Not on file    Frequency of Social Gatherings with Friends and Family: Not on file    Attends Jehovah's witness Services: Not on file    Active Member of [address] [place] [address] or Organizations: Not on file    Attends Club or Organization Meetings: Not on file    Marital Status: Not on file   Intimate Partner Violence:     Fear of Current or Ex-Partner: Not on file    Emotionally Abused: Not on file    Physically Abused: Not on file    Sexually Abused: Not on file   Housing Stability:     Unable to Pay for Housing in the Last Year: Not on file    Number of [place] in the Last Year: Not on file    Unstable Housing in the Last Year: Not on file       Allergies:  No Known Allergies    Physical Exam:  /88   Pulse 80   Temp 97.2 °F (36.2 °C)   Ht 4' 8\" (1.422 m)   Wt 118 lb 6.4 oz (53.7 kg)   LMP 10/28/2021   SpO2 100%   BMI 26.54 kg/m²   GENERAL: Alert, oriented x 3, not in acute distress. HEENT: PERRLA; EOMI. Oropharynx clear. NECK: Supple. No palpable cervical or supraclavicular lymphadenopathy. LUNGS: Good air entry bilaterally. No wheezing, crackles or rhonchi. BREASTS: The left breast exam is negative for any skin changes, no nipple discharge, no palpable masses, no palpable left axillary adenopathy, right breast exam is remarkable for a scar from her lumpectomy, radiation dermatitis, few open areas in the medial part of the breast no nipple discharge, no palpable mass, no palpable right axilla lymphadenopathy. CARDIOVASCULAR: Regular rhythm, tachycardic. ABDOMEN: Soft. Non-tender, non-distended. Positive bowel sounds. EXTREMITIES: Without clubbing, cyanosis, or edema. NEUROLOGIC: No focal deficits.      ECOG PS 1      Impression/Plan:    Morelia Mondragon is a pleasant 35-year-old female patient, with a past medical history significant for hearing loss and hyperlipidemia, who had presented with an abnormal screening mammogram, she was diagnosed with a right breast invasive ductal carcinoma, she underwent on 11/3/2021 right breast lumpectomy with axillary sentinel lymph node biopsy, tumor size was 3 cm, grade 2, carcinoma invaded skeletal muscle, inferior margin was positive, posterior margin was less than 1 mm from invasive carcinoma, she had margins reexcision done, was negative for residual carcinoma, 1 sentinel lymph node was removed, was negative for metastatic disease, final pathologic stage pT2 p(sn)N0, ER positive greater than 90%, KY +2%, HER-2/marquise negative, 1+ by IHC, Oncotype DX was done, recurrence score is 26, risk of distant recurrence at 9 years with endocrine therapy alone is 16%, chemotherapy benefit is greater than 15%. I discussed with the patient and her mother her diagnosis, characteristics of her tumor, prognosis and recommendations for treatment, adjuvant endocrine therapy with an AI is recommended, the side effects of the Arimidex were reviewed with the patient. We reviewed the Oncotype DX results, recurrence score is 26, adjuvant chemotherapy is recommended, benefit is greater than 15%, I discussed with the patient TC regimen, the side effects and the schedule were reviewed with he. The patient was started on adjuvant chemotherapy with Taxotere and Cytoxan on 1/5/2022, she completed 4 cycles on 3/16/2022. The patient completed adjuvant radiation therapy today 5/18/2022. I discussed with the patient adjuvant endocrine therapy, Arimidex, will be started on 5/25/2022, the side effects were reviewed with the patient and her mother, prescription was sent to her pharmacy. DEXA scan was done on 4/20/2022, revealing osteopenia, she will continue vitamin D, start calcium 600 mg p.o. twice daily. RTC in about 6 weeks. Thank you for allowing us to participate in the care of Ms. Guzman.     Doni Cortez MD HEMATOLOGY/MEDICAL ONCOLOGY  [facility] [address] [id number] [facility] MEDICAL ONCOLOGY  [address] [facility] [address]  Dept: [phone] [address]: [phone]

## 2022-05-23 ASSESSMENT — ENCOUNTER SYMPTOMS
BACK PAIN: 0
RESPIRATORY NEGATIVE: 1

## 2022-05-23 NOTE — PROGRESS NOTES
Subjective:      Patient ID: Christian Santiago is a 58 y.o. female. HPI Arlette Gosselin is a pleasant 58 y.o. female who was found to have a right breast mass on screening mammogram done at [address] [place] [address] 08/24/2021.    09/20/2021 she underwent a right breast ultrasound-guided core core biopsy at 3 o'clock position. Pathology completed at [facility]):    Diagnosis:   Right breast, 3 o'clock position, core biopsies: Invasive carcinoma of no   special type (ductal with lobular features).  Preliminary Austin   score 3+2+1 = 6     Comment:   Tumor cells stain strongly positive for e-cadherin immunostain. Intradepartmental consultation is obtained. Breast Cancer Marker Studies:         Estrogen Receptors (ER): Positive. Percentage of cells positive: >90%, Intensity: Strong       Progesterone Receptors (WA): Positive. Percentage of cells positive: 2%  Intensity: Weak       Her-2/marquise (c-erb B-2) protein expression: Negative (1+)      11/03/2021 right breast lumpectomy with axillary sentinel lymph node biopsy, pathology:     CANCER CASE SUMMARY   Procedure -excision   Specimen Laterality -right   Tumor Site, Invasive Carcinoma-3:00   Histologic Type-invasive carcinoma, no special type (ductal)   Histologic Grade (Allan Histologic Score):                    Tubule differentiation- score-3                    Nuclear pleomorphism- score 2                    Mitotic rate- score 2                    Overall Grade- grade 2, (score 7)   Tumor Size: Size of Largest Invasive Carcinoma-30 mm   Ductal Carcinoma In Situ (DCIS)-not identified   Tumor Extent-carcinoma invades skeletal muscle   Treatment Effect in the Breast-no known presurgical therapy   Margin status for invasive carcinoma: Inferior margin involved by   invasive carcinoma.  Posterior margin less than 1 mm from invasive   carcinoma. Regional Lymph Nodes:  All regional lymph nodes negative for tumor   Total number of lymph nodes examined-1 Number of sentinel nodes examined-1   Pathologic Stage Classification (AJCC 8th Edition)- pT2 p(sn)N0   Additional Pathologic Findings-lymphovascular and perineural space   invasion   Special studies-ER positive, IL positive, HER-2 negative      12/1/2021 lumpectomy margin reexcision, was negative for residual carcinoma.     Oncotype DX Recurrence score 26; absolute chemotherapy benefit greater than 15%.    03/16/2022 completed adjuvant chemotherapy with TC x4 cycles per medical oncology, Dr. Raz Marina.  05/18/2022 completed adjuvant radiation therapy. 5/25/2022 endocrine therapy with Arimidex 1 mg by mouth was started. Review of Systems   Constitutional:        Has recovered nicely from treatment. Complains of pruritus of the right anterior chest wall post RT. HENT: Negative. Respiratory: Negative. Cardiovascular: Negative for chest pain and palpitations. Musculoskeletal: Negative for arthralgias, back pain and myalgias. Neurological: Negative. Objective:   Physical Exam  Vitals and nursing note reviewed. Constitutional:       General: She is not in acute distress. Appearance: She is well-developed. Comments: ECOG 0. Pleasant and conversant. Very hard of hearing; hearing aids in place. Accompanied by her mother. HENT:      Head: Normocephalic and atraumatic. Cardiovascular:      Rate and Rhythm: Normal rate and regular rhythm. Pulmonary:      Effort: Pulmonary effort is normal.      Breath sounds: Normal breath sounds. Chest:      Chest wall: No mass, lacerations, deformity, swelling or edema. Breasts: Breasts are symmetrical.      Right: No inverted nipple, mass, nipple discharge, skin change or tenderness. Left: No inverted nipple, mass, nipple discharge, skin change or tenderness. Comments: Breast exam is stable and unremarkable. Musculoskeletal:         General: Normal range of motion.       Right shoulder: Normal.      Left shoulder: Normal. Cervical back: Normal range of motion and neck supple. Skin:     General: Skin is warm and dry. Neurological:      Mental Status: She is alert and oriented to person, place, and time. Psychiatric:         Behavior: Behavior normal.         Thought Content: Thought content normal.         Judgment: Judgment normal.         Assessment:    Juan Ramon Loco is a 58 y.o. female with a right breast invasive ductal carcinoma. 11/03/2021 right breast lumpectomy with axillary sentinel lymph node biopsy, pathology:     CANCER CASE SUMMARY right breast, 3 o'clock position. Tumor size was 3 cm. Grade 2 disease. Carcinoma invaded skeletal muscle, inferior margin was positive, posterior margin was less than 1 mm from invasive carcinoma. Reexcision was negative for residual carcinoma. 1 sentinel lymph node was removed and was negative for metastatic disease. No DCIS. Final pathologic stage pT2 p(sn)N0. Additional Pathologic Findings-lymphovascular and perineural space   Invasion. Special studies-ER positive, CA positive, HER-2 negative      12/1/2021 lumpectomy margin reexcision, was negative for residual carcinoma.     Oncotype DX Recurrence score 26; absolute chemotherapy benefit greater than 15%.    03/16/2022 completed adjuvant chemotherapy with TC x4 cycles per medical oncology, Dr. Simon Ivy.  05/18/2022 completed adjuvant radiation therapy. 5/25/2022 endocrine therapy with Arimidex 1 mg by mouth was started. 06/09/2022 clinical follow-up is without evidence of recurrent disease. She has grade 1 radiation dermatitis of the right breast/chest wall. Tolerating Arimidex well. We discussed use of Benadryl/cortisone cream as needed for pruritus. We also reviewed the importance of wearing a good supportive bra and breast massage twice daily. We reviewed NCCN guidelines for breast cancer follow-up. All questions were answered to their apparent satisfaction.   She will be due for a screening mammogram in August with office visit same day. Plan:   1. Continue monthly breast/chest wall self examination; detailed instructions reviewed today. Bring any changes to your physician's attention. 2. Continue healthy diet and exercise routinely as tolerated. 3. Maintaining ideal body weight (20-25 BMI) may lead to optimal breast cancer outcomes. 4. Avoid alcohol. 5. Repeat mammogram August 2022. 6. Continue Arimidex 1 mg daily at the discretion of medical oncology team.  7. Ca+/VitD while on Arimidex. 8. Continue follow up with Medical Oncology, Primary Care, and all specialties as directed. 9. RTC August with mammogram same day. I spent a total of 26 minutes on the date of the service which included preparing to see the patient, face-to-face patient care, completing clinical documentation, obtaining and/or reviewing separately obtained history, performing a medically appropriate examination, counseling and educating the patient/family/caregiver, ordering medications, tests, or procedures, communicating with other HCPs (not separately reported), independently interpreting results (not separately reported), communicating results to the patient/family/caregiver and care coordination (not separately reported). Gabriela Room, RN, MSN, APRN-CNP, [address]  Advanced Oncology Certified Nurse Practitioner  Department of Breast Surgery  [facility]/  [facility] in collaboration with Dr. Kenyon Callander.  Triny/Dr. Barbette Epley Marchand/Dr. Blayne Salter APRN-CNP

## 2022-06-01 ENCOUNTER — HOSPITAL ENCOUNTER (OUTPATIENT)
Dept: RADIATION ONCOLOGY | Age: 62
Discharge: HOME | End: 2022-06-01
Attending: RADIOLOGY

## 2022-06-01 VITALS
RESPIRATION RATE: 20 BRPM | SYSTOLIC BLOOD PRESSURE: 138 MMHG | DIASTOLIC BLOOD PRESSURE: 82 MMHG | HEART RATE: 107 BPM | TEMPERATURE: 98.2 F | BODY MASS INDEX: 26.51 KG/M2 | OXYGEN SATURATION: 97 % | WEIGHT: 118.25 LBS

## 2022-06-01 DIAGNOSIS — C50.211 MALIGNANT NEOPLASM OF UPPER-INNER QUADRANT OF RIGHT BREAST IN FEMALE, ESTROGEN RECEPTOR POSITIVE (HCC): Primary | ICD-10-CM

## 2022-06-01 DIAGNOSIS — Z17.0 MALIGNANT NEOPLASM OF UPPER-INNER QUADRANT OF RIGHT BREAST IN FEMALE, ESTROGEN RECEPTOR POSITIVE (HCC): Primary | ICD-10-CM

## 2022-06-01 ASSESSMENT — PAIN DESCRIPTION - DESCRIPTORS: DESCRIPTORS: BURNING

## 2022-06-01 ASSESSMENT — PAIN DESCRIPTION - FREQUENCY: FREQUENCY: CONTINUOUS

## 2022-06-01 ASSESSMENT — PAIN DESCRIPTION - LOCATION: LOCATION: BREAST

## 2022-06-01 NOTE — PROGRESS NOTES
DEPARTMENT OF RADIATION ONCOLOGY   Follow up visit        2022    NAME:  Jimmy Paniagua    :  1960 58 y.o. female     PCP: Janina Davis DO    DIAGNOSIS:  1. Malignant neoplasm of upper-inner quadrant of right breast in female, estrogen receptor positive ([facility] [address]        STAGING: Cancer Staging  Malignant neoplasm of upper-inner quadrant of right breast in female, estrogen receptor positive ([facility] [address]  Staging form: Breast, AJCC 8th Edition  - Clinical: No stage assigned - Unsigned  - Pathologic stage from 2021: Stage IA (pT2, pN0(sn), cM0, G2, ER+, FL+, HER2-) - Signed by Jesse Olvera MD on 2021      RECENT HISTORY: Jimmy Paniagua is now  2 weeksd out from completion of RT to the right breast.  She is here today for a skin check. On physical examination she presents with moist desquamation of the internal mammary quadrant of the right breast in the area of the electron boost.  There are no visible signs of infection, the area is tender to palpation. Past medical, surgical, social and family histories reviewed and updated as indicated. ALLERGIES:  Patient has no known allergies. MEDICATIONS:   Current Outpatient Medications:     anastrozole (ARIMIDEX) 1 MG tablet, Take 1 tablet by mouth daily Start on on , Disp: 30 tablet, Rfl: 3    silver sulfADIAZINE (SILVADENE) 1 % cream, Apply 1 applicator topically 2 times daily Apply topically daily. , Disp: 50 g, Rfl: 1    betamethasone dipropionate (DIPROLENE) 0.05 % ointment, Apply topically daily for no more than 3 weeks. ., Disp: 50 g, Rfl: 1    simvastatin (ZOCOR) 20 MG tablet, Take 20 mg by mouth nightly , Disp: , Rfl:     triamcinolone (KENALOG) 0.1 % cream, Apply topically 2 times daily. , Disp: 80 g, Rfl: 1    prochlorperazine (COMPAZINE) 10 MG tablet, Take 1 tablet by mouth every 6 hours as needed (nausea, vomiting), Disp: 30 tablet, Rfl: 2    ondansetron (ZOFRAN) 8 MG tablet, Take 1 tablet by mouth every 12 hours as needed for Nausea or Vomiting, Disp: 30 tablet, Rfl: 1    Garlic (GARLIQUE) 445 MG TBEC, Take by mouth daily , Disp: , Rfl:     Cholecalciferol (VITAMIN D3) 50 MCG (2000 UT) CAPS, Take by mouth daily , Disp: , Rfl:     loratadine (CLARITIN) 10 MG capsule, Take 10 mg by mouth daily, Disp: , Rfl:     acetaminophen (TYLENOL) 500 MG tablet, Take 500 mg by mouth every 6 hours as needed for Pain, Disp: , Rfl:     REVIEW OF SYSTEMS: Obtained from the patient, chart review and nursing assessment. 10-point ROS reviewed and negative except as per above. PHYSICAL EXAMINATION:    Vitals:    06/01/22 1429   BP: 138/82   Pulse: (!) 107   Resp: 20   Temp: 98.2 °F (36.8 °C)   TempSrc: Temporal   SpO2: 97%   Weight: 118 lb 4 oz (53.6 kg)       Wt Readings from Last 3 Encounters:   06/01/22 118 lb 4 oz (53.6 kg)   05/18/22 118 lb 6.4 oz (53.7 kg)   05/16/22 118 lb 12.8 oz (53.9 kg)       General: Well developed, no acute distress. HEENT: Normocephalic and atraumatic. Moist mucosae. Neck: No thyromegaly. Trachea at midline. No lymphadenopathy. Cardiorespiratory: Unlabored breathing. No edema. Abdomen: Nontender and nondistended. No hepatosplenomegaly appreciated. Back: No tenderness to palpation. Neuro: CNs grossly intact. Spontaneous movement with all limbs. Psych: Normal affect. Alert & oriented x3. Skin: Moist desquamation at the level of the internal mammary quadrants of the right breast    ASSESSMENT/PLAN:      The patient is here for a skin check and presents with moist desquamation in the area of the electron boost.  The skin reaction he is expected given the technique used to the liver radiation to shallow area of the breast in the region more at risk for local recurrence. I have recommended the use of Silvadene alternating with the Neosporin.   I have recommended to cover the area with known adhesive pads, and I have reassured the patient and her mother that it is and expected side effects of radiation therapy and that it should heal in a couple of weeks. Have recommended to follow-up with me in 1 week.     Augustus Gonzalez MD    Department of Radiation Oncology  [facility]) [facility]: [phone] (CUB: [phone])  Benny Zamudio) [facility]: [phone] (ZUI: [phone])  [facility] Sushial Husk) [facility]:  [phone] (SVZ:  [phone])

## 2022-06-08 ENCOUNTER — HOSPITAL ENCOUNTER (OUTPATIENT)
Dept: RADIATION ONCOLOGY | Age: 62
Discharge: HOME | End: 2022-06-08
Attending: RADIOLOGY

## 2022-06-08 VITALS
DIASTOLIC BLOOD PRESSURE: 78 MMHG | SYSTOLIC BLOOD PRESSURE: 140 MMHG | HEART RATE: 110 BPM | TEMPERATURE: 97.3 F | RESPIRATION RATE: 18 BRPM | BODY MASS INDEX: 26.29 KG/M2 | WEIGHT: 117.25 LBS

## 2022-06-08 DIAGNOSIS — Z17.0 MALIGNANT NEOPLASM OF UPPER-INNER QUADRANT OF RIGHT BREAST IN FEMALE, ESTROGEN RECEPTOR POSITIVE (HCC): Primary | ICD-10-CM

## 2022-06-08 DIAGNOSIS — C50.211 MALIGNANT NEOPLASM OF UPPER-INNER QUADRANT OF RIGHT BREAST IN FEMALE, ESTROGEN RECEPTOR POSITIVE (HCC): Primary | ICD-10-CM

## 2022-06-08 NOTE — PROGRESS NOTES
DEPARTMENT OF RADIATION ONCOLOGY   Follow up visit        2022    NAME:  Janell Juarez    :  1960 58 y.o. female     PCP: Dinora Christensen DO    DIAGNOSIS:  1. Malignant neoplasm of upper-inner quadrant of right breast in female, estrogen receptor positive ([facility] [address]        STAGING: Cancer Staging  Malignant neoplasm of upper-inner quadrant of right breast in female, estrogen receptor positive ([facility] [address]  Staging form: Breast, AJCC 8th Edition  - Clinical: No stage assigned - Unsigned  - Pathologic stage from 2021: Stage IA (pT2, pN0(sn), cM0, G2, ER+, LA+, HER2-) - Signed by Gentry De Paz MD on 2021      RECENT HISTORY: Janell Juarez is now 3 weeks out from completion of RT to the right breast.  She is here for a skin check after the manifestation of  acute radiation dermatitis complete dysepithelization 1 week ago. Past medical, surgical, social and family histories reviewed and updated as indicated. ALLERGIES:  Patient has no known allergies. MEDICATIONS:   Current Outpatient Medications:     anastrozole (ARIMIDEX) 1 MG tablet, Take 1 tablet by mouth daily Start on on , Disp: 30 tablet, Rfl: 3    silver sulfADIAZINE (SILVADENE) 1 % cream, Apply 1 applicator topically 2 times daily Apply topically daily. , Disp: 50 g, Rfl: 1    betamethasone dipropionate (DIPROLENE) 0.05 % ointment, Apply topically daily for no more than 3 weeks. ., Disp: 50 g, Rfl: 1    simvastatin (ZOCOR) 20 MG tablet, Take 20 mg by mouth nightly , Disp: , Rfl:     triamcinolone (KENALOG) 0.1 % cream, Apply topically 2 times daily. , Disp: 80 g, Rfl: 1    prochlorperazine (COMPAZINE) 10 MG tablet, Take 1 tablet by mouth every 6 hours as needed (nausea, vomiting), Disp: 30 tablet, Rfl: 2    ondansetron (ZOFRAN) 8 MG tablet, Take 1 tablet by mouth every 12 hours as needed for Nausea or Vomiting, Disp: 30 tablet, Rfl: 1    Garlic (GARLIQUE) 734 MG TBEC, Take by mouth daily , Disp: , Rfl:    Cholecalciferol (VITAMIN D3) 50 MCG (2000 UT) CAPS, Take by mouth daily , Disp: , Rfl:     loratadine (CLARITIN) 10 MG capsule, Take 10 mg by mouth daily, Disp: , Rfl:     acetaminophen (TYLENOL) 500 MG tablet, Take 500 mg by mouth every 6 hours as needed for Pain, Disp: , Rfl:     REVIEW OF SYSTEMS: Obtained from the patient, chart review and nursing assessment. 10-point ROS reviewed and negative except as per above. PHYSICAL EXAMINATION:    Vitals:    06/08/22 1053   BP: (!) 140/78   Pulse: (!) 110   Resp: 18   Temp: 97.3 °F (36.3 °C)   TempSrc: Temporal   Weight: 117 lb 4 oz (53.2 kg)       Wt Readings from Last 3 Encounters:   06/08/22 117 lb 4 oz (53.2 kg)   06/01/22 118 lb 4 oz (53.6 kg)   05/18/22 118 lb 6.4 oz (53.7 kg)       General: Well developed, no acute distress. HEENT: Normocephalic and atraumatic. Moist mucosae. Neck: No thyromegaly. Trachea at midline. No lymphadenopathy. Cardiorespiratory: Unlabored breathing. No edema. Abdomen: Nontender and nondistended. No hepatosplenomegaly appreciated. Back: No tenderness to palpation. Neuro: CNs grossly intact. Spontaneous movement with all limbs. Psych: Normal affect. Alert & oriented x3. Skin: No abnormal lesions throughout. ASSESSMENT/PLAN:      The patient is doing much better locally. The skin is completely healed, even though it is still erythematous. Mild tender to palpation. Continue with the Silvadene for 1 more week and then with Aquaphor for 2 more weeks. After that she can start using moisturizers of her choice. Patient will continue to follow-up with Dr. Garcia Staff. Follow-up in 6 months with myself.     Jonatan Silva MD    Department of Radiation Oncology  [facility] [place] [facility]) [facility]: [phone] (SSR: [phone])  [facility]: [phone] (YKF: [phone])  [facility]:  [phone] (PGB:  [phone])

## 2022-06-20 ENCOUNTER — OFFICE VISIT (OUTPATIENT)
Dept: BREAST CENTER | Age: 62
End: 2022-06-20
Payer: COMMERCIAL

## 2022-06-20 VITALS
DIASTOLIC BLOOD PRESSURE: 68 MMHG | OXYGEN SATURATION: 98 % | SYSTOLIC BLOOD PRESSURE: 128 MMHG | BODY MASS INDEX: 24.52 KG/M2 | TEMPERATURE: 97.3 F | RESPIRATION RATE: 18 BRPM | HEIGHT: 58 IN | HEART RATE: 87 BPM | WEIGHT: 116.8 LBS

## 2022-06-20 DIAGNOSIS — Z12.31 VISIT FOR SCREENING MAMMOGRAM: Primary | ICD-10-CM

## 2022-06-20 PROBLEM — H91.90 HEARING LOSS: Status: ACTIVE | Noted: 2022-06-20

## 2022-06-20 PROBLEM — E78.00 HYPERCHOLESTEROLEMIA: Status: ACTIVE | Noted: 2022-06-20

## 2022-06-20 PROCEDURE — 3017F COLORECTAL CA SCREEN DOC REV: CPT | Performed by: NURSE PRACTITIONER

## 2022-06-20 PROCEDURE — 99213 OFFICE O/P EST LOW 20 MIN: CPT | Performed by: NURSE PRACTITIONER

## 2022-06-20 PROCEDURE — G8420 CALC BMI NORM PARAMETERS: HCPCS | Performed by: NURSE PRACTITIONER

## 2022-06-20 PROCEDURE — 1036F TOBACCO NON-USER: CPT | Performed by: NURSE PRACTITIONER

## 2022-06-20 PROCEDURE — 99213 OFFICE O/P EST LOW 20 MIN: CPT

## 2022-06-20 PROCEDURE — G8427 DOCREV CUR MEDS BY ELIG CLIN: HCPCS | Performed by: NURSE PRACTITIONER

## 2022-06-22 ENCOUNTER — HOSPITAL ENCOUNTER (OUTPATIENT)
Dept: INFUSION THERAPY | Age: 62
Discharge: HOME | End: 2022-06-22

## 2022-06-22 ENCOUNTER — OFFICE VISIT (OUTPATIENT)
Dept: ONCOLOGY | Age: 62
End: 2022-06-22
Payer: COMMERCIAL

## 2022-06-22 VITALS
WEIGHT: 116 LBS | OXYGEN SATURATION: 100 % | DIASTOLIC BLOOD PRESSURE: 84 MMHG | HEART RATE: 89 BPM | TEMPERATURE: 97.5 F | BODY MASS INDEX: 24.35 KG/M2 | HEIGHT: 58 IN | SYSTOLIC BLOOD PRESSURE: 145 MMHG

## 2022-06-22 DIAGNOSIS — C50.211 MALIGNANT NEOPLASM OF UPPER-INNER QUADRANT OF RIGHT BREAST IN FEMALE, ESTROGEN RECEPTOR POSITIVE (HCC): Primary | ICD-10-CM

## 2022-06-22 DIAGNOSIS — Z17.0 MALIGNANT NEOPLASM OF UPPER-INNER QUADRANT OF RIGHT BREAST IN FEMALE, ESTROGEN RECEPTOR POSITIVE (HCC): Primary | ICD-10-CM

## 2022-06-22 PROCEDURE — G8427 DOCREV CUR MEDS BY ELIG CLIN: HCPCS | Performed by: INTERNAL MEDICINE

## 2022-06-22 PROCEDURE — G8420 CALC BMI NORM PARAMETERS: HCPCS | Performed by: INTERNAL MEDICINE

## 2022-06-22 PROCEDURE — 1036F TOBACCO NON-USER: CPT | Performed by: INTERNAL MEDICINE

## 2022-06-22 PROCEDURE — 99214 OFFICE O/P EST MOD 30 MIN: CPT | Performed by: INTERNAL MEDICINE

## 2022-06-22 PROCEDURE — 99212 OFFICE O/P EST SF 10 MIN: CPT

## 2022-06-22 PROCEDURE — 3017F COLORECTAL CA SCREEN DOC REV: CPT | Performed by: INTERNAL MEDICINE

## 2022-06-22 RX ORDER — ANASTROZOLE 1 MG/1
1 TABLET ORAL DAILY
Qty: 90 TABLET | Refills: 3 | Status: SHIPPED
Start: 2022-06-22 | End: 2022-09-28 | Stop reason: SDUPTHER

## 2022-06-22 NOTE — PROGRESS NOTES
[address] [facility] MEDICAL ONCOLOGY  [address] [place] [address]  Dept: [phone]  Loc: [phone]  Attending Progress Note      Reason for Visit: Right breast cancer. Referring Physician: Mervin Weston MD    PCP:  Kennedy Patel DO    History of Present Illness:     Heri Owens is a pleasant 66-year-old female patient, with a past medical history significant for hearing loss and hyperlipidemia, who had presented with an abnormal screening mammogram,  MAMMOGRAM ULTRASOUND BIOPSY; [facility]       9/14/2021: MAMMOGRAM, RIGHT BREAST ULTRASOUND: [facility]                9/14/2021: LEFT BREAST ULTRASOUND: [facility]    She underwent an US guided right breast core biopsy at 3 o'clock position on September 20 , 2021. Pathological evaluation completed at [facility]:  PATHOLOGY  Diagnosis:   Right breast, 3 o'clock position, core biopsies: Invasive carcinoma of no   special type (ductal with lobular features).  Preliminary Allan   score 3+2+1 = 6     Comment:   Tumor cells stain strongly positive for e-cadherin   immunostain. Intradepartmental consultation is obtained.      Breast Cancer Marker Studies:         Estrogen Receptors (ER):       -Positive (>10% of cells demonstrate nuclear positivity):       Percentage of cells positive: >90%       Intensity: Strong         Progesterone Receptors (GA):       -Positive:       Percentage of cells positive: 2%       Intensity: Weak         Her-2/marquise (c-erb B-2) protein expression: Negative (1+)     The patient underwent on 11/3/2021 right breast lumpectomy with axillary sentinel lymph node biopsy, pathology:    CANCER CASE SUMMARY   Procedure -excision   Specimen Laterality -right   Tumor Site, Invasive Carcinoma-3:00   Histologic Type-invasive carcinoma, no special type (ductal)   Histologic Grade (Allan Histologic Score):                    Tubule differentiation- score-3                    Nuclear pleomorphism- score 2                    Mitotic rate- score 2                    Overall Grade- grade 2, (score 7)   Tumor Size: Size of Largest Invasive Carcinoma-30 mm   Ductal Carcinoma In Situ (DCIS)-not identified   Tumor Extent-carcinoma invades skeletal muscle   Treatment Effect in the Breast-no known presurgical therapy   Margin status for invasive carcinoma: Inferior margin involved by   invasive carcinoma.  Posterior margin less than 1 mm from invasive   carcinoma. Regional Lymph Nodes: All regional lymph nodes negative for tumor   Total number of lymph nodes examined-1   Number of sentinel nodes examined-1   Pathologic Stage Classification (AJCC 8th Edition)- pT2 p(sn)N0   Additional Pathologic Findings-lymphovascular and perineural space   invasion   Special studies-ER positive, WV positive, HER-2 negative     The patient underwent on 12/1/2021 lumpectomy margin reexcision, was negative for residual carcinoma. Oncotype DX was done, recurrence score is 26, distant recurrence risk at 9 years with endocrine therapy alone is 16%, absolute chemotherapy benefit is greater than 15%. Patient was started on adjuvant chemotherapy with Taxotere and Cytoxan on 1/5/2022, she completed 4 cycles on 3/16/2022. She completed adjuvant RT on 5/18/2022, she was started on adjuvant endocrine therapy with Arimidex on 5/25/2022, tolerating it well, taking calcium and vitamin D. Review of Systems;  CONSTITUTIONAL: No fever, chills. Good appetite, feeling tired. ENMT: Eyes: No diplopia; Nose: No epistaxis. Mouth: No sore throat. RESPIRATORY: No hemoptysis, shortness of breath, cough. CARDIOVASCULAR: No chest pain, palpitations. GASTROINTESTINAL: No nausea or vomiting at this time, no abdominal pain, diarrhea/constipation. GENITOURINARY: No dysuria, urinary frequency, hematuria. NEURO: No syncope, presyncope, headache.   Remainder:  ROS NEGATIVE    Past Medical History:      Diagnosis Date    Breast of Transportation (Non-Medical): Not on file   Physical Activity:     Days of Exercise per Week: Not on file    Minutes of Exercise per Session: Not on file   Stress:     Feeling of Stress : Not on file   Social Connections:     Frequency of Communication with Friends and Family: Not on file    Frequency of Social Gatherings with Friends and Family: Not on file    Attends Evangelical Services: Not on file    Active Member of [address] [place] [address] or Organizations: Not on file    Attends Club or Organization Meetings: Not on file    Marital Status: Not on file   Intimate Partner Violence:     Fear of Current or Ex-Partner: Not on file    Emotionally Abused: Not on file    Physically Abused: Not on file    Sexually Abused: Not on file   Housing Stability:     Unable to Pay for Housing in the Last Year: Not on file    Number of [place] in the Last Year: Not on file    Unstable Housing in the Last Year: Not on file       Allergies:  No Known Allergies    Physical Exam:  BP (!) 145/84   Pulse 89   Temp 97.5 °F (36.4 °C)   Ht 4' 10\" (1.473 m)   Wt 116 lb (52.6 kg)   LMP 10/28/2021   SpO2 100%   BMI 24.24 kg/m²   GENERAL: Alert, oriented x 3, not in acute distress. HEENT: PERRLA; EOMI. Oropharynx clear. NECK: Supple. No palpable cervical or supraclavicular lymphadenopathy. LUNGS: Good air entry bilaterally. No wheezing, crackles or rhonchi. BREASTS: The left breast exam is negative for any skin changes, no nipple discharge, no palpable masses, no palpable left axillary adenopathy, right breast exam is remarkable for a scar from her lumpectomy, mild radiation changes, no nipple discharge, no palpable mass, no palpable right axilla lymphadenopathy. CARDIOVASCULAR: Regular rhythm, tachycardic. ABDOMEN: Soft. Non-tender, non-distended. Positive bowel sounds. EXTREMITIES: Without clubbing, cyanosis, or edema. NEUROLOGIC: No focal deficits.      ECOG PS 1      Impression/Plan:    Arlette Gosselin is a pleasant 59-year-old female patient, with a past medical history significant for hearing loss and hyperlipidemia, who had presented with an abnormal screening mammogram, she was diagnosed with a right breast invasive ductal carcinoma, she underwent on 11/3/2021 right breast lumpectomy with axillary sentinel lymph node biopsy, tumor size was 3 cm, grade 2, carcinoma invaded skeletal muscle, inferior margin was positive, posterior margin was less than 1 mm from invasive carcinoma, she had margins reexcision done, was negative for residual carcinoma, 1 sentinel lymph node was removed, was negative for metastatic disease, final pathologic stage pT2 p(sn)N0, ER positive greater than 90%, AK +2%, HER-2/marquise negative, 1+ by IHC, Oncotype DX was done, recurrence score is 26, risk of distant recurrence at 9 years with endocrine therapy alone is 16%, chemotherapy benefit is greater than 15%. I discussed with the patient and her mother her diagnosis, characteristics of her tumor, prognosis and recommendations for treatment, adjuvant endocrine therapy with an AI is recommended, the side effects of the Arimidex were reviewed with the patient. We reviewed the Oncotype DX results, recurrence score is 26, adjuvant chemotherapy is recommended, benefit is greater than 15%, I discussed with the patient TC regimen, the side effects and the schedule were reviewed with he. The patient was started on adjuvant chemotherapy with Taxotere and Cytoxan on 1/5/2022, she completed 4 cycles on 3/16/2022. The patient completed adjuvant radiation therapy on5/18/2022. I discussed with the patient adjuvant endocrine therapy, Arimidex, was on 5/25/2022, she is tolerating it well, continue Arimidex. DEXA scan was done on 4/20/2022, revealing osteopenia, continue calcium and vitamin D. I discussed with the patient importance of monthly BSE and routine screening mammograms.     The surveillance guidelines were reviewed with the patient and her mother. RTC in 3 months. Thank you for allowing us to participate in the care of Ms. Guzman.     Festus Johnson MD   HEMATOLOGY/MEDICAL [address] MEDICAL ONCOLOGY  [address] [place] [address]  Dept: [phone] Federico Nik: [phone]

## 2022-07-23 NOTE — PROGRESS NOTES
Radiation Treatment Summary    Patient Name:  Valentín Parrish,  1960,  58 y.o., female       Referring Physician: No referring provider defined for this encounter. PCP: Loretto Nyhan, DO       Diagnosis:Pathological stage pT2, pN0, M0, G2, invasive ductal carcinoma of the right breast, 3 cm in maximum diameter at 3 o'clock position, SP partial mastectomy and sentinel node biopsy, 1 out of 1 negative sentinel lymph nodes, lymphovascular and perineural space invasion, ER positive (strong), NH positive (weak), HER 2 -, Oncotype DEX score at 26, SP 4 cycles of TC the last 1 on 3/16/2022        Recent History: The patient underwent adjuvant irradiation for her right breast cancer. Site Start [facility] Last [facility] ED Fractions Dose (cGy) Fx Dose (cGy) Technique   Right breast 4/19/2022 5/10/2022 21 16 [id number] 3D conformal radiation therapy   Right lumpectomy boost 5/11/2022 5/18/22 7 6 1500 250 Electrons       Response/Tolerance: The patient tolerated the treatment well, toward the end of it she started to complain of moist desquamation at the level of the inner quadrants of the residual right breast in the area of the electron boost.    Follow-up:  30-day in the office with Radiation Oncology      Thank you for the opportunity to participate in multidisciplinary management of this remarkable and pleasant patient.       Olga Godinez MD    Department of Radiation Oncology  [facility]) [facility]: [phone] (FIK: [phone])  [address] [place] [address]: [phone] (YWY: [phone])  [facility]) [facility]:  [phone] (OHT:  [phone])

## 2022-07-27 NOTE — OP NOTE
Operative Note      Patient: George Can  YOB: 1960  MRN: [id number]    Date of Procedure: 11/3/2021    Pre-Op Diagnosis: BREAST CANCER, 3:00 right breast, adjacent to sternum, minimally mobile. Post-Op Diagnosis: Same       Procedure(s):  RIGHT BREAST LUMPECTOMY, BLUE DYE INJECTION, RIGHT AXILLARY SENTINEL LYMPH NODE INCISION    Surgeon(s):  Julianne Clements MD    Assistant:   Resident: Mary Quintero MD    Anesthesia: General    Estimated Blood Loss (mL): less than 50     Complications: Other: Tumor adherent to chest wall, pectoralis major muscle, medially. Specimens:   ID Type Source Tests Collected by Time Destination   A : RIGHT BREAST MASS 3 OCLOCK Tissue Breast SURGICAL PATHOLOGY Julianne Clements MD 11/3/2021 1329    B : 0 SENTINEL LYMPH NODE #1  Tissue Breast SURGICAL PATHOLOGY Julianne Clements MD 11/3/2021 1357        Implants:  * No implants in log *      Drains: * No LDAs found *    Findings: Procedure done with curative intent yes,   Agent(s) utilized for sentinel lymph node identification: Blue dye and Radioactive tracer  Agent(s) utilized for sentinel lymph node identification after neoadjuvant chemotherapy: Not Applicable  All colored nodes or non-colored nodes present at the end of a dye-filled lymphatic channel were removed: yes  All significantly radioactive nodes were removed if radionuclide was used: yes  All palpably suspicious nodes were removed, if present: Yes  If clips were placed preoperatively in pathologically-involved nodes, those nodes were identified and removed: N/A      Detailed Description of Procedure    HISTORY: George Can is a 64 y.o. female who presented with a minimally mobile lesion in her medial breast which on biopsy proved to be invasive ductal carcinoma. Lumpectomy and sentinel lymph node biopsy were given as an option for surgery, although concern was raised about the ability to obtain clear margins doing a lumpectomy.  The risks benefits and [FreeTextEntry1] : Testicular pain: Check UA. Declined G/c. Check testicular US. Will discuss results. \par HCM: Check labs. Will discuss. \par  alternatives of the procedure were discussed with the patient who stated her understanding and agreed to proceed. The patient was specifically counseled that in the event that margins are positive, she would require additional surgery to obtain negative margins. Informed consent obtained in the preoperative holding area. The patient underwent injection of Lymphoseek in the preoperative holding area. PROCEDURE: The patient was brought to the operating room and positioned supine on the OR table. Sequential compression devices were placed on the patient's lower extremities and functioning. Preoperative antibiotics were administered. Anesthesia was obtained without complication as per the anesthesia record. Immediately prior to the procedure a time-out was called and the surgical checklist was reviewed and agreed upon by all present. The patient was prepped and draped in the usual sterile fashion and the procedure went forth with strict aseptic technique under maximal barrier precautions. 3 mL of dilute methylene blue dye was injected in the retroareolar portion of the right breast. An incision was made using the PEAK Plasma Blade in the medial aspect of the right breast following skin lines. Dissection was continued using the Plasma Blade. Once palpable within the tissue, the localizing needle was grasped with Allis clamps. Dissection continued using the Plasma Blade for the remaining margins. Circumferentially acceptable margins were obtained. Bleeding points were all cauterized. The lumpectomy cavity was packed with a lap sponge. The specimen was removed and brought to the back table where 6 colors of paint were applied to orient it. The wound was irrigated, inspected, bleeding points were cauterized. Pectoralis muscle major muscle was removed with the lumpectomy specimen as it was densely adherent. The margins of the pectoralis muscle that were transected were marked with clips circumferentially.   Kiki

## 2022-08-09 ENCOUNTER — APPOINTMENT (OUTPATIENT)
Dept: GENERAL RADIOLOGY | Age: 62
DRG: 051 | End: 2022-08-09
Payer: COMMERCIAL

## 2022-08-09 ENCOUNTER — APPOINTMENT (OUTPATIENT)
Dept: MRI IMAGING | Age: 62
DRG: 051 | End: 2022-08-09
Payer: COMMERCIAL

## 2022-08-09 ENCOUNTER — APPOINTMENT (OUTPATIENT)
Dept: CT IMAGING | Age: 62
DRG: 051 | End: 2022-08-09
Payer: COMMERCIAL

## 2022-08-09 ENCOUNTER — HOSPITAL ENCOUNTER (INPATIENT)
Age: 62
LOS: 10 days | Discharge: NURSING, REHAB OR LONG-TERM CARE | DRG: 051 | End: 2022-08-19
Attending: EMERGENCY MEDICINE | Admitting: INTERNAL MEDICINE
Payer: COMMERCIAL

## 2022-08-09 DIAGNOSIS — R47.1 DYSARTHRIA: ICD-10-CM
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
## 2022-08-09 DIAGNOSIS — M89.9 LESION OF VERTEBRA: ICD-10-CM

## 2022-08-09 PROBLEM — R53.1 WEAKNESS: Status: ACTIVE | Noted: 2022-08-09

## 2022-08-09 LAB
ALBUMIN SERPL-MCNC: 4.3 G/DL (ref 3.5–5.2)
ALP BLD-CCNC: 71 U/L (ref 35–104)
ALT SERPL-CCNC: 21 U/L (ref 0–32)
ANION GAP SERPL CALCULATED.3IONS-SCNC: 12 MMOL/L (ref 7–16)
ANISOCYTOSIS: ABNORMAL
AST SERPL-CCNC: 23 U/L (ref 0–31)
BACTERIA: ABNORMAL /HPF
BASOPHILS ABSOLUTE: 0.05 E9/L (ref 0–0.2)
BASOPHILS RELATIVE PERCENT: 0.9 % (ref 0–2)
BILIRUB SERPL-MCNC: 0.4 MG/DL (ref 0–1.2)
BILIRUBIN URINE: NEGATIVE
BLOOD, URINE: NEGATIVE
BUN BLDV-MCNC: 11 MG/DL (ref 6–23)
BURR CELLS: ABNORMAL
CALCIUM SERPL-MCNC: 9.6 MG/DL (ref 8.6–10.2)
CHLORIDE BLD-SCNC: 105 MMOL/L (ref 98–107)
CHP ED QC CHECK: NORMAL
CLARITY: CLEAR
CO2: 22 MMOL/L (ref 22–29)
COLOR: YELLOW
CREAT SERPL-MCNC: 0.8 MG/DL (ref 0.5–1)
D DIMER: 431 NG/ML DDU
EKG ATRIAL RATE: 109 BPM
EKG P AXIS: 63 DEGREES
EKG P-R INTERVAL: 116 MS
EKG Q-T INTERVAL: 350 MS
EKG QRS DURATION: 72 MS
EKG QTC CALCULATION (BAZETT): 471 MS
EKG R AXIS: 53 DEGREES
EKG T AXIS: -64 DEGREES
EKG VENTRICULAR RATE: 109 BPM
EOSINOPHILS ABSOLUTE: 0 E9/L (ref 0.05–0.5)
EOSINOPHILS RELATIVE PERCENT: 0.2 % (ref 0–6)
GFR AFRICAN AMERICAN: >60
GFR NON-AFRICAN AMERICAN: >60 ML/MIN/1.73
GLUCOSE BLD-MCNC: 150 MG/DL (ref 74–99)
GLUCOSE URINE: NEGATIVE MG/DL
HCT VFR BLD CALC: 45.2 % (ref 34–48)
HEMOGLOBIN: 15 G/DL (ref 11.5–15.5)
KETONES, URINE: ABNORMAL MG/DL
LEUKOCYTE ESTERASE, URINE: NEGATIVE
LYMPHOCYTES ABSOLUTE: 0.22 E9/L (ref 1.5–4)
LYMPHOCYTES RELATIVE PERCENT: 4.3 % (ref 20–42)
MCH RBC QN AUTO: 28.6 PG (ref 26–35)
MCHC RBC AUTO-ENTMCNC: 33.2 % (ref 32–34.5)
MCV RBC AUTO: 86.1 FL (ref 80–99.9)
METER GLUCOSE: 138 MG/DL (ref 74–99)
MONOCYTES ABSOLUTE: 0.16 E9/L (ref 0.1–0.95)
MONOCYTES RELATIVE PERCENT: 2.6 % (ref 2–12)
NEUTROPHILS ABSOLUTE: 4.97 E9/L (ref 1.8–7.3)
NEUTROPHILS RELATIVE PERCENT: 92.2 % (ref 43–80)
NITRITE, URINE: NEGATIVE
PDW BLD-RTO: 15.4 FL (ref 11.5–15)
PH UA: 7 (ref 5–9)
PLATELET # BLD: 168 E9/L (ref 130–450)
PMV BLD AUTO: 9.8 FL (ref 7–12)
POIKILOCYTES: ABNORMAL
POLYCHROMASIA: ABNORMAL
POTASSIUM REFLEX MAGNESIUM: 4 MMOL/L (ref 3.5–5)
PROTEIN UA: NEGATIVE MG/DL
RBC # BLD: 5.25 E12/L (ref 3.5–5.5)
RBC UA: ABNORMAL /HPF (ref 0–2)
SODIUM BLD-SCNC: 139 MMOL/L (ref 132–146)
SPECIFIC GRAVITY UA: <=1.005 (ref 1–1.03)
TOTAL PROTEIN: 7.3 G/DL (ref 6.4–8.3)
TROPONIN, HIGH SENSITIVITY: 10 NG/L (ref 0–9)
TROPONIN, HIGH SENSITIVITY: 11 NG/L (ref 0–9)
UROBILINOGEN, URINE: 0.2 E.U./DL
WBC # BLD: 5.4 E9/L (ref 4.5–11.5)
WBC UA: ABNORMAL /HPF (ref 0–5)

## 2022-08-09 PROCEDURE — 85025 COMPLETE CBC W/AUTO DIFF WBC: CPT

## 2022-08-09 PROCEDURE — 73610 X-RAY EXAM OF ANKLE: CPT

## 2022-08-09 PROCEDURE — 2580000003 HC RX 258

## 2022-08-09 PROCEDURE — 70450 CT HEAD/BRAIN W/O DYE: CPT

## 2022-08-09 PROCEDURE — 99285 EMERGENCY DEPT VISIT HI MDM: CPT

## 2022-08-09 PROCEDURE — 85378 FIBRIN DEGRADE SEMIQUANT: CPT

## 2022-08-09 PROCEDURE — 87088 URINE BACTERIA CULTURE: CPT

## 2022-08-09 PROCEDURE — 93005 ELECTROCARDIOGRAM TRACING: CPT

## 2022-08-09 PROCEDURE — 36415 COLL VENOUS BLD VENIPUNCTURE: CPT

## 2022-08-09 PROCEDURE — 81001 URINALYSIS AUTO W/SCOPE: CPT

## 2022-08-09 PROCEDURE — 6360000004 HC RX CONTRAST MEDICATION: Performed by: RADIOLOGY

## 2022-08-09 PROCEDURE — 2060000000 HC ICU INTERMEDIATE R&B

## 2022-08-09 PROCEDURE — 71275 CT ANGIOGRAPHY CHEST: CPT

## 2022-08-09 PROCEDURE — 71045 X-RAY EXAM CHEST 1 VIEW: CPT

## 2022-08-09 PROCEDURE — 84484 ASSAY OF TROPONIN QUANT: CPT

## 2022-08-09 PROCEDURE — 80053 COMPREHEN METABOLIC PANEL: CPT

## 2022-08-09 PROCEDURE — 2580000003 HC RX 258: Performed by: RADIOLOGY

## 2022-08-09 PROCEDURE — 82962 GLUCOSE BLOOD TEST: CPT

## 2022-08-09 PROCEDURE — 6360000002 HC RX W HCPCS: Performed by: NEUROLOGICAL SURGERY

## 2022-08-09 RX ORDER — SODIUM CHLORIDE 0.9 % (FLUSH) 0.9 %
5-40 SYRINGE (ML) INJECTION EVERY 12 HOURS SCHEDULED
Status: DISCONTINUED | OUTPATIENT
Start: 2022-08-09 | End: 2022-08-19 | Stop reason: HOSPADM

## 2022-08-09 RX ORDER — ENOXAPARIN SODIUM 100 MG/ML
40 INJECTION SUBCUTANEOUS DAILY
Status: DISCONTINUED | OUTPATIENT
Start: 2022-08-10 | End: 2022-08-19 | Stop reason: HOSPADM

## 2022-08-09 RX ORDER — POLYETHYLENE GLYCOL 3350 17 G/17G
17 POWDER, FOR SOLUTION ORAL DAILY PRN
Status: DISCONTINUED | OUTPATIENT
Start: 2022-08-09 | End: 2022-08-19 | Stop reason: HOSPADM

## 2022-08-09 RX ORDER — SODIUM CHLORIDE 9 MG/ML
INJECTION, SOLUTION INTRAVENOUS PRN
Status: DISCONTINUED | OUTPATIENT
Start: 2022-08-09 | End: 2022-08-19 | Stop reason: HOSPADM

## 2022-08-09 RX ORDER — LEVETIRACETAM 10 MG/ML
1000 INJECTION INTRAVASCULAR ONCE
Status: COMPLETED | OUTPATIENT
Start: 2022-08-09 | End: 2022-08-09

## 2022-08-09 RX ORDER — MIDAZOLAM HYDROCHLORIDE 2 MG/2ML
2 INJECTION, SOLUTION INTRAMUSCULAR; INTRAVENOUS ONCE
Status: DISCONTINUED | OUTPATIENT
Start: 2022-08-09 | End: 2022-08-19 | Stop reason: HOSPADM

## 2022-08-09 RX ORDER — ONDANSETRON 2 MG/ML
4 INJECTION INTRAMUSCULAR; INTRAVENOUS EVERY 6 HOURS PRN
Status: DISCONTINUED | OUTPATIENT
Start: 2022-08-09 | End: 2022-08-19 | Stop reason: HOSPADM

## 2022-08-09 RX ORDER — ATORVASTATIN CALCIUM 10 MG/1
10 TABLET, FILM COATED ORAL NIGHTLY
Status: DISCONTINUED | OUTPATIENT
Start: 2022-08-09 | End: 2022-08-19 | Stop reason: HOSPADM

## 2022-08-09 RX ORDER — ONDANSETRON 4 MG/1
4 TABLET, ORALLY DISINTEGRATING ORAL EVERY 8 HOURS PRN
Status: DISCONTINUED | OUTPATIENT
Start: 2022-08-09 | End: 2022-08-19 | Stop reason: HOSPADM

## 2022-08-09 RX ORDER — SODIUM CHLORIDE 0.9 % (FLUSH) 0.9 %
10 SYRINGE (ML) INJECTION
Status: COMPLETED | OUTPATIENT
Start: 2022-08-09 | End: 2022-08-09

## 2022-08-09 RX ORDER — ACETAMINOPHEN 650 MG/1
650 SUPPOSITORY RECTAL EVERY 6 HOURS PRN
Status: DISCONTINUED | OUTPATIENT
Start: 2022-08-09 | End: 2022-08-19 | Stop reason: HOSPADM

## 2022-08-09 RX ORDER — SODIUM CHLORIDE 0.9 % (FLUSH) 0.9 %
5-40 SYRINGE (ML) INJECTION PRN
Status: DISCONTINUED | OUTPATIENT
Start: 2022-08-09 | End: 2022-08-19 | Stop reason: HOSPADM

## 2022-08-09 RX ORDER — ACETAMINOPHEN 325 MG/1
650 TABLET ORAL EVERY 6 HOURS PRN
Status: DISCONTINUED | OUTPATIENT
Start: 2022-08-09 | End: 2022-08-19 | Stop reason: HOSPADM

## 2022-08-09 RX ORDER — SIMVASTATIN 20 MG
20 TABLET ORAL NIGHTLY
Status: DISCONTINUED | OUTPATIENT
Start: 2022-08-09 | End: 2022-08-09 | Stop reason: CLARIF

## 2022-08-09 RX ORDER — ANASTROZOLE 1 MG/1
1 TABLET ORAL DAILY
Status: DISCONTINUED | OUTPATIENT
Start: 2022-08-10 | End: 2022-08-19 | Stop reason: HOSPADM

## 2022-08-09 RX ORDER — SODIUM CHLORIDE 9 MG/ML
INJECTION, SOLUTION INTRAVENOUS CONTINUOUS
Status: DISCONTINUED | OUTPATIENT
Start: 2022-08-09 | End: 2022-08-10 | Stop reason: SDUPTHER

## 2022-08-09 RX ORDER — 0.9 % SODIUM CHLORIDE 0.9 %
1000 INTRAVENOUS SOLUTION INTRAVENOUS ONCE
Status: COMPLETED | OUTPATIENT
Start: 2022-08-09 | End: 2022-08-09

## 2022-08-09 RX ADMIN — IOPAMIDOL 60 ML: 755 INJECTION, SOLUTION INTRAVENOUS at 13:05

## 2022-08-09 RX ADMIN — Medication 10 ML: at 13:05

## 2022-08-09 RX ADMIN — LEVETIRACETAM 1000 MG: 10 INJECTION INTRAVENOUS at 18:57

## 2022-08-09 RX ADMIN — SODIUM CHLORIDE 1000 ML: 9 INJECTION, SOLUTION INTRAVENOUS at 19:00

## 2022-08-09 ASSESSMENT — PAIN - FUNCTIONAL ASSESSMENT: PAIN_FUNCTIONAL_ASSESSMENT: 0-10

## 2022-08-09 ASSESSMENT — ENCOUNTER SYMPTOMS
VOICE CHANGE: 1
BACK PAIN: 0
EYE REDNESS: 0
PHOTOPHOBIA: 0
EYE DISCHARGE: 0
TROUBLE SWALLOWING: 0
VOMITING: 0
SHORTNESS OF BREATH: 0
ABDOMINAL PAIN: 0
DIARRHEA: 0
COUGH: 0
RHINORRHEA: 0
NAUSEA: 0
WHEEZING: 0

## 2022-08-09 ASSESSMENT — PAIN DESCRIPTION - PAIN TYPE: TYPE: ACUTE PAIN

## 2022-08-09 ASSESSMENT — PAIN DESCRIPTION - LOCATION: LOCATION: ARM;LEG

## 2022-08-09 ASSESSMENT — PAIN DESCRIPTION - DESCRIPTORS: DESCRIPTORS: ACHING

## 2022-08-09 ASSESSMENT — PAIN SCALES - GENERAL: PAINLEVEL_OUTOF10: 4

## 2022-08-09 ASSESSMENT — PAIN DESCRIPTION - FREQUENCY: FREQUENCY: CONTINUOUS

## 2022-08-09 NOTE — ED NOTES
Pure wick placed on patient for urine sample because bed pan was not successful     Nate Burn, RN  08/09/22 7604

## 2022-08-09 NOTE — LETTER
[address] Medicaid  CERTIFICATION OF NECESSITY  FOR TRANSPORTATION   BY WHEELCHAIR VAN     Individual Information   1. Name: Luis Quinteros 2. [place] Medicaid Billing Number:    3. Address: [address] [id number] [facility]     Transportation Provider Information   4. Provider Name:    5. [place] Medicaid Provider Number:  National Provider Identifier (NPI):      Certification  7. Criteria:  By signing this document, the practitioner certifies that two statements are true:  A. This individual must be accompanied by a mobility-related assistive device from the point of pick-up to the point of drop-off. B. Transport of this individual by standard passenger vehicle or common carrier is precluded or contraindicated. 8. Period Beginning Date: 8/19/2022     9. Length  [x] Not more than 10 day(s)  [] One Year     Additional Information Relevant to Certification   10. Comments or Explanations, If Necessary or Appropriate   Cancer, Right breast carcinoma, Dysarthria, fall, weakness, PAF       Certifying Practitioner Information   11. Name of Practitioner: Dr. Alba Yi. MD   12. [place] Medicaid Provider Number, If Applicable:  [address] Provider Identifier (NPI):      Signature Information   14. Date of Signature: 8/19/2022   15. Name of Person Signing:   Sinan Weston [facility]   16. Signature and Professional Designation:   Electronically signed by CLARISSE Mendez on 8/19/2022 at 10:10 AM       [facility] [address]  Rev. 7/2015              [address] En[facility] Date/Time: 8/9/2022 Todd Stevens Account: [de-identified]    MRN: [id number]    Patient: Luis Quinteros    Contact Serial #: [id number]      ENCOUNTER          Patient Class: I Private Enc?   No Unit [address] [facility] Service: MED   Encounter DX: Weakness [R53.1]   ADM Provider: Ирина Dewey DO   Procedure:     ATT Provider: Ирина Dewey DO   REF Provider:    Admission DX: Weakness, Lesion of vertebra, Fall, initial encounter and DX codes: R53.1, M89.9, W19. Sue Daily      PATIENT                 Name: Kathleen Abarca : 1960 (62 yrs)   Address: [address] [place] [address] Sex: Female   City: [place] [address]         Marital Status: Single   Employer: NONE         Denominational: Latter day   Primary Care Provider: Truman Apley, DO         Primary Phone: [phone]   EMERGENCY CONTACT   Contact Name Legal Guardian? Relationship to Patient Home Phone Work Phone   1. Kim Guzman  2. *No Contact Specified*      Parent    [phone]                 GUARANTOR            Guarantor: Kathleen Abarca     : 1960   Address: [address] Sex: Female     [address] [place], [place] [address]     Relation to Patient: Self       Home Phone: [phone]   Guarantor ID: [id number]       Work Phone:     Guarantor Employer: NONE         Status: NOT EMPLO*      COVERAGE        PRIMARY INSURANCE   Payor: [facility]* Plan: Yann[address] Man[address] 34*   Payor Address: [place] [address]       Group Number:   Insurance Type: INDEMNITY   Subscriber Name: Lucio Starks : 1960   Subscriber ID: [id number] Pat. Rel. to Sub: Self   SECONDARY INSURANCE   Payor:   Plan:     Payor Address: ,          Group Number:   Insurance Type:     Subscriber Name:   Subscriber :     Subscriber ID:   Pat.  Rel. to Sub:             CSN: [id number]

## 2022-08-09 NOTE — ED PROVIDER NOTES
58y.o. year old female presenting to the emergency room with concerns of fall recurrent beginning a few week prior. Patient reports that symptom's onset for a few weeks, worsening last night. Worsen with nothing. Improves with nothing. Severity of 4 out of 10 pain, with no radiation . Symptoms are constant in timing. Symptoms described as fatigue, weakness, pain in right ankle. Mother reports associated symptoms of slurred speech. Patient in  no acute distress. Patient denies any head injury, states that she been feeling very weak, and had to crawl to the bathroom multiple times where she is unable to ambulate today. Patient lives at home with her mother who is older. Previous history of breast cancer with lumpectomy. Chief Complaint   Patient presents with    Fall     Dizziness, fall hitting right arm and leg, denies loc/thinners       Review of Systems   Constitutional:  Positive for activity change and fatigue. Negative for chills and fever. HENT:  Positive for voice change. Negative for congestion, rhinorrhea and trouble swallowing. Eyes:  Negative for photophobia, discharge, redness and visual disturbance. Respiratory:  Negative for cough, shortness of breath and wheezing. Cardiovascular:  Negative for chest pain and palpitations. Gastrointestinal:  Negative for abdominal pain, diarrhea, nausea and vomiting. Genitourinary:  Negative for dysuria and hematuria. Musculoskeletal:  Positive for gait problem. Negative for arthralgias, back pain and neck pain. Skin:  Negative for rash and wound. Neurological:  Positive for speech difficulty and weakness. Negative for dizziness, syncope and headaches. Psychiatric/Behavioral:  Negative for behavioral problems and confusion. The patient is nervous/anxious. Physical Exam  Vitals reviewed. Constitutional:       General: She is not in acute distress. Appearance: She is not ill-appearing. HENT:      Head: Normocephalic.       Right this   limitation, the findings are as follows:      MRI BRAIN:      No evidence of neoplastic disease. No evidence of recent infarct, acute intracranial hemorrhage, mass effect, or   hydrocephalus. MRA HEAD:      No proximal large vessel arterial occlusion or high-grade stenosis. CERVICAL SPINE:      No evidence of neoplastic disease. Mild to moderate multilevel spinal stenosis secondary to a congenitally   narrow cervical spinal canal, as described above. Moderate to severe left   neural foraminal stenosis at C5-6. THORACIC SPINE:      No evidence of neoplastic disease. No significant spinal or neural foraminal stenosis. MRI CERVICAL SPINE WO CONTRAST   Final Result   Motion artifact significantly limits evaluation. Allowing for this   limitation, the findings are as follows:      MRI BRAIN:      No evidence of neoplastic disease. No evidence of recent infarct, acute intracranial hemorrhage, mass effect, or   hydrocephalus. MRA HEAD:      No proximal large vessel arterial occlusion or high-grade stenosis. CERVICAL SPINE:      No evidence of neoplastic disease. Mild to moderate multilevel spinal stenosis secondary to a congenitally   narrow cervical spinal canal, as described above. Moderate to severe left   neural foraminal stenosis at C5-6. THORACIC SPINE:      No evidence of neoplastic disease. No significant spinal or neural foraminal stenosis. MRI BRAIN WO CONTRAST   Final Result   Motion artifact significantly limits evaluation. Allowing for this   limitation, the findings are as follows:      MRI BRAIN:      No evidence of neoplastic disease. No evidence of recent infarct, acute intracranial hemorrhage, mass effect, or   hydrocephalus. MRA HEAD:      No proximal large vessel arterial occlusion or high-grade stenosis. CERVICAL SPINE:      No evidence of neoplastic disease.       Mild to moderate multilevel spinal stenosis secondary to a congenitally   narrow cervical spinal canal, as described above. Moderate to severe left   neural foraminal stenosis at C5-6. THORACIC SPINE:      No evidence of neoplastic disease. No significant spinal or neural foraminal stenosis. MRA HEAD WO CONTRAST   Final Result   Motion artifact significantly limits evaluation. Allowing for this   limitation, the findings are as follows:      MRI BRAIN:      No evidence of neoplastic disease. No evidence of recent infarct, acute intracranial hemorrhage, mass effect, or   hydrocephalus. MRA HEAD:      No proximal large vessel arterial occlusion or high-grade stenosis. CERVICAL SPINE:      No evidence of neoplastic disease. Mild to moderate multilevel spinal stenosis secondary to a congenitally   narrow cervical spinal canal, as described above. Moderate to severe left   neural foraminal stenosis at C5-6. THORACIC SPINE:      No evidence of neoplastic disease. No significant spinal or neural foraminal stenosis. US DUP LOWER EXTREMITY RIGHT LUCIAN   Final Result   Within the visualized vessels there is no evidence for deep venous   thrombosis               US CAROTID ARTERY BILATERAL   Final Result   Atherosclerotic disease. No hemodynamically significant stenosis is   identified   Estimated stenosis by NASCET criteria in the proximal right carotid   artery is between 0% and 49%. Estimated stenosis by NASCET criteria in the proximal left carotid   artery is between 0% and 49%. CT Head WO Contrast   Final Result   No acute intracranial abnormality. CTA PULMONARY W CONTRAST   Final Result   1. No evidence of pulmonary embolism. 2. No pneumonia or pleural effusion. 3. Noncalcified nodule located in right upper lobe measures approximately 6   mm. 4. Focal sclerotic lesion located in T8 vertebral body. Bone scan could be   helpful to exclude active lesion.       RECOMMENDATIONS: Fleischner Society guidelines for follow-up and management of incidentally   detected pulmonary nodules:      Single Solid Nodule:      Nodule size less than 6 mm   In a low-risk patient, no routine follow-up. In a high-risk patient, optional CT at 12 months. Nodule size equals 6-8 mm   In a low-risk patient, CT at 6-12 months, then consider CT at 18-24 months. In a high-risk patient, CT at 6-12 months, then CT at 18-24 months. Nodule size greater than 8 mm         In a low-risk patient, consider CT at 3 months, PET/CT, or tissue sampling. In a high-risk patient, consider CT at 3 months, PET/CT, or tissue sampling. Multiple Solid Nodules:      Nodule size less than 6 mm   In a low-risk patient, no routine follow-up. In a high-risk patient, optional CT at 12 months. Nodule size equals 6-8 mm   In a low-risk patient, CT at 3-6 months, then consider CT at 18-24 months. In a high-risk patient, CT at 3-6 months, then CT at 18-24 months. Nodule size greater than 8 mm   In a low-risk patient, CT at 3-6 months, then consider CT at 18-24 months. In a high-risk patient, CT at 3-6 months, then CT at 18-24 months. - Low risk patients include individuals with minimal or absent history of   smoking and other known risk factors. - High risk patients include individuals with a history or smoking or known   risk factors. Radiology 2017 http://pubs. rsna.org/doi/full/10.1148/radiol. 6214358289         XR ANKLE RIGHT (MIN 3 VIEWS)   Final Result   No fracture or dislocation. Small plantar calcaneal bone spur. XR CHEST PORTABLE   Final Result   No acute process.          PET CT WHOLE BODY    (Results Pending)   Fluoroscopy modified barium swallow with video    (Results Pending)   FL LUMBAR PUNCTURE DIAG    (Results Pending)         MDM  Number of Diagnoses or Management Options  Dysarthria  Fall, initial encounter  Lesion of vertebra  Diagnosis management comments: [age]-year-old female presenting to emergency department complaints of falls home. Patient with previous history of breast cancer able to move all hands and limbs without difficulty, pain of right ankle which patient states is due to arthritis. Patient ANO x4 on initial examination, no acute distress. Slight dysarthria noted, patient with hearing loss at baseline. Patient does not have her hearing aids. No elevation of white count noted. D-dimer elevated CTA ordered no pulmonary embolism noted focal sclerotic lesion noted at T8 vertebra. Spoke to mother who is now present at bedside mother reports that patient is more slightly reports than her usual, has been complaining of weakness which worsened last night with multiple falls today. Mother also noted that patient had elevated blood pressure taken by EMS. Mother also notes that patient becomes nervous around healthcare settings. Attempted to ambulate patient however patient unable to ambulate which is not at patient's baseline. Spoke to hospitalist Dr. Yue Kirkpatrick, discussed patient who accepted patient for admission and recommended neurosurgery consult. Spoke to Dr. Rigoberto Abarca discussed patient recommended Keppra and MRI brain and spine. MRI is ordered. Nurse informed resident that patient with increased anxiety unable to remain still enough for MRI pictures. Versed ordered however prior to administration patient refused MRIs. Dr. Ashleigh Boyd informed         --------------------------------------------- PAST HISTORY ---------------------------------------------  Past Medical History:  has a past medical history of Breast cancer ([facility] [address] Cancer ([facility] [address] Hearing impaired, Hearing loss, and Hyperlipidemia. Past Surgical History:  has a past surgical history that includes [address] RIGHT (2021); eye surgery; Breast lumpectomy (Right, 11/3/2021); and Breast lumpectomy (Right, 12/1/2021).     Social History:  reports that she has never Virus 2 by PCR Not Detected Not Detected    Parainfluenza Virus 3 by PCR Not Detected Not Detected    Parainfluenza Virus 4 by PCR Not Detected Not Detected    Respiratory Syncytial Virus by PCR Not Detected Not Detected   CBC with Auto Differential   Result Value Ref Range    WBC 5.4 4.5 - 11.5 E9/L    RBC 5.25 3.50 - 5.50 E12/L    Hemoglobin 15.0 11.5 - 15.5 g/dL    Hematocrit 45.2 34.0 - 48.0 %    MCV 86.1 80.0 - 99.9 fL    MCH 28.6 26.0 - 35.0 pg    MCHC 33.2 32.0 - 34.5 %    RDW 15.4 (H) 11.5 - 15.0 fL    Platelets 858 014 - 559 E9/L    MPV 9.8 7.0 - 12.0 fL    Neutrophils % 92.2 (H) 43.0 - 80.0 %    Lymphocytes % 4.3 (L) 20.0 - 42.0 %    Monocytes % 2.6 2.0 - 12.0 %    Eosinophils % 0.2 0.0 - 6.0 %    Basophils % 0.9 0.0 - 2.0 %    Neutrophils Absolute 4.97 1.80 - 7.30 E9/L    Lymphocytes Absolute 0.22 (L) 1.50 - 4.00 E9/L    Monocytes Absolute 0.16 0.10 - 0.95 E9/L    Eosinophils Absolute 0.00 (L) 0.05 - 0.50 E9/L    Basophils Absolute 0.05 0.00 - 0.20 E9/L    Anisocytosis 1+     Polychromasia 1+     Poikilocytes 1+     [place] Cells 1+    Comprehensive Metabolic Panel w/ Reflex to MG   Result Value Ref Range    Sodium 139 132 - 146 mmol/L    Potassium reflex Magnesium 4.0 3.5 - 5.0 mmol/L    Chloride 105 98 - 107 mmol/L    CO2 22 22 - 29 mmol/L    Anion Gap 12 7 - 16 mmol/L    Glucose 150 (H) 74 - 99 mg/dL    BUN 11 6 - 23 mg/dL    Creatinine 0.8 0.5 - 1.0 mg/dL    GFR Non-African American >60 >=60 mL/min/1.73    GFR African American >60     Calcium 9.6 8.6 - 10.2 mg/dL    Total Protein 7.3 6.4 - 8.3 g/dL    Albumin 4.3 3.5 - 5.2 g/dL    Total Bilirubin 0.4 0.0 - 1.2 mg/dL    Alkaline Phosphatase 71 35 - 104 U/L    ALT 21 0 - 32 U/L    AST 23 0 - 31 U/L   Troponin   Result Value Ref Range    Troponin, High Sensitivity 11 (H) 0 - 9 ng/L   Urinalysis with Microscopic   Result Value Ref Range    Color, UA Yellow Straw/Yellow    Clarity, UA Clear Clear    Glucose, Ur Negative Negative mg/dL    Bilirubin Urine Negative Negative    Ketones, Urine TRACE (A) Negative mg/dL    Specific Gravity, UA <=1.005 1.005 - 1.030    Blood, Urine Negative Negative    pH, UA 7.0 5.0 - 9.0    Protein, UA Negative Negative mg/dL    Urobilinogen, Urine 0.2 <2.0 E.U./dL    Nitrite, Urine Negative Negative    Leukocyte Esterase, Urine Negative Negative    WBC, UA NONE 0 - 5 /HPF    RBC, UA NONE 0 - 2 /HPF    Bacteria, UA NONE SEEN None Seen /HPF   D-Dimer, Quantitative   Result Value Ref Range    D-Dimer, Quant 431 ng/mL DDU   Troponin   Result Value Ref Range    Troponin, High Sensitivity 10 (H) 0 - 9 ng/L   Lactic Acid   Result Value Ref Range    Lactic Acid 1.1 0.5 - 2.2 mmol/L   TSH   Result Value Ref Range    TSH 0.644 0.270 - 4.200 uIU/mL   T3, Free   Result Value Ref Range    T3, Free 1.7 (L) 2.0 - 4.4 pg/mL   T4, Free   Result Value Ref Range    T4 Free 1.03 0.93 - 1.70 ng/dL   CBC   Result Value Ref Range    WBC 5.4 4.5 - 11.5 E9/L    RBC 4.36 3.50 - 5.50 E12/L    Hemoglobin 12.6 11.5 - 15.5 g/dL    Hematocrit 38.8 34.0 - 48.0 %    MCV 89.0 80.0 - 99.9 fL    MCH 28.9 26.0 - 35.0 pg    MCHC 32.5 32.0 - 34.5 %    RDW 15.7 (H) 11.5 - 15.0 fL    Platelets 170 (L) 668 - 450 E9/L    MPV 9.7 7.0 - 12.0 fL   Comprehensive Metabolic Panel   Result Value Ref Range    Sodium 138 132 - 146 mmol/L    Potassium 3.8 3.5 - 5.0 mmol/L    Chloride 106 98 - 107 mmol/L    CO2 19 (L) 22 - 29 mmol/L    Anion Gap 13 7 - 16 mmol/L    Glucose 101 (H) 74 - 99 mg/dL    BUN 10 6 - 23 mg/dL    Creatinine 0.8 0.5 - 1.0 mg/dL    GFR Non-African American >60 >=60 mL/min/1.73    GFR African American >60     Calcium 8.0 (L) 8.6 - 10.2 mg/dL    Total Protein 6.0 (L) 6.4 - 8.3 g/dL    Albumin 3.4 (L) 3.5 - 5.2 g/dL    Total Bilirubin 0.4 0.0 - 1.2 mg/dL    Alkaline Phosphatase 72 35 - 104 U/L    ALT 42 (H) 0 - 32 U/L    AST 50 (H) 0 - 31 U/L   Lipid panel   Result Value Ref Range    Cholesterol, Total 128 0 - 199 mg/dL    Triglycerides 148 0 - 149 mg/dL    HDL 35 >40 mg/dL    LDL Calculated 63 0 - 99 mg/dL    VLDL Cholesterol Calculated 30 mg/dL   Hemoglobin A1C   Result Value Ref Range    Hemoglobin A1C 6.1 (H) 4.0 - 5.6 %   Ammonia   Result Value Ref Range    Ammonia 27.0 11.0 - 51.0 umol/L   Protime-INR   Result Value Ref Range    Protime 13.8 (H) 9.3 - 12.4 sec    INR 1.3    POCT Glucose   Result Value Ref Range    QC OK? y    POCT Glucose   Result Value Ref Range    Meter Glucose 138 (H) 74 - 99 mg/dL   EKG 12 Lead   Result Value Ref Range    Ventricular Rate 109 BPM    Atrial Rate 109 BPM    P-R Interval 116 ms    QRS Duration 72 ms    Q-T Interval 350 ms    QTc Calculation (Bazett) 471 ms    P Axis 63 degrees    R Axis 53 degrees    T Axis -64 degrees       RADIOLOGY:  XR CHEST PORTABLE   Final Result   Low lung volumes. No acute disease. XR FOOT RIGHT (MIN 3 VIEWS)   Final Result   No acute fracture or dislocation. Probable old chip fracture involving the   distal ventral tibia. Significant osteo-degenerative changes involving the   1st MTP joint. Hallux rigidus cannot be excluded. Small plantar calcaneal   bone spur. XR SHOULDER RIGHT (MIN 2 VIEWS)   Final Result   No acute abnormality of the right shoulder. MRI THORACIC SPINE WO CONTRAST   Final Result   Motion artifact significantly limits evaluation. Allowing for this   limitation, the findings are as follows:      MRI BRAIN:      No evidence of neoplastic disease. No evidence of recent infarct, acute intracranial hemorrhage, mass effect, or   hydrocephalus. MRA HEAD:      No proximal large vessel arterial occlusion or high-grade stenosis. CERVICAL SPINE:      No evidence of neoplastic disease. Mild to moderate multilevel spinal stenosis secondary to a congenitally   narrow cervical spinal canal, as described above. Moderate to severe left   neural foraminal stenosis at C5-6. THORACIC SPINE:      No evidence of neoplastic disease.       No significant spinal or neural foraminal stenosis. MRI CERVICAL SPINE WO CONTRAST   Final Result   Motion artifact significantly limits evaluation. Allowing for this   limitation, the findings are as follows:      MRI BRAIN:      No evidence of neoplastic disease. No evidence of recent infarct, acute intracranial hemorrhage, mass effect, or   hydrocephalus. MRA HEAD:      No proximal large vessel arterial occlusion or high-grade stenosis. CERVICAL SPINE:      No evidence of neoplastic disease. Mild to moderate multilevel spinal stenosis secondary to a congenitally   narrow cervical spinal canal, as described above. Moderate to severe left   neural foraminal stenosis at C5-6. THORACIC SPINE:      No evidence of neoplastic disease. No significant spinal or neural foraminal stenosis. MRI BRAIN WO CONTRAST   Final Result   Motion artifact significantly limits evaluation. Allowing for this   limitation, the findings are as follows:      MRI BRAIN:      No evidence of neoplastic disease. No evidence of recent infarct, acute intracranial hemorrhage, mass effect, or   hydrocephalus. MRA HEAD:      No proximal large vessel arterial occlusion or high-grade stenosis. CERVICAL SPINE:      No evidence of neoplastic disease. Mild to moderate multilevel spinal stenosis secondary to a congenitally   narrow cervical spinal canal, as described above. Moderate to severe left   neural foraminal stenosis at C5-6. THORACIC SPINE:      No evidence of neoplastic disease. No significant spinal or neural foraminal stenosis. MRA HEAD WO CONTRAST   Final Result   Motion artifact significantly limits evaluation. Allowing for this   limitation, the findings are as follows:      MRI BRAIN:      No evidence of neoplastic disease. No evidence of recent infarct, acute intracranial hemorrhage, mass effect, or   hydrocephalus.       MRA HEAD:      No proximal large vessel arterial occlusion or high-grade stenosis. CERVICAL SPINE:      No evidence of neoplastic disease. Mild to moderate multilevel spinal stenosis secondary to a congenitally   narrow cervical spinal canal, as described above. Moderate to severe left   neural foraminal stenosis at C5-6. THORACIC SPINE:      No evidence of neoplastic disease. No significant spinal or neural foraminal stenosis. US DUP LOWER EXTREMITY RIGHT LUCIAN   Final Result   Within the visualized vessels there is no evidence for deep venous   thrombosis               US CAROTID ARTERY BILATERAL   Final Result   Atherosclerotic disease. No hemodynamically significant stenosis is   identified   Estimated stenosis by NASCET criteria in the proximal right carotid   artery is between 0% and 49%. Estimated stenosis by NASCET criteria in the proximal left carotid   artery is between 0% and 49%. CT Head WO Contrast   Final Result   No acute intracranial abnormality. CTA PULMONARY W CONTRAST   Final Result   1. No evidence of pulmonary embolism. 2. No pneumonia or pleural effusion. 3. Noncalcified nodule located in right upper lobe measures approximately 6   mm. 4. Focal sclerotic lesion located in T8 vertebral body. Bone scan could be   helpful to exclude active lesion. RECOMMENDATIONS:   Fleischner Society guidelines for follow-up and management of incidentally   detected pulmonary nodules:      Single Solid Nodule:      Nodule size less than 6 mm   In a low-risk patient, no routine follow-up. In a high-risk patient, optional CT at 12 months. Nodule size equals 6-8 mm   In a low-risk patient, CT at 6-12 months, then consider CT at 18-24 months. In a high-risk patient, CT at 6-12 months, then CT at 18-24 months. Nodule size greater than 8 mm         In a low-risk patient, consider CT at 3 months, PET/CT, or tissue sampling.       In a high-risk patient, consider CT at 3 months, PET/CT, or tissue sampling. Multiple Solid Nodules:      Nodule size less than 6 mm   In a low-risk patient, no routine follow-up. In a high-risk patient, optional CT at 12 months. Nodule size equals 6-8 mm   In a low-risk patient, CT at 3-6 months, then consider CT at 18-24 months. In a high-risk patient, CT at 3-6 months, then CT at 18-24 months. Nodule size greater than 8 mm   In a low-risk patient, CT at 3-6 months, then consider CT at 18-24 months. In a high-risk patient, CT at 3-6 months, then CT at 18-24 months. - Low risk patients include individuals with minimal or absent history of   smoking and other known risk factors. - High risk patients include individuals with a history or smoking or known   risk factors. Radiology 2017 http://pubs. rsna.org/doi/full/10.1148/radiol. 6082428762         XR ANKLE RIGHT (MIN 3 VIEWS)   Final Result   No fracture or dislocation. Small plantar calcaneal bone spur. XR CHEST PORTABLE   Final Result   No acute process. PET CT WHOLE BODY    (Results Pending)   Fluoroscopy modified barium swallow with video    (Results Pending)   FL LUMBAR PUNCTURE DIAG    (Results Pending)           ------------------------- NURSING NOTES AND VITALS REVIEWED ---------------------------  Date / Time Roomed:  8/9/2022 11:24 AM  ED Bed Assignment:  [id number]    The nursing notes within the ED encounter and vital signs as below have been reviewed.      Patient Vitals for the past 24 hrs:   BP Temp Temp src Pulse Resp SpO2   08/11/22 1645 135/66 100 °F (37.8 °C) Oral (!) 108 -- 94 %   08/11/22 0931 (!) 156/79 98.1 °F (36.7 °C) Temporal 97 24 96 %   08/11/22 0200 (!) 157/71 96.8 °F (36 °C) Temporal (!) 110 18 98 %   08/10/22 2145 131/68 (!) 96.7 °F (35.9 °C) Temporal (!) 110 18 99 %       Oxygen Saturation Interpretation: Normal    ------------------------------------------ PROGRESS NOTES ------------------------------------------  Re-evaluation(s):    Patients symptoms show no change  Repeat physical examination is not changed    Counseling:  I have spoken with the patient and discussed todays results, in addition to providing specific details for the plan of care and counseling regarding the diagnosis and prognosis. Their questions are answered at this time and they are agreeable with the plan of admission.    --------------------------------- ADDITIONAL PROVIDER NOTES ---------------------------------  Consultations:  . Spoke with Dr. Daron Bran. Discussed case. They will admit the patient. This patient's ED course included: a personal history and physicial examination, re-evaluation prior to disposition, multiple bedside re-evaluations, IV medications, cardiac monitoring, and continuous pulse oximetry    This patient has remained hemodynamically stable during their ED course. Diagnosis:  1. Fall, initial encounter    2. Lesion of vertebra    3. Dysarthria        Disposition:  Patient's disposition: Admit   Patient's condition is stable. Attending was present and available throughout encounter including all critical portions; See Attending Note/Attestation for Final Plan       Elayne Boas, DO  Resident  08/09/22 [id number]     ATTENDING PROVIDER ATTESTATION:     I,  Dr. Jarad Solorzano am the primary physician of record for this patient. Keith Cabrera presented to the emergency department for evaluation of Fall (Dizziness, fall hitting right arm and leg, denies loc/thinners)   and was initially evaluated by the Medical Resident. See Original ED Note for H&P and ED course above. I have reviewed and discussed the case, including pertinent history (medical, surgical, family and social) and exam findings with the Medical Resident assigned to Keith Cabrera. I have personally performed and/or participated in the history, exam, medical decision making, and procedures and agree with all pertinent clinical information. EKG:   This EKG is signed and interpreted by

## 2022-08-09 NOTE — ED NOTES
Ortho statics    Laying down- BP- 154/85, P- 110    Sitting Up- BP- 157/97       P- 11    Standing Up- /80, P- 112     Lizbeth Nolan RN  08/09/22 [id number]

## 2022-08-10 ENCOUNTER — APPOINTMENT (OUTPATIENT)
Dept: MRI IMAGING | Age: 62
DRG: 051 | End: 2022-08-10
Payer: COMMERCIAL

## 2022-08-10 ENCOUNTER — APPOINTMENT (OUTPATIENT)
Dept: ULTRASOUND IMAGING | Age: 62
DRG: 051 | End: 2022-08-10
Payer: COMMERCIAL

## 2022-08-10 LAB
ADENOVIRUS BY PCR: NOT DETECTED
ALBUMIN SERPL-MCNC: 3.4 G/DL (ref 3.5–5.2)
ALP BLD-CCNC: 72 U/L (ref 35–104)
ALT SERPL-CCNC: 42 U/L (ref 0–32)
ANION GAP SERPL CALCULATED.3IONS-SCNC: 13 MMOL/L (ref 7–16)
AST SERPL-CCNC: 50 U/L (ref 0–31)
BILIRUB SERPL-MCNC: 0.4 MG/DL (ref 0–1.2)
BORDETELLA PARAPERTUSSIS BY PCR: NOT DETECTED
BORDETELLA PERTUSSIS BY PCR: NOT DETECTED
BUN BLDV-MCNC: 10 MG/DL (ref 6–23)
CALCIUM SERPL-MCNC: 8 MG/DL (ref 8.6–10.2)
CHLAMYDOPHILIA PNEUMONIAE BY PCR: NOT DETECTED
CHLORIDE BLD-SCNC: 106 MMOL/L (ref 98–107)
CO2: 19 MMOL/L (ref 22–29)
CORONAVIRUS 229E BY PCR: NOT DETECTED
CORONAVIRUS HKU1 BY PCR: NOT DETECTED
CORONAVIRUS NL63 BY PCR: NOT DETECTED
CORONAVIRUS OC43 BY PCR: NOT DETECTED
CREAT SERPL-MCNC: 0.8 MG/DL (ref 0.5–1)
GFR AFRICAN AMERICAN: >60
GFR NON-AFRICAN AMERICAN: >60 ML/MIN/1.73
GLUCOSE BLD-MCNC: 101 MG/DL (ref 74–99)
HCT VFR BLD CALC: 38.8 % (ref 34–48)
HEMOGLOBIN: 12.6 G/DL (ref 11.5–15.5)
HUMAN METAPNEUMOVIRUS BY PCR: NOT DETECTED
HUMAN RHINOVIRUS/ENTEROVIRUS BY PCR: NOT DETECTED
INFLUENZA A BY PCR: NOT DETECTED
INFLUENZA B BY PCR: NOT DETECTED
LACTIC ACID: 1.1 MMOL/L (ref 0.5–2.2)
LV EF: 73 %
LVEF MODALITY: NORMAL
MCH RBC QN AUTO: 28.9 PG (ref 26–35)
MCHC RBC AUTO-ENTMCNC: 32.5 % (ref 32–34.5)
MCV RBC AUTO: 89 FL (ref 80–99.9)
MYCOPLASMA PNEUMONIAE BY PCR: NOT DETECTED
PARAINFLUENZA VIRUS 1 BY PCR: NOT DETECTED
PARAINFLUENZA VIRUS 2 BY PCR: NOT DETECTED
PARAINFLUENZA VIRUS 3 BY PCR: NOT DETECTED
PARAINFLUENZA VIRUS 4 BY PCR: NOT DETECTED
PDW BLD-RTO: 15.7 FL (ref 11.5–15)
PLATELET # BLD: 113 E9/L (ref 130–450)
PMV BLD AUTO: 9.7 FL (ref 7–12)
POTASSIUM SERPL-SCNC: 3.8 MMOL/L (ref 3.5–5)
RBC # BLD: 4.36 E12/L (ref 3.5–5.5)
RESPIRATORY SYNCYTIAL VIRUS BY PCR: NOT DETECTED
SARS-COV-2, PCR: NOT DETECTED
SODIUM BLD-SCNC: 138 MMOL/L (ref 132–146)
T3 FREE: 1.7 PG/ML (ref 2–4.4)
T4 FREE: 1.03 NG/DL (ref 0.93–1.7)
TOTAL PROTEIN: 6 G/DL (ref 6.4–8.3)
TSH SERPL DL<=0.05 MIU/L-ACNC: 0.64 UIU/ML (ref 0.27–4.2)
URINE CULTURE, ROUTINE: NORMAL
WBC # BLD: 5.4 E9/L (ref 4.5–11.5)

## 2022-08-10 PROCEDURE — 85027 COMPLETE CBC AUTOMATED: CPT

## 2022-08-10 PROCEDURE — 93971 EXTREMITY STUDY: CPT | Performed by: RADIOLOGY

## 2022-08-10 PROCEDURE — 93306 TTE W/DOPPLER COMPLETE: CPT

## 2022-08-10 PROCEDURE — 0202U NFCT DS 22 TRGT SARS-COV-2: CPT

## 2022-08-10 PROCEDURE — 99221 1ST HOSP IP/OBS SF/LOW 40: CPT

## 2022-08-10 PROCEDURE — 72141 MRI NECK SPINE W/O DYE: CPT

## 2022-08-10 PROCEDURE — 93880 EXTRACRANIAL BILAT STUDY: CPT | Performed by: RADIOLOGY

## 2022-08-10 PROCEDURE — 2060000000 HC ICU INTERMEDIATE R&B

## 2022-08-10 PROCEDURE — 84443 ASSAY THYROID STIM HORMONE: CPT

## 2022-08-10 PROCEDURE — 6360000002 HC RX W HCPCS: Performed by: NEUROLOGICAL SURGERY

## 2022-08-10 PROCEDURE — 99254 IP/OBS CNSLTJ NEW/EST MOD 60: CPT | Performed by: NEUROLOGICAL SURGERY

## 2022-08-10 PROCEDURE — 70551 MRI BRAIN STEM W/O DYE: CPT

## 2022-08-10 PROCEDURE — 84439 ASSAY OF FREE THYROXINE: CPT

## 2022-08-10 PROCEDURE — 2580000003 HC RX 258: Performed by: INTERNAL MEDICINE

## 2022-08-10 PROCEDURE — 80053 COMPREHEN METABOLIC PANEL: CPT

## 2022-08-10 PROCEDURE — 70544 MR ANGIOGRAPHY HEAD W/O DYE: CPT

## 2022-08-10 PROCEDURE — 2580000003 HC RX 258

## 2022-08-10 PROCEDURE — 72146 MRI CHEST SPINE W/O DYE: CPT

## 2022-08-10 PROCEDURE — 83605 ASSAY OF LACTIC ACID: CPT

## 2022-08-10 PROCEDURE — 36415 COLL VENOUS BLD VENIPUNCTURE: CPT

## 2022-08-10 PROCEDURE — 6370000000 HC RX 637 (ALT 250 FOR IP): Performed by: INTERNAL MEDICINE

## 2022-08-10 PROCEDURE — 93971 EXTREMITY STUDY: CPT

## 2022-08-10 PROCEDURE — 6360000002 HC RX W HCPCS

## 2022-08-10 PROCEDURE — 84481 FREE ASSAY (FT-3): CPT

## 2022-08-10 PROCEDURE — 6360000002 HC RX W HCPCS: Performed by: INTERNAL MEDICINE

## 2022-08-10 PROCEDURE — 93880 EXTRACRANIAL BILAT STUDY: CPT

## 2022-08-10 RX ORDER — SODIUM CHLORIDE 9 MG/ML
INJECTION, SOLUTION INTRAVENOUS CONTINUOUS
Status: DISCONTINUED | OUTPATIENT
Start: 2022-08-10 | End: 2022-08-12

## 2022-08-10 RX ORDER — LEVETIRACETAM 5 MG/ML
500 INJECTION INTRAVASCULAR EVERY 12 HOURS
Status: DISCONTINUED | OUTPATIENT
Start: 2022-08-10 | End: 2022-08-16

## 2022-08-10 RX ORDER — DIAZEPAM 5 MG/ML
5 INJECTION, SOLUTION INTRAMUSCULAR; INTRAVENOUS
Status: COMPLETED | OUTPATIENT
Start: 2022-08-10 | End: 2022-08-10

## 2022-08-10 RX ADMIN — SODIUM CHLORIDE: 9 INJECTION, SOLUTION INTRAVENOUS at 15:16

## 2022-08-10 RX ADMIN — LEVETIRACETAM 500 MG: 5 INJECTION INTRAVENOUS at 21:52

## 2022-08-10 RX ADMIN — Medication 10 ML: at 20:09

## 2022-08-10 RX ADMIN — ANASTROZOLE 1 MG: 1 TABLET ORAL at 10:28

## 2022-08-10 RX ADMIN — ENOXAPARIN SODIUM 40 MG: 100 INJECTION SUBCUTANEOUS at 13:08

## 2022-08-10 RX ADMIN — SODIUM CHLORIDE: 9 INJECTION, SOLUTION INTRAVENOUS at 00:11

## 2022-08-10 RX ADMIN — Medication 10 ML: at 10:31

## 2022-08-10 RX ADMIN — SODIUM CHLORIDE: 9 INJECTION, SOLUTION INTRAVENOUS at 13:04

## 2022-08-10 RX ADMIN — DIAZEPAM 5 MG: 5 INJECTION, SOLUTION INTRAMUSCULAR; INTRAVENOUS at 19:39

## 2022-08-10 RX ADMIN — ATORVASTATIN CALCIUM 10 MG: 10 TABLET, FILM COATED ORAL at 00:07

## 2022-08-10 RX ADMIN — ACETAMINOPHEN 650 MG: 325 TABLET, FILM COATED ORAL at 10:27

## 2022-08-10 ASSESSMENT — PAIN SCALES - GENERAL
PAINLEVEL_OUTOF10: 0
PAINLEVEL_OUTOF10: 0
PAINLEVEL_OUTOF10: 10

## 2022-08-10 ASSESSMENT — PAIN DESCRIPTION - LOCATION: LOCATION: FOOT

## 2022-08-10 ASSESSMENT — PAIN DESCRIPTION - DESCRIPTORS: DESCRIPTORS: ACHING

## 2022-08-10 ASSESSMENT — PAIN SCALES - WONG BAKER: WONGBAKER_NUMERICALRESPONSE: 2

## 2022-08-10 ASSESSMENT — PAIN DESCRIPTION - ORIENTATION: ORIENTATION: RIGHT

## 2022-08-10 NOTE — ED NOTES
Notified Dr. Emilie Paris via answering service that pt refused mri, Dr. Misty Temple also made aware.      Marianela Vazquez RN  08/09/22 2027

## 2022-08-10 NOTE — CONSULTS
No    Genitourinary  Difficulty with Urination: No    Integumentary  Rash: No    Musculoskeletal  Back Pain: No    Neurological  Headaches: No  Weakness: Yes  Numbness: No  Seizures: No  Difficulty with Memory: No  Further symptoms noted in HPI    Psychiatric  Anxiety: No  Depression: No    Complete 10-point review of systems is negative except as noted above in my HPI    Medical History:   Past Medical History:   Diagnosis Date    Breast cancer ([facility] [address]     Right breast    Cancer ([facility] [address]     Hearing impaired     Hearing loss     Hyperlipidemia         Surgical History:   Past Surgical History:   Procedure Laterality Date    BREAST LUMPECTOMY Right 11/3/2021    RIGHT BREAST LUMPECTOMY, BLUE DYE INJECTION, RIGHT AXILLARY SENTINEL LYMPH NODE INCISION performed by June Mclaughlin MD at [place] [address] LUMPECTOMY Right 12/1/2021    RE-EXCISION RIGHT BREAST INFERIOR MARGIN performed by June Mclaughlin MD at [facility]  2021        Family History:   Family History   Problem Relation Age of Onset    Heart Disease Mother     High Blood Pressure Mother     High Cholesterol Mother     Other Father         COPD    Dementia Maternal Grandmother     Cancer Maternal Cousin         colon       Social History:  Social History     Tobacco Use    Smoking status: Never    Smokeless tobacco: Never   Vaping Use    Vaping Use: Never used   Substance Use Topics    Alcohol use: Never    Drug use: Never        Current Medications:      Current Facility-Administered Medications   Medication Dose Route Frequency Provider Last Rate Last Admin    perflutren lipid microspheres (DEFINITY) injection 1.65 mg  1.5 mL IntraVENous ONCE PRN Germaine Diaz, DO        anastrozole (ARIMIDEX) tablet 1 mg  1 mg Oral Daily Germaine Diaz, DO        sodium chloride flush 0.9 % injection 5-40 mL  5-40 mL IntraVENous 2 times per day Germaine Diaz DO        sodium chloride flush 0.9 % injection 5-40 mL  5-40 mL bilaterally. EOMs: full, no nystagmus. V. Facial sensation intact to light touch bilaterally  VII: Facial movements symmetric and strong  VIII: Hearing intact to voice  IX,X: Palate elevates symmetrically. Positive for dysarthria  XI: Sternocleidomastoid and trapezius 5/5 bilaterally   XII: Tongue deviates to the left    Motor     Right Left   Right Left   Deltoid 4- 5-  Hip Flexion 4- 5-   Biceps     4-  5-  Knee Extension 4- 5-   Triceps 4- 5-  Knee Flexion 4- 5-   Handgrip 4- 5-  Ankle Dorsiflexion 4- 5-       Ankle Plantarflexion 4- 5-     Tone: Normal in all four limbs    Bulk: Normal in all four limbs with no evidence of atrophy    Pronator drift: absent bilaterally    Sensation  Light Touch: Intact distally in all four limbs  Pinprick: Intact distally in all four limbs  Vibration: Intact distally in all four limbs    Reflexes     Right Left   Biceps 2 2   Brachioradialis 2 2   Triceps 2 2   Patellar 2 2   Achilles 2 2   ankle clonus none none   Babinski absent absent     Coordination  Rapid alternating movements clumsy in bilateral upper extremities  Finger to nose testing ataxic bilaterally  Heel to shin testing ataxic bilaterally    Gait  Deferred for safety/fall consideration      Labs  Recent Labs     08/09/22  1157      K 4.0      CO2 22   BUN 11   CREATININE 0.8   GLUCOSE 150*   CALCIUM 9.6   PROT 7.3   LABALBU 4.3   BILITOT 0.4   ALKPHOS 71   AST 23   ALT 21   WBC 5.4   RBC 5.25   HGB 15.0   HCT 45.2   MCV 86.1   MCH 28.6   MCHC 33.2   RDW 15.4*      MPV 9.8         Imaging  MRI CERVICAL SPINE W WO CONTRAST         MRI THORACIC SPINE W WO CONTRAST         CT Head WO Contrast   Final Result   No acute intracranial abnormality. CTA PULMONARY W CONTRAST   Final Result   1. No evidence of pulmonary embolism. 2. No pneumonia or pleural effusion. 3. Noncalcified nodule located in right upper lobe measures approximately 6   mm.    4. Focal sclerotic lesion located in T8 vertebral body. Bone scan could be   helpful to exclude active lesion. RECOMMENDATIONS:   Fleischner Society guidelines for follow-up and management of incidentally   detected pulmonary nodules:      Single Solid Nodule:      Nodule size less than 6 mm   In a low-risk patient, no routine follow-up. In a high-risk patient, optional CT at 12 months. Nodule size equals 6-8 mm   In a low-risk patient, CT at 6-12 months, then consider CT at 18-24 months. In a high-risk patient, CT at 6-12 months, then CT at 18-24 months. Nodule size greater than 8 mm         In a low-risk patient, consider CT at 3 months, PET/CT, or tissue sampling. In a high-risk patient, consider CT at 3 months, PET/CT, or tissue sampling. Multiple Solid Nodules:      Nodule size less than 6 mm   In a low-risk patient, no routine follow-up. In a high-risk patient, optional CT at 12 months. Nodule size equals 6-8 mm   In a low-risk patient, CT at 3-6 months, then consider CT at 18-24 months. In a high-risk patient, CT at 3-6 months, then CT at 18-24 months. Nodule size greater than 8 mm   In a low-risk patient, CT at 3-6 months, then consider CT at 18-24 months. In a high-risk patient, CT at 3-6 months, then CT at 18-24 months. - Low risk patients include individuals with minimal or absent history of   smoking and other known risk factors. - High risk patients include individuals with a history or smoking or known   risk factors. Radiology 2017 http://pubs. rsna.org/doi/full/10.1148/radiol. 3517877414         XR ANKLE RIGHT (MIN 3 VIEWS)   Final Result   No fracture or dislocation. Small plantar calcaneal bone spur. XR CHEST PORTABLE   Final Result   No acute process.          MRI BRAIN W WO CONTRAST    (Results Pending)   MRI LUMBAR SPINE W WO CONTRAST    (Results Pending)         I have personally reviewed the above images:          Electronically signed by ORLY White CNP on 8/10/2022 at 8:29 AM

## 2022-08-10 NOTE — PROGRESS NOTES
Occupational Therapy  OT SESSION ATTEMPT     Date:8/10/2022  Patient Name: Terence Wu  MRN: [id number]  : 1960  Room: [address] [place] [address]-A     Attempted OT session this date:    [] unavailable due to other medical staff currently with pt   [x] on hold - await MRI lumbar, cervical and thoracic spine then neurosurgery recommendations/POC    [] on hold per nursing staff secondary to lab / radiology results    [] declined treatment  this date due to ____. Benefits of participation in therapy reviewed with pt.    [] off unit   [] Other:     Will reattempt OT eval at a later time.     MAGDI BoxR/L #[id number]

## 2022-08-10 NOTE — ED NOTES
Pt sleeping at this time. resps easy and unlabored. No distress noted.       Matteo Dumont RN  08/10/22 9510

## 2022-08-10 NOTE — CONSULTS
Beatriz Arita M.D.,[facility]  River Quiñonez D.O., F.A.C.O.I., Howard Willis M.D. Disha Smith M.D. Kavya Johnson D.O. Patient:  Noris Calabrese 58 y.o. female MRN: [id number]     Date of Service: 8/10/2022      PULMONARY CONSULTATION    Reason for Consultation: Lung nodule  Referring Physician: Dr. Alireza Burgess DO    Communication with the referring physician will be sent via the electronic medical record. Chief Complaint: Fall, right leg pain    CODE STATUS: Full    SUBJECTIVE:  HPI:  Noris Calabrese is a 58 y.o.  female who we are asked to evaluate for lung nodule. Her past medical history includes hearing impairment, hyperlipidemia, invasive ductal right breast cancer, estrogen receptor positive diagnosed September 2021 by ultrasound biopsy at [facility].  She completed 4 cycles of Taxotere and Cytoxan on 3/16/2022. She also had adjuvant radiation therapy completed 5/18/2022. She is now maintained on endocrine therapy Arimidex tolerating well since 5/25/2022. She follows with Dr. Jennifer Remy oncology. She denies history of any breathing problems. No asthma or COPD. She is a lifelong non-smoker. She presented to the ED at [facility] on 8/9/2022 after suffering a fall at home. She had been complaining of right leg pain and some numbness of her foot. She had trouble walking and speaking. Her symptoms of leg pain and weakness started several weeks ago and continued to worsen. She did not hit her head or lose consciousness. Her family  (mom) is present at bedside to help with answering questions. She denies infectious symptoms-no fever or chills. No nausea or vomiting. Her mom states she has an occasional cough and nasal congestion attributed to seasonal allergies. No mucus production. No recent travel. No chest pain or hemoptysis. Her speech difficulties are related to her underlying hearing loss.   She is alert and oriented to her surroundings. Radiology review-CT head no acute intracranial abnormality. CTA chest no evidence of PE, focal sclerotic lesion involving T8 vertebral body. Noncalcified pulmonary nodule located in the right upper lobe measuring approximately 6 mm. No lymphadenopathy. No prior CT chest or PET scan in the [facility] system for review. Lab testing-WBC 5.4, hemoglobin 15.0, absolute lymphocytes 0.22, sodium 139, potassium 4.0, BUN 11, creatinine 0.8, glucose 150, CO2 22, anion gap 12, urinalysis with trace ketones, negative for bacteria protein and leukocyte Estrace, D-dimer 431, high-sensitivity troponin 10, ALT 42, AST 50, TSH 0.644, free T3 1.7. Urine culture pending. Lying in bed appears uncomfortable related to right leg and foot pain. Heart rate 112 sinus tachycardia on telemetry monitor. MRI has been ordered per neurology recommendations. She is not requiring supplemental oxygen. Respiratory rate 30/min.     Past Medical History:   Diagnosis Date    Breast cancer ([facility] [address]     Right breast    Cancer ([facility] [address]     Hearing impaired     Hearing loss     Hyperlipidemia        Past Surgical History:   Procedure Laterality Date    BREAST LUMPECTOMY Right 11/3/2021    RIGHT BREAST LUMPECTOMY, BLUE DYE INJECTION, RIGHT AXILLARY SENTINEL LYMPH NODE INCISION performed by Maggie Lopez MD at [place] [address] LUMPECTOMY Right 12/1/2021    RE-EXCISION RIGHT BREAST INFERIOR MARGIN performed by Maggie Lopez MD at [address] [place] [address] RIGHT  2021       Family History   Problem Relation Age of Onset    Heart Disease Mother     High Blood Pressure Mother     High Cholesterol Mother     Other Father         COPD    Dementia Maternal Grandmother     Cancer Maternal Cousin         colon       Social History:   Social History     Socioeconomic History    Marital status: Single     Spouse name: Not on file    Number of children: 0    Years of education: Not on file    Highest education level: Not on file Occupational History    Not on file   Tobacco Use    Smoking status: Never    Smokeless tobacco: Never   Vaping Use    Vaping Use: Never used   Substance and Sexual Activity    Alcohol use: Never    Drug use: Never    Sexual activity: Not on file   Other Topics Concern    Not on file   Social History Narrative    Not on file     Social Determinants of Health     Financial Resource Strain: Not on file   Food Insecurity: Not on file   Transportation Needs: Not on file   Physical Activity: Not on file   Stress: Not on file   Social Connections: Not on file   Intimate Partner Violence: Not on file   Housing Stability: Not on file     Smoking history: The patient is a lifelong non-smoker    ETOH:   reports no history of alcohol use. Exposures: There is no history of TB or asbestos. No recent travel. No IV or recreational drug use. No exotic pets turtles or birds at home. Vaccines:      Immunization History   Administered Date(s) Administered    COVID-19, MODERNA BLUE border, Primary or Immunocompromised, (age 12y+), IM, 100 mcg/0.5mL 03/02/2021, 03/30/2021        Home Meds: Medications Prior to Admission: anastrozole (ARIMIDEX) 1 MG tablet, Take 1 tablet by mouth daily  simvastatin (ZOCOR) 20 MG tablet, Take 20 mg by mouth nightly   triamcinolone (KENALOG) 0.1 % cream, Apply topically 2 times daily.   Garlic (GARLIQUE) 412 MG TBEC, Take by mouth daily   Cholecalciferol (VITAMIN D3) 50 MCG (2000 UT) CAPS, Take by mouth daily   loratadine (CLARITIN) 10 MG capsule, Take 10 mg by mouth daily  acetaminophen (TYLENOL) 500 MG tablet, Take 500 mg by mouth every 6 hours as needed for Pain     CURRENT MEDS :  Scheduled Meds:   anastrozole  1 mg Oral Daily    sodium chloride flush  5-40 mL IntraVENous 2 times per day    enoxaparin  40 mg SubCUTAneous Daily    midazolam  2 mg IntraVENous Once    atorvastatin  10 mg Oral Nightly       Continuous Infusions:   sodium chloride      sodium chloride 75 mL/hr at 08/10/22 0011 No Known Allergies    REVIEW OF SYSTEMS:  Constitutional: Denies fever, weight loss, night sweats, and fatigue  Skin: Denies pigmentation, dark lesions, and rashes   HEENT: Denies tinnitus, ear drainage, epistaxis, sore throat, and hoarseness. Positive hearing loss dysarthria  Cardiovascular: Denies palpitations, chest pain, and chest pressure. Respiratory: Denies cough, dyspnea at rest, hemoptysis, apnea, and choking. Gastrointestinal: Denies nausea, vomiting, poor appetite, diarrhea, heartburn or reflux  Genitourinary: Denies dysuria, frequency, urgency or hematuria  Musculoskeletal: Right leg and foot pain  Neurological: Denies dizziness, vertigo, headache, and focal weakness difficulty with ambulation  Psychological: Denies anxiety and depression  Endocrine: Denies heat intolerance and cold intolerance  Hematopoietic/Lymphatic: Denies bleeding problems and blood transfusions  History of invasive ductal right breast cancer diagnosed September 2021 treated with chemo, radiation, lumpectomy    OBJECTIVE:   /67   Pulse (!) 110   Temp 98 °F (36.7 °C) (Oral)   Resp 22   Wt 116 lb (52.6 kg)   LMP 10/28/2021   SpO2 95%   BMI 24.24 kg/m²   SpO2 Readings from Last 1 Encounters:   08/10/22 95%        I/O:  No intake or output data in the 24 hours ending 08/10/22 1224                   Physical Exam:  General: The patient is lying in bed comfortably without any distress. Breathing is not labored  HEENT: Pupils are equal round and reactive to light, there are no oral lesions and no post-nasal drip   Neck: supple without adenopathy  Cardiovascular: Sinus tachycardia heart rate 112 regular rate and rhythm without murmur or gallop  Respiratory: Clear to auscultation bilaterally without wheezing or crackles.   Air entry is symmetric  Abdomen: soft, non-tender, non-distended, normal bowel sounds  Extremities: warm, no edema, no clubbing right leg pain  Skin: no rash or lesion  Neurologic: CN II-XII grossly intact, no focal deficits/hearing loss    Pulmonary Function Testing none on file      Imaging personally reviewed:  CTA chest 8/9/2022  FINDINGS:   No filling defect in the pulmonary arteries to suggest pulmonary arterial   embolism. The heart is normal in size. No pericardial effusion. No   mediastinal lymphadenopathy. Nodule located in right upper lobe adjacent to   the minor fissure on axial image number 56 measures approximately 6 mm. Subsegmental atelectasis present in lung bases. No airspace opacity or   pleural effusion. No pneumothorax. Focal sclerotic lesion located in T8   vertebral body measures 8 x 8 mm. View of the upper abdomen shows normal   bilateral adrenal glands. Postsurgical changes associated with right medial   breast.           Impression   1. No evidence of pulmonary embolism. 2. No pneumonia or pleural effusion. 3. Noncalcified nodule located in right upper lobe measures approximately 6   mm. 4. Focal sclerotic lesion located in T8 vertebral body. Bone scan could be   helpful to exclude active lesion. Echo:  None on file    Labs:  Lab Results   Component Value Date/Time    WBC 5.4 08/10/2022 08:29 AM    HGB 12.6 08/10/2022 08:29 AM    HCT 38.8 08/10/2022 08:29 AM    MCV 89.0 08/10/2022 08:29 AM    MCH 28.9 08/10/2022 08:29 AM    MCHC 32.5 08/10/2022 08:29 AM    RDW 15.7 08/10/2022 08:29 AM     08/10/2022 08:29 AM    MPV 9.7 08/10/2022 08:29 AM     Lab Results   Component Value Date/Time     08/10/2022 08:29 AM    K 3.8 08/10/2022 08:29 AM    K 4.0 08/09/2022 11:57 AM     08/10/2022 08:29 AM    CO2 19 08/10/2022 08:29 AM    BUN 10 08/10/2022 08:29 AM    CREATININE 0.8 08/10/2022 08:29 AM    LABALBU 3.4 08/10/2022 08:29 AM    CALCIUM 8.0 08/10/2022 08:29 AM    GFRAA >60 08/10/2022 08:29 AM    LABGLOM >60 08/10/2022 08:29 AM     No results found for: PROTIME, INR  No results for input(s): PROBNP in the last 72 hours.   No results for input(s): TROPONINI in the last 72 hours. No results for input(s): PROCAL in the last 72 hours. This SmartLink has not been configured with any valid records. Micro:  No results for input(s): CULTRESP in the last 72 hours. No results for input(s): LABGRAM in the last 72 hours. No results for input(s): LEGUR in the last 72 hours. No results for input(s): STREPNEUMAGU in the last 72 hours. No results for input(s): LP1UAG in the last 72 hours. Assessment:  RUL 6 mm nodule  T 8 sclerotic lesion  Invasive ductal carcinoma of R breast. Diagnosed Sept 2021 treated with 4 cycles chemotherapy, radiation, and now on Arimidex since May 2022. Hearing loss  R leg pain and weakness resulting  in a fall at home   Mild elevation of transaminases  Mild lymphopenia  Elevated d dimer  Lifelong non smoker    Plan:  CTA chest -very small non calcified 6 mm RUL nodule-too small for adequate biopsy. Recommend follow up with CT chest as OP based on 2017 Fleischner Guidelines. Await results of MRI/MRA head, spine per neuro. Work up for stroke and further eval of T 8 sclerotic lesion. US carotids. Rei Kale for plavix and asa from a pulmonary perspective. No procedure planned. Follows with [facility]  Check respiratory viral panel  Elevated D dimer, R leg pain. CTA chest negative for PE. Check Ultrasound right leg to assess for DVT  DVT, GI prophylaxis   Speech therapy to evaluate dysphagia, dysarthria  PT, OT    Thank you for allowing me to participate in the care of Sixto Hameed. Please feel free to call with questions. This plan of care was reviewed in collaboration with Dr. Melissa Fenton    Electronically signed by ORLY Bucio CNP on 8/10/2022 at 12:24 PM      Note: This report was completed utilizing computer voice recognition software. Every effort has been made to ensure accuracy, however; inadvertent computerized transcription errors may be present      I personally saw, examined, and cared for the patient. Labs, medications, radiographs reviewed. I agree with history exam and plans detailed in NP note. Patient with 6 mm right upper lobe nodule. Nodule is too small for biopsy. .  No need for biopsy at this time. Would continue outpatient CT surveillance. Continue mental status evaluation work-up. Family updated at bedside. Will continue to follow for pulmonary.     Christy Earl, DO

## 2022-08-10 NOTE — PROCEDURES
MRI of the brain and spine was attempted. PT was moving on all sequences, attempted to get sedation from the ER but PT refused to continue and wanted to be sent back.

## 2022-08-10 NOTE — H&P
[address]                  [address] [facility]  [facility]                              HISTORY AND PHYSICAL    PATIENT NAME: Rolf Howell                  :        1960  MED REC NO:   [id number]                            ROOM:       9302  ACCOUNT NO:   [de-identified]                           ADMIT DATE: 2022  PROVIDER:     Alireza Burgess DO    CHIEF COMPLAINT:  Weakness. HISTORY OF PRESENT ILLNESS:  The patient is a 61-year-old   female who presented to the emergency room for evaluation complaining of  right ankle pain, weakness, slurred speech, recurrent falls. The  patient lives at home with her mother. The patient with history of  breast CA, status post lumpectomy, on Arimidex. PAST MEDICAL HISTORY:  Hyperlipidemia, breast CA, hearing loss. PAST SURGICAL HISTORY:  Breast biopsy, eye surgery, right breast  lumpectomy. SOCIAL HISTORY:  No tobacco.  No alcohol. REVIEW OF SYSTEMS:  Remarkable for above-stated chief complaint. ALLERGIES:  None known. MEDICATIONS PRIOR TO ADMISSION:  Arimidex, Zocor, Kenalog topical,  Garlique, vitamin D, Claritin, Tylenol. PHYSICAL EXAMINATION:  GENERAL APPEARANCE:  Physical exam reveals a 61-year-old   female who is alert, cooperative and a poor historian. She is hard of  hearing. VITAL SIGNS:  On admission, temperature 97 degrees, pulse 116,  respirations 18, blood pressure 134/89. HEENT:  Head:  Normocephalic, atraumatic. Eyes:  Pupils equal and  reactive to light. Extraocular muscles intact. Fundi not well  visualized. Nose:  No obstruction, polyp or discharge noted. Mouth:   Mucosa without lesion. Teeth poor repair. Pharynx noninjected without  exudate. NECK:  Supple. No JVD. No thyromegaly. No carotid bruits. HEART:  Regular, tachycardic with grade 1/6 systolic murmur. LUNGS:  Clear to auscultation bilaterally.   ABDOMEN:  Positive bowel sounds, soft, nontender. No rebound or  guarding. No hepatosplenomegaly. No masses. BACK:  Intact without lesion. EXTREMITIES:  There is tenderness to palpation of the right ankle. No  erythema or edema. IMPRESSION:  Weakness; slurred speech; lung nodule on CTA chest;  tachycardia; hard of hearing; elevated cholesterol; history of breast  CA, status post lumpectomy; sclerotic lesion of the thoracic spine on  CTA chest.    PLAN:  Admit. Cardiac telemetry. Further evaluation of sclerotic  thoracic lesion and lung nodule. Neurology to see. Thyroid profile. Echocardiogram.  PT, OT eval.  Discharge plan home versus rehab as per  the patient's progress.         Rubi Wick DO    D: 08/10/2022 7:54:14       T: 08/10/2022 7:56:55     [email or web address]  Job#: [id number]     Doc#: [id number]    CC:

## 2022-08-10 NOTE — CONSULTS
NEUROSURGERY INPATIENT CONSULT NOTE    Chief Complaint: Weakness, slurred speech, new lesion on T8    HPI:   Valentín Parrish is a 58 y.o.  female who has a history of breast cancer presents to the ED yesterday for weakness. Patient is dysarthric on exam and no family at bedside so history was obtained through chart review. Patient presents with increasing falls at home, and worsening weakness in lower extremities. On exam patient intermittently follows commands. Able to move all extremities. Does admit to some back pain. CT Chest with T8 sclerotic lesion which is why Neurosurgery was consulted.      Past Medical History:   Diagnosis Date    Breast cancer ([facility] [address]     Right breast    Cancer ([facility] [address]     Hearing impaired     Hearing loss     Hyperlipidemia      Past Surgical History:   Procedure Laterality Date    BREAST LUMPECTOMY Right 11/3/2021    RIGHT BREAST LUMPECTOMY, BLUE DYE INJECTION, RIGHT AXILLARY SENTINEL LYMPH NODE INCISION performed by Payton Laboy MD at [place] [address] LUMPECTOMY Right 12/1/2021    RE-EXCISION RIGHT BREAST INFERIOR MARGIN performed by Payton Lbaoy MD at [facility]  2021      Family History   Problem Relation Age of Onset    Heart Disease Mother     High Blood Pressure Mother     High Cholesterol Mother     Other Father         COPD    Dementia Maternal Grandmother     Cancer Maternal Cousin         colon        Medications:   Current Facility-Administered Medications   Medication Dose Route Frequency Provider Last Rate Last Admin    perflutren lipid microspheres (DEFINITY) injection 1.65 mg  1.5 mL IntraVENous ONCE PRN Moshe Landau Malmer, DO        anastrozole (ARIMIDEX) tablet 1 mg  1 mg Oral Daily Moshe Landau Malmer, DO        sodium chloride flush 0.9 % injection 5-40 mL  5-40 mL IntraVENous 2 times per day Moshe Landau Malmer, DO        sodium chloride flush 0.9 % injection 5-40 mL  5-40 mL IntraVENous PRN Moshe Landau Malmer, DO        0.9 % -MRI Brain, Cervical, Thoracic and Lumbar Spine W/WO Contrast  -Will await MRI for further treatment plan  -Please call with any questions or concerns. Electronically signed by Adeline Amaro PA-C on 8/10/2022 at 9:05 AM     Christie Attending:    Patient was seen and examined by me with the team.  I personally reviewed all pertinent radiological images. I concur with Miss Figueroa's clinical assessment and plan. In brief, this 66-year-old woman with significant hearing loss and a history of breast CA presents with worsened dysarthria and increasing falls at home. On my exam she is awake and alert and follows simple commands. She did have some weakness of the right upper and lower extremity. She was in significant pain pointing to her back. CT chest shows T8 sclerotic lesion. Given her cancer history and her altered neurological status, screen for mets. Plan as above. Thank you so much for allowing us to participate in the care of this patient. I certify that I spent more than half of the total time on this encounter. NOTE: This report was transcribed using voice recognition software.  Every effort was made to ensure accuracy; however, inadvertent computerized transcription errors may be present

## 2022-08-10 NOTE — ED NOTES
Received report and assumed care.  Pt not in room at this time, at [facility]  08/09/22 2024

## 2022-08-10 NOTE — ED NOTES
To mri and informed by technician that pt refused test. Pt brought back to room by techs and placed on monitor.      Socorro Castellon RN  08/09/22 2023

## 2022-08-10 NOTE — PROGRESS NOTES
Ortho consult placed via [facility] serve to Ortho resident.   Dr Jorge Magana taking messages    Pulmonary consult placed via perfect serve to Dr Syd Zapata

## 2022-08-10 NOTE — ED NOTES
MRI phoned and stated that pt needs sedation for testing.  Dr. Stacy Love made aware     Bebo Fuller, RN  08/09/22 2022

## 2022-08-11 ENCOUNTER — APPOINTMENT (OUTPATIENT)
Dept: NEUROLOGY | Age: 62
DRG: 051 | End: 2022-08-11
Payer: COMMERCIAL

## 2022-08-11 ENCOUNTER — APPOINTMENT (OUTPATIENT)
Dept: GENERAL RADIOLOGY | Age: 62
DRG: 051 | End: 2022-08-11
Payer: COMMERCIAL

## 2022-08-11 ENCOUNTER — TELEPHONE (OUTPATIENT)
Dept: BREAST CENTER | Age: 62
End: 2022-08-11

## 2022-08-11 PROBLEM — R41.82 ALTERED MENTAL STATUS: Status: ACTIVE | Noted: 2022-08-11

## 2022-08-11 PROBLEM — R47.1 DYSARTHRIA: Status: ACTIVE | Noted: 2022-08-11

## 2022-08-11 LAB
AMMONIA: 27 UMOL/L (ref 11–51)
B.E.: -1.2 MMOL/L (ref -3–3)
CHOLESTEROL, TOTAL: 128 MG/DL (ref 0–199)
COHB: 0.5 % (ref 0–1.5)
CRITICAL: ABNORMAL
DATE ANALYZED: ABNORMAL
DATE OF COLLECTION: ABNORMAL
HBA1C MFR BLD: 6.1 % (ref 4–5.6)
HCO3: 21.6 MMOL/L (ref 22–26)
HDLC SERPL-MCNC: 35 MG/DL
HHB: 5.4 % (ref 0–5)
INR BLD: 1.3
LAB: ABNORMAL
LDL CHOLESTEROL CALCULATED: 63 MG/DL (ref 0–99)
Lab: ABNORMAL
METER GLUCOSE: 121 MG/DL (ref 74–99)
METHB: 0.3 % (ref 0–1.5)
MODE: ABNORMAL
O2 SATURATION: 94.7 % (ref 92–98.5)
O2HB: 93.8 % (ref 94–97)
OPERATOR ID: 7490
PATIENT TEMP: 37 C
PCO2: 30.4 MMHG (ref 35–45)
PH BLOOD GAS: 7.47 (ref 7.35–7.45)
PO2: 67.3 MMHG (ref 75–100)
PROTHROMBIN TIME: 13.8 SEC (ref 9.3–12.4)
SOURCE, BLOOD GAS: ABNORMAL
THB: 12.7 G/DL (ref 11.5–16.5)
TIME ANALYZED: 2054
TRIGL SERPL-MCNC: 148 MG/DL (ref 0–149)
VLDLC SERPL CALC-MCNC: 30 MG/DL

## 2022-08-11 PROCEDURE — 71045 X-RAY EXAM CHEST 1 VIEW: CPT

## 2022-08-11 PROCEDURE — 2060000000 HC ICU INTERMEDIATE R&B

## 2022-08-11 PROCEDURE — 99233 SBSQ HOSP IP/OBS HIGH 50: CPT | Performed by: NURSE PRACTITIONER

## 2022-08-11 PROCEDURE — 83036 HEMOGLOBIN GLYCOSYLATED A1C: CPT

## 2022-08-11 PROCEDURE — 36415 COLL VENOUS BLD VENIPUNCTURE: CPT

## 2022-08-11 PROCEDURE — 99232 SBSQ HOSP IP/OBS MODERATE 35: CPT | Performed by: NEUROLOGICAL SURGERY

## 2022-08-11 PROCEDURE — 95816 EEG AWAKE AND DROWSY: CPT | Performed by: PSYCHIATRY & NEUROLOGY

## 2022-08-11 PROCEDURE — 92507 TX SP LANG VOICE COMM INDIV: CPT

## 2022-08-11 PROCEDURE — 82962 GLUCOSE BLOOD TEST: CPT

## 2022-08-11 PROCEDURE — 82140 ASSAY OF AMMONIA: CPT

## 2022-08-11 PROCEDURE — 6370000000 HC RX 637 (ALT 250 FOR IP): Performed by: INTERNAL MEDICINE

## 2022-08-11 PROCEDURE — 73030 X-RAY EXAM OF SHOULDER: CPT

## 2022-08-11 PROCEDURE — 92523 SPEECH SOUND LANG COMPREHEN: CPT

## 2022-08-11 PROCEDURE — 82805 BLOOD GASES W/O2 SATURATION: CPT

## 2022-08-11 PROCEDURE — 95816 EEG AWAKE AND DROWSY: CPT

## 2022-08-11 PROCEDURE — 6360000002 HC RX W HCPCS: Performed by: NEUROLOGICAL SURGERY

## 2022-08-11 PROCEDURE — 80061 LIPID PANEL: CPT

## 2022-08-11 PROCEDURE — 73630 X-RAY EXAM OF FOOT: CPT

## 2022-08-11 PROCEDURE — 6360000002 HC RX W HCPCS: Performed by: NURSE PRACTITIONER

## 2022-08-11 PROCEDURE — 92526 ORAL FUNCTION THERAPY: CPT

## 2022-08-11 PROCEDURE — 36600 WITHDRAWAL OF ARTERIAL BLOOD: CPT

## 2022-08-11 PROCEDURE — 92610 EVALUATE SWALLOWING FUNCTION: CPT

## 2022-08-11 PROCEDURE — 85610 PROTHROMBIN TIME: CPT

## 2022-08-11 RX ORDER — CLOPIDOGREL BISULFATE 75 MG/1
75 TABLET ORAL DAILY
Status: DISCONTINUED | OUTPATIENT
Start: 2022-08-11 | End: 2022-08-19 | Stop reason: HOSPADM

## 2022-08-11 RX ORDER — ASPIRIN 81 MG/1
81 TABLET ORAL DAILY
Status: DISCONTINUED | OUTPATIENT
Start: 2022-08-11 | End: 2022-08-19 | Stop reason: HOSPADM

## 2022-08-11 RX ADMIN — ACETAMINOPHEN 650 MG: 650 SUPPOSITORY RECTAL at 20:33

## 2022-08-11 RX ADMIN — LEVETIRACETAM 500 MG: 5 INJECTION INTRAVENOUS at 13:41

## 2022-08-11 RX ADMIN — ENOXAPARIN SODIUM 40 MG: 100 INJECTION SUBCUTANEOUS at 16:52

## 2022-08-11 RX ADMIN — LEVETIRACETAM 500 MG: 5 INJECTION INTRAVENOUS at 20:32

## 2022-08-11 ASSESSMENT — PAIN SCALES - WONG BAKER
WONGBAKER_NUMERICALRESPONSE: 4
WONGBAKER_NUMERICALRESPONSE: 6
WONGBAKER_NUMERICALRESPONSE: 2

## 2022-08-11 ASSESSMENT — PAIN DESCRIPTION - ORIENTATION: ORIENTATION: RIGHT

## 2022-08-11 ASSESSMENT — PAIN DESCRIPTION - LOCATION: LOCATION: FOOT

## 2022-08-11 NOTE — PLAN OF CARE
Problem: Discharge Planning  Goal: Discharge to home or other facility with appropriate resources  Outcome: Progressing     Problem: Pain  Goal: Verbalizes/displays adequate comfort level or baseline comfort level  Outcome: Progressing  Flowsheets (Taken 8/10/2022 1745 by Raz Hernandez RN)  Verbalizes/displays adequate comfort level or baseline comfort level: Encourage patient to monitor pain and request assistance     Problem: Skin/Tissue Integrity  Goal: Absence of new skin breakdown  Description: 1. Monitor for areas of redness and/or skin breakdown  2. Assess vascular access sites hourly  3. Every 4-6 hours minimum:  Change oxygen saturation probe site  4. Every 4-6 hours:  If on nasal continuous positive airway pressure, respiratory therapy assess nares and determine need for appliance change or resting period.   Outcome: Progressing

## 2022-08-11 NOTE — CONSULTS
Department of Orthopedic Surgery  Resident Consult Note          Reason for Consult:  R foot and right shoulder pain     HISTORY OF PRESENT ILLNESS:       Patient is a right hand dominant 58 y.o. female doing loss, hyperlipidemia who recently presented the ED for the chief complaint of frequent falls, associated right ankle pain as well as slurred speech. Orthopedic surgery was consulted to evaluate her right ankle pain. Speak with the patient in the room, she is a very poor historian with dysarthria. She states that she has had chronic right-sided foot pain for many months. Denies any recent traumas. Does admit to frequent falls. States she walks with a walker. States she has been able to bear weight on her right lower extremity. She is also complaining of some right-sided shoulder pain. Does not know when this started. States it is dull and achy in nature. Denies any radiation. Denies any neck pain. Denies numbness/tingling/paresthesias. Denies any other orthopedic complaints at this time.       Past Medical History:        Diagnosis Date    Breast cancer ([facility] [address]     Right breast    Cancer ([facility] [address]     Hearing impaired     Hearing loss     Hyperlipidemia      Past Surgical History:        Procedure Laterality Date    BREAST LUMPECTOMY Right 11/3/2021    RIGHT BREAST LUMPECTOMY, BLUE DYE INJECTION, RIGHT AXILLARY SENTINEL LYMPH NODE INCISION performed by Radha Mckinnon MD at [place] [address] LUMPECTOMY Right 12/1/2021    RE-EXCISION RIGHT BREAST INFERIOR MARGIN performed by Radha Mckinnon MD at [address] [place] [address] RIGHT  2021     Current Medications:   Current Facility-Administered Medications: perflutren lipid microspheres (DEFINITY) injection 1.65 mg, 1.5 mL, IntraVENous, ONCE PRN  0.9 % sodium chloride infusion, , IntraVENous, Continuous  levETIRAcetam (KEPPRA) 500 mg/100 mL IVPB, 500 mg, IntraVENous, Q12H  anastrozole (ARIMIDEX) tablet 1 mg, 1 mg, Oral, Daily  sodium chloride flush 0.9 % injection 5-40 mL, 5-40 mL, IntraVENous, 2 times per day  sodium chloride flush 0.9 % injection 5-40 mL, 5-40 mL, IntraVENous, PRN  0.9 % sodium chloride infusion, , IntraVENous, PRN  enoxaparin (LOVENOX) injection 40 mg, 40 mg, SubCUTAneous, Daily  ondansetron (ZOFRAN-ODT) disintegrating tablet 4 mg, 4 mg, Oral, Q8H PRN **OR** ondansetron (ZOFRAN) injection 4 mg, 4 mg, IntraVENous, Q6H PRN  polyethylene glycol (GLYCOLAX) packet 17 g, 17 g, Oral, Daily PRN  acetaminophen (TYLENOL) tablet 650 mg, 650 mg, Oral, Q6H PRN **OR** acetaminophen (TYLENOL) suppository 650 mg, 650 mg, Rectal, Q6H PRN  midazolam PF (VERSED) injection 2 mg, 2 mg, IntraVENous, Once  atorvastatin (LIPITOR) tablet 10 mg, 10 mg, Oral, Nightly  Allergies:  Patient has no known allergies. Social History:   TOBACCO:   reports that she has never smoked. She has never used smokeless tobacco.  ETOH:   reports no history of alcohol use. DRUGS:   reports no history of drug use.   ACTIVITIES OF DAILY LIVING:    OCCUPATION:    Family History:       Problem Relation Age of Onset    Heart Disease Mother     High Blood Pressure Mother     High Cholesterol Mother     Other Father         COPD    Dementia Maternal Grandmother     Cancer Maternal Cousin         colon       REVIEW OF SYSTEMS:  CONSTITUTIONAL:  negative for  fevers, chills  EYES:  negative for acute vision changes  HEENT:  negative for cough, positive for hearing loss  RESPIRATORY:  negative for  dyspnea, wheezing  CARDIOVASCULAR:  negative for  chest pain  GASTROINTESTINAL:  negative for nausea, vomiting  GENITOURINARY:  negative for frequency, urinary incontinence  HEMATOLOGIC/LYMPHATIC:  negative for bleeding  MUSCULOSKELETAL:  positive for right foot and ankle pain  NEUROLOGICAL:  negative for headaches, dizziness  BEHAVIOR/PSYCH:  negative for increased agitation and anxiety    PHYSICAL EXAM:    VITALS:  /68   Pulse (!) 110   Temp (!) 96.7 °F (35.9 °C) (Temporal) Resp 18   Wt 116 lb (52.6 kg)   LMP 10/28/2021   SpO2 99%   BMI 24.24 kg/m²   CONSTITUTIONAL: Dysarthria, awake, alert, cooperative, no apparent distress, and appears stated age  MUSCULOSKELETAL:  Right lower Extremity:  Skin examination reveals no superficial lacerations or abrasions. No soft tissue swelling about the foot or ankle. Patient does have cavovarus feet bilaterally. No evidence of any open wounds. No drainage appreciated. TTP over the medial aspect of the foot most prominently over the navicular. No tenderness palpation over the toes, remainder the foot, ankle, shin, knee, thigh, hip  Sensation intact light touch distally in sural, saphenous, tibial, deep and supers peroneal nerve distributions. Motor function gross intact distally in tibial, deep and supers peroneal nerve distributions. No pain with passive range of motion of the ankle  Compartment soft and compressible  +2/4 DP PT pulses, foot warm well-perfused    Right upper Extremity:  Skin examination reveals no superficial lacerations or abrasions. No open wounds or drainage. No erythema or ecchymosis appreciated. No gross deformity noted about the right upper extremity. TTP over the middle aspect of the clavicle as well as the posterior aspect of the shoulder girdle. No tense palpation over the arm, elbow, forearm, wrist, hands, fingers  Sensation intact light touch distally in median, radial, ulnar nerve distributions to the hand  Motor function gross intact distally in axillary, AIN, PIN, median, radial, ulnar nerve distributions. Patient able to actively forward flex and Abduct the arm to approximately 160 degrees. Compartments soft and compressible  +2/4 radial ulnar pulses, brisk cap refill, hand warm well-perfused    Secondary Exam:   leftUE: No obvious signs of trauma. -TTP to fingers, hand, wrist, forearm, elbow, humerus, shoulder or clavicle. leftLE: No obvious signs of trauma.    -TTP to foot, ankle, leg, knee, thigh, hip. Pelvis: -TTP, -Log roll, -Heel strike     DATA:    CBC:   Lab Results   Component Value Date/Time    WBC 5.4 08/10/2022 08:29 AM    RBC 4.36 08/10/2022 08:29 AM    HGB 12.6 08/10/2022 08:29 AM    HCT 38.8 08/10/2022 08:29 AM    MCV 89.0 08/10/2022 08:29 AM    MCH 28.9 08/10/2022 08:29 AM    MCHC 32.5 08/10/2022 08:29 AM    RDW 15.7 08/10/2022 08:29 AM     08/10/2022 08:29 AM    MPV 9.7 08/10/2022 08:29 AM     PT/INR:  No results found for: PROTIME, INR    Radiology Review:  XR 3 views of the right ankle reviewed demonstrating no acute fracture dislocations. Midfoot arthritis noted. No other bony or soft tissue abnormalities noted. 3 views of the right foot reviewed demonstrating no acute fracture dislocations. ,  Redemonstration of the midfoot arthritis. First MTP joint arthritis noted. No other bone or soft tissue is noted. 3 views right shoulder reviewed demonstrating no acute fractures or dislocations. Vascular clips noted within the right chest wall. No other bony or soft tissue abnormalities noted. IMPRESSION:  Chronic, right foot pain  Right shoulder pain    PLAN:  Weightbearing as tolerated right upper and right lower extremity  Pain control per primary  DVT prophylaxis okay for orthopedic standpoint  Antibiotics as indicated by primary  Appreciate medical management  No acute orthopedic invention planned  Patient okay from discharge from orthopedic standpoint, we will continue to follow from periphery, please reach out with any questions or concerns. Patient okay to follow-up in orthopedic trauma clinic as needed   All patient/family questions have been answered and patient is currently agreeable to plan.   Discuss with Attending      Electronically signed by Donald Chiu DO on 8/11/2022 at 12:19 AM

## 2022-08-11 NOTE — PROGRESS NOTES
Department of Neurosurgery  Progress Note    CHIEF COMPLAINT: T8 lesion    SUBJECTIVE:  No acute events overnight. Patient remains dysarthric. No new complaints. MRI reviewed. REVIEW OF SYSTEMS :  Constitutional: Negative for chills and fever. Neurological: Negative for dizziness, tremors and speech change.      OBJECTIVE:   VITALS:  BP (!) 157/71   Pulse (!) 110   Temp 96.8 °F (36 °C) (Temporal)   Resp 18   Wt 116 lb (52.6 kg)   LMP 10/28/2021   SpO2 98%   BMI 24.24 kg/m²     PHYSICAL:  Alert, intermittently follows commands  Dysarthric   EOMI  PERRLA  CN3-12 intact  VELOZ    DATA:  CBC:   Lab Results   Component Value Date/Time    WBC 5.4 08/10/2022 08:29 AM    RBC 4.36 08/10/2022 08:29 AM    HGB 12.6 08/10/2022 08:29 AM    HCT 38.8 08/10/2022 08:29 AM    MCV 89.0 08/10/2022 08:29 AM    MCH 28.9 08/10/2022 08:29 AM    MCHC 32.5 08/10/2022 08:29 AM    RDW 15.7 08/10/2022 08:29 AM     08/10/2022 08:29 AM    MPV 9.7 08/10/2022 08:29 AM     BMP:    Lab Results   Component Value Date/Time     08/10/2022 08:29 AM    K 3.8 08/10/2022 08:29 AM    K 4.0 08/09/2022 11:57 AM     08/10/2022 08:29 AM    CO2 19 08/10/2022 08:29 AM    BUN 10 08/10/2022 08:29 AM    LABALBU 3.4 08/10/2022 08:29 AM    CREATININE 0.8 08/10/2022 08:29 AM    CALCIUM 8.0 08/10/2022 08:29 AM    GFRAA >60 08/10/2022 08:29 AM    LABGLOM >60 08/10/2022 08:29 AM    GLUCOSE 101 08/10/2022 08:29 AM     PT/INR:  No results found for: PROTIME, INR  PTT:  No results found for: APTT, PTT[APTT}    Current Inpatient Medications  Current Facility-Administered Medications: aspirin EC tablet 81 mg, 81 mg, Oral, Daily  clopidogrel (PLAVIX) tablet 75 mg, 75 mg, Oral, Daily  perflutren lipid microspheres (DEFINITY) injection 1.65 mg, 1.5 mL, IntraVENous, ONCE PRN  0.9 % sodium chloride infusion, , IntraVENous, Continuous  levETIRAcetam (KEPPRA) 500 mg/100 mL IVPB, 500 mg, IntraVENous, Q12H  anastrozole (ARIMIDEX) tablet 1 mg, 1 mg, Oral, Daily  sodium chloride flush 0.9 % injection 5-40 mL, 5-40 mL, IntraVENous, 2 times per day  sodium chloride flush 0.9 % injection 5-40 mL, 5-40 mL, IntraVENous, PRN  0.9 % sodium chloride infusion, , IntraVENous, PRN  enoxaparin (LOVENOX) injection 40 mg, 40 mg, SubCUTAneous, Daily  ondansetron (ZOFRAN-ODT) disintegrating tablet 4 mg, 4 mg, Oral, Q8H PRN **OR** ondansetron (ZOFRAN) injection 4 mg, 4 mg, IntraVENous, Q6H PRN  polyethylene glycol (GLYCOLAX) packet 17 g, 17 g, Oral, Daily PRN  acetaminophen (TYLENOL) tablet 650 mg, 650 mg, Oral, Q6H PRN **OR** acetaminophen (TYLENOL) suppository 650 mg, 650 mg, Rectal, Q6H PRN  midazolam PF (VERSED) injection 2 mg, 2 mg, IntraVENous, Once  atorvastatin (LIPITOR) tablet 10 mg, 10 mg, Oral, Nightly      ASSESSMENT:   -Leatha Escamilla is a 59 y/o female who CT shows T8 sclerotic. MRI reviewed which showed no evidence of neoplastic disease, but MRI was limited d/t movement and contrast no being able to given d/t patient compliance. PLAN:  -Continue current care  -PET scan ordered, If no evidence of neoplastic disease seen on PET scan, okay to start ASA and plavix from our standpoint.   -Please call with any questions       Electronically signed by Krzysztof Farah PA-C on 8/11/2022 at 9:14 AM     Nsx Attending:    Patient was seen and examined by me with the team.  I personally reviewed all pertinent radiological images. I concur with Miss Figueroa's clinical assessment and plan. Pain under better control today. She remains dysarthric but brighter for me on my exam.  Plan as above and discussed with patient's mother at bedside. Thank you so much for allowing us to participate in the care of this patient. I certify that I spent more than half of the total time on this encounter. NOTE: This report was transcribed using voice recognition software.  Every effort was made to ensure accuracy; however, inadvertent computerized transcription errors may be present

## 2022-08-11 NOTE — PROGRESS NOTES
SPEECH/LANGUAGE PATHOLOGY  CLINICAL ASSESSMENT OF SWALLOWING FUNCTION   and PLAN OF CARE    PATIENT NAME:  Song Tobias  (female)     MRN:  [id number]    :  1960  (58 y.o.)  STATUS:  Inpatient: Room 7421/7421-A    TODAY'S DATE:  2022  08/10/22 1215    SLP swallowing-dysphagia evaluation and treatment  Start:  08/10/22 1215,   End:  08/10/22 121,   ONE TIME,   Standing Count:  1 Occurrences,   R         Arelis Mancia, APRN - CNP   REASON FOR REFERRAL: to assess oropharyngeal function   EVALUATING THERAPIST: Roya Ruiz, MARCOS                 RESULTS:    DYSPHAGIA DIAGNOSIS:   Clinical indicators of suspected moderate-severe oropharyngeal phase dysphagia       DIET RECOMMENDATIONS:  NPO until MBSS can be completed        FEEDING RECOMMENDATIONS:     Assistance level:  Not applicable      Compensatory strategies recommended: Not applicable      Discussed recommendations with nursing and/or faxed report to referring provider: Yes    SPEECH THERAPY  PLAN OF CARE   The dysphagia POC is established based on physician order, dysphagia diagnosis and results of clinical assessment     Will establish POC once MBSS is completed. Conditions Requiring Skilled Therapeutic Intervention for dysphagia:    Patient is performing below functional baseline d/t  current acute condition, Multiple diagnoses, multiple medications, and increased dependency upon caregivers. Specific dysphagia interventions to include:     not applicable    Specific instructions for next treatment:  MBSS to be completed  Patient Treatment Goals:    Short Term Goals:  Pt will participate in MBSS to fully assess oropharyngeal swallow function and to assist in determining the least restrictive PO diet to maintain adequate nutrition/hydration     Long Term Goals:    A Video Swallow Study (MBSS) is recommended and requires a physician order      Patient/family Goal:    To be able to eat regular foods and drink regular liquids    Plan of care discussed with Patient and Family   The Patient and Family understand(s) the diagnosis, prognosis and plan of care     Rehabilitation Potential/Prognosis: fair                    ADMITTING DIAGNOSIS: Weakness [R53.1]  Lesion of vertebra [M89.9]  Fall, initial encounter [W19. XXXA]    VISIT DIAGNOSIS:   Visit Diagnoses         Codes    Fall, initial encounter    -  Primary W19. XXXA    Lesion of vertebra     M89.9             PATIENT REPORT/COMPLAINT: coughing with all consistencies  RN cleared patient for participation in assessment     yes     PRIOR LEVEL OF SWALLOW FUNCTION:    PAST HISTORY OF DYSPHAGIA?: none reported    Home diet: Regular consistency solids (IDDSI level 7) with  thin liquids (IDDSI level 0)  Current Diet Order:  Diet NPO    PROCEDURE:  Consistencies Administered During the Evaluation   Liquids: thin liquid   Solids:  pureed foods      Method of Intake:   cup, spoon  Self fed, Fed by clinician      Position:   Seated, upright    CLINICAL ASSESSMENT:  Oral Stage:       Impaired oral initiation  Delayed A-P transit due to: decreased ability for initiation    and reduced lingual strength   Lingual pumping present      Pharyngeal Stage:    Immediate wet cough was noted after presentation of all consistencies administered  Wet respirations were noted after presentation of all consistencies administered    Cognition:   Within functional limits for this exam    Oral Peripheral Examination   Generalized oral weakness    Current Respiratory Status    room air     Parameters of Speech Production  Respiration:  Shortness of breath  Quality:   Within functional limits  Intensity: Quiet    Volitional Swallow: present     Volitional Cough:   present     Pain: No pain reported. EDUCATION:   The Speech Language Pathologist (SLP) completed education regarding results of evaluation and that intervention is warranted at this time.   Learner: Patient and Family  Education: Reviewed results and recommendations of this evaluation  Evaluation of Education:  Verbalizes understanding    This plan may be re-evaluated and revised as warranted. Evaluation Time includes thorough review of current medical information, gathering information on past medical history/social history and prior level of function, completion of standardized testing/informal observation of tasks, assessment of data and education on plan of care and goals. [x]The admitting diagnosis and active problem list, have been reviewed prior to initiation of this evaluation.         ACTIVE PROBLEM LIST:   Patient Active Problem List   Diagnosis    Cancer [facility])    Malignant neoplasm of upper-inner quadrant of right breast in female, estrogen receptor positive ([facility] [address]    Hearing loss    Hypercholesterolemia    Weakness         CPT code:  60525  bedside swallow alexis Morales M.S., CCC-SLP  Speech-Language Pathologist  [address] [id number]

## 2022-08-11 NOTE — PROGRESS NOTES
SPEECH/LANGUAGE PATHOLOGY  SPEECH/LANGUAGE/COGNITIVE EVALUATION   and PLAN OF CARE      PATIENT NAME:  Noel Vergara  (female)     MRN:  [id number]    :  1960  (58 y.o.)  STATUS:  Inpatient: Room 7421/7421-A    TODAY'S DATE:  2022  08/10/22 1215    SLP eval and treat  Start:  08/10/22 1215,   End:  08/10/22 121,   ONE TIME,   Standing Count:  1 Occurrences,   [facility], [facility]   REASON FOR REFERRAL:  to assess cognitive-linguistic abilities  EVALUATING THERAPIST: Al Ramos, SLP    ADMITTING DIAGNOSIS: Weakness [R53.1]  Lesion of vertebra [M89.9]  Fall, initial encounter [W19. XXXA]    VISIT DIAGNOSIS:   Visit Diagnoses         Codes    Fall, initial encounter    -  Primary W19. XXXA    Lesion of vertebra     M89.9               SPEECH THERAPY  PLAN OF CARE   The speech therapy  POC is established based on physician order, speech pathology diagnosis and results of clinical assessment     SPEECH PATHOLOGY DIAGNOSIS:    Moderate-severe dysarthria    Speech Pathology intervention is recommended up to 6 times per week for LOS or when goals are met with emphasis on the following:      Conditions Requiring Skilled Therapeutic Intervention for speech, language and/or cognition    Dysarthria     Specific Speech Therapy Interventions to Include: Therapeutic exercises for dysarthria    Specific instructions for next treatment:      To initiate speech production tasks    SHORT/LONG TERM GOALS  Pt will improve speech intelligibility and increased articulatory precision via compensatory strategies and oral motor exercises   Pt will improve motor programming to reduce dysfluent speech via compensatory strategies with 80% accuracy    Patient goals: Patient/family involved in developing goals and treatment plan:   Treatment goals discussed with Patient and Family    The Patient and Family understand(s) the diagnosis, prognosis and plan of care   The patient/family Agreed with above, This plan may be re-evaluated and revised as warranted. Rehabilitation Potential/Prognosis: fair                CLINICAL ASSESSMENT:  MOTOR SPEECH       Oral Peripheral Examination   Generalized oral weakness    Parameters of Speech Production  Respiration:  Shortness of breath  Articulation:  Distortion, Anticipatory struggle  Resonance:  Hypernasal  Quality:   Within functional limits  Pitch: Within functional limits  Intensity: Quiet  Fluency:  Intact  Prosody Intact    RECEPTIVE LANGUAGE    Comprehension of Yes/No Questions: Within functional limits    Process  Simple Verbal Commands:   Within functional limits  Process Intermediate Verbal Commands:   Within functional limits  Process Complex Verbal Commands:     Within functional limits    Comprehension of Conversation:      Within functional limits      EXPRESSIVE LANGUAGE     Serials: Functional    Imitation:  Words   Functional   Sentences Functional    Naming:  (Modality used:  Verbal)  Confrontation Naming  Functional  Functional Description  Functional  Response Naming: Functional    Conversation:      Conversation was within functional limits    COGNITION     Attention/Orientation  Attention: Sustained attention   Orientation:  Oriented to Person    Memory   Immediate Recall: Repeated 3/3    Long Term Recall:   Recalled Birthdate, Age, and Family    Organization/Problem Solving/Reasoning   Verbal Sequencing:   Functional        Verbal Problem solving:   Functional          CLINICAL OBSERVATIONS NOTED DURING THE EVALUATION  Patient was alert and cooperative throughout evaluation. Patient w/ baseline cognitive delay and hearing impairment. Patient's mother at bedside and stated patient was independent w/ ADLs and some IADLs. Patient's mother reports some articulation distortion secondary to hearing impairment (dx at 11years old). Patient's mother reported increased difficulty understanding patient. Moderate-severe dysarthria noted.  Language and cognition were [facility]. EDUCATION:   The Speech Language Pathologist (SLP) completed education regarding results of evaluation and that intervention is warranted at this time. Learner: Patient and Family  Education: Reviewed results and recommendations of this evaluation  Evaluation of Education:  Verbalizes understanding    Evaluation Time includes thorough review of current medical information, gathering information on past medical history/social history and prior level of function, completion of standardized testing/informal observation of tasks, assessment of data and education on plan of care and goals. CPT code:    50248  eval speech sound lang comprehension      The admitting diagnosis and active problem list, as listed below have been reviewed prior to initiation of this evaluation.         ACTIVE PROBLEM LIST:   Patient Active Problem List   Diagnosis    Cancer [facility])    Malignant neoplasm of upper-inner quadrant of right breast in female, estrogen receptor positive ([facility] [address]    Hearing loss    Hypercholesterolemia    Weakness       Renu Krueger M.S., [address] Pathologist  [address] [phone]

## 2022-08-11 NOTE — PLAN OF CARE
Neuro plan of care note:    Patient off the floor for testing with neuro rounded. Spoke with PCP who states that he has been following with the patient and her mother for many years and her altered mentation and slurred speech is abnormal--he is concerned for CNS infection. She had some unexplained tachycardia--in the 110s-- which has since improved. She has significant anxiety. She has been afebrile for the past 24 hours and no leukocytosis   Patient's mother is at the bedside and concurs that the patient has not improved. She is confused and dysarthric which is not her baseline. Mother is very emotional and wants an answer. No acute stroke on MRIs or clear metastatic lesions in her spine, though these were limited due to movement and lack of IV contrast.  Neurosurgery has ordered a PET scan. EEG is pending.     Plan:    -LP with cytology and meningitis and autoimmune encephalitis panels--hold anticoags/antiplatelets  -Follow-up EEG--patient is on Keppra per neurosurgery  -Spiritual care for supportive mother    Will return later for formal neuro assessment when patient returns    Elidia Dakin, APRN - CNP  12:10 PM

## 2022-08-11 NOTE — PLAN OF CARE
Problem: Discharge Planning  Goal: Discharge to home or other facility with appropriate resources  Outcome: Progressing  Flowsheets (Taken 8/11/2022 0930)  Discharge to home or other facility with appropriate resources: Identify barriers to discharge with patient and caregiver     Problem: Pain  Goal: Verbalizes/displays adequate comfort level or baseline comfort level  Outcome: Progressing  Flowsheets (Taken 8/11/2022 0931)  Verbalizes/displays adequate comfort level or baseline comfort level: Encourage patient to monitor pain and request assistance

## 2022-08-11 NOTE — PROGRESS NOTES
SPEECH LANGUAGE PATHOLOGY  DAILY PROGRESS NOTE        PATIENT NAME:  Bessie Zelaya      :  1960          TODAY'S DATE:  2022 ROOM:  [address] [place] [address]    Patient seen for skilled dysphagia tx. SLP educated patient and mother at bedside regarding results of Clinical Assessment of Swallowing Function and recommendation of NPO and MBSS to further assess oropharyngeal function. Patient and family verbalized understanding and agreement.        CPT code(s) 29959  dysphagia tx  Total minutes :  15 minutes    Feliz Hodge M.S., CCC-SLP  Speech-Language Pathologist  [address] [id number]

## 2022-08-11 NOTE — PROGRESS NOTES
Chichi Velásquez M.D.,[facility]  LISA SernaO., F.A.C.O.I., Rocio Mahoney M.D. Cierra Fang M.D. Jr Singh D.O. Daily Pulmonary Progress Note    Patient:  Mae Munson 58 y.o. female MRN: [id number]     Date of Service: 8/11/2022      Synopsis     We are following patient for lung nodule    \"CC\" leg pain, s/p fall    Code status: FULL       Subjective      Patient was seen and examined. Return from EEG. HR elevated 110's. RR 30 mild accessory muscle use. No stridor. Appears mildly restless not much change from yesterday. Mild headache frontal area. Low grade temp yesterday 100 F. No temps overnight. Crow. SPO2 94% on RA at rest. CXR obtained concern for aspiration. Speech remains difficult to understand. US leg negative for dvt. MRI spine, brain no evidence of neoplastic disease. However limited by motion artifact. Will need PET scan. Work up per neuro ongoing. Plan for lumbar puncture. Incidental finding of RUL 6 mm nodule very small, not amenable to biopsy. Review of Systems:  Constitutional: Denies fever, weight loss, night sweats, and fatigue  Skin: Denies pigmentation, dark lesions, and rashes  HEENT: Denies tinnitus, ear drainage, epistaxis, sore throat, and hoarseness. Positive hearing loss dysarthria  Cardiovascular: Denies palpitations, chest pain, and chest pressure. Respiratory: Denies cough, dyspnea at rest, hemoptysis, apnea, and choking.   Gastrointestinal: Denies nausea, vomiting, poor appetite, diarrhea, heartburn or reflux  Genitourinary: Denies dysuria, frequency, urgency or hematuria  Musculoskeletal: Right leg and foot pain  Neurological: Denies dizziness, vertigo, headache, and focal weakness difficulty with ambulation  Psychological: Denies anxiety and depression  Endocrine: Denies heat intolerance and cold intolerance  Hematopoietic/Lymphatic: Denies bleeding problems and blood transfusions  History of invasive ductal right breast cancer diagnosed September 2021 treated with chemo, radiation, lumpectomy    24-hour events:  None     Objective   Vitals: BP (!) 156/79   Pulse 97   Temp 98.1 °F (36.7 °C) (Temporal)   Resp 24   Wt 116 lb (52.6 kg)   LMP 10/28/2021   SpO2 96%   BMI 24.24 kg/m²     I/O:    Intake/Output Summary (Last 24 hours) at 8/11/2022 1155  Last data filed at 8/11/2022 4194  Gross per 24 hour   Intake --   Output 500 ml   Net -500 ml                        CURRENT MEDS :  Scheduled Meds:   [Held by provider] aspirin  81 mg Oral Daily    [Held by provider] clopidogrel  75 mg Oral Daily    levETIRAcetam  500 mg IntraVENous Q12H    anastrozole  1 mg Oral Daily    sodium chloride flush  5-40 mL IntraVENous 2 times per day    enoxaparin  40 mg SubCUTAneous Daily    midazolam  2 mg IntraVENous Once    atorvastatin  10 mg Oral Nightly       Physical Exam:  General Appearance: appears comfortable in no acute distress. HEENT: Normocephalic atraumatic without obvious abnormality   Neck: Lips, mucosa, and tongue normal.  Supple, symmetrical, trachea midline, no adenopathy;thyroid:  no enlargement/tenderness/nodules or JVD. Lung: Breath sounds diminished few crackles. Respirations 30/min. Symmetrical expansion. no stridor. Mild accessory muscle use. Heart: RRR, normal S1, S2. No MRG. . Abdomen: Soft, NT, ND. BS present x 4 quadrants. No bruit or organomegaly. Extremities: Pedal pulses 2+ symmetric b/l. Extremities normal, no cyanosis, clubbing, or edema. Musculokeletal: No joint swelling, no muscle tenderness. ROM normal in all joints of extremities. Neurologic: Mental status: dysarthria. Alert but very restless. Following commands. No deficits of extremities    Pertinent/ New Labs and Imaging Studies     Imaging Personally Reviewed:  Cxr 8/11      CTA chest 8/9/2022  FINDINGS:   No filling defect in the pulmonary arteries to suggest pulmonary arterial   embolism. The heart is normal in size. No pericardial effusion.   No mediastinal lymphadenopathy. Nodule located in right upper lobe adjacent to   the minor fissure on axial image number 56 measures approximately 6 mm. Subsegmental atelectasis present in lung bases. No airspace opacity or   pleural effusion. No pneumothorax. Focal sclerotic lesion located in T8   vertebral body measures 8 x 8 mm. View of the upper abdomen shows normal   bilateral adrenal glands. Postsurgical changes associated with right medial   breast.           Impression   1. No evidence of pulmonary embolism. 2. No pneumonia or pleural effusion. 3. Noncalcified nodule located in right upper lobe measures approximately 6   mm. 4. Focal sclerotic lesion located in T8 vertebral body. Bone scan could be   helpful to exclude active lesion. Echo:       Summary   Left ventricle is normal in size . Mild left ventricular concentric hypertrophy noted. No regional wall motion abnormalities seen. Normal left ventricular ejection fraction. Hyperdynamic left ventricular systolic function. Left ventricular outflow tract gradient. The left atrium is mildly dilated. Normal mitral valve leaflet thickness. Mild systolic anterior motion (MANOJ) of anterior mitral leaflet. Mild centrally directed mitral regurgitation. Mild tricuspid regurgitation. Pulmonary hypertension is mild . No intracardiac shunt identified via saline contrast administration.          Labs:  Lab Results   Component Value Date/Time    WBC 5.4 08/10/2022 08:29 AM    HGB 12.6 08/10/2022 08:29 AM    HCT 38.8 08/10/2022 08:29 AM    MCV 89.0 08/10/2022 08:29 AM    MCH 28.9 08/10/2022 08:29 AM    MCHC 32.5 08/10/2022 08:29 AM    RDW 15.7 08/10/2022 08:29 AM     08/10/2022 08:29 AM    MPV 9.7 08/10/2022 08:29 AM     Lab Results   Component Value Date/Time     08/10/2022 08:29 AM    K 3.8 08/10/2022 08:29 AM    K 4.0 08/09/2022 11:57 AM     08/10/2022 08:29 AM    CO2 19 08/10/2022 08:29 AM    BUN 10 study when pt able to participate  PT, OT    This plan of care was reviewed in collaboration with Dr. Elton Singh  Electronically signed by ORLY Cornejo CNP on 8/11/2022         I personally saw, examined, and cared for the patient. Labs, medications, radiographs reviewed. I agree with history exam and plans detailed in NP note. No stridor. Continue close monitoring. Patient with upper throat sounds suspect from tongue obstruction. Lung nodule is small for biopsy. Outpatient follow-up. Continue mental status evaluation work-up. Family updated bedside.     Og Santiago DO

## 2022-08-11 NOTE — PROGRESS NOTES
Neuro Inpatient Follow Up Note       Precious Nieto is a 58 y.o. female     Neurology is following for weakness    Significant past medical history breast cancer, status post right lumpectomyHLD, deafness, mild intellectual disability    Synopsis:  Patient was admitted with weakness and slurred speech. She woke up on 8/8 and fell when she tried to walk. She has a history of deafness and mild intellectual disability and lives with her mother--typically very energetic, oriented. .  A couple of hours following the fall she fell again and had slurred speech. SBP was over 200 on arrival.  Patient reportedly has mild speech impediment but the dysarthria was much worse than baseline. She was treated with chemotherapy and radiation for her breast cancer and has been on Arimidex since May 2022. HPI:  MRI of the brain was negative for acute stroke--though there was motion artifact. LP ordered and pending. EEG neg. She is awake in bed with a bit of abnormal breathing--she follows most commands but is generally weak--worse on the L. Speech is very slurred. Nods yes to headache. Pulm NP is also at the bedside. She had some unexplained tachycardia--in the 110s. She has been afebrile for the past 24 hours and no leukocytosis--infectious workup is pending. Neurosurgery is following for possible T8 lesion--MRIs of her cervical and thoracic spine showed no clear metastatic disease but they were also limited due to movement and lack of IV contrast.  They ordered a PET scan. They have also ordered Keppra. Neuro spoke with her mother at length earlier today. She is in the hallway on the phone at present time.     ROS limited due to her mental status    Current Facility-Administered Medications   Medication Dose Route Frequency Provider Last Rate Last Admin    [Held by provider] aspirin EC tablet 81 mg  81 mg Oral Daily Benjie Mendoza DO        [Held by provider] clopidogrel (PLAVIX) tablet 75 mg  75 mg Oral Daily [employer],         perflutren lipid microspheres (DEFINITY) injection 1.65 mg  1.5 mL IntraVENous ONCE PRN Ignacio Diaz DO        0.9 % sodium chloride infusion   IntraVENous Continuous ORLY Degroot - CNP 50 mL/hr at 08/10/22 1516 New Bag at 08/10/22 1516    levETIRAcetam (KEPPRA) 500 mg/100 mL IVPB  500 mg IntraVENous Q12H Oz Ham MD   Stopped at 08/10/22 2219    anastrozole (ARIMIDEX) tablet 1 mg  1 mg Oral Daily Ignacio Diaz DO   1 mg at 08/10/22 1028    sodium chloride flush 0.9 % injection 5-40 mL  5-40 mL IntraVENous 2 times per day Ignacio Diaz DO   10 mL at 08/10/22 2009    sodium chloride flush 0.9 % injection 5-40 mL  5-40 mL IntraVENous PRN Ignacio Diaz DO        0.9 % sodium chloride infusion   IntraVENous PRN Ignacio Diaz DO        enoxaparin (LOVENOX) injection 40 mg  40 mg SubCUTAneous Daily Ignacio Diaz DO   40 mg at 08/10/22 1308    ondansetron (ZOFRAN-ODT) disintegrating tablet 4 mg  4 mg Oral Q8H PRN Ignacio Diaz DO        Or    ondansetron [facility]) injection 4 mg  4 mg IntraVENous Q6H PRN Ignacio Diaz DO        polyethylene glycol (GLYCOLAX) packet 17 g  17 g Oral Daily PRN Ignacio Diaz DO        acetaminophen (TYLENOL) tablet 650 mg  650 mg Oral Q6H PRN Ignacio Diaz DO   650 mg at 08/10/22 1027    Or    acetaminophen (TYLENOL) suppository 650 mg  650 mg Rectal Q6H PRN Ignacio Diaz DO        midazolam PF (VERSED) injection 2 mg  2 mg IntraVENous Once Theotijoni Palomo, DO        atorvastatin (LIPITOR) tablet 10 mg  10 mg Oral Nightly Ignacio Diaz DO   10 mg at 08/10/22 0007        Objective:     BP (!) 156/79   Pulse 97   Temp 98.1 °F (36.7 °C) (Temporal)   Resp 18   Wt 116 lb (52.6 kg)   LMP 10/28/2021   SpO2 96%   BMI 24.24 kg/m²     General appearance: alert, appears stated age, cooperative --appears uncomfortable and a little restless  Head: normocephalic, without obvious abnormality, atraumatic--dry mucous membranes  Eyes: conjunctivae/corneas clear. .  Neck: full ROM--neg Lhermitte's  Lungs: ragged breathing on room air but no clear resp distress  Heart: tachycardic   Extremities: normal, atraumatic, no cyanosis or edema  Pulses: 2+ and symmetric  Skin: color, texture, turgor normal---no rashes or lesions      Mental Status: alert, follows most simple commands.  Oriented to person and mother    Fair attention/concentration  Evidence of intellectual delay--follows most simple commands    Speech: marked dysarthria  Language:laconic    Cranial Nerves:  I: smell NA   II: visual acuity  NA   II: visual fields B/l threat   II: pupils JALEN   III,VII: ptosis None   III,IV,VI: extraocular muscles  Full ROM   V: mastication Normal   V: facial light touch sensation     V,VII: corneal reflex     VII: facial muscle function - upper  Normal   VII: facial muscle function - lower Symmetric   VIII: hearing Deaf   IX: soft palate elevation  Normal   IX,X: gag reflex    XI: trapezius strength  5/5   XI: sternocleidomastoid strength 5/5   XI: neck extension strength  5/5   XII: tongue strength  Normal     Motor:  Mild L hemiparesis--3+/5  5/5 R arm; 4/5 R leg  Normal bulk  No abnormal movements    Sensory:  Appreciates LT in all limbs    DTR:   No Babinskis  No Cho's    No pathological reflexes    Laboratory/Radiology:     CBC with Differential:    Lab Results   Component Value Date/Time    WBC 5.4 08/10/2022 08:29 AM    RBC 4.36 08/10/2022 08:29 AM    HGB 12.6 08/10/2022 08:29 AM    HCT 38.8 08/10/2022 08:29 AM     08/10/2022 08:29 AM    MCV 89.0 08/10/2022 08:29 AM    MCH 28.9 08/10/2022 08:29 AM    MCHC 32.5 08/10/2022 08:29 AM    RDW 15.7 08/10/2022 08:29 AM    METASPCT 5.0 01/12/2022 03:16 PM    LYMPHOPCT 4.3 08/09/2022 11:57 AM    PROMYELOPCT 8.0 01/12/2022 03:16 PM    MONOPCT 2.6 08/09/2022 11:57 AM    MYELOPCT 3.0 01/12/2022 03:16 PM    BASOPCT 0.9 08/09/2022 11:57 AM    MONOSABS 0.16 08/09/2022 11:57 AM LYMPHSABS 0.22 08/09/2022 11:57 AM    EOSABS 0.00 08/09/2022 11:57 AM    BASOSABS 0.05 08/09/2022 11:57 AM     CMP:    Lab Results   Component Value Date/Time     08/10/2022 08:29 AM    K 3.8 08/10/2022 08:29 AM    K 4.0 08/09/2022 11:57 AM     08/10/2022 08:29 AM    CO2 19 08/10/2022 08:29 AM    BUN 10 08/10/2022 08:29 AM    CREATININE 0.8 08/10/2022 08:29 AM    GFRAA >60 08/10/2022 08:29 AM    LABGLOM >60 08/10/2022 08:29 AM    GLUCOSE 101 08/10/2022 08:29 AM    PROT 6.0 08/10/2022 08:29 AM    LABALBU 3.4 08/10/2022 08:29 AM    CALCIUM 8.0 08/10/2022 08:29 AM    BILITOT 0.4 08/10/2022 08:29 AM    ALKPHOS 72 08/10/2022 08:29 AM    AST 50 08/10/2022 08:29 AM    ALT 42 08/10/2022 08:29 AM     TSH:    Lab Results   Component Value Date/Time    TSH 0.644 08/10/2022 08:29 AM     MRI BRAIN:   No evidence of neoplastic disease. No evidence of recent infarct, acute intracranial hemorrhage, mass effect, or hydrocephalus. MRA HEAD:   No proximal large vessel arterial occlusion or high-grade stenosis. MRI CERVICAL SPINE:   No evidence of neoplastic disease. Mild to moderate multilevel spinal stenosis secondary to a congenitally narrow cervical spinal canal, as described above. Moderate to severe left neural foraminal stenosis at C5-6. MRI THORACIC SPINE:   No evidence of neoplastic disease. No significant spinal or neural foraminal stenosis     EEG 8/11: This was a normal study during the waking state. No seizures or epileptiform discharges were noted during this study. LP pending    All pertinent labs and images personally reviewed today    Assessment:     Altered mental status and dysarthria: in a pt with intellectual disability and hx breast cancer (on Arimidex). Etiology of her symptoms is unclear at the present time. No clear acute stroke on MRI.   Seizure with postictal confusion is still possible, though EEG was normal.  No clear evidence of metastatic disease --but workup ensues. No clear signs of systemic infection aside from her tachycardia and current abnormal breathing, but CSF studies and workup pending. Her exam is unchanged today.     Plan:     Await LP    Reasonable to continue Keppra for now     Discussed with Dr. Emery Chaudhry, Dr. Augustus Nunez, and pts mother    Will follow closely    ORLY Avendano CNP  10:32 AM  8/11/2022

## 2022-08-11 NOTE — PROCEDURES
[address] Report    MRN: [id number]   PATIENT NAME: Christine Durand   DATE OF FCVYG:    DATE OF SERVICE: 2022    PHYSICIAN NAME: Antonio Jeter DO  Referring Physician: Azeb Brown      Patient's : 1960   Patient's Age: 58 y.o. Gender: female     PROCEDURE: Routine EEG with video      Clinical Interpretation: This was a normal study during the waking state. No seizures or epileptiform discharges were noted during this study. ____________________________  Electronically signed by: Antonio Jeter DO, 2022 12:22 PM      Patient Clinical Information   Reason for Study: Patient undergoing evaluation for slurred speech  Patient State: Awake  Primary neurological diagnosis: Spell of uncertain etiology   Primary indication for monitoring: Characterization of spell    Pertinent Medications and Treatments    levetiracetam    Sedatives administered: No  Intubated: No  Pharmacological paralytic: No    Reporting Period  Start of Study: 1146, 2022   End of Study:  1213, 2022       EEG Description  Digital video and scalp EEG monitoring was performed using the standard protocol for this laboratory. Scalp electrodes were applied in the international 10/20 system. Multiple digital montage arrangements were utilized for evaluation. EKG and video were recorded. Background:      Occipital rhythm (posterior dominant rhythm or PDR): Present   Frequency: 11 Hz  Voltage: Low   Organization: fair   Reactivity to eye opening/closure: fair    Drowsiness: Absent  Sleep: Absent    Technical and Activation Procedures:  Hyperventilation: Not done        Photic stimulation: Done - physiologic driving noted        Reactivity to stimulation: Yes    Abnormalities:    I. Seizures? No    II. Rhythmic or Periodic Patterns? No    III. Other Abnormalities?         No

## 2022-08-11 NOTE — PROGRESS NOTES
SPEECH LANGUAGE PATHOLOGY  DAILY PROGRESS NOTE        PATIENT NAME:  Kristina Davidson      :  1960          TODAY'S DATE:  2022 ROOM:  [address] [place] [address]    Patient seen in room for skilled speech/lang tx. Patient presents w/ moderate-severe dysarthria at word/phrase/sentence levels. Patient and family educated on POC to include compensatory speech strategies (I.e.: slow rate, over articulation, and increased volume). Patient and family verbalized understanding and agreement. Will cont w/ POC.        CPT code(s) U4641723  speech/language tx  Total minutes :  15 minutes    Kapil Singh M.S., CCC-SLP  Speech-Language Pathologist  [phone]

## 2022-08-11 NOTE — PROCEDURES
PT was sedated for MRI, moving on all sequences. Had to run propeller sequences went as far as we could but PT refused to continue, and was sent back to floor. Nurse notified.

## 2022-08-11 NOTE — PROGRESS NOTES
Hospital Medicine    Subjective:  pt alert responsive / thick speech      Current Facility-Administered Medications:     [Held by provider] aspirin EC tablet 81 mg, 81 mg, Oral, Daily, Ezequiel Verma DO    [Held by provider] clopidogrel (PLAVIX) tablet 75 mg, 75 mg, Oral, Daily, Carly Diaz DO    perflutren lipid microspheres (DEFINITY) injection 1.65 mg, 1.5 mL, IntraVENous, ONCE PRN, Carly Diaz DO    0.9 % sodium chloride infusion, , IntraVENous, Continuous, Quiana Horowitz, APRN - CNP, Last Rate: 50 mL/hr at 08/10/22 1516, New Bag at 08/10/22 1516    levETIRAcetam (KEPPRA) 500 mg/100 mL IVPB, 500 mg, IntraVENous, Q12H, Rolly Reddy MD, Stopped at 08/10/22 2219    anastrozole (ARIMIDEX) tablet 1 mg, 1 mg, Oral, Daily, Carly Diaz DO, 1 mg at 08/10/22 1028    sodium chloride flush 0.9 % injection 5-40 mL, 5-40 mL, IntraVENous, 2 times per day, Ezequiel Verma DO, 10 mL at 08/10/22 2009    sodium chloride flush 0.9 % injection 5-40 mL, 5-40 mL, IntraVENous, PRN, Carly Diaz DO    0.9 % sodium chloride infusion, , IntraVENous, PRN, Carly Diaz DO    enoxaparin (LOVENOX) injection 40 mg, 40 mg, SubCUTAneous, Daily, Carly Diaz DO, 40 mg at 08/10/22 1308    ondansetron (ZOFRAN-ODT) disintegrating tablet 4 mg, 4 mg, Oral, Q8H PRN **OR** ondansetron (ZOFRAN) injection 4 mg, 4 mg, IntraVENous, Q6H PRN, Carly Diaz DO    polyethylene glycol (GLYCOLAX) packet 17 g, 17 g, Oral, Daily PRN, Carly Diaz DO    acetaminophen (TYLENOL) tablet 650 mg, 650 mg, Oral, Q6H PRN, 650 mg at 08/10/22 1027 **OR** acetaminophen (TYLENOL) suppository 650 mg, 650 mg, Rectal, Q6H PRN, Carly Diaz DO    midazolam PF (VERSED) injection 2 mg, 2 mg, IntraVENous, Once, Grisel Harris DO    atorvastatin (LIPITOR) tablet 10 mg, 10 mg, Oral, Nightly, Carly Diaz DO, 10 mg at 08/10/22 0007    Objective:    BP (!) 156/79   Pulse 97   Temp 98.1 °F (36.7 °C) (Temporal)   Resp 18 Wt 116 lb (52.6 kg)   LMP 10/28/2021   SpO2 96%   BMI 24.24 kg/m²     Heart:  reg  Lungs:  ctab  Abd: + bs soft nontender  Extrem:  w/o edema    CBC with Differential:    Lab Results   Component Value Date/Time    WBC 5.4 08/10/2022 08:29 AM    RBC 4.36 08/10/2022 08:29 AM    HGB 12.6 08/10/2022 08:29 AM    HCT 38.8 08/10/2022 08:29 AM     08/10/2022 08:29 AM    MCV 89.0 08/10/2022 08:29 AM    MCH 28.9 08/10/2022 08:29 AM    MCHC 32.5 08/10/2022 08:29 AM    RDW 15.7 08/10/2022 08:29 AM    METASPCT 5.0 01/12/2022 03:16 PM    LYMPHOPCT 4.3 08/09/2022 11:57 AM    PROMYELOPCT 8.0 01/12/2022 03:16 PM    MONOPCT 2.6 08/09/2022 11:57 AM    MYELOPCT 3.0 01/12/2022 03:16 PM    BASOPCT 0.9 08/09/2022 11:57 AM    MONOSABS 0.16 08/09/2022 11:57 AM    LYMPHSABS 0.22 08/09/2022 11:57 AM    EOSABS 0.00 08/09/2022 11:57 AM    BASOSABS 0.05 08/09/2022 11:57 AM     CMP:    Lab Results   Component Value Date/Time     08/10/2022 08:29 AM    K 3.8 08/10/2022 08:29 AM    K 4.0 08/09/2022 11:57 AM     08/10/2022 08:29 AM    CO2 19 08/10/2022 08:29 AM    BUN 10 08/10/2022 08:29 AM    CREATININE 0.8 08/10/2022 08:29 AM    GFRAA >60 08/10/2022 08:29 AM    LABGLOM >60 08/10/2022 08:29 AM    GLUCOSE 101 08/10/2022 08:29 AM    PROT 6.0 08/10/2022 08:29 AM    LABALBU 3.4 08/10/2022 08:29 AM    CALCIUM 8.0 08/10/2022 08:29 AM    BILITOT 0.4 08/10/2022 08:29 AM    ALKPHOS 72 08/10/2022 08:29 AM    AST 50 08/10/2022 08:29 AM    ALT 42 08/10/2022 08:29 AM     Warfarin PT/INR:  No results found for: INR, PROTIME    Assessment:    Principal Problem:    Weakness  Resolved Problems:    * No resolved hospital problems.  *      Plan:  Discussed case with neurology await further rec / pt/ot alexis Verma DO  10:35 AM  8/11/2022

## 2022-08-12 ENCOUNTER — APPOINTMENT (OUTPATIENT)
Dept: MRI IMAGING | Age: 62
DRG: 051 | End: 2022-08-12
Payer: COMMERCIAL

## 2022-08-12 ENCOUNTER — APPOINTMENT (OUTPATIENT)
Dept: GENERAL RADIOLOGY | Age: 62
DRG: 051 | End: 2022-08-12
Payer: COMMERCIAL

## 2022-08-12 PROBLEM — I47.10 SUPRAVENTRICULAR TACHYCARDIA: Status: ACTIVE | Noted: 2022-08-12

## 2022-08-12 PROBLEM — I42.2 HYPERTROPHIC CARDIOMYOPATHY (HCC): Status: ACTIVE | Noted: 2022-08-12

## 2022-08-12 PROBLEM — I47.1 SUPRAVENTRICULAR TACHYCARDIA (HCC): Status: ACTIVE | Noted: 2022-08-12

## 2022-08-12 PROBLEM — I10 PRIMARY HYPERTENSION: Status: ACTIVE | Noted: 2022-08-12
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
## 2022-08-12 PROBLEM — D05.91: Status: ACTIVE | Noted: 2022-08-12

## 2022-08-12 PROBLEM — A87.9 VIRAL MENINGITIS: Status: ACTIVE | Noted: 2022-08-12

## 2022-08-12 LAB
CRYPTOCOCCUS NEOFORMANS/GATTII CSF BY PCR: NOT DETECTED
CYTOMEGALOVIRUS CSF BY PCR: NOT DETECTED
ENTEROVIRUS CSF BY PCR: NOT DETECTED
ESCHERICHIA COLI K1 CSF BY PCR: NOT DETECTED
GLUCOSE, CSF: 78 MG/DL (ref 40–70)
HAEMOPHILUS INFLUENZAE CSF BY PCR: NOT DETECTED
HERPES SIMPLEX VIRUS 1 CSF BY PCR: NOT DETECTED
HERPES SIMPLEX VIRUS 2 CSF BY PCR: NOT DETECTED
HUMAN HERPESVIRUS 6 CSF BY PCR: DETECTED
HUMAN PARECHOVIRUS CSF BY PCR: NOT DETECTED
LISTERIA MONOCYTOGENES CSF BY PCR: NOT DETECTED
METER GLUCOSE: 122 MG/DL (ref 74–99)
NEISSERIA MENINGITIDIS CSF BY PCR: NOT DETECTED
PROTEIN CSF: 25 MG/DL (ref 15–40)
STREPTOCOCCUS AGALACTIAE CSF BY PCR: NOT DETECTED
STREPTOCOCCUS PNEUMONIAE CSF BY PCR: NOT DETECTED
VARICELLA ZOSTER VIRUS CSF BY PCR: NOT DETECTED

## 2022-08-12 PROCEDURE — 86592 SYPHILIS TEST NON-TREP QUAL: CPT

## 2022-08-12 PROCEDURE — 2060000000 HC ICU INTERMEDIATE R&B

## 2022-08-12 PROCEDURE — 6360000002 HC RX W HCPCS: Performed by: NEUROLOGICAL SURGERY

## 2022-08-12 PROCEDURE — 84157 ASSAY OF PROTEIN OTHER: CPT

## 2022-08-12 PROCEDURE — 62328 DX LMBR SPI PNXR W/FLUOR/CT: CPT

## 2022-08-12 PROCEDURE — 6360000002 HC RX W HCPCS: Performed by: NURSE PRACTITIONER

## 2022-08-12 PROCEDURE — 87205 SMEAR GRAM STAIN: CPT

## 2022-08-12 PROCEDURE — 87483 CNS DNA AMP PROBE TYPE 12-25: CPT

## 2022-08-12 PROCEDURE — 87206 SMEAR FLUORESCENT/ACID STAI: CPT

## 2022-08-12 PROCEDURE — 2580000003 HC RX 258: Performed by: INTERNAL MEDICINE

## 2022-08-12 PROCEDURE — 89051 BODY FLUID CELL COUNT: CPT

## 2022-08-12 PROCEDURE — 99233 SBSQ HOSP IP/OBS HIGH 50: CPT | Performed by: NURSE PRACTITIONER

## 2022-08-12 PROCEDURE — 82945 GLUCOSE OTHER FLUID: CPT

## 2022-08-12 PROCEDURE — 74230 X-RAY XM SWLNG FUNCJ C+: CPT

## 2022-08-12 PROCEDURE — 82962 GLUCOSE BLOOD TEST: CPT

## 2022-08-12 PROCEDURE — 2700000000 HC OXYGEN THERAPY PER DAY

## 2022-08-12 PROCEDURE — 6360000002 HC RX W HCPCS: Performed by: INTERNAL MEDICINE

## 2022-08-12 PROCEDURE — 87533 HHV-6 DNA QUANT: CPT

## 2022-08-12 PROCEDURE — 87070 CULTURE OTHR SPECIMN AEROBIC: CPT

## 2022-08-12 PROCEDURE — 009U3ZX DRAINAGE OF SPINAL CANAL, PERCUTANEOUS APPROACH, DIAGNOSTIC: ICD-10-PCS | Performed by: RADIOLOGY

## 2022-08-12 PROCEDURE — B01B1ZZ FLUOROSCOPY OF SPINAL CORD USING LOW OSMOLAR CONTRAST: ICD-10-PCS | Performed by: RADIOLOGY

## 2022-08-12 PROCEDURE — 86593 SYPHILIS TEST NON-TREP QUANT: CPT

## 2022-08-12 PROCEDURE — 83519 RIA NONANTIBODY: CPT

## 2022-08-12 PROCEDURE — 86341 ISLET CELL ANTIBODY: CPT

## 2022-08-12 PROCEDURE — 99223 1ST HOSP IP/OBS HIGH 75: CPT | Performed by: INTERNAL MEDICINE

## 2022-08-12 PROCEDURE — 86255 FLUORESCENT ANTIBODY SCREEN: CPT

## 2022-08-12 RX ORDER — METOPROLOL SUCCINATE 25 MG/1
12.5 TABLET, EXTENDED RELEASE ORAL DAILY
Status: DISCONTINUED | OUTPATIENT
Start: 2022-08-12 | End: 2022-08-19 | Stop reason: HOSPADM

## 2022-08-12 RX ORDER — SODIUM CHLORIDE 9 MG/ML
INJECTION, SOLUTION INTRAVENOUS CONTINUOUS
Status: DISCONTINUED | OUTPATIENT
Start: 2022-08-12 | End: 2022-08-16

## 2022-08-12 RX ORDER — DIAZEPAM 5 MG/ML
5 INJECTION, SOLUTION INTRAMUSCULAR; INTRAVENOUS SEE ADMIN INSTRUCTIONS
Status: DISCONTINUED | OUTPATIENT
Start: 2022-08-12 | End: 2022-08-19 | Stop reason: HOSPADM

## 2022-08-12 RX ADMIN — LEVETIRACETAM 500 MG: 5 INJECTION INTRAVENOUS at 21:19

## 2022-08-12 RX ADMIN — SODIUM CHLORIDE: 9 INJECTION, SOLUTION INTRAVENOUS at 18:34

## 2022-08-12 RX ADMIN — ENOXAPARIN SODIUM 40 MG: 100 INJECTION SUBCUTANEOUS at 08:45

## 2022-08-12 RX ADMIN — SODIUM CHLORIDE: 9 INJECTION, SOLUTION INTRAVENOUS at 09:04

## 2022-08-12 RX ADMIN — DIAZEPAM 5 MG: 5 INJECTION, SOLUTION INTRAMUSCULAR; INTRAVENOUS at 19:29

## 2022-08-12 RX ADMIN — LEVETIRACETAM 500 MG: 5 INJECTION INTRAVENOUS at 08:49

## 2022-08-12 RX ADMIN — SODIUM CHLORIDE: 9 INJECTION, SOLUTION INTRAVENOUS at 05:51

## 2022-08-12 RX ADMIN — Medication 10 ML: at 21:20

## 2022-08-12 RX ADMIN — Medication 10 ML: at 09:04

## 2022-08-12 ASSESSMENT — PAIN SCALES - WONG BAKER
WONGBAKER_NUMERICALRESPONSE: 4
WONGBAKER_NUMERICALRESPONSE: 0
WONGBAKER_NUMERICALRESPONSE: 0

## 2022-08-12 ASSESSMENT — PAIN SCALES - GENERAL
PAINLEVEL_OUTOF10: 0

## 2022-08-12 NOTE — PROGRESS NOTES
Attempted to have one more ICU rn try with ultrasound, pt screaming no no more, crying, thrashing in bed refusing iv at this point, will not attempt, will re-revaluate in the morning.

## 2022-08-12 NOTE — CONSULTS
[facility] CONSULT NOTE    Reason for Consult: Fever   Requested by: Radha Arreola DO     Chief complaint: Weakness and slurred speech    History Obtained From: EMR, patient and her cousin    HISTORY OFPRESENT ILLNESS              The patient is a 58 y.o. female with history of invasive ductal right breast cancer diagnosed in 2021 currently on anastrazole, presented on 08/09 with weakness and slurred speech accompanied by recurrent falls. Since admission, she had occasional T-max of 100 to 100.3 °F with no leukocytosis. Chest CTA showed focal sclerotic lesion in T8 vertebral body, noncalcified nodule in right upper lobe measuring 6 mm, no pneumonia or pleural effusion, no evidence of pulmonary embolism. MRI brain showed no evidence of recent infarct, acute intracranial hemorrhage, mass-effect, hydrocephalus, neoplastic disease. Respiratory pathogen PCR panel was negative. Urinalysis showed no pyuria. Lumbar puncture on 08/12 yielded CSF with WBCs of 4-5 predominantly monocytic at 75%. CSF gram stain and culture showed no polymorphonuclear leukocytes, rare epithelial cells, no organisms. 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 Acyclovir was started by attending physician. ID service was subsequently consulted for further recommendations.     Past Medical History  Past Medical History:   Diagnosis Date    Breast cancer ([facility] [address]     Right breast    Cancer ([facility] [address]     Hearing impaired     Hearing loss     Hyperlipidemia        Current Facility-Administered Medications   Medication Dose Route Frequency Provider Last Rate Last Admin    0.9 % sodium chloride infusion   IntraVENous Continuous Kimberly Diaz  mL/hr at 08/12/22 0904 New Bag at 08/12/22 0904    barium sulfate 40 % suspension 15 mL  15 mL Oral ONCE PRN Tu Shultz MD        barium sulfate 40 % paste 15 mL  15 mL Oral ONCE PRN Tu Shultz MD        barium sulfate 40 % reconsitutable suspension  5 mL Oral ONCE PRN Una Borders LUMPECTOMY Right 12/1/2021    RE-EXCISION RIGHT BREAST INFERIOR MARGIN performed by Tomy Baxter MD at [facility]  2021        Social History  Social History     Socioeconomic History    Marital status: Single   Tobacco Use    Smoking status: Never    Smokeless tobacco: Never   Vaping Use    Vaping Use: Never used   Substance and Sexual Activity    Alcohol use: Never    Drug use: Never       Family Medical History  Family History   Problem Relation Age of Onset    Heart Disease Mother     High Blood Pressure Mother     High Cholesterol Mother     Other Father         COPD    Dementia Maternal Grandmother     Cancer Maternal Cousin         colon       Review of Systems:  Constitutional: No fever, no chills  Eyes: No vision changes, no retroorbital pain  ENT: No hearing changes, no ear pain  Respiratory: No cough, no dyspnea  Cardiovascular: No chest pain, no palpitations  Gastrointestinal: No abdominal pain, no diarrhea  Genitourinary: No dysuria, no hematuria  Integumentary: No rash, no itching  Musculoskeletal: No muscle pain, no joint pain  Neurologic: No headache, no numbness in extremities    Physical Examination:  Vitals:    08/12/22 0507 08/12/22 0530 08/12/22 0732 08/12/22 0901   BP: (!) 176/76 (!) 160/78 (!) 143/83    Pulse:  (!) 108 (!) 114    Resp:  26 24    Temp:   99.1 °F (37.3 °C)    TempSrc:       SpO2:  98% 98% 98%   Weight:         Constitutional: Alert, not in distress  Eyes: Sclerae anicteric, no conjunctival erythema  ENT: No buccal lesion, no pharyngeal exudates  Neck: No nuchal rigidity, no cervical adenopathy  Lungs: Clear breath sounds, no crackles, no wheezes  Heart: Regular rate and rhythm, no murmurs  Abdomen: Bowel sounds present, soft, nontender  Skin: Warm and dry, no active dermatoses  Musculoskeletal: No joint erythema, no joint swelling    Labs, imaging, and medical records/notes were personally reviewed.       Assessment:  Dysarthria, etiology unclear. Chest imaging and CSF cell count with differential not suggestive of infection. Plan:  Stop acyclovir. Monitor clinically off antibiotics for now. Send CSF and serum HHV-6 quantitative PCR/viral load. (Order clarified twice with the lab that human herpesvirus 6 and not HSV PCR)  Check blood cultures. Follow up CSF culture. Thank you for involving me in the care of Owen Aguirre. I will continue to follow. Please do not hesitate to call for any questions or concerns.     Electronically signed by Ele Veloz MD on 8/12/2022 at 1:04 PM

## 2022-08-12 NOTE — PROGRESS NOTES
According to order, only 1L is supposed to run into pt, bag started at 1653, total infused was around 800 mls, no IV access so fluids stopped, were stopping at 0300 according to order anyway. Will continue to monitor.

## 2022-08-12 NOTE — PROGRESS NOTES
Pt has been stuck a total of 8 times for an iv, pt crying, upset. Charge RN notified, if unable to get IV, IV team will have to place in the AM per policy. ICU Rn's have tried and failed as well, will address in AM, provider aware.

## 2022-08-12 NOTE — PROGRESS NOTES
Pt agitated in bed at this time d/t changing pt, will reassess vital signs and call the provider if no improvement in MEWS. MEWS at the start of shift also 4.

## 2022-08-12 NOTE — PROGRESS NOTES
Hospital Medicine    Subjective:  pt seen this am alert responsive dysarthric      Current Facility-Administered Medications:     0.9 % sodium chloride infusion, , IntraVENous, Continuous, Shravan Diaz DO, Last Rate: 100 mL/hr at 08/12/22 0904, New Bag at 08/12/22 0904    barium sulfate 40 % suspension 15 mL, 15 mL, Oral, ONCE PRN, Frank Simmonds, MD    barium sulfate 40 % paste 15 mL, 15 mL, Oral, ONCE PRN, Frank Simmonds, MD    barium sulfate 40 % reconsitutable suspension, 5 mL, Oral, ONCE PRN, Frank Simmonds, MD    [facility] aspirin EC tablet 81 mg, 81 mg, Oral, Daily, Love Buck DO    [Held by provider] clopidogrel (PLAVIX) tablet 75 mg, 75 mg, Oral, Daily, Shravan Diaz DO    perflutren lipid microspheres (DEFINITY) injection 1.65 mg, 1.5 mL, IntraVENous, ONCE PRN, Shravan Diaz DO    levETIRAcetam (KEPPRA) 500 mg/100 mL IVPB, 500 mg, IntraVENous, Q12H, Ashley Pearson MD, Stopped at 08/12/22 0904    anastrozole (ARIMIDEX) tablet 1 mg, 1 mg, Oral, Daily, Shravan Diaz DO, 1 mg at 08/10/22 1028    sodium chloride flush 0.9 % injection 5-40 mL, 5-40 mL, IntraVENous, 2 times per day, Love Buck DO, 10 mL at 08/12/22 1806    sodium chloride flush 0.9 % injection 5-40 mL, 5-40 mL, IntraVENous, PRN, Shravan Diaz DO    0.9 % sodium chloride infusion, , IntraVENous, PRN, Shravan Diaz DO    enoxaparin (LOVENOX) injection 40 mg, 40 mg, SubCUTAneous, Daily, ORLY Gustafson - CNP, 40 mg at 08/12/22 0845    ondansetron (ZOFRAN-ODT) disintegrating tablet 4 mg, 4 mg, Oral, Q8H PRN **OR** ondansetron (ZOFRAN) injection 4 mg, 4 mg, IntraVENous, Q6H PRN, Shravan Diaz DO    polyethylene glycol (GLYCOLAX) packet 17 g, 17 g, Oral, Daily PRN, Shravan Diaz DO    acetaminophen (TYLENOL) tablet 650 mg, 650 mg, Oral, Q6H PRN, 650 mg at 08/10/22 1027 **OR** acetaminophen (TYLENOL) suppository 650 mg, 650 mg, Rectal, Q6H PRN, Shravan Diaz DO, 650 mg at 08/11/22 2033    midazolam PF (VERSED) injection 2 mg, 2 mg, IntraVENous, Once, Parrish Evans DO    atorvastatin (LIPITOR) tablet 10 mg, 10 mg, Oral, Nightly, Margo Diaz DO, 10 mg at 08/10/22 0007    Objective:    BP (!) 143/83   Pulse (!) 114   Temp 99.1 °F (37.3 °C)   Resp 24   Wt 116 lb (52.6 kg)   LMP 10/28/2021   SpO2 98%   BMI 24.24 kg/m²     Heart:  reg tachy  Lungs:  ctab  Abd: + bs soft nontender  Extrem:  w/o edema    CBC with Differential:    Lab Results   Component Value Date/Time    WBC 5.4 08/10/2022 08:29 AM    RBC 4.36 08/10/2022 08:29 AM    HGB 12.6 08/10/2022 08:29 AM    HCT 38.8 08/10/2022 08:29 AM     08/10/2022 08:29 AM    MCV 89.0 08/10/2022 08:29 AM    MCH 28.9 08/10/2022 08:29 AM    MCHC 32.5 08/10/2022 08:29 AM    RDW 15.7 08/10/2022 08:29 AM    METASPCT 5.0 01/12/2022 03:16 PM    LYMPHOPCT 4.3 08/09/2022 11:57 AM    PROMYELOPCT 8.0 01/12/2022 03:16 PM    MONOPCT 2.6 08/09/2022 11:57 AM    MYELOPCT 3.0 01/12/2022 03:16 PM    BASOPCT 0.9 08/09/2022 11:57 AM    MONOSABS 0.16 08/09/2022 11:57 AM    LYMPHSABS 0.22 08/09/2022 11:57 AM    EOSABS 0.00 08/09/2022 11:57 AM    BASOSABS 0.05 08/09/2022 11:57 AM     CMP:    Lab Results   Component Value Date/Time     08/10/2022 08:29 AM    K 3.8 08/10/2022 08:29 AM    K 4.0 08/09/2022 11:57 AM     08/10/2022 08:29 AM    CO2 19 08/10/2022 08:29 AM    BUN 10 08/10/2022 08:29 AM    CREATININE 0.8 08/10/2022 08:29 AM    GFRAA >60 08/10/2022 08:29 AM    LABGLOM >60 08/10/2022 08:29 AM    GLUCOSE 101 08/10/2022 08:29 AM    PROT 6.0 08/10/2022 08:29 AM    LABALBU 3.4 08/10/2022 08:29 AM    CALCIUM 8.0 08/10/2022 08:29 AM    BILITOT 0.4 08/10/2022 08:29 AM    ALKPHOS 72 08/10/2022 08:29 AM    AST 50 08/10/2022 08:29 AM    ALT 42 08/10/2022 08:29 AM     Warfarin PT/INR:    Lab Results   Component Value Date    INR 1.3 08/11/2022    PROTIME 13.8 (H) 08/11/2022       Assessment:    Principal Problem:    Weakness  Active Problems: Altered mental status    Dysarthria  Resolved Problems:    * No resolved hospital problems.  *      Plan:  Video swallow study ordered for lp today npo until video swallow cont ivf while npo        Cari Diaz DO  12:18 PM  8/12/2022

## 2022-08-12 NOTE — PROGRESS NOTES
Pt allowed ICU, RN to attempt IV one last try before day shift, cat rn from ICU got a 20g to the left hand, pt reassessed, lungs clear/dimished, no wheezing, no signs of fluid volume overload, assessment all remains unchanged, per order, 0.9 NS ordered infuse for one total liter, previous output was 750 mls, pump programed to administer 250mls will let days rn know to clarify order with dr if he wants this to continue past 1L of fluid.  Pt  at this time, RR 24, and O2 98% ON 2L NC

## 2022-08-12 NOTE — PLAN OF CARE
Problem: Discharge Planning  Goal: Discharge to home or other facility with appropriate resources  8/12/2022 1059 by Jodi Malagon RN  Outcome: Progressing  Flowsheets (Taken 8/12/2022 0901)  Discharge to home or other facility with appropriate resources: Identify barriers to discharge with patient and caregiver  8/12/2022 0233 by Sherwin Benson RN  Outcome: Progressing  Flowsheets (Taken 8/11/2022 1500 by Jared Hess RN)  Discharge to home or other facility with appropriate resources: Identify barriers to discharge with patient and caregiver     Problem: Pain  Goal: Verbalizes/displays adequate comfort level or baseline comfort level  8/12/2022 1059 by Jodi Malagon RN  Outcome: Progressing  Flowsheets (Taken 8/12/2022 0901)  Verbalizes/displays adequate comfort level or baseline comfort level: Encourage patient to monitor pain and request assistance  8/12/2022 0233 by Sherwin Benson RN  Outcome: Progressing     Problem: Skin/Tissue Integrity  Goal: Absence of new skin breakdown  Description: 1. Monitor for areas of redness and/or skin breakdown  2. Assess vascular access sites hourly  3. Every 4-6 hours minimum:  Change oxygen saturation probe site  4. Every 4-6 hours:  If on nasal continuous positive airway pressure, respiratory therapy assess nares and determine need for appliance change or resting period.   8/12/2022 0233 by Sherwin Benson RN  Outcome: Progressing

## 2022-08-12 NOTE — CARE COORDINATION
Pt having Modified Barium Swallow and MRI of thoracic spine. today. PT/OT ordered. PET scan as outpatient. Referral made to [facility] by secure VM. Discharge Plan is to return home if able verses [facility].  [facility] to follow for discharge needs.    Beverly Steven, [email or web address].  [phone]

## 2022-08-12 NOTE — PROGRESS NOTES
Joceline Mota M.D.,[facility]  Josefa Hewitt D.O., F.A.C.O.I., Maribell Sullivan M.D. Sole Gonzalez M.D. Zarina Casey D.O. Daily Pulmonary Progress Note    Patient:  Nelli Hanley 58 y.o. female MRN: [id number]     Date of Service: 8/12/2022      Synopsis     We are following patient for lung nodule    \"CC\" leg pain, s/p fall    Code status: FULL       Subjective      Patient was seen and examined. Elevated HR and RR overnight. Now on oxygen 2 liters NC. SPO2 99%. Work up per neuro ongoing. Plan for video swallow study and lumbar puncture today. +low grade temps 99.1 F. Incidental finding of RUL 6 mm nodule very small, not amenable to biopsy. Her mom at bedside, updated. Patient with prior upper throat sounds yesterday, suspect from tongue obstruction, not noticed on todays exam. No stridor. Review of Systems:  Constitutional: Denies fever, weight loss, night sweats, and fatigue  Skin: Denies pigmentation, dark lesions, and rashes  HEENT: Denies tinnitus, ear drainage, epistaxis, sore throat, and hoarseness. Positive hearing loss dysarthria  Cardiovascular: Denies palpitations, chest pain, and chest pressure. Respiratory: Denies cough, dyspnea at rest, hemoptysis, apnea, and choking.   Gastrointestinal: Denies nausea, vomiting, poor appetite, diarrhea, heartburn or reflux  Genitourinary: Denies dysuria, frequency, urgency or hematuria  Musculoskeletal: Right leg and foot pain  Neurological: Denies dizziness, vertigo, headache, and focal weakness difficulty with ambulation  Psychological: Denies anxiety and depression  Endocrine: Denies heat intolerance and cold intolerance  Hematopoietic/Lymphatic: Denies bleeding problems and blood transfusions  History of invasive ductal right breast cancer diagnosed September 2021 treated with chemo, radiation, lumpectomy    24-hour events:  None     Objective   Vitals: BP (!) 143/83   Pulse (!) 114   Temp 99.1 °F (37.3 °C)   Resp 24   Wt 116 lb (52.6 kg)   LMP 10/28/2021   SpO2 98%   BMI 24.24 kg/m²     I/O:    Intake/Output Summary (Last 24 hours) at 8/12/2022 1025  Last data filed at 8/12/2022 0400  Gross per 24 hour   Intake 990 ml   Output --   Net 990 ml               CURRENT MEDS :  Scheduled Meds:   [Held by provider] aspirin  81 mg Oral Daily    [Held by provider] clopidogrel  75 mg Oral Daily    levETIRAcetam  500 mg IntraVENous Q12H    anastrozole  1 mg Oral Daily    sodium chloride flush  5-40 mL IntraVENous 2 times per day    enoxaparin  40 mg SubCUTAneous Daily    midazolam  2 mg IntraVENous Once    atorvastatin  10 mg Oral Nightly       Physical Exam:  General Appearance: appears comfortable in no acute distress. HEENT: Normocephalic atraumatic without obvious abnormality   Neck: Lips, mucosa, and tongue normal.  Supple, symmetrical, trachea midline, no adenopathy;thyroid:  no enlargement/tenderness/nodules or JVD. Lung: Breath sounds diminished few crackles. Respirations 30/min. Symmetrical expansion. no stridor. Mild accessory muscle use. Heart: RRR, normal S1, S2. No MRG. . Abdomen: Soft, NT, ND. BS present x 4 quadrants. No bruit or organomegaly. Extremities: Pedal pulses 2+ symmetric b/l. Extremities normal, no cyanosis, clubbing, or edema. Musculokeletal: No joint swelling, no muscle tenderness. ROM normal in all joints of extremities. Neurologic: Mental status: dysarthria. Alert but very restless. Following commands. No deficits of extremities    Pertinent/ New Labs and Imaging Studies     Imaging Personally Reviewed:  Cxr 8/11      CTA chest 8/9/2022  FINDINGS:   No filling defect in the pulmonary arteries to suggest pulmonary arterial   embolism. The heart is normal in size. No pericardial effusion. No   mediastinal lymphadenopathy. Nodule located in right upper lobe adjacent to   the minor fissure on axial image number 56 measures approximately 6 mm. Subsegmental atelectasis present in lung bases.   No airspace opacity or   pleural effusion. No pneumothorax. Focal sclerotic lesion located in T8   vertebral body measures 8 x 8 mm. View of the upper abdomen shows normal   bilateral adrenal glands. Postsurgical changes associated with right medial   breast.           Impression   1. No evidence of pulmonary embolism. 2. No pneumonia or pleural effusion. 3. Noncalcified nodule located in right upper lobe measures approximately 6   mm. 4. Focal sclerotic lesion located in T8 vertebral body. Bone scan could be   helpful to exclude active lesion. Echo:       Summary   Left ventricle is normal in size . Mild left ventricular concentric hypertrophy noted. No regional wall motion abnormalities seen. Normal left ventricular ejection fraction. Hyperdynamic left ventricular systolic function. Left ventricular outflow tract gradient. The left atrium is mildly dilated. Normal mitral valve leaflet thickness. Mild systolic anterior motion (MANOJ) of anterior mitral leaflet. Mild centrally directed mitral regurgitation. Mild tricuspid regurgitation. Pulmonary hypertension is mild . No intracardiac shunt identified via saline contrast administration.      EEG 8/11/22 no seizure activity    Labs:  Lab Results   Component Value Date/Time    WBC 5.4 08/10/2022 08:29 AM    HGB 12.6 08/10/2022 08:29 AM    HCT 38.8 08/10/2022 08:29 AM    MCV 89.0 08/10/2022 08:29 AM    MCH 28.9 08/10/2022 08:29 AM    MCHC 32.5 08/10/2022 08:29 AM    RDW 15.7 08/10/2022 08:29 AM     08/10/2022 08:29 AM    MPV 9.7 08/10/2022 08:29 AM     Lab Results   Component Value Date/Time     08/10/2022 08:29 AM    K 3.8 08/10/2022 08:29 AM    K 4.0 08/09/2022 11:57 AM     08/10/2022 08:29 AM    CO2 19 08/10/2022 08:29 AM    BUN 10 08/10/2022 08:29 AM    CREATININE 0.8 08/10/2022 08:29 AM    LABALBU 3.4 08/10/2022 08:29 AM    CALCIUM 8.0 08/10/2022 08:29 AM    GFRAA >60 08/10/2022 08:29 AM    LABGLOM >60 08/10/2022 08:29 AM     Lab Results   Component Value Date/Time    PROTIME 13.8 2022 03:41 PM    INR 1.3 2022 03:41 PM     No results for input(s): PROBNP in the last 72 hours. No results for input(s): PROCAL in the last 72 hours. This SmartLink has not been configured with any valid records. Ammonia level 22- 27.0    22 ab.46/30/67/21/-1.2/94% on RA. Micro:  No results for input(s): CULTRESP in the last 72 hours. No results for input(s): LABGRAM in the last 72 hours. No results for input(s): LEGUR in the last 72 hours. No results for input(s): STREPNEUMAGU in the last 72 hours. No results for input(s): LP1UAG in the last 72 hours. Assessment:    RUL 6 mm nodule  T 8 sclerotic lesion. MRI/MRA of brain and spine with no evidence of neoplastic disease  Invasive ductal carcinoma of R breast. Diagnosed 2021 treated with 4 cycles chemotherapy, radiation, and now on Arimidex since May 2022. Hearing loss  R leg pain and weakness resulting  in a fall at home  Mild elevation of transaminases  Mild lymphopenia  Elevated d dimer  Lifelong non smoker     Plan:   Oxygen therapy 2 liters NC. Keep SPO2 >94%. Remains on IV fluids 50/hr while NPO-for video swallow study today  CTA chest -very small non calcified 6 mm RUL nodule-not amenable to biopsy. Recommend follow up with CT chest as OP based on 2017 Fleischner Guidelines. No evidence of neoplastic disease on completion of MRI/MRA head, spine-limited by motion artifact. Whole body PET scan ordered. EEG completed. -no seizure activity   Neuro plan for Lumbar puncture today. INR 1.3. Holding ASA and Plavix. Follows with [facility] for tx breast CA  Elevated D dimer, R leg pain. CTA chest negative for PE.  Ultrasound right leg negative for DVT  DVT, GI prophylaxis  Speech therapy following  PT, OT    This plan of care was reviewed in collaboration with Dr. Cheryl Taylor  Electronically signed by Kathya Davis, ORLY - CNP on 8/12/2022        I personally saw, examined, and cared for the patient. Labs, medications, radiographs reviewed. I agree with history exam and plans detailed in NP note. No stridor. Continue close monitoring. Lung nodule is small for biopsy. Outpatient follow-up.      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

## 2022-08-12 NOTE — PROGRESS NOTES
Neuro Inpatient Follow Up Note       Sixto Hameed is a 58 y.o. female     Neurology is following for weakness    Significant past medical history breast cancer, status post right lumpectomy, HLD, deafness, mild intellectual disability    Synopsis:  Patient was admitted with weakness and slurred speech. She woke up on 8/8 and fell when she tried to walk. She has a history of deafness and mild intellectual disability and lives with her mother--typically very energetic, oriented. .  A couple of hours following the fall she fell again and had slurred speech. SBP was over 200 on arrival.  Patient reportedly has mild speech impediment but the dysarthria was much worse than baseline. She was treated with chemotherapy and radiation for her breast cancer and has been on Arimidex since May 2022. HPI:  Lp was done and showed WBCs of 5. Protein normal.  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 Spoke with ID who states that this may not be significant and is checking viral load and labs,  and that HHV-6 is usually self-limited and immunocompetent patient. The patient's Arimidex should not cause immunosuppression in their opinion. Patient is doing a little bit better today, more alert, speech is a little bit clearer. Still not entirely back to baseline. She remains tachycardic and is on oxygen therapy. T-max overnight was 99.6. Her family is at the bedside. Apparently the patient's grandfather had an undiagnosed neuromuscular disease and the patient's aunt had \"dormant\" late life muscular dystrophy--and had a similar presentation of falls and swallowing problems.     ROS limited due to her mental status    Current Facility-Administered Medications   Medication Dose Route Frequency Provider Last Rate Last Admin    0.9 % sodium chloride infusion   IntraVENous Continuous Kimberly Diaz,  mL/hr at 08/12/22 0904 New Bag at 08/12/22 0904    barium sulfate 40 % suspension 15 mL  15 mL Oral ONCE PRN Jess Connor Linda Huitron MD        barium sulfate 40 % paste 15 mL  15 mL Oral ONCE PRN Juju MD Stephanie        barium sulfate 40 % reconsitutable suspension  5 mL Oral ONCE PRN Juju MD Stephanie        [facility]] aspirin EC tablet 81 mg  81 mg Oral Daily [place], DO        [Held by provider] clopidogrel (PLAVIX) tablet 75 mg  75 mg Oral Daily [place], DO        perflutren lipid microspheres (DEFINITY) injection 1.65 mg  1.5 mL IntraVENous ONCE PRN [place] Bliss, DO        levETIRAcetam (KEPPRA) 500 mg/100 mL IVPB  500 mg IntraVENous Q12H Antonia Wu MD   Stopped at 08/12/22 6437    anastrozole (ARIMIDEX) tablet 1 mg  1 mg Oral Daily Rober Mulugeta Malmer, DO   1 mg at 08/10/22 1028    sodium chloride flush 0.9 % injection 5-40 mL  5-40 mL IntraVENous 2 times per day [place], DO   10 mL at 08/12/22 9115    sodium chloride flush 0.9 % injection 5-40 mL  5-40 mL IntraVENous PRN Rober Mulugeta Malmer, DO        0.9 % sodium chloride infusion   IntraVENous PRN Rober Mulugeta Malmer, DO        enoxaparin (LOVENOX) injection 40 mg  40 mg SubCUTAneous Daily Yvan Mole, APRN - CNP   40 mg at 08/12/22 0845    ondansetron (ZOFRAN-ODT) disintegrating tablet 4 mg  4 mg Oral Q8H PRN Rober Mulugeta Limer, DO        Or    ondansetron [facility]) injection 4 mg  4 mg IntraVENous Q6H PRN Rober Mulugeta Malmer, DO        polyethylene glycol (GLYCOLAX) packet 17 g  17 g Oral Daily PRN Rober Mulugeta Malmer, DO        acetaminophen (TYLENOL) tablet 650 mg  650 mg Oral Q6H PRN Rober Mulugeta Malmer, DO   650 mg at 08/10/22 1027    Or    acetaminophen (TYLENOL) suppository 650 mg  650 mg Rectal Q6H PRN Rober Mulugeta Malmer, DO   650 mg at 08/11/22 2033    midazolam PF (VERSED) injection 2 mg  2 mg IntraVENous Once Verlena Lot, DO        atorvastatin (LIPITOR) tablet 10 mg  10 mg Oral Nightly Rober Mulugeta Malmer, DO   10 mg at 08/10/22 0007        Objective:     BP (!) 143/83   Pulse (!) 114   Temp 99.1 °F (37.3 °C)   Resp 24   Wt 116 lb (52.6 kg)   LMP 10/28/2021   SpO2 98%   BMI 24.24 kg/m²     General appearance: alert, appears stated age, cooperative no distress today  Head: normocephalic, without obvious abnormality, atraumatic-  Eyes: conjunctivae/corneas clear. .  Neck: Slight grimace with passive range of motion; no nuchal rigidity  Lungs: Aspirations nonlabored on oxygen  Heart: tachycardic rate and rhythm  Extremities: normal, atraumatic, no cyanosis or edema  Pulses: 2+ and symmetric  Skin: color, texture, turgor normal---no rashes or lesions      Mental Status: alert, oriented to person, place, year, and family.   Smiling at me today    Good attention/concentration  Evidence of mild intellectual delay--follows most simple commands    Speech: Dysarthric--improved from yesterday  [address] but no obvious aphasias    Cranial Nerves:  I: smell NA   II: visual acuity  NA   II: visual fields B/l threat   II: pupils JALEN   III,VII: ptosis None   III,IV,VI: extraocular muscles  Full ROM   V: mastication Normal   V: facial light touch sensation     V,VII: corneal reflex     VII: facial muscle function - upper  Normal   VII: facial muscle function - lower Normal   VIII: hearing Nearly deaf   IX: soft palate elevation  Normal   IX,X: gag reflex    XI: trapezius strength  5/5   XI: sternocleidomastoid strength 5/5   XI: neck extension strength  5/5   XII: tongue strength  Normal     Motor:  Generalized weakness; slightly worse left arm but improved from yesterday  4/5 both legs  Normal bulk  No abnormal movements    Sensory:  Appreciates LT in all limbs    Coordination:  FFM and FN clumsy b/l    DTR:   No Babinskis  No Cho's    No pathological reflexes    Laboratory/Radiology:     CBC with Differential:    Lab Results   Component Value Date/Time    WBC 5.4 08/10/2022 08:29 AM    RBC 4.36 08/10/2022 08:29 AM    HGB 12.6 08/10/2022 08:29 AM    HCT 38.8 08/10/2022 08:29 AM     08/10/2022 08:29 AM    MCV 89.0 08/10/2022 08:29 AM    MCH 28.9 08/10/2022 08:29 AM    MCHC 32.5 08/10/2022 08:29 AM    RDW 15.7 08/10/2022 08:29 AM    METASPCT 5.0 01/12/2022 03:16 PM    LYMPHOPCT 4.3 08/09/2022 11:57 AM    PROMYELOPCT 8.0 01/12/2022 03:16 PM    MONOPCT 2.6 08/09/2022 11:57 AM    MYELOPCT 3.0 01/12/2022 03:16 PM    BASOPCT 0.9 08/09/2022 11:57 AM    MONOSABS 0.16 08/09/2022 11:57 AM    LYMPHSABS 0.22 08/09/2022 11:57 AM    EOSABS 0.00 08/09/2022 11:57 AM    BASOSABS 0.05 08/09/2022 11:57 AM     CMP:    Lab Results   Component Value Date/Time     08/10/2022 08:29 AM    K 3.8 08/10/2022 08:29 AM    K 4.0 08/09/2022 11:57 AM     08/10/2022 08:29 AM    CO2 19 08/10/2022 08:29 AM    BUN 10 08/10/2022 08:29 AM    CREATININE 0.8 08/10/2022 08:29 AM    GFRAA >60 08/10/2022 08:29 AM    LABGLOM >60 08/10/2022 08:29 AM    GLUCOSE 101 08/10/2022 08:29 AM    PROT 6.0 08/10/2022 08:29 AM    LABALBU 3.4 08/10/2022 08:29 AM    CALCIUM 8.0 08/10/2022 08:29 AM    BILITOT 0.4 08/10/2022 08:29 AM    ALKPHOS 72 08/10/2022 08:29 AM    AST 50 08/10/2022 08:29 AM    ALT 42 08/10/2022 08:29 AM     TSH:    Lab Results   Component Value Date/Time    TSH 0.644 08/10/2022 08:29 AM     MRI BRAIN:   No evidence of neoplastic disease. No evidence of recent infarct, acute intracranial hemorrhage, mass effect, or hydrocephalus. MRA HEAD:   No proximal large vessel arterial occlusion or high-grade stenosis. MRI CERVICAL SPINE:   No evidence of neoplastic disease. Mild to moderate multilevel spinal stenosis secondary to a congenitally narrow cervical spinal canal, as described above. Moderate to severe left neural foraminal stenosis at C5-6. MRI THORACIC SPINE:   No evidence of neoplastic disease. No significant spinal or neural foraminal stenosis     EEG 8/11: This was a normal study during the waking state. No seizures or epileptiform discharges were noted during this study.        Latest Reference Range & Units 8/12/22 10:50   Appearance, CSF Clear Clear  Clear  Clear   GLUCOSE,CSF 40 - 70 mg/dL 78 (H)   PROTEIN,CSF 15 - 40 mg/dL 25   RBC, CSF /uL  /uL <2000  <2000   WBC, CSF 0 - 2 /uL  0 - 2 /uL 5 (H)  4 (H)   Neutrophils, CSF 0 - 10 %  0 - 10 % 40 (H)  25 (H)   Monocytes, CSF 10 - 70 %  10 - 70 % 60  75 (H)   Color, CSF  Colorless  Colorless   Tube Number + CELL CT + DIFF-CSF  Tube 4  Tube 1   (H): Data is abnormally high    Meningitis/encephalitis panel +HHV-6  Autoimmune encephalitis reflex panel pending    All pertinent labs and images personally reviewed today    Assessment:     Altered mental status and dysarthria: in a pt with mild intellectual disability and hx breast cancer (on Arimidex). Meningitis panel positive for HHV-6 but ID states this is not necessarily clinically significant---and further work-up is pending. Final CSF studies are pending. Her exam has improved somewhat today.   Of note, family is concerned about underlying neuromuscular disease in light of family history of muscular dystrophy (in aunt) and unknown neuro muscular disease in grandfather    Plan:     Await final CSF studies    Increase activity as tolerated    No current evidence of epilepsy--likely does not need Keppra    Could consider genetic testing for neuromuscular disease down the line    Discussed with Dr. Fletcher Suggs, Dr. Disha Bustos from ID, and patient's family    Will follow     Zenon Kocher, APRN - CNP  11:32 AM  8/12/2022

## 2022-08-12 NOTE — CONSULTS
INPATIENT CARDIOLOGY CONSULT    Name: Precious Nieto    Age: 58 y.o. Date of Admission: 8/9/2022 11:24 AM    Date of Service: 8/12/2022    Reason for Consultation: Febrile illness with documented herpes meningitis, hypertrophic cardiomyopathy, paroxysmal supraventricular tachycardia, carcinoma of the breast, hypertension    Referring Physician: Leigh Vail DO    History of Present Illness: The patient is a 22-year-old white female with no association to [facility] and no previous known structural heart disease. She has a prior history of invasive ductal carcinoma the right breast with prior radiation and chemotherapy presently maintained on maintenance therapy. She was initially hospitalized following a mechanical fall at home with the majority of her history provided by her mother at the time of evaluation. She otherwise relates no reports of anginal-like chest discomfort or other ischemic equivalents nor manifestations of decompensated left ventricular systolic dysfunction or volume overload. Following her initial fall, her mother relates the development of her not \"seeming right\" without additional specifics at the time of evaluation, the patient could answer that she was experiencing no recent symptoms of chest discomfort or dyspnea. During the course of hospitalization speech abnormalities have been intermittently noted potentially in part related to significant underlying hearing difficulty. At the time of hospitalization a resting electrocardiogram reviewed at time of assessment demonstrated evidence sinus tachycardia with additional changes consistent with that of left ventricular hypertrophy and repolarization. A chest x-ray again reviewed demonstrated no evidence of cardiomegaly or infiltrate with a subsequent contrast CT angiogram excluding a pulmonary embolism and additionally demonstrating a right upper lobe pulmonary nodule.   He has subsequently undergone extensive evaluation by the pulmonary and neurology services with an interim development of a febrile illness and magnetic resonance imaging demonstrating no evidence of intracranial neoplastic disease nor infarct. Echocardiogram demonstrated evidence of a normal size left ventricular chamber with left ventricular hypertrophy and hyperdynamic left ventricular systolic function as well as evidence of systolic anterior motion of the anterior mitral leaflet and a left ventricular outflow tract gradient consistent with that of hypertrophic cardiomyopathy in addition to that of mild left atrial enlargement and mild pulmonary hypertension. 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 During the course of hospitalization persistent sinus tachycardia has been noted with transient episodes of self terminating paroxysmal supraventricular tachycardia. In addition evidence of hypertension has been noted throughout the majority of hospitalization. .    Review of Systems: The remainder of a complete multisystem review including consitutional, central nervous, respiratory, circulatory, gastrointestinal, genitourinary, endocrinologic, hematologic, musculoskeletal and psychiatric are negative.     Past Medical History:  Past Medical History:   Diagnosis Date    Breast cancer ([facility] [address]     Right breast    Cancer ([facility] [address]     Hearing impaired     Hearing loss     Hyperlipidemia        Past Surgical History:  Past Surgical History:   Procedure Laterality Date    BREAST LUMPECTOMY Right 11/3/2021    RIGHT BREAST LUMPECTOMY, BLUE DYE INJECTION, RIGHT AXILLARY SENTINEL LYMPH NODE INCISION performed by Corby Reyes MD at [place] [address] LUMPECTOMY Right 12/1/2021    RE-EXCISION RIGHT BREAST INFERIOR MARGIN performed by Corby Reyes MD at [address] [place] [address] RIGHT  2021       Family History:  Family History   Problem Relation Age of Onset Heart Disease Mother     High Blood Pressure Mother     High Cholesterol Mother     Other Father         COPD    Dementia Maternal Grandmother     Cancer Maternal Cousin         colon       Social History:  Social History     Socioeconomic History    Marital status: Single     Spouse name: Not on file    Number of children: 0    Years of education: Not on file    Highest education level: Not on file   Occupational History    Not on file   Tobacco Use    Smoking status: Never    Smokeless tobacco: Never   Vaping Use    Vaping Use: Never used   Substance and Sexual Activity    Alcohol use: Never    Drug use: Never    Sexual activity: Not on file   Other Topics Concern    Not on file   Social History Narrative    Not on file     Social Determinants of Health     Financial Resource Strain: Not on file   Food Insecurity: Not on file   Transportation Needs: Not on file   Physical Activity: Not on file   Stress: Not on file   Social Connections: Not on file   Intimate Partner Violence: Not on file   Housing Stability: Not on file       Allergies:  No Known Allergies    Home Medications:  Prior to Admission medications    Medication Sig Start Date End Date Taking? Authorizing Provider   anastrozole (ARIMIDEX) 1 MG tablet Take 1 tablet by mouth daily 6/22/22   Alan Erickson MD   simvastatin (ZOCOR) 20 MG tablet Take 20 mg by mouth nightly  3/23/22   Historical Provider, MD   triamcinolone (KENALOG) 0.1 % cream Apply topically 2 times daily.  2/16/22   Alan Erickson MD   Garlic (GARLIQUE) 635 MG TBEC Take by mouth daily     Historical Provider, MD   Cholecalciferol (VITAMIN D3) 50 MCG (2000 UT) CAPS Take by mouth daily     Historical Provider, MD   loratadine (CLARITIN) 10 MG capsule Take 10 mg by mouth daily    Historical Provider, MD   acetaminophen (TYLENOL) 500 MG tablet Take 500 mg by mouth every 6 hours as needed for Pain     Historical Provider, MD       Current Medications:  Current Facility-Administered suppository 650 mg  650 mg Rectal Q6H PRN Adelina Loop Malmer, DO   650 mg at 08/11/22 2033    midazolam PF (VERSED) injection 2 mg  2 mg IntraVENous Once Latesha Hernández DO        atorvastatin (LIPITOR) tablet 10 mg  10 mg Oral Nightly Adelina Loop Malmer, DO   10 mg at 08/10/22 0007      sodium chloride 100 mL/hr at 08/12/22 0904    sodium chloride           Physical Exam:  /70   Pulse (!) 109   Temp 98.6 °F (37 °C)   Resp 18   Wt 116 lb (52.6 kg)   LMP 10/28/2021   SpO2 100%   BMI 24.24 kg/m²   Weight change: Wt Readings from Last 3 Encounters:   08/09/22 116 lb (52.6 kg)   08/09/22 116 lb (52.6 kg)   06/22/22 116 lb (52.6 kg)     The patient is awake, alert and in no discomfort or distress. She appears chronically ill. No gross musculoskeletal deformity or lymphadenopathy are present. No significant skin or nail changes are present. Gross examination of head, eyes, nose and throat are negative. Jugular venous pressure is normal and no carotid bruits are present. No thyromegaly is noted. Normal respiratory effort is noted with no accessory muscle usage present. Lung fields are clear to ascultation. Cardiac examination is notable for a regular rate and rhythm with no palpable thrill. No gallop rhythm and a soft systolic murmur at the left sternal border are identified. A benign abdominal examination is present with no masses or organomegaly. A normal abdominal aortic pulsation is present. Intact pulses are present throughout all extremities and no peripheral edema is present. No focal neurologic deficits are present. Intake/Output:    Intake/Output Summary (Last 24 hours) at 8/12/2022 1543  Last data filed at 8/12/2022 1336  Gross per 24 hour   Intake 990 ml   Output --   Net 990 ml     No intake/output data recorded.     Laboratory Tests:  Lab Results   Component Value Date    CREATININE 0.8 08/10/2022    BUN 10 08/10/2022     08/10/2022    K 3.8 08/10/2022     08/10/2022    CO2 19 (L) 08/10/2022     No results for input(s): CKTOTAL, CKMB in the last 72 hours.     Invalid input(s): TROPONONI  No results found for: BNP  Lab Results   Component Value Date/Time    WBC 5.4 08/10/2022 08:29 AM    RBC 4.36 08/10/2022 08:29 AM    HGB 12.6 08/10/2022 08:29 AM    HCT 38.8 08/10/2022 08:29 AM    MCV 89.0 08/10/2022 08:29 AM    MCH 28.9 08/10/2022 08:29 AM    MCHC 32.5 08/10/2022 08:29 AM    RDW 15.7 08/10/2022 08:29 AM     08/10/2022 08:29 AM    MPV 9.7 08/10/2022 08:29 AM     Recent Labs     08/10/22  0829   ALKPHOS 72   ALT 42*   AST 50*   PROT 6.0*   BILITOT 0.4   LABALBU 3.4*     No results found for: MG  Lab Results   Component Value Date/Time    PROTIME 13.8 08/11/2022 03:41 PM    INR 1.3 08/11/2022 03:41 PM     Lab Results   Component Value Date/Time    TSH 0.644 08/10/2022 08:29 AM     No components found for: CHLPL  Lab Results   Component Value Date    TRIG 148 08/11/2022    TRIG 78 06/11/2015     Lab Results   Component Value Date    HDL 35 08/11/2022    HDL 66 06/11/2015     Lab Results   Component Value Date    LDLCALC 63 08/11/2022    LDLCALC 151 (H) 06/11/2015         Cardiac Tests:  ECG: A resting electrocardiogram reviewed at the time of evaluation demonstrates evidence of sinus tachycardia with left ventricular hypertrophy and repolarization  Telemetry findings reviewed: sinus tachycardia with transient episodes of paroxysmal supraventricular tachycardia of less than 5 seconds duration, no new tachy/bradyarrhythmias overnight  Chest X-ray: Chest x-ray of the previous day demonstrates no evidence significant cardiomegaly or infiltrate  Last Echocardiogram: An echocardiogram demonstrates evidence of a normal-sized left ventricular chamber with mild concentric left ventricular hypertrophy with no regional wall motion abnormalities and hyperdynamic left ventricular systolic function with evidence of left ventricular outflow tract obstruction (resting gradient 45 mmHg) consistent with that of a potential hypertrophic cardiomyopathy. And systolic anterior mitral leaflet motion is noted with mild central mitral regurgitation and mild left atrial enlargement. Mild pulmonary hypertension with right ventricular systolic pressures of approximately 45 mmHg is present      ASSESSMENT / PLAN: On a clinical basis, the patient present predominantly presented with noncardiovascular concerns and interim findings of herpes meningitis with additional management deferred to the nephrology service. In addition, incidental findings of a hypertrophic cardiomyopathy are noted without additional significant cardiovascular symptomatology as well as that of persistent elevated blood pressure in addition to that of transient paroxysmal supraventricular tachyarrhythmias in the face of her acute illness. On this combined basis, low-dose beta-blocker therapy will be initiated with no additional plans of cardiovascular assessment during her present hospitalization. Repeat serum chemistries will be performed to assess any additional metabolic derangements contributing to her supraventricular tachyarrhythmias. Additional management of noncardiovascular issues will be deferred to primary care and those additional consultants with plans of clinical reassessment on an outpatient basis following discharge and further assessment during hospitalization should additional cardiovascular difficulties or concerns arise. Thank you for allowing me to participate in your patient's care. Please feel free to contact me if you have any questions or concerns. Note: This report was completed using computerized voice recognition software. Every effort has been made to ensure accuracy, however; inadvertent computerized transcription errors may be present. Brinda Lee.  Hina Mcclure, [facility]

## 2022-08-12 NOTE — PROGRESS NOTES
Called dr. Abraham Weaver answering service to notify him of pt vitals signs and restlessness, pt appears to be in pain or agitated, unable to take PO tylenol at this time d/t NPO failed swallow, had another nurse come to bedside as well, pt may be agitated she did this previous in shift will await call back from answering service. BS checked was normal, will reassess vitals when pt calmed down.

## 2022-08-12 NOTE — PROGRESS NOTES
Physical Therapy    PT order received and medical chart reviewed 8/12. Pt off unit at time of attempt. Will re-attempt at a later time/date. Thank you.     Ricky Nunn, PT, DPT  [id number]

## 2022-08-12 NOTE — PLAN OF CARE
Problem: Discharge Planning  Goal: Discharge to home or other facility with appropriate resources  8/12/2022 0233 by Ventura Veliz RN  Outcome: Progressing  Flowsheets (Taken 8/11/2022 1500 by Wilmer Abreu RN)  Discharge to home or other facility with appropriate resources: Identify barriers to discharge with patient and caregiver  8/11/2022 1412 by Aleyda Meyer RN  Outcome: Progressing  Flowsheets (Taken 8/11/2022 0930)  Discharge to home or other facility with appropriate resources: Identify barriers to discharge with patient and caregiver     Problem: Pain  Goal: Verbalizes/displays adequate comfort level or baseline comfort level  8/12/2022 0233 by Ventura Veliz RN  Outcome: Progressing  8/11/2022 1412 by Aleyda Meyer RN  Outcome: Progressing  Flowsheets (Taken 8/11/2022 0931)  Verbalizes/displays adequate comfort level or baseline comfort level: Encourage patient to monitor pain and request assistance     Problem: Skin/Tissue Integrity  Goal: Absence of new skin breakdown  Description: 1. Monitor for areas of redness and/or skin breakdown  2. Assess vascular access sites hourly  3. Every 4-6 hours minimum:  Change oxygen saturation probe site  4. Every 4-6 hours:  If on nasal continuous positive airway pressure, respiratory therapy assess nares and determine need for appliance change or resting period.   Outcome: Progressing     Problem: Safety - Adult  Goal: Free from fall injury  Outcome: Progressing

## 2022-08-12 NOTE — PROGRESS NOTES
Pt appears a lot more comfortable at this time, resting, appears to be back to baseline, had second rn verify, still awaiting call back from perfect serve number provider. Pt stable at this time. Called ICU to have another RN try for an IV in case iv medication is needed d/t pt NPO status.

## 2022-08-12 NOTE — PROGRESS NOTES
SPEECH/LANGUAGE PATHOLOGY  VIDEOFLUOROSCOPIC STUDY OF SWALLOWING (MBS)   and PLAN OF CARE    PATIENT NAME:  Terence Wu  (female)     MRN:  [id number]    :  1960  (58 y.o.)  STATUS:  Inpatient: Room 7421/7421-A    TODAY'S DATE:  22 1145    SLP video swallow  Start:  22 1145,   End:  22 1145,   ONE TIME,   Standing Count:  1 Occurrences,   R         Fernanda Diaz DO   REASON FOR REFERRAL: to further assess oropharyngeal function   EVALUATING THERAPIST: MARCOS Rush      RESULTS:      DYSPHAGIA DIAGNOSIS:  severe  oropharyngeal phase dysphagia     DIET RECOMMENDATIONS:   NPO: Unable to safely recommend PO diet at this time due to Pt's high risk of aspiration on all textures assessed. Recommend that Pt, family, and medical team discuss goals of care and wishes to determine best option for nutrition/ hydration. FEEDING RECOMMENDATIONS:    Assistance level:  Not applicable     Compensatory strategies recommended: Not applicable     Discussed recommendations with nursing and/or faxed report to referring provider: Yes    Laryngeal Penetration and Aspiration:  Penetration WITHOUT aspiration was observed in today's study with  pureed foods  Penetration WITH aspiration was observed in today's study with  thin liquid, mildly thick liquid (nectar)    SPEECH THERAPY  PLAN OF CARE   The dysphagia POC is established based on physician order and dysphagia diagnosis    Skilled SLP intervention for dysphagia management up to 5x per week until goals met, pt plateaus in function and/or discharged from hospital      Conditions Requiring Skilled Therapeutic Intervention for dysphagia:    Patient is performing below functional baseline d/t  current acute condition, Multiple diagnoses, multiple medications, and increased dependency upon caregivers.   Reduced laryngeal closure resulting in penetration  Reduced laryngeal closure resulting in aspiration     SPECIFIC DYSPHAGIA retraction resulting in residuals in vallecula and/or posterior pharyngeal wall for all consistencies administered which minimally cleared with cued double swallow       Pyriform Sinuses      No significant residuals were noted in the pyriform sinuses     LARYNGEAL PENETRATION   Laryngeal penetration occurred in the absence of aspiration DURING the swallow for pureed foods due to  inadequate laryngeal closure which cleared from the laryngeal vestibule spontaneously (transient). Laryngeal penetration was minimal and occurred inconsistently   an absent cough/throat clear was noted    ASPIRATION  Aspiration occurred DURING the swallow for thin liquid and nectar consistency liquid due to  inadequate laryngeal closure . Aspiration was moderate and occurred consistently . a spontaneous cough was noted with thin and an absent cough/throat clear was noted with nectar    PENETRATION-ASPIRATION SCALE (PAS):  THIN 7 = Material enters the airway, passes below the vocal folds, and is not ejected from the trachea despite effort   MILDLY THICK 8 = Material enters the airway, passes below the vocal folds, and no effort is made to eject   MODERATELY THICK item not administered  PUREE 2 = Material enters the airway, remains above vocal folds, and is ejected from the airway   HARD SOLID item not administered       COMPENSATORY STRATEGIES    Compensatory strategies that were not beneficial included Double swallow, Small bites/sips, and Throat clear      STRUCTURAL/FUNCTIONAL ANOMALIES   No structural/functional anomalies were noted    CERVICAL ESOPHAGEAL STAGE :     The cervical esophagus appeared adequate          ___________    Cognition:   Within functional limits for this exam    Oral Peripheral Examination   Generalized oral weakness    Current Respiratory Status   2 liters nasal cannula     Parameters of Speech Production  Respiration:  Adequate for speech production  Quality:   Within functional limits  Intensity: Within functional limits    Pain: No pain reported. EDUCATION:   The Speech Language Pathologist (SLP) completed education regarding results of evaluation and that intervention is warranted at this time. Learner: Patient and Family  Education: Reviewed results and recommendations of this evaluation  Evaluation of Education:  Luis Alberto Cabrera understanding    This plan may be re-evaluated and revised as warranted. Evaluation Time includes thorough review of current medical information, gathering information on past medical history/social history and prior level of function, completion of standardized testing/informal observation of tasks, assessment of data and education on plan of care and goals. [x]The admitting diagnosis and active problem list, have been reviewed prior to initiation of this evaluation.     CPT Code: 67956  dysphagia study    ACTIVE PROBLEM LIST:   Patient Active Problem List   Diagnosis    Cancer ([facility] [address]    Malignant neoplasm of upper-inner quadrant of right breast in female, estrogen receptor positive ([facility] [address]    Hearing loss    Hypercholesterolemia    Weakness    Altered mental status    Dysarthria       Diana Fonseca M.S., CCC-SLP  Speech-Language Pathologist  [address] [phone]

## 2022-08-12 NOTE — PROGRESS NOTES
SPEECH LANGUAGE PATHOLOGY  DAILY PROGRESS NOTE        PATIENT NAME:  Bret Bowen      :  1960          TODAY'S DATE:  2022 ROOM:  [address] [place] [address]    Patient seen for f/u for dysphagia mgmt. Patient's mother and cousin at bedside. SLP reviewed results of Modified Barium swallow study that was completed this date. SLP educated patient on recommendation for NPO at this time d/t aspiration w/ thin, silent aspiration w/ nectar, and penetration w/ puree as well is significant residuals in the vallecula which could increase her risk for aspiration. SLP will recommend dysphagia exercises once neurology work up is completed. SLP recommends repeat video in 2-3 days. Will cont to follow. Patient and family verbalized understanding and agreement w/ POC at this time.        CPT code(s) 82986  dysphagia tx  Total minutes :  15 minutes    Janell De La Fuente M.S., CCC-SLP  Speech-Language Pathologist  [address] [phone]

## 2022-08-13 ENCOUNTER — APPOINTMENT (OUTPATIENT)
Dept: GENERAL RADIOLOGY | Age: 62
DRG: 051 | End: 2022-08-13
Payer: COMMERCIAL

## 2022-08-13 LAB
ANION GAP SERPL CALCULATED.3IONS-SCNC: 16 MMOL/L (ref 7–16)
APPEARANCE CSF: CLEAR
APPEARANCE CSF: CLEAR
BUN BLDV-MCNC: 16 MG/DL (ref 6–23)
CALCIUM SERPL-MCNC: 7.9 MG/DL (ref 8.6–10.2)
CHLORIDE BLD-SCNC: 105 MMOL/L (ref 98–107)
CO2: 18 MMOL/L (ref 22–29)
COLOR CSF: COLORLESS
COLOR CSF: COLORLESS
CREAT SERPL-MCNC: 0.5 MG/DL (ref 0.5–1)
GFR AFRICAN AMERICAN: >60
GFR NON-AFRICAN AMERICAN: >60 ML/MIN/1.73
GLUCOSE BLD-MCNC: 124 MG/DL (ref 74–99)
GRAM STAIN ORDERABLE: NORMAL
MONOCYTE, CSF: 60 % (ref 10–70)
MONOCYTE, CSF: 75 % (ref 10–70)
NEUTROPHILS, CSF: 25 % (ref 0–10)
NEUTROPHILS, CSF: 40 % (ref 0–10)
POTASSIUM SERPL-SCNC: 3.5 MMOL/L (ref 3.5–5)
RBC CSF: <2000 /UL
RBC CSF: <2000 /UL
SODIUM BLD-SCNC: 139 MMOL/L (ref 132–146)
TOTAL CK: 159 U/L (ref 20–180)
TUBE NUMBER CSF: ABNORMAL
TUBE NUMBER CSF: ABNORMAL
WBC CSF: 4 /UL (ref 0–2)
WBC CSF: 5 /UL (ref 0–2)

## 2022-08-13 PROCEDURE — 83516 IMMUNOASSAY NONANTIBODY: CPT

## 2022-08-13 PROCEDURE — 2580000003 HC RX 258: Performed by: INTERNAL MEDICINE

## 2022-08-13 PROCEDURE — 6370000000 HC RX 637 (ALT 250 FOR IP): Performed by: INTERNAL MEDICINE

## 2022-08-13 PROCEDURE — 6360000002 HC RX W HCPCS: Performed by: NEUROLOGICAL SURGERY

## 2022-08-13 PROCEDURE — 82550 ASSAY OF CK (CPK): CPT

## 2022-08-13 PROCEDURE — 2500000003 HC RX 250 WO HCPCS: Performed by: INTERNAL MEDICINE

## 2022-08-13 PROCEDURE — 99233 SBSQ HOSP IP/OBS HIGH 50: CPT | Performed by: CLINICAL NURSE SPECIALIST

## 2022-08-13 PROCEDURE — 6360000002 HC RX W HCPCS: Performed by: NURSE PRACTITIONER

## 2022-08-13 PROCEDURE — 83519 RIA NONANTIBODY: CPT

## 2022-08-13 PROCEDURE — 2700000000 HC OXYGEN THERAPY PER DAY

## 2022-08-13 PROCEDURE — 36415 COLL VENOUS BLD VENIPUNCTURE: CPT

## 2022-08-13 PROCEDURE — 71045 X-RAY EXAM CHEST 1 VIEW: CPT

## 2022-08-13 PROCEDURE — 94640 AIRWAY INHALATION TREATMENT: CPT

## 2022-08-13 PROCEDURE — 87533 HHV-6 DNA QUANT: CPT

## 2022-08-13 PROCEDURE — 80048 BASIC METABOLIC PNL TOTAL CA: CPT

## 2022-08-13 PROCEDURE — 94664 DEMO&/EVAL PT USE INHALER: CPT

## 2022-08-13 PROCEDURE — 99233 SBSQ HOSP IP/OBS HIGH 50: CPT | Performed by: INTERNAL MEDICINE

## 2022-08-13 PROCEDURE — 94660 CPAP INITIATION&MGMT: CPT

## 2022-08-13 PROCEDURE — 6360000002 HC RX W HCPCS: Performed by: INTERNAL MEDICINE

## 2022-08-13 PROCEDURE — 2060000000 HC ICU INTERMEDIATE R&B

## 2022-08-13 PROCEDURE — 86038 ANTINUCLEAR ANTIBODIES: CPT

## 2022-08-13 RX ORDER — LEVALBUTEROL INHALATION SOLUTION 0.63 MG/3ML
0.63 SOLUTION RESPIRATORY (INHALATION) EVERY 8 HOURS
Status: DISCONTINUED | OUTPATIENT
Start: 2022-08-13 | End: 2022-08-16

## 2022-08-13 RX ORDER — DILTIAZEM HYDROCHLORIDE 5 MG/ML
20 INJECTION INTRAVENOUS
Status: ACTIVE | OUTPATIENT
Start: 2022-08-13 | End: 2022-08-13

## 2022-08-13 RX ORDER — METOPROLOL TARTRATE 5 MG/5ML
5 INJECTION INTRAVENOUS ONCE
Status: COMPLETED | OUTPATIENT
Start: 2022-08-13 | End: 2022-08-13

## 2022-08-13 RX ORDER — DILTIAZEM HYDROCHLORIDE 5 MG/ML
15 INJECTION INTRAVENOUS ONCE
Status: COMPLETED | OUTPATIENT
Start: 2022-08-13 | End: 2022-08-13

## 2022-08-13 RX ADMIN — DEXTROSE MONOHYDRATE 10 MG/HR: 50 INJECTION, SOLUTION INTRAVENOUS at 07:10

## 2022-08-13 RX ADMIN — LEVALBUTEROL 0.63 MG: 0.63 SOLUTION RESPIRATORY (INHALATION) at 12:42

## 2022-08-13 RX ADMIN — Medication 10 ML: at 09:25

## 2022-08-13 RX ADMIN — SODIUM CHLORIDE: 9 INJECTION, SOLUTION INTRAVENOUS at 11:24

## 2022-08-13 RX ADMIN — LEVETIRACETAM 500 MG: 5 INJECTION INTRAVENOUS at 09:43

## 2022-08-13 RX ADMIN — METOPROLOL TARTRATE 5 MG: 5 INJECTION INTRAVENOUS at 05:38

## 2022-08-13 RX ADMIN — LEVETIRACETAM 500 MG: 5 INJECTION INTRAVENOUS at 20:05

## 2022-08-13 RX ADMIN — ENOXAPARIN SODIUM 40 MG: 100 INJECTION SUBCUTANEOUS at 09:24

## 2022-08-13 RX ADMIN — LEVALBUTEROL 0.63 MG: 0.63 SOLUTION RESPIRATORY (INHALATION) at 19:14

## 2022-08-13 RX ADMIN — DEXTROSE MONOHYDRATE 10 MG/HR: 50 INJECTION, SOLUTION INTRAVENOUS at 20:07

## 2022-08-13 RX ADMIN — DILTIAZEM HYDROCHLORIDE 15 MG: 5 INJECTION INTRAVENOUS at 07:07

## 2022-08-13 RX ADMIN — DEXTROSE MONOHYDRATE 15 MG/HR: 50 INJECTION, SOLUTION INTRAVENOUS at 13:39

## 2022-08-13 NOTE — PROGRESS NOTES
INPATIENT CARDIOLOGY FOLLOW-UP    Name: Dori Tierney    Age: 58 y.o. Date of Admission: 8/9/2022 11:24 AM    Date of Service: 8/13/2022    Chief Complaint: Follow-up for herpes meningitis, altered mental status, hypertrophic cardiomyopathy, paroxysmal atrial fibrillation, carcinoma of the breast, hypertension    Interim History: Interim events have been noted inclusive of the failure of a swallow study precluding enteral intake including medications as well as the inability to perform her magnetic resonance imaging because of altered mental status. In the interim, she has developed atrial fibrillation with suboptimal rate control.       Review of Systems:   Based on the patient's medical condition, a review of systems could not be obtained    Problem List:  Patient Active Problem List   Diagnosis    Cancer ([facility] [address]    Malignant neoplasm of upper-inner quadrant of right breast in female, estrogen receptor positive ([address]    Hearing loss    Hypercholesterolemia    Weakness    Altered mental status    Dysarthria    Fall    Hypertrophic cardiomyopathy ([address]    Supraventricular tachycardia ([address]    Viral meningitis    Carcinoma in situ of female breast, right    Primary hypertension       Allergies:  No Known Allergies    Current Medications:  Current Facility-Administered Medications   Medication Dose Route Frequency Provider Last Rate Last Admin    0.9 % sodium chloride infusion   IntraVENous Continuous Germaine Diaz  mL/hr at 08/12/22 1834 New Bag at 08/12/22 1834    barium sulfate 40 % suspension 15 mL  15 mL Oral ONCE PRN Ken Tejeda MD        barium sulfate 40 % paste 15 mL  15 mL Oral ONCE PRN Ken Tejeda MD        barium sulfate 40 % reconsitutable suspension  5 mL Oral ONCE PRN Ken Tejeda MD        diazePAM (VALIUM) injection 5 mg  5 mg IntraVENous See Admin Instructions Germaine Diaz DO   5 mg at 08/12/22 1929    metoprolol succinate (TOPROL XL) extended release tablet 12.5 mg 12.5 mg Oral Daily Ivania Rios MD        [facility]] aspirin EC tablet 81 mg  81 mg Oral Daily Kolleen Gosselin, DO        [Held by provider] clopidogrel (PLAVIX) tablet 75 mg  75 mg Oral Daily Kolleen Gosselin, DO        perflutren lipid microspheres (DEFINITY) injection 1.65 mg  1.5 mL IntraVENous ONCE PRN Marisa All Malmer, DO        levETIRAcetam (KEPPRA) 500 mg/100 mL IVPB  500 mg IntraVENous Q12H Eun Armstrong MD   Stopped at 08/12/22 2142    anastrozole (ARIMIDEX) tablet 1 mg  1 mg Oral Daily Marisa All Malmer, DO   1 mg at 08/10/22 1028    sodium chloride flush 0.9 % injection 5-40 mL  5-40 mL IntraVENous 2 times per day Marisa All Malmer, DO   10 mL at 08/12/22 2120    sodium chloride flush 0.9 % injection 5-40 mL  5-40 mL IntraVENous PRN Marisa All Malmer, DO        0.9 % sodium chloride infusion   IntraVENous PRN Marisa All Malmer, DO        enoxaparin (LOVENOX) injection 40 mg  40 mg SubCUTAneous Daily ORLY Hogan - CNP   40 mg at 08/12/22 0845    ondansetron (ZOFRAN-ODT) disintegrating tablet 4 mg  4 mg Oral Q8H PRN Marisa All Malmer, DO        Or    ondansetron [facility]) injection 4 mg  4 mg IntraVENous Q6H PRN Marisa All Malmer, DO        polyethylene glycol (GLYCOLAX) packet 17 g  17 g Oral Daily PRN Marisa All Malmer, DO        acetaminophen (TYLENOL) tablet 650 mg  650 mg Oral Q6H PRN Marisa All Malmer, DO   650 mg at 08/10/22 1027    Or    acetaminophen (TYLENOL) suppository 650 mg  650 mg Rectal Q6H PRN Marisa All Malmer, DO   650 mg at 08/11/22 2033    midazolam PF (VERSED) injection 2 mg  2 mg IntraVENous Once Kelsey Wilson, DO        atorvastatin (LIPITOR) tablet 10 mg  10 mg Oral Nightly Marisa All Malmer, DO   10 mg at 08/10/22 0007      sodium chloride 100 mL/hr at 08/12/22 1834    sodium chloride         Physical Exam:  BP (!) 123/97   Pulse (!) 130   Temp 98.3 °F (36.8 °C) (Temporal)   Resp 26   Wt 116 lb (52.6 kg)   LMP 10/28/2021   SpO2 98%   BMI 24.24 kg/m²   Weight change: Wt Readings from Last 3 Encounters:   08/09/22 116 lb (52.6 kg)   08/09/22 116 lb (52.6 kg)   06/22/22 116 lb (52.6 kg)     The patient is awake but lethargic and otherwise noncommunicative and in no discomfort or distress. No gross musculoskeletal deformity is present. No significant skin or nail changes are present. Gross examination of head, eyes, nose and throat are negative. Jugular venous pressure is normal and no carotid bruits are present. Normal respiratory effort is noted with no accessory muscle usage present. Lung fields are clear to ascultation with upper airway rhonchi. Cardiac examination is notable for an irregular rhythm with no palpable thrill. No gallop rhythm or cardiac murmur are identified. A benign abdominal examination is present with no masses or organomegaly. Intact pulses are present throughout all extremities and no peripheral edema is present. No focal neurologic deficits are present. Intake/Output:    Intake/Output Summary (Last 24 hours) at 8/13/2022 0630  Last data filed at 8/12/2022 1336  Gross per 24 hour   Intake 0 ml   Output --   Net 0 ml     No intake/output data recorded. Laboratory Tests:  Lab Results   Component Value Date    CREATININE 0.8 08/10/2022    BUN 10 08/10/2022     08/10/2022    K 3.8 08/10/2022     08/10/2022    CO2 19 (L) 08/10/2022     No results for input(s): CKTOTAL, CKMB in the last 72 hours.     Invalid input(s): TROPONONI  No results found for: BNP  Lab Results   Component Value Date/Time    WBC 5.4 08/10/2022 08:29 AM    RBC 4.36 08/10/2022 08:29 AM    HGB 12.6 08/10/2022 08:29 AM    HCT 38.8 08/10/2022 08:29 AM    MCV 89.0 08/10/2022 08:29 AM    MCH 28.9 08/10/2022 08:29 AM    MCHC 32.5 08/10/2022 08:29 AM    RDW 15.7 08/10/2022 08:29 AM     08/10/2022 08:29 AM    MPV 9.7 08/10/2022 08:29 AM     Recent Labs     08/10/22  0829   ALKPHOS 72   ALT 42*   AST 50*   PROT 6.0*   BILITOT 0.4   LABALBU 3.4*     No results found for: MG  Lab Results   Component Value Date/Time    PROTIME 13.8 08/11/2022 03:41 PM    INR 1.3 08/11/2022 03:41 PM     Lab Results   Component Value Date/Time    TSH 0.644 08/10/2022 08:29 AM     No components found for: CHLPL  Lab Results   Component Value Date    TRIG 148 08/11/2022    TRIG 78 06/11/2015     Lab Results   Component Value Date    HDL 35 08/11/2022    HDL 66 06/11/2015     Lab Results   Component Value Date    LDLCALC 63 08/11/2022    LDLCALC 151 (H) 06/11/2015       Cardiac Tests:  Telemetry findings reviewed: atrial fibrillation with a mean ventricular response of approximately 140 bpm, no new tachy/bradyarrhythmias overnight      ASSESSMENT / PLAN: On a clinical basis, the patient has developed atrial fibrillation in the absence of administration of previously recommended beta-blocker therapy beyond a single dose in the face of inadequate swallowing function to permit oral administration of medications. On this basis, intravenous diltiazem will be initiated to assist rate stabilization of her atrial arrhythmias with needs of a rate control strategy based on contraindications to anticoagulation in the face of her existing meningitis. Careful monitoring of intake and output will be necessary to reduce risk of iatrogenic volume overload. Additional medical management including that of supportive care will be deferred to primary care and the neurologic service. Once appropriate swallowing function has been restored or if indicated placement either of a nasogastric feeding tube or percutaneous gastrostomy, resumption of enteral beta-blocker therapy will be possible with nutrition essential to fluid mobilization reducing risk of progressive debilitation. Note: This report was completed utilizing computer voice recognition software. Every effort has been made to ensure accuracy, however; inadvertent computerized transcription errors may be present. Lorie Washington.  Dustin Luevano, [facility] Cardiology

## 2022-08-13 NOTE — PROGRESS NOTES
Radha Grace is a 58 y.o. female     Neurology is following for weakness    Significant past medical history breast cancer, status post right lumpectomy, HLD, deafness, mild intellectual disability    Patient was admitted with weakness and slurred speech. She woke up on 8/8 and fell when she tried to walk. She has a history of deafness and mild intellectual disability and lives with her mother--typically very energetic, oriented. A couple of hours following the fall she fell again and had slurred speech. SBP was over 200 on arrival.      Patient reportedly has mild speech impediment but the dysarthria was much worse than baseline. She was treated with chemotherapy and radiation for her breast cancer and has been on Arimidex since May 2022. LP was completed and showed WBCs of 5. Protein normal.      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 ID stated that this may not be significant and is checking viral load and labs,  and that HHV-6 is usually self-limited and immunocompetent patient. Apparently the patient's grandfather had an undiagnosed neuromuscular disease and the patient's aunt had \"dormant\" late life muscular dystrophy--and had a similar presentation of falls and swallowing problems. CK obtained this morning which was normal  Acetylcholine receptor antibodies also drawn as well as LILIANA both remain pending    ROS limited due to her mental status    Objective:     /65   Pulse 95   Temp 97.7 °F (36.5 °C) (Temporal)   Resp 30   Wt 116 lb (52.6 kg)   LMP 10/28/2021   SpO2 98%   BMI 24.24 kg/m²     General appearance: alert, appears stated age, cooperative no distress today  Head: normocephalic, without obvious abnormality, atraumatic-  Extremities: normal, atraumatic, no cyanosis or edema  Pulses: 2+ and symmetric  Skin: Diffuse rash on chest arms and legs      Mental Status: alert, oriented to person, place, year, and family.   Smiling at me today    Speech: Dysarthric  Language:laconic but no obvious aphasias    Cranial Nerves:  I: smell    II: visual acuity     II: visual fields B/l threat   II: pupils JALEN   III,VII: ptosis None   III,IV,VI: extraocular muscles  Full ROM   V: mastication Normal   V: facial light touch sensation     V,VII: corneal reflex     VII: facial muscle function - upper  Normal   VII: facial muscle function - lower Normal   VIII: hearing Nearly deaf   IX: soft palate elevation  Normal   IX,X: gag reflex    XI: trapezius strength  5/5   XI: sternocleidomastoid strength 5/5   XI: neck extension strength  5/5   XII: tongue strength  Normal     Motor:  No drift  Normal bulk and tone    Sensory:  Appreciates LT in all limbs    Coordination:  FN symmetrical    DTR:   No Babinskis  No Cho's      Laboratory/Radiology:     CBC with Differential:    Lab Results   Component Value Date/Time    WBC 5.4 08/10/2022 08:29 AM    RBC 4.36 08/10/2022 08:29 AM    HGB 12.6 08/10/2022 08:29 AM    HCT 38.8 08/10/2022 08:29 AM     08/10/2022 08:29 AM    MCV 89.0 08/10/2022 08:29 AM    MCH 28.9 08/10/2022 08:29 AM    MCHC 32.5 08/10/2022 08:29 AM    RDW 15.7 08/10/2022 08:29 AM    METASPCT 5.0 01/12/2022 03:16 PM    LYMPHOPCT 4.3 08/09/2022 11:57 AM    PROMYELOPCT 8.0 01/12/2022 03:16 PM    MONOPCT 2.6 08/09/2022 11:57 AM    MYELOPCT 3.0 01/12/2022 03:16 PM    BASOPCT 0.9 08/09/2022 11:57 AM    MONOSABS 0.16 08/09/2022 11:57 AM    LYMPHSABS 0.22 08/09/2022 11:57 AM    EOSABS 0.00 08/09/2022 11:57 AM    BASOSABS 0.05 08/09/2022 11:57 AM     CMP:    Lab Results   Component Value Date/Time     08/13/2022 08:04 AM    K 3.5 08/13/2022 08:04 AM    K 4.0 08/09/2022 11:57 AM     08/13/2022 08:04 AM    CO2 18 08/13/2022 08:04 AM    BUN 16 08/13/2022 08:04 AM    CREATININE 0.5 08/13/2022 08:04 AM    GFRAA >60 08/13/2022 08:04 AM    LABGLOM >60 08/13/2022 08:04 AM    GLUCOSE 124 08/13/2022 08:04 AM    PROT 6.0 08/10/2022 08:29 AM    LABALBU 3.4 08/10/2022 08:29 AM    CALCIUM 7.9 08/13/2022 08:04 AM    BILITOT 0.4 08/10/2022 08:29 AM    ALKPHOS 72 08/10/2022 08:29 AM    AST 50 08/10/2022 08:29 AM    ALT 42 08/10/2022 08:29 AM     TSH:    Lab Results   Component Value Date/Time    TSH 0.644 08/10/2022 08:29 AM         MRI BRAIN:   No evidence of neoplastic disease. No evidence of recent infarct, acute intracranial hemorrhage, mass effect, or hydrocephalus. MRA HEAD:   No proximal large vessel arterial occlusion or high-grade stenosis. MRI CERVICAL SPINE:   No evidence of neoplastic disease. Mild to moderate multilevel spinal stenosis secondary to a congenitally narrow cervical spinal canal, as described above. Moderate to severe left neural foraminal stenosis at C5-6. MRI THORACIC SPINE:   No evidence of neoplastic disease. No significant spinal or neural foraminal stenosis     EEG 8/11: This was a normal study during the waking state. No seizures or epileptiform discharges were noted during this study. Latest Reference Range & Units 8/12/22 10:50   Appearance, CSF Clear   Clear  Clear  Clear   GLUCOSE,CSF 40 - 70 mg/dL 78 (H)   PROTEIN,CSF 15 - 40 mg/dL 25   RBC, CSF /uL  /uL <2000  <2000   WBC, CSF 0 - 2 /uL  0 - 2 /uL 5 (H)  4 (H)   Neutrophils, CSF 0 - 10 %  0 - 10 % 40 (H)  25 (H)   Monocytes, CSF 10 - 70 %  10 - 70 % 60  75 (H)   Color, CSF  Colorless  Colorless   Tube Number + CELL CT + DIFF-CSF  Tube 4  Tube 1   (H): Data is abnormally high    Meningitis/encephalitis panel +HHV-6  Autoimmune encephalitis reflex panel pending    All pertinent labs and images personally reviewed today    Assessment:     Dysarthria in a pt with mild intellectual disability and hx breast cancer (on Arimidex). Meningitis panel positive for HHV-6 but ID states this is not necessarily clinically significant---and further work-up is pending. Final CSF studies are pending.      Of note, family is concerned about underlying neuromuscular disease in light of family history of muscular dystrophy (in aunt) and unknown neuro muscular disease in grandfather   CK unrevealing   LILIANA and acetylcholine receptor antibodies pending    Plan:     Await final CSF studies    Await laboratory data    Consider fine cuts through the brainstem on repeat MRI    Will follow     ORLY Neville CNS  1:36 PM  8/13/2022

## 2022-08-13 NOTE — PROGRESS NOTES
Hospital Medicine    Subjective: awake  Confused  No distress      Current Facility-Administered Medications:     dilTIAZem injection 20 mg, 20 mg, IntraVENous, Once PRN, Greg Mccormick MD    dilTIAZem 100 mg in dextrose 5 % 100 mL infusion ([place]), 10 mg/hr, IntraVENous, Continuous, Greg Mccormick MD, Last Rate: 10 mL/hr at 08/13/22 0710, 10 mg/hr at 08/13/22 0710    0.9 % sodium chloride infusion, , IntraVENous, Continuous, Greg Mccormick MD, Last Rate: 100 mL/hr at 08/12/22 1834, New Bag at 08/12/22 1834    barium sulfate 40 % suspension 15 mL, 15 mL, Oral, ONCE PRN, Tyron Phillips MD    barium sulfate 40 % paste 15 mL, 15 mL, Oral, ONCE PRN, Tyron Phillips MD    barium sulfate 40 % reconsitutable suspension, 5 mL, Oral, ONCE PRN, Tyron Phillips MD    diazePAM (VALIUM) injection 5 mg, 5 mg, IntraVENous, See Admin Instructions, Truman Apley, DO, 5 mg at 08/12/22 1929    [Held by provider] metoprolol succinate (TOPROL XL) extended release tablet 12.5 mg, 12.5 mg, Oral, Daily, Greg Mccormick MD    [facility] [place] by provider] aspirin EC tablet 81 mg, 81 mg, Oral, Daily, Truman Apley, DO    [Held by provider] clopidogrel (PLAVIX) tablet 75 mg, 75 mg, Oral, Daily, Vinnie Diaz DO    perflutren lipid microspheres (DEFINITY) injection 1.65 mg, 1.5 mL, IntraVENous, ONCE PRN, Vinnie Diaz DO    levETIRAcetam (KEPPRA) 500 mg/100 mL IVPB, 500 mg, IntraVENous, Q12H, Wade Villarreal MD, Stopped at 08/12/22 2142    anastrozole (ARIMIDEX) tablet 1 mg, 1 mg, Oral, Daily, Vinnie Diaz DO, 1 mg at 08/10/22 1028    sodium chloride flush 0.9 % injection 5-40 mL, 5-40 mL, IntraVENous, 2 times per day, Truman Apley, DO, 10 mL at 08/12/22 2120    sodium chloride flush 0.9 % injection 5-40 mL, 5-40 mL, IntraVENous, PRN, Vinnie Diaz, DO    0.9 % sodium chloride infusion, , IntraVENous, PRN, Vinnie Diaz, DO    enoxaparin (LOVENOX) injection 40 mg, 40 mg, SubCUTAneous, Daily, Leonides Schmidt, 08/10/2022 08:29 AM    LABGLOM >60 08/10/2022 08:29 AM    GLUCOSE 101 08/10/2022 08:29 AM    PROT 6.0 08/10/2022 08:29 AM    LABALBU 3.4 08/10/2022 08:29 AM    CALCIUM 8.0 08/10/2022 08:29 AM    BILITOT 0.4 08/10/2022 08:29 AM    ALKPHOS 72 08/10/2022 08:29 AM    AST 50 08/10/2022 08:29 AM    ALT 42 08/10/2022 08:29 AM     Warfarin PT/INR:    Lab Results   Component Value Date    INR 1.3 08/11/2022    PROTIME 13.8 (H) 08/11/2022       Assessment:    Principal Problem:    Weakness  Active Problems:    Altered mental status    Dysarthria    Fall    Hypertrophic cardiomyopathy ([address]    Supraventricular tachycardia ([address]    Viral meningitis    Carcinoma in situ of female breast, right    Primary hypertension  Resolved Problems:    * No resolved hospital problems.  *  Atrial fib with rapid rates    Plan:  Remains tachycardic- likely reactive  Pcr- human herpesvirus 6  Cardiology following  On iv prema Alanis MD  8:07 AM  8/13/2022

## 2022-08-13 NOTE — PROGRESS NOTES
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 female with history of invasive ductal right breast cancer diagnosed in 2021, off chemotherapy since 01/2022 and currently on anastrazole, presented on 08/09 with weakness accompanied by recurrent falls. Since admission, she had occasional T-max of 100 to 100.3 °F with no leukocytosis. According to family, she developed dysarthria, tongue swelling and rash after admission. Chest CTA showed focal sclerotic lesion in T8 vertebral body, noncalcified nodule in right upper lobe measuring 6 mm, no pneumonia or pleural effusion, no evidence of pulmonary embolism. MRI brain showed no evidence of recent infarct, acute intracranial hemorrhage, mass-effect, hydrocephalus, neoplastic disease. Respiratory pathogen PCR panel was negative. Urinalysis showed no pyuria. Lumbar puncture on 08/12 yielded CSF with WBCs of 4-5 predominantly monocytic at 75%. CSF gram stain and culture showed no polymorphonuclear leukocytes, rare epithelial cells, no organisms. 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 Acyclovir was started by attending physician. Subjective: Patient was seen and examined. No chills, no abdominal pain, no diarrhea, has rash, has itching, has garbled speech. Objective:    Vitals:    08/13/22 0731   BP: (!) 142/65   Pulse: (!) 114   Resp: 24   Temp: 99.9 °F (37.7 °C)   SpO2: 96%     Constitutional: Alert, not in distress  Respiratory: Clear breath sounds, no crackles, no wheezes  Cardiovascular: Regular rate and rhythm, no murmurs  Gastrointestinal: Bowel sounds present, soft, nontender  Skin: Warm and dry, erythematous plaques over anterior trunk. Ulcer on somewhat swollen tongue. Musculoskeletal: No joint swelling, no joint erythema    Labs, imaging, and medical records/notes were personally reviewed. Assessment:  Rash, suspect allergic reaction.  Chest imaging and CSF cell count with differential not suggestive of infection. 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 HHV-6 DNA has been detected by PCR in the brain of about 1/3 of healthy persons (per Ander Baires's Principles and Practice of Infectious Diseases). HHV-6 infections in immunocompetent patients are generally not treated, since most cases are self-limited and antiviral therapy has not been studied in such patients (per [employer]). Recommendations:  Monitor clinically off antibiotics for now. Send CSF and serum HHV-6 quantitative PCR/viral load. (Order clarified twice with the lab that human herpesvirus 6 and not HSV PCR)  Follow up blood cultures. Follow up CSF culture. Check HIV screen. Discussed with patient's family. Thank you for involving me in the care of Radha Grace. I will continue to follow. Please do not hesitate to call for any questions or concerns.     Electronically signed by Justin Lester MD on 8/13/2022 at 11:07 AM

## 2022-08-13 NOTE — PROGRESS NOTES
9773: -130, increased Cardizem gtt to 15  1030: Pt's RR in the 30's, placed on nasal cannula 3L, paged pulmonology, orders for Cxray received. 1107: Pt now in sinus rhythm rate of 98, RR still in 30's, stat cxray completed.

## 2022-08-13 NOTE — PROGRESS NOTES
Pulmonary Subsequent Hospital F/U note    Patient is being followed for: aacute hypoxic respiratory failure, lung nodule    Interval HPI:  Contacted by nursing today for increased RR  The patient has also had worsening afib with RVR on a cardizem infusion  CXR with mild pulmonary vascular congestion  Seen in the room   She states that her breathing is feeling okay, however, does have audible exp wheezing    ROS:  Denies fever, night sweats  Denies cough, sputum, hemoptysis  Denies chest pain, edema, palpitations  Denies nausea  Denies abdominal pain  Denies rash      Exam:  BP (!) 142/65   Pulse (!) 114   Temp 99.9 °F (37.7 °C) (Temporal)   Resp 24   Wt 116 lb (52.6 kg)   LMP 10/28/2021   SpO2 96%   BMI 24.24 kg/m²    General: mild resp distress  HEENT: PERRL, EOMI, MMM, no oral lesions  Neck: supple no adenopathy  CV: irregularly irregular  Lungs: exp wheezing present   Abd: soft, ND, NT, bowel sounds normal  Ext: warm, no edema    Data:    Oximetry:  SpO2 Readings from Last 1 Encounters:   08/13/22 96%       Imaging personally reviewed by myself:  CXR     Perihilar lung markings concerning for central vascular congestion versus   peribronchial inflammatory process           Pertinent labs reviewed and noted:  Lab Results   Component Value Date/Time    WBC 5.4 08/10/2022 08:29 AM    HGB 12.6 08/10/2022 08:29 AM    HCT 38.8 08/10/2022 08:29 AM    MCV 89.0 08/10/2022 08:29 AM    MCH 28.9 08/10/2022 08:29 AM    MCHC 32.5 08/10/2022 08:29 AM    RDW 15.7 08/10/2022 08:29 AM     08/10/2022 08:29 AM    MPV 9.7 08/10/2022 08:29 AM     Lab Results   Component Value Date/Time     08/13/2022 08:04 AM    K 3.5 08/13/2022 08:04 AM    K 4.0 08/09/2022 11:57 AM     08/13/2022 08:04 AM    CO2 18 08/13/2022 08:04 AM    BUN 16 08/13/2022 08:04 AM    CREATININE 0.5 08/13/2022 08:04 AM    LABALBU 3.4 08/10/2022 08:29 AM    CALCIUM 7.9 08/13/2022 08:04 AM    GFRAA >60 08/13/2022 08:04 AM    LABGLOM >60 08/13/2022 08:04 AM     Lab Results   Component Value Date/Time    PROTIME 13.8 08/11/2022 03:41 PM    INR 1.3 08/11/2022 03:41 PM       Assessment:  1. Acute hypoxic respiratory failure  2. Pulmonary edema  3. Acute bronchospasm  4. Atrial fibrillation with RVR on cardizem  5. 6mm pulmonary nodule  6. Invasive ductal carcinoma of R breast. Diagnosed Sept 2021 treated with 4 cycles chemotherapy, radiation, and now on Arimidex since May 2022.  7. T 8 sclerotic lesion. MRI/MRA of brain and spine with no evidence of neoplastic disease  8.  Low grade fevers     Plan:  Supplemental oxygen  Prn NIV support   Start bronchodilators  May need to hold additional IVF  Repeat CT in 1 year  Remainder per other services      Electronically signed by Richi Busch MD on 8/13/2022 at 11:36 AM

## 2022-08-14 LAB
ANION GAP SERPL CALCULATED.3IONS-SCNC: 14 MMOL/L (ref 7–16)
BUN BLDV-MCNC: 19 MG/DL (ref 6–23)
CALCIUM SERPL-MCNC: 8.1 MG/DL (ref 8.6–10.2)
CHLORIDE BLD-SCNC: 110 MMOL/L (ref 98–107)
CO2: 19 MMOL/L (ref 22–29)
CREAT SERPL-MCNC: 0.5 MG/DL (ref 0.5–1)
GFR AFRICAN AMERICAN: >60
GFR NON-AFRICAN AMERICAN: >60 ML/MIN/1.73
GLUCOSE BLD-MCNC: 169 MG/DL (ref 74–99)
POTASSIUM SERPL-SCNC: 4 MMOL/L (ref 3.5–5)
SODIUM BLD-SCNC: 143 MMOL/L (ref 132–146)

## 2022-08-14 PROCEDURE — 2060000000 HC ICU INTERMEDIATE R&B

## 2022-08-14 PROCEDURE — 94640 AIRWAY INHALATION TREATMENT: CPT

## 2022-08-14 PROCEDURE — 80048 BASIC METABOLIC PNL TOTAL CA: CPT

## 2022-08-14 PROCEDURE — 86703 HIV-1/HIV-2 1 RESULT ANTBDY: CPT

## 2022-08-14 PROCEDURE — 99233 SBSQ HOSP IP/OBS HIGH 50: CPT | Performed by: INTERNAL MEDICINE

## 2022-08-14 PROCEDURE — 97530 THERAPEUTIC ACTIVITIES: CPT

## 2022-08-14 PROCEDURE — 6370000000 HC RX 637 (ALT 250 FOR IP): Performed by: INTERNAL MEDICINE

## 2022-08-14 PROCEDURE — 6360000002 HC RX W HCPCS: Performed by: INTERNAL MEDICINE

## 2022-08-14 PROCEDURE — 86618 LYME DISEASE ANTIBODY: CPT

## 2022-08-14 PROCEDURE — 6360000002 HC RX W HCPCS: Performed by: NURSE PRACTITIONER

## 2022-08-14 PROCEDURE — 2580000003 HC RX 258: Performed by: INTERNAL MEDICINE

## 2022-08-14 PROCEDURE — 99233 SBSQ HOSP IP/OBS HIGH 50: CPT | Performed by: CLINICAL NURSE SPECIALIST

## 2022-08-14 PROCEDURE — 36415 COLL VENOUS BLD VENIPUNCTURE: CPT

## 2022-08-14 PROCEDURE — 97535 SELF CARE MNGMENT TRAINING: CPT

## 2022-08-14 PROCEDURE — 2500000003 HC RX 250 WO HCPCS: Performed by: INTERNAL MEDICINE

## 2022-08-14 PROCEDURE — 6360000002 HC RX W HCPCS: Performed by: NEUROLOGICAL SURGERY

## 2022-08-14 PROCEDURE — 97161 PT EVAL LOW COMPLEX 20 MIN: CPT

## 2022-08-14 PROCEDURE — 97165 OT EVAL LOW COMPLEX 30 MIN: CPT

## 2022-08-14 PROCEDURE — 2700000000 HC OXYGEN THERAPY PER DAY

## 2022-08-14 PROCEDURE — 94660 CPAP INITIATION&MGMT: CPT

## 2022-08-14 RX ORDER — DIPHENHYDRAMINE HYDROCHLORIDE 50 MG/ML
25 INJECTION INTRAMUSCULAR; INTRAVENOUS EVERY 6 HOURS PRN
Status: DISCONTINUED | OUTPATIENT
Start: 2022-08-14 | End: 2022-08-19 | Stop reason: HOSPADM

## 2022-08-14 RX ORDER — METHYLPREDNISOLONE SODIUM SUCCINATE 40 MG/ML
40 INJECTION, POWDER, LYOPHILIZED, FOR SOLUTION INTRAMUSCULAR; INTRAVENOUS EVERY 8 HOURS
Status: COMPLETED | OUTPATIENT
Start: 2022-08-14 | End: 2022-08-14

## 2022-08-14 RX ORDER — METOPROLOL TARTRATE 5 MG/5ML
2.5 INJECTION INTRAVENOUS EVERY 6 HOURS
Status: DISCONTINUED | OUTPATIENT
Start: 2022-08-14 | End: 2022-08-17

## 2022-08-14 RX ORDER — FUROSEMIDE 10 MG/ML
20 INJECTION INTRAMUSCULAR; INTRAVENOUS ONCE
Status: COMPLETED | OUTPATIENT
Start: 2022-08-14 | End: 2022-08-14

## 2022-08-14 RX ORDER — CLOTRIMAZOLE 10 MG/1
10 LOZENGE ORAL; TOPICAL
Status: DISCONTINUED | OUTPATIENT
Start: 2022-08-14 | End: 2022-08-19 | Stop reason: HOSPADM

## 2022-08-14 RX ORDER — POTASSIUM CHLORIDE 7.45 MG/ML
10 INJECTION INTRAVENOUS ONCE
Status: COMPLETED | OUTPATIENT
Start: 2022-08-14 | End: 2022-08-14

## 2022-08-14 RX ORDER — DIPHENHYDRAMINE HCL 25 MG
25 TABLET ORAL EVERY 6 HOURS PRN
Status: DISCONTINUED | OUTPATIENT
Start: 2022-08-14 | End: 2022-08-14

## 2022-08-14 RX ADMIN — Medication 10 ML: at 07:36

## 2022-08-14 RX ADMIN — METOPROLOL TARTRATE 2.5 MG: 1 INJECTION, SOLUTION INTRAVENOUS at 20:44

## 2022-08-14 RX ADMIN — LEVETIRACETAM 500 MG: 5 INJECTION INTRAVENOUS at 20:41

## 2022-08-14 RX ADMIN — DEXTROSE MONOHYDRATE 10 MG/HR: 50 INJECTION, SOLUTION INTRAVENOUS at 05:37

## 2022-08-14 RX ADMIN — FUROSEMIDE 20 MG: 10 INJECTION, SOLUTION INTRAMUSCULAR; INTRAVENOUS at 07:36

## 2022-08-14 RX ADMIN — ENOXAPARIN SODIUM 40 MG: 100 INJECTION SUBCUTANEOUS at 07:36

## 2022-08-14 RX ADMIN — DIPHENHYDRAMINE HYDROCHLORIDE 25 MG: 50 INJECTION, SOLUTION INTRAMUSCULAR; INTRAVENOUS at 13:45

## 2022-08-14 RX ADMIN — CLOTRIMAZOLE 10 MG: 10 LOZENGE ORAL at 23:54

## 2022-08-14 RX ADMIN — LEVALBUTEROL 0.63 MG: 0.63 SOLUTION RESPIRATORY (INHALATION) at 12:36

## 2022-08-14 RX ADMIN — POTASSIUM CHLORIDE 10 MEQ: 7.46 INJECTION, SOLUTION INTRAVENOUS at 07:42

## 2022-08-14 RX ADMIN — LEVALBUTEROL 0.63 MG: 0.63 SOLUTION RESPIRATORY (INHALATION) at 20:51

## 2022-08-14 RX ADMIN — LEVETIRACETAM 500 MG: 5 INJECTION INTRAVENOUS at 09:29

## 2022-08-14 RX ADMIN — CLOTRIMAZOLE 10 MG: 10 LOZENGE ORAL at 18:30

## 2022-08-14 RX ADMIN — METOPROLOL TARTRATE 2.5 MG: 1 INJECTION, SOLUTION INTRAVENOUS at 07:36

## 2022-08-14 RX ADMIN — SODIUM CHLORIDE, PRESERVATIVE FREE 10 ML: 5 INJECTION INTRAVENOUS at 18:27

## 2022-08-14 RX ADMIN — CLOTRIMAZOLE 10 MG: 10 LOZENGE ORAL at 15:28

## 2022-08-14 RX ADMIN — LEVALBUTEROL 0.63 MG: 0.63 SOLUTION RESPIRATORY (INHALATION) at 03:40

## 2022-08-14 RX ADMIN — Medication 10 ML: at 20:47

## 2022-08-14 RX ADMIN — METHYLPREDNISOLONE SODIUM SUCCINATE 40 MG: 40 INJECTION, POWDER, FOR SOLUTION INTRAMUSCULAR; INTRAVENOUS at 10:56

## 2022-08-14 RX ADMIN — METOPROLOL TARTRATE 2.5 MG: 1 INJECTION, SOLUTION INTRAVENOUS at 13:44

## 2022-08-14 RX ADMIN — METHYLPREDNISOLONE SODIUM SUCCINATE 40 MG: 40 INJECTION, POWDER, FOR SOLUTION INTRAMUSCULAR; INTRAVENOUS at 18:27

## 2022-08-14 NOTE — PROGRESS NOTES
Hospital Medicine    Subjective: Seen earlier this morning  Mother is by the bedside  Patient continues to be confused    Current Facility-Administered Medications:     metoprolol (LOPRESSOR) injection 2.5 mg, 2.5 mg, IntraVENous, Q6H, Billy Martinez MD, 2.5 mg at 08/14/22 1344    methylPREDNISolone sodium (SOLU-MEDROL) injection 40 mg, 40 mg, IntraVENous, Q8H, Corey Ferguson MD, 40 mg at 08/14/22 1056    clotrimazole (MYCELEX) dayna 10 mg, 10 mg, Oral, 5x Daily, Corey Ferguson MD, 10 mg at 08/14/22 1528    diphenhydrAMINE (BENADRYL) injection 25 mg, 25 mg, IntraVENous, Q6H PRN, Corey Ferguson MD, 25 mg at 08/14/22 1345    levalbuterol (XOPENEX) nebulization 0.63 mg, 0.63 mg, Nebulization, q8h, Mazin Villalpando MD, 0.63 mg at 08/14/22 1236    [Held by provider] 0.9 % sodium chloride infusion, , IntraVENous, Continuous, Billy Martinez MD, Stopped at 08/14/22 3447    barium sulfate 40 % suspension 15 mL, 15 mL, Oral, ONCE PRN, Twan Nicolas MD    barium sulfate 40 % paste 15 mL, 15 mL, Oral, ONCE PRN, Twan Nicolas MD    barium sulfate 40 % reconsitutable suspension, 5 mL, Oral, ONCE PRN, Twan Nicolas MD    diazePAM (VALIUM) injection 5 mg, 5 mg, IntraVENous, See Admin Instructions, Shiela Diaz DO, 5 mg at 08/12/22 1929    [Held by provider] metoprolol succinate (TOPROL XL) extended release tablet 12.5 mg, 12.5 mg, Oral, Daily, Billy Martinez MD    [Held by provider] aspirin EC tablet 81 mg, 81 mg, Oral, Daily, Jacky Hallman DO    [Held by provider] clopidogrel (PLAVIX) tablet 75 mg, 75 mg, Oral, Daily, Shiela Diaz DO    perflutren lipid microspheres (DEFINITY) injection 1.65 mg, 1.5 mL, IntraVENous, ONCE PRN, Shiela Diaz DO    levETIRAcetam (KEPPRA) 500 mg/100 mL IVPB, 500 mg, IntraVENous, Q12H, Cheyanne Workman MD, Stopped at 08/14/22 0945    anastrozole (ARIMIDEX) tablet 1 mg, 1 mg, Oral, Daily, Shiela Diaz DO, 1 mg at 08/10/22 1028    sodium chloride flush 0.9 % injection 5-40 mL, 5-40 mL, IntraVENous, 2 times per day, Meldon Ser, DO, 10 mL at 08/14/22 0736    sodium chloride flush 0.9 % injection 5-40 mL, 5-40 mL, IntraVENous, PRN, David Diaz, DO    0.9 % sodium chloride infusion, , IntraVENous, PRN, David Limer, DO    enoxaparin (LOVENOX) injection 40 mg, 40 mg, SubCUTAneous, Daily, Quynh Rodriguez, APRN - CNP, 40 mg at 08/14/22 0736    ondansetron (ZOFRAN-ODT) disintegrating tablet 4 mg, 4 mg, Oral, Q8H PRN **OR** ondansetron (ZOFRAN) injection 4 mg, 4 mg, IntraVENous, Q6H PRN, David Diaz, DO    polyethylene glycol (GLYCOLAX) packet 17 g, 17 g, Oral, Daily PRN, David Diaz, DO    acetaminophen (TYLENOL) tablet 650 mg, 650 mg, Oral, Q6H PRN, 650 mg at 08/10/22 1027 **OR** acetaminophen (TYLENOL) suppository 650 mg, 650 mg, Rectal, Q6H PRN, David Diaz, DO, 650 mg at 08/11/22 2033    midazolam PF (VERSED) injection 2 mg, 2 mg, IntraVENous, Once, Tavon Pardo,     atorvastatin (LIPITOR) tablet 10 mg, 10 mg, Oral, Nightly, David Diaz, DO, 10 mg at 08/10/22 0007    Objective:    /70   Pulse 98   Temp 97.5 °F (36.4 °C) (Temporal)   Resp 22   Wt 116 lb (52.6 kg)   LMP 10/28/2021   SpO2 98%   BMI 24.24 kg/m²     Heart:  irregular  Lungs:  ctab  Abd: + bs soft nontender  Extrem:  w/o edema    CBC with Differential:    Lab Results   Component Value Date/Time    WBC 5.4 08/10/2022 08:29 AM    RBC 4.36 08/10/2022 08:29 AM    HGB 12.6 08/10/2022 08:29 AM    HCT 38.8 08/10/2022 08:29 AM     08/10/2022 08:29 AM    MCV 89.0 08/10/2022 08:29 AM    MCH 28.9 08/10/2022 08:29 AM    MCHC 32.5 08/10/2022 08:29 AM    RDW 15.7 08/10/2022 08:29 AM    METASPCT 5.0 01/12/2022 03:16 PM    LYMPHOPCT 4.3 08/09/2022 11:57 AM    PROMYELOPCT 8.0 01/12/2022 03:16 PM    MONOPCT 2.6 08/09/2022 11:57 AM    MYELOPCT 3.0 01/12/2022 03:16 PM    BASOPCT 0.9 08/09/2022 11:57 AM    MONOSABS 0.16 08/09/2022 11:57 AM    LYMPHSABS 0.22 08/09/2022 11:57 AM    EOSABS 0.00 08/09/2022 11:57 AM    BASOSABS 0.05 08/09/2022 11:57 AM     CMP:    Lab Results   Component Value Date/Time     08/14/2022 08:04 AM    K 4.0 08/14/2022 08:04 AM    K 4.0 08/09/2022 11:57 AM     08/14/2022 08:04 AM    CO2 19 08/14/2022 08:04 AM    BUN 19 08/14/2022 08:04 AM    CREATININE 0.5 08/14/2022 08:04 AM    GFRAA >60 08/14/2022 08:04 AM    LABGLOM >60 08/14/2022 08:04 AM    GLUCOSE 169 08/14/2022 08:04 AM    PROT 6.0 08/10/2022 08:29 AM    LABALBU 3.4 08/10/2022 08:29 AM    CALCIUM 8.1 08/14/2022 08:04 AM    BILITOT 0.4 08/10/2022 08:29 AM    ALKPHOS 72 08/10/2022 08:29 AM    AST 50 08/10/2022 08:29 AM    ALT 42 08/10/2022 08:29 AM     Warfarin PT/INR:    Lab Results   Component Value Date    INR 1.3 08/11/2022    PROTIME 13.8 (H) 08/11/2022       Assessment:    Principal Problem:    Weakness  Active Problems:    Altered mental status    Dysarthria    Fall    Hypertrophic cardiomyopathy ([address]    Supraventricular tachycardia ([address]    Viral meningitis    Carcinoma in situ of female breast, right    Primary hypertension  Resolved Problems:    * No resolved hospital problems.  *  Atrial fib with rapid rates  Oral thrush  Diffuse rash of questionable etiology    Plan:  Patient's rash was even before admission but however now spread  Try Solu-Medrol and Benadryl  Mycelex for thrush  Discussed with patient's mother  Await MRI    Bartolo Kerns MD  4:26 PM  8/14/2022

## 2022-08-14 NOTE — PROGRESS NOTES
Occupational Therapy  OCCUPATIONAL THERAPY INITIAL EVALUATION    [address] [address] [address]      [id number]                                                Patient Name: Radha Grace  MRN: [id number]  : 1960  Room: [address] [place] [address]    Evaluating OT: Caridad Flores OTR/L #[id number]     Referring Provider: Edis Luevano DO  Specific Provider Orders/Date: OT eval and treat 22    Diagnosis: Weakness [R53.1]  Lesion of vertebra [M89.9]  Fall, initial encounter [W19. XXXA]   Pt admitted to hospital with weakness, AMS and dysarthria     Pertinent Medical History:  has a past medical history of Breast cancer ([facility] Cancer ([facility] Hearing impaired, Hearing loss, and Hyperlipidemia.        Precautions:  Fall Risk, [place], bed alarm, O2, Intellectual Disability     Assessment of current deficits    [x] Functional mobility  [x]ADLs  [x] Strength               [x]Cognition    [x] Functional transfers   [x] IADLs         [x] Safety Awareness   [x]Endurance    [] Fine Coordination              [x] Balance      [] Vision/perception   []Sensation     []Gross Motor Coordination  [] ROM  [] Delirium                   [] Motor Control     OT PLAN OF CARE   OT POC based on physician orders, patient diagnosis and results of clinical assessment    Frequency/Duration 1-3 days/wk for 2 weeks PRN   Specific OT Treatment Interventions to include:   * Instruction/training on adapted ADL techniques and AE recommendations to increase functional independence within precautions       * Training on energy conservation strategies, correct breathing pattern and techniques to improve independence/tolerance for self-care routine  * Functional transfer/mobility training/DME recommendations for increased independence, safety, and fall prevention  * Patient/Family education to increase follow through with safety techniques and functional independence  * Recommendation of environmental modifications for increased safety with functional transfers/mobility and ADLs  * Cognitive retraining/development of therapeutic activities to improve problem solving, judgement, memory, and attention for increased safety/participation in ADL/IADL tasks  * Therapeutic exercise to improve motor endurance, ROM, and functional strength for ADLs/functional transfers  * Therapeutic activities to facilitate/challenge dynamic balance, stand tolerance for increased safety and independence with ADLs  * Therapeutic activities to facilitate gross/fine motor skills for increased independence with ADLs  * Neuro-muscular re-education: facilitation of righting/equilibrium reactions, midline orientation, scapular stability/mobility, normalization of muscle tone, and facilitation of volitional active controled movement    Recommended Adaptive Equipment:  TBD     Home Living: Pt lives with mother in a 1 story home with 2+2 XIMENA and 1+0 rails. Bathroom setup: tub/shower combo 1st floor; walk-in shower in basement    Equipment owned: shower chair    Prior Level of Function: Independent with ADLs , mod I with IADLs; ambulated without AD   Occupation: na    Pain Level: Pt reports R foot pain (unable to quantify); Therapist facilitated repositioning to address pain      Cognition: A&O: x3 ;  Follows 1 step directions with occasional cues   Memory:  P+   Sequencing:  P+   Problem solving:  P+   Judgement/safety:  P+     Functional Assessment:  AM-PAC Daily Activity Raw Score: 14/24   Initial Eval Status  Date: 8/14/22 Treatment Status  Date: STGs = LTGs  Time frame: 10-14 days   Feeding NPO      Grooming Minimal Assist   Modified [place]    UB Dressing Minimal  Assist   Supervision    LB Dressing Maximal Assist   Minimal Assist    Bathing Maximal Assist  Minimal Assist    Toileting Dependent   Minimal Assist    Bed Mobility  Supine to sit: Minimal Assist   Sit to supine: Minimal Assist   Supine to sit: Modified [place]   Sit to supine: Modified [place]    Functional Transfers Moderate Assist     Sit to stands   Stand by Assist    Functional Mobility NT    Unable to complete side steps due to R LE pain. Minimal Assist    Balance Sitting:     Static:  SBA    Dynamic: min A  Standing: mod A with HHA     Activity Tolerance F-  F+   Visual/  Perceptual Glasses: reading  wfl                  Hand Dominance right   Strength ROM Additional Info:    RUE   3+/5 wfl F+  and fair FMC/dexterity noted during ADL tasks     LUE 3+/5 wfl F+  and fair FMC/dexterity noted during ADL tasks     Hearing: wfl  Sensation:wfl  Tone: wfl  Edema:none noted     Comments: Upon arrival patient supine in bed and agreeable to OT Session - mother present ; nursing clearance obtained prior to session. Therapist educated pt on role of OT. At end of session, patient semi-supine in bed with call light and phone within reach, all lines and tubes intact. Overall patient demonstrated decreased independence and safety during completion of ADL/functional transfer/mobility tasks. Pt would benefit from continued skilled OT to increase safety and independence with completion of ADL/IADL tasks for functional independence and quality of life. Treatment: OT treatment provided this date includes: Facilitation of bed mobility, unsupported sitting balance at EOB (impacting ADLs; addressing posture, weight shifting, dynamic reaching), functional sit to stand transfers and standing tolerance tasks with HHA (addressing posture, balance and activity tolerance) - skilled cuing on sequencing, hand placement, posture, body mechanics, energy conservation techniques and safety. Therapist facilitated self-care retraining: UB/LB self-care tasks (gown, socks) and grooming tasks while educating pt on modified techniques, posture, safety and energy conservation techniques. Skilled monitoring of HR, O2 sats and pts response to treatment.       Rehab Potential: Fair for established goals     Patient / Family Goal:  regain independence     Patient and/or family were instructed on functional diagnosis, prognosis/goals and OT plan of care. Demonstrated P+ understanding. Eval Complexity: Low    Time In: 1110  Time Out: 1140  Total Treatment Time:10 minutes    Min Units   OT Eval Low 97165  x  1   OT Eval Medium 08413      OT Eval High 24434      OT Re-Eval [id number]       Therapeutic Ex 23515       Therapeutic Activities 44660  2     ADL/Self Care 61070  8  1   Orthotic Management 68155       Manual [id number]     Neuro Re-Ed [id number]       Non-Billable Time          Evaluation Time additionally includes thorough review of current medical information, gathering information on past medical history/social history and prior level of function, interpretation of standardized testing/informal observation of tasks, assessment of data and development of plan of care and goals.           Micah Kuo OTR/L #[id number]

## 2022-08-14 NOTE — PROGRESS NOTES
Patient refused Bipap. Attempted to put pt on Bipap and pt shakes her head and pulls off mask. On 3L 93%.

## 2022-08-14 NOTE — PROGRESS NOTES
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 female with history of invasive ductal right breast cancer diagnosed in 2021, off chemotherapy since 01/2022 and currently on anastrazole, presented on 08/09 with weakness accompanied by recurrent falls. Since admission, she had occasional T-max of 100 to 100.3 °F with no leukocytosis. According to family, she developed dysarthria, tongue swelling and rash after admission. Chest CTA showed focal sclerotic lesion in T8 vertebral body, noncalcified nodule in right upper lobe measuring 6 mm, no pneumonia or pleural effusion, no evidence of pulmonary embolism. MRI brain showed no evidence of recent infarct, acute intracranial hemorrhage, mass-effect, hydrocephalus, neoplastic disease. Respiratory pathogen PCR panel was negative. Urinalysis showed no pyuria. Lumbar puncture on 08/12 yielded CSF with WBCs of 4-5 predominantly monocytic at 75%. CSF gram stain and culture showed no polymorphonuclear leukocytes, rare epithelial cells, no organisms. 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 Acyclovir was started by attending physician. Subjective: Patient was seen and examined. No chills, no abdominal pain, no diarrhea, has rash, has itching, has garbled speech. Objective:  /70   Pulse 98   Temp 97.5 °F (36.4 °C) (Temporal)   Resp 22   Wt 116 lb (52.6 kg)   LMP 10/28/2021   SpO2 98%   BMI 24.24 kg/m²   Constitutional: Alert, not in distress  Respiratory: Clear breath sounds, no crackles, no wheezes  Cardiovascular: Regular rate and rhythm, no murmurs  Gastrointestinal: Bowel sounds present, soft, nontender  Skin: Warm and dry, less erythematous patches over anterior and posterior trunk and buttocks. Musculoskeletal: No joint swelling, no joint erythema    Labs, imaging, and medical records/notes were personally reviewed. Assessment:  Rash, suspect allergic reaction. Chest imaging and CSF cell count with differential not suggestive of infection. 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 HHV-6 DNA has been detected by PCR in the brain of about 1/3 of healthy persons (per Hillary Melo and [employer]'s Principles and Practice of Infectious Diseases). HHV-6 infections in immunocompetent patients are generally not treated, since most cases are self-limited and antiviral therapy has not been studied in such patients (per [employer]). Recommendations:  Monitor clinically off antibiotics for now. Follow up CSF and serum HHV-6 quantitative PCR/viral load. Follow up blood cultures. Follow up CSF culture. Follow up HIV screen. Thank you for involving me in the care of Noris Calabrese. I will continue to follow. Please do not hesitate to call for any questions or concerns.     Electronically signed by Fletcher Mari MD on 8/14/2022 at 9:39 AM

## 2022-08-14 NOTE — PROGRESS NOTES
INPATIENT CARDIOLOGY FOLLOW-UP    Name: George Can    Age: 58 y.o. Date of Admission: 8/9/2022 11:24 AM    Date of Service: 8/14/2022    Chief Complaint: Follow-up for altered mental status, herpes meningitis, hypertrophic cardiomyopathy, paroxysmal atrial fibrillation, acute hypoxic respiratory failure, carcinoma of the breast, hypertension    Interim History: The patient continues to manifest altered mental status with earlier agitation limiting compliance with recommendations. She developed acute hypoxic respiratory failure likely in the basis of iatrogenic volume overload with radiographic evidence of interstitial infiltrates. Her atrial fibrillation has resolved with present maintenance of sinus rhythm. Laboratory studies are pending with most recent laboratory studies demonstrating borderline hypokalemia. Review of Systems: The remainder of a complete multisystem review including consitutional, central nervous, respiratory, circulatory, gastrointestinal, genitourinary, endocrinologic, hematologic, musculoskeletal and psychiatric are negative.     Problem List:  Patient Active Problem List   Diagnosis    Cancer ([address]    Malignant neoplasm of upper-inner quadrant of right breast in female, estrogen receptor positive ([address]    Hearing loss    Hypercholesterolemia    Weakness    Altered mental status    Dysarthria    Fall    Hypertrophic cardiomyopathy ([address]    Supraventricular tachycardia ([address]    Viral meningitis    Carcinoma in situ of female breast, right    Primary hypertension       Allergies:  No Known Allergies    Current Medications:  Current Facility-Administered Medications   Medication Dose Route Frequency Provider Last Rate Last Admin    dilTIAZem 100 mg in dextrose 5 % 100 mL infusion ([facility])  10 mg/hr IntraVENous Continuous Deidra Sherman MD 10 mL/hr at 08/14/22 0537 10 mg/hr at 08/14/22 0537    levalbuterol (XOPENEX) nebulization 0.63 mg  0.63 mg Nebulization q8h Jeffrey Christianson MD   0.63 mg at 08/14/22 0340    0.9 % sodium chloride infusion   IntraVENous Continuous Indiana Duffy MD 75 mL/hr at 08/13/22 1919 Rate Verify at 08/13/22 1919    barium sulfate 40 % suspension 15 mL  15 mL Oral ONCE PRN Brian Ortiz MD        barium sulfate 40 % paste 15 mL  15 mL Oral ONCE PRN Brian Ortiz MD        barium sulfate 40 % reconsitutable suspension  5 mL Oral ONCE PRN Brian Ortiz MD        diazePAM (VALIUM) injection 5 mg  5 mg IntraVENous See Admin Instructions Christopher Diaz, DO   5 mg at 08/12/22 1929    [Held by provider] metoprolol succinate (TOPROL XL) extended release tablet 12.5 mg  12.5 mg Oral Daily Indiana Duffy MD        [facility] by provider] aspirin EC tablet 81 mg  81 mg Oral Daily Ginny Negro DO        [Held by provider] clopidogrel (PLAVIX) tablet 75 mg  75 mg Oral Daily Ginny Negro DO        perflutren lipid microspheres (DEFINITY) injection 1.65 mg  1.5 mL IntraVENous ONCE PRN Christopher Diaz,         levETIRAcetam (KEPPRA) 500 mg/100 mL IVPB  500 mg IntraVENous Q12H Victorino Valdez MD   Stopped at 08/13/22 2042    anastrozole (ARIMIDEX) tablet 1 mg  1 mg Oral Daily Christopher Diaz, DO   1 mg at 08/10/22 1028    sodium chloride flush 0.9 % injection 5-40 mL  5-40 mL IntraVENous 2 times per day Ginny Negor DO   10 mL at 08/13/22 2472    sodium chloride flush 0.9 % injection 5-40 mL  5-40 mL IntraVENous PRN Christopher Diaz DO        0.9 % sodium chloride infusion   IntraVENous PRN Christopher Diaz DO        enoxaparin (LOVENOX) injection 40 mg  40 mg SubCUTAneous Daily ORLY Jones CNP   40 mg at 08/13/22 0924    ondansetron (ZOFRAN-ODT) disintegrating tablet 4 mg  4 mg Oral Q8H PRN Christopher Diaz DO        Or    ondansetron [facility]) injection 4 mg  4 mg IntraVENous Q6H PRN Christopher Diaz, DO        polyethylene glycol (GLYCOLAX) packet 17 g  17 g Oral Daily PRN Christopher Diaz,         acetaminophen (TYLENOL) tablet 650 mg  650 mg Oral Q6H PRN Germaine Riis Malmer, DO   650 mg at 08/10/22 1027    Or    acetaminophen (TYLENOL) suppository 650 mg  650 mg Rectal Q6H PRN Germaine Riis Malmer, DO   650 mg at 08/11/22 2033    midazolam PF (VERSED) injection 2 mg  2 mg IntraVENous Once Dorisann Jericho, DO        atorvastatin (LIPITOR) tablet 10 mg  10 mg Oral Nightly Germaine Riis Malmer, DO   10 mg at 08/10/22 0007      dilTIAZem (CARDIZEM) 100 mg in dextrose 5% 100 mL ([facility]) 10 mg/hr (08/14/22 0537)    sodium chloride 75 mL/hr at 08/13/22 1919    sodium chloride         Physical Exam:  /72   Pulse 93   Temp 97.5 °F (36.4 °C) (Temporal)   Resp 25   Wt 116 lb (52.6 kg)   LMP 10/28/2021   SpO2 97%   BMI 24.24 kg/m²   Weight change: Wt Readings from Last 3 Encounters:   08/09/22 116 lb (52.6 kg)   08/09/22 116 lb (52.6 kg)   06/22/22 116 lb (52.6 kg)     The patient is awake but remains nonresponsive and in no discomfort or distress. No gross musculoskeletal deformity is present. No significant skin or nail changes are present. Gross examination of head, eyes, nose and throat are negative. Jugular venous pressure is normal and no carotid bruits are present. Normal respiratory effort is noted with no accessory muscle usage present. Lung fields are clear to ascultation. Cardiac examination is notable for a regular rate and rhythm with no palpable thrill. No gallop rhythm or cardiac murmur are identified. A benign abdominal examination is present with no masses or organomegaly. Intact pulses are present throughout all extremities and no peripheral edema is present. No focal neurologic deficits are present. Intake/Output:    Intake/Output Summary (Last 24 hours) at 8/14/2022 0701  Last data filed at 8/13/2022 1919  Gross per 24 hour   Intake 2426.94 ml   Output 725 ml   Net 1701.94 ml     No intake/output data recorded.     Laboratory Tests:  Lab Results   Component Value Date    CREATININE 0.5 08/13/2022    BUN 16 08/13/2022     08/13/2022    K 3.5 08/13/2022     08/13/2022    CO2 18 (L) 08/13/2022     Recent Labs     08/13/22  0804   CKTOTAL 159     No results found for: BNP  Lab Results   Component Value Date/Time    WBC 5.4 08/10/2022 08:29 AM    RBC 4.36 08/10/2022 08:29 AM    HGB 12.6 08/10/2022 08:29 AM    HCT 38.8 08/10/2022 08:29 AM    MCV 89.0 08/10/2022 08:29 AM    MCH 28.9 08/10/2022 08:29 AM    MCHC 32.5 08/10/2022 08:29 AM    RDW 15.7 08/10/2022 08:29 AM     08/10/2022 08:29 AM    MPV 9.7 08/10/2022 08:29 AM     No results for input(s): ALKPHOS, ALT, AST, PROT, BILITOT, BILIDIR, LABALBU in the last 72 hours. No results found for: MG  Lab Results   Component Value Date/Time    PROTIME 13.8 08/11/2022 03:41 PM    INR 1.3 08/11/2022 03:41 PM     Lab Results   Component Value Date/Time    TSH 0.644 08/10/2022 08:29 AM     No components found for: CHLPL  Lab Results   Component Value Date    TRIG 148 08/11/2022    TRIG 78 06/11/2015     Lab Results   Component Value Date    HDL 35 08/11/2022    HDL 66 06/11/2015     Lab Results   Component Value Date    LDLCALC 63 08/11/2022    LDLCALC 151 (H) 06/11/2015       Cardiac Tests:  Telemetry findings reviewed: sinus rhythm following the spontaneous conversion of atrial fibrillation, no new tachy/bradyarrhythmias overnight  Chest X-ray: A chest x-ray reviewed at time of evaluation demonstrates somewhat limited inspiratory effort with borderline cardiomegaly and interstitial infiltrates      ASSESSMENT / PLAN: On clinical basis, the patient has decompensated with the development of acute hypoxic respiratory failure in part related to iatrogenic volume overload from fluid administration in the absence of enteral intake. Presently intravenous fluids have been withheld with plans of administration of a single dose of intravenous diuretics to assist fluid mobilization.   Persistent altered mental status continues to limit consideration of enteral intake with needs of consideration of a nasogastric feeding tube for if extended interruption of enteral nutrition additional consideration of a percutaneous gastrostomy. Based on her spontaneous conversion to sinus rhythm, her intravenous diltiazem will be discontinued with replacement with that of intravenous metoprolol pending the return of capabilities of enteral administration. Nutritional support will be essential to fluid mobilization as well as reducing her risk of progressive debilitation. Additional management will be deferred to primary care in addition to have the pulmonary and neurology services. Based on combined considerations of additional involved medical services, based on a determination of her prognosis, potential consideration of palliative care assessment may be appropriate for reassessment of advanced directives if her prognosis for functional recovery is limited. Note: This report was completed utilizing computer voice recognition software. Every effort has been made to ensure accuracy, however; inadvertent computerized transcription errors may be present. Yanira Boyd.  Aiden Day, Count includes the [facility]

## 2022-08-14 NOTE — PROGRESS NOTES
08/14/22 0342   Treatment   Treatment Type HHN   $Treatment Type $Inhaled Therapy/Meds   Medications Levalbuterol HCL   Patient started to take the treatment but got very upset and said to take off, she woukd not hold it or let me hold so I took off

## 2022-08-14 NOTE — PROGRESS NOTES
Bret Bowen is a 58 y.o. female     Neurology is following for weakness    Significant past medical history breast cancer, status post right lumpectomy, HLD, deafness, mild intellectual disability    Patient was admitted with weakness and slurred speech. She woke up on 8/8 and fell when she tried to walk. She has a history of deafness and mild intellectual disability and lives with her mother--typically very energetic, oriented. A couple of hours following the fall she fell again and had slurred speech. SBP was over 200 on arrival.      Patient reportedly has mild speech impediment but the dysarthria was much worse than baseline. She was treated with chemotherapy and radiation for her breast cancer and has been on Arimidex since May 2022. LP was completed and showed WBCs of 5. Protein normal.      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 ID stated that this may not be significant and is checking viral load and labs,  and that HHV-6 is usually self-limited and immunocompetent patient. Apparently the patient's grandfather had an undiagnosed neuromuscular disease and the patient's aunt had \"dormant\" late life muscular dystrophy--and had a similar presentation of falls and swallowing problems.   CK obtained this morning which was normal  Acetylcholine receptor antibodies also drawn as well as LILIANA both remain pending    Mom at bedside this morning  We discussed her rash which appeared a few days prior to admission   She did not start any new medication   Mom does report that she lives outside much of the day-enjoys her garden-there are deer as well as other wildlife in and around yard    ROS limited due to her mental status    Objective:     /73   Pulse 89   Temp 97.5 °F (36.4 °C) (Temporal)   Resp 22   Wt 116 lb (52.6 kg)   LMP 10/28/2021   SpO2 98%   BMI 24.24 kg/m²     General appearance: alert, appears stated age, cooperative   Head: normocephalic, without obvious abnormality, atraumatic-  Extremities: normal, atraumatic, no cyanosis or edema  Pulses: 2+ and symmetric  Skin: Diffuse rash on chest arms and legs    Difficult to open mouth however question lesions noted in her oral mucosa    Mental Status: alert, oriented to person, place, year, and family    Speech: Hyponasal-almost macroglossic speech    Language:laconic but no obvious aphasias    Cranial Nerves:  I: smell    II: visual acuity     II: visual fields B/l threat   II: pupils JALEN   III,VII: ptosis None   III,IV,VI: extraocular muscles  Full ROM   V: mastication Normal   V: facial light touch sensation     V,VII: corneal reflex     VII: facial muscle function - upper  Normal   VII: facial muscle function - lower Normal   VIII: hearing Nearly deaf   IX: soft palate elevation  Normal   IX,X: gag reflex    XI: trapezius strength  5/5   XI: sternocleidomastoid strength 5/5   XI: neck extension strength  5/5   XII: tongue strength  Normal     Motor:  No drift  Normal bulk and tone    Sensory:  Appreciates LT in all limbs    Coordination:  FN symmetrical    DTR:   No Babinskis  No Cho's      Laboratory/Radiology:     CBC with Differential:    Lab Results   Component Value Date/Time    WBC 5.4 08/10/2022 08:29 AM    RBC 4.36 08/10/2022 08:29 AM    HGB 12.6 08/10/2022 08:29 AM    HCT 38.8 08/10/2022 08:29 AM     08/10/2022 08:29 AM    MCV 89.0 08/10/2022 08:29 AM    MCH 28.9 08/10/2022 08:29 AM    MCHC 32.5 08/10/2022 08:29 AM    RDW 15.7 08/10/2022 08:29 AM    METASPCT 5.0 01/12/2022 03:16 PM    LYMPHOPCT 4.3 08/09/2022 11:57 AM    PROMYELOPCT 8.0 01/12/2022 03:16 PM    MONOPCT 2.6 08/09/2022 11:57 AM    MYELOPCT 3.0 01/12/2022 03:16 PM    BASOPCT 0.9 08/09/2022 11:57 AM    MONOSABS 0.16 08/09/2022 11:57 AM    LYMPHSABS 0.22 08/09/2022 11:57 AM    EOSABS 0.00 08/09/2022 11:57 AM    BASOSABS 0.05 08/09/2022 11:57 AM     CMP:    Lab Results   Component Value Date/Time     08/14/2022 08:04 AM    K 4.0 08/14/2022 08:04 AM    K 4.0 08/09/2022 11:57 AM     08/14/2022 08:04 AM    CO2 19 08/14/2022 08:04 AM    BUN 19 08/14/2022 08:04 AM    CREATININE 0.5 08/14/2022 08:04 AM    GFRAA >60 08/14/2022 08:04 AM    LABGLOM >60 08/14/2022 08:04 AM    GLUCOSE 169 08/14/2022 08:04 AM    PROT 6.0 08/10/2022 08:29 AM    LABALBU 3.4 08/10/2022 08:29 AM    CALCIUM 8.1 08/14/2022 08:04 AM    BILITOT 0.4 08/10/2022 08:29 AM    ALKPHOS 72 08/10/2022 08:29 AM    AST 50 08/10/2022 08:29 AM    ALT 42 08/10/2022 08:29 AM     TSH:    Lab Results   Component Value Date/Time    TSH 0.644 08/10/2022 08:29 AM         MRI BRAIN:   No evidence of neoplastic disease. No evidence of recent infarct, acute intracranial hemorrhage, mass effect, or hydrocephalus. MRA HEAD:   No proximal large vessel arterial occlusion or high-grade stenosis. MRI CERVICAL SPINE:   No evidence of neoplastic disease. Mild to moderate multilevel spinal stenosis secondary to a congenitally narrow cervical spinal canal, as described above. Moderate to severe left neural foraminal stenosis at C5-6. MRI THORACIC SPINE:   No evidence of neoplastic disease. No significant spinal or neural foraminal stenosis     EEG 8/11: This was a normal study during the waking state. No seizures or epileptiform discharges were noted during this study.        Latest Reference Range & Units 8/12/22 10:50   Appearance, CSF Clear   Clear  Clear  Clear   GLUCOSE,CSF 40 - 70 mg/dL 78 (H)   PROTEIN,CSF 15 - 40 mg/dL 25   RBC, CSF /uL  /uL <2000  <2000   WBC, CSF 0 - 2 /uL  0 - 2 /uL 5 (H)  4 (H)   Neutrophils, CSF 0 - 10 %  0 - 10 % 40 (H)  25 (H)   Monocytes, CSF 10 - 70 %  10 - 70 % 60  75 (H)   Color, CSF  Colorless  Colorless   Tube Number + CELL CT + DIFF-CSF  Tube 4  Tube 1   (H): Data is abnormally high    Meningitis/encephalitis panel +HHV-6  Autoimmune encephalitis reflex panel pending    All pertinent labs and images personally reviewed today    Assessment: Dysarthria in a pt with mild intellectual disability and hx breast cancer (on Arimidex). Her dysarthria is the presence of no other focal neurodeficits    To me, her speech sounds macroglossia then pseudobulbar palsy. Questionable lesions noted oral mucosa. Along with diffuse rash-we will obtain Lyme testing however still also awaiting final CSF studies as well as other laboratory studies including acetylcholine receptor antibodies. Meningitis panel positive for HHV-6 but ID states this is not necessarily clinically significant---and further work-up is pending. Antibiotics on hold for now as well as antivirals. Final CSF studies are pending.      Of note, family is concerned about underlying neuromuscular disease in light of family history of muscular dystrophy (in aunt) and unknown neuro muscular disease in grandfather   CK unrevealing   LILIANA and acetylcholine receptor antibodies pending    Plan:     Await final CSF studies  Await laboratory data    Will obtain Lyme testing    Consider fine cuts through the brainstem on repeat MRI if lab testing proves to be unrevealing    Will follow     ORLY Hahn - CNS  10:21 AM  8/14/2022

## 2022-08-14 NOTE — PLAN OF CARE
Problem: Discharge Planning  Goal: Discharge to home or other facility with appropriate resources  8/14/2022 0756 by Kaitlynn Cochran RN  Outcome: Progressing  8/14/2022 0420 by Jackson Murphy RN  Outcome: Progressing     Problem: Skin/Tissue Integrity  Goal: Absence of new skin breakdown  Description: 1. Monitor for areas of redness and/or skin breakdown  2. Assess vascular access sites hourly  3. Every 4-6 hours minimum:  Change oxygen saturation probe site  4. Every 4-6 hours:  If on nasal continuous positive airway pressure, respiratory therapy assess nares and determine need for appliance change or resting period.   8/14/2022 6003 by Kaitlynn Cochran RN  Outcome: Progressing  8/14/2022 0420 by Jackson Murphy RN  Outcome: Progressing

## 2022-08-14 NOTE — PROGRESS NOTES
Pulmonary Subsequent Hospital F/U note    Patient is being followed for: aacute hypoxic respiratory failure, lung nodule    Interval HPI:  Seen in the room   Respiratory status seems more comfortable  She was declining the breathing treatments  Less wheezing today  Mother present   IVF stopped and lasix given    ROS:  Unable to obtain - speech garbled       Exam:  /73   Pulse 89   Temp 97.5 °F (36.4 °C) (Temporal)   Resp 22   Wt 116 lb (52.6 kg)   LMP 10/28/2021   SpO2 98%   BMI 24.24 kg/m²    General: mild resp distress  HEENT: PERRL, EOMI, MMM, no oral lesions  Neck: supple no adenopathy  CV: irregularly irregular  Lungs: wheezing improved   Abd: soft, ND, NT, bowel sounds normal  Ext: warm, no edema    Data:    Oximetry:  SpO2 Readings from Last 1 Encounters:   08/14/22 98%       Imaging personally reviewed by myself:  CXR     Perihilar lung markings concerning for central vascular congestion versus   peribronchial inflammatory process           Pertinent labs reviewed and noted:  Lab Results   Component Value Date/Time    WBC 5.4 08/10/2022 08:29 AM    HGB 12.6 08/10/2022 08:29 AM    HCT 38.8 08/10/2022 08:29 AM    MCV 89.0 08/10/2022 08:29 AM    MCH 28.9 08/10/2022 08:29 AM    MCHC 32.5 08/10/2022 08:29 AM    RDW 15.7 08/10/2022 08:29 AM     08/10/2022 08:29 AM    MPV 9.7 08/10/2022 08:29 AM     Lab Results   Component Value Date/Time     08/14/2022 08:04 AM    K 4.0 08/14/2022 08:04 AM    K 4.0 08/09/2022 11:57 AM     08/14/2022 08:04 AM    CO2 19 08/14/2022 08:04 AM    BUN 19 08/14/2022 08:04 AM    CREATININE 0.5 08/14/2022 08:04 AM    LABALBU 3.4 08/10/2022 08:29 AM    CALCIUM 8.1 08/14/2022 08:04 AM    GFRAA >60 08/14/2022 08:04 AM    LABGLOM >60 08/14/2022 08:04 AM     Lab Results   Component Value Date/Time    PROTIME 13.8 08/11/2022 03:41 PM    INR 1.3 08/11/2022 03:41 PM       Assessment:  1. Acute hypoxic respiratory failure  2. Pulmonary edema  3. Acute bronchospasm  4.

## 2022-08-14 NOTE — PROGRESS NOTES
Physical Therapy  Physical Therapy Initial Assessment     Name: Nelli Hanley  : 1960  MRN: [id number]      Date of Service: 2022    Evaluating PT:  Lexus Blanton PT, DPT [id number]    Room #:  4235/2660-O  Diagnosis:  Weakness [R53.1]  Lesion of vertebra [M89.9]  Fall, initial encounter [W19. XXXA]  PMHx/PSHx:  breast CA, hearing loss, HLD  Procedure/Surgery:  none this admission  Precautions:  Falls, [place]  Equipment Needs:  TBD    SUBJECTIVE:    Pt lives with mother in a 1 story home with 2+2 stairs to enter and 1+0 rail. Bed is on 1st floor and bath is on 1st floor. Pt ambulated with no AD PTA. OBJECTIVE:   Initial Evaluation  Date: 22 Treatment Short Term/ Long Term   Goals   AM-PAC 6 Clicks 41/55     Was pt agreeable to Eval/treatment? yes     Does pt have pain? R foot pain     Bed Mobility  Rolling: [place]  Supine to sit: [place]  Sit to supine: [place]  Scooting: [place]  Rolling: Independent   Supine to sit:  Independent   Sit to supine: Independent   Scooting: Independent    Transfers Sit to stand: [address] [place]  Stand to sit: ModA  Stand pivot: NT  Sit to stand: SBA  Stand to sit: SBA  Stand pivot: SBA with AAD   Ambulation    NT, pt unable to step  50 feet with AAD SBA   Stair negotiation: ascended and descended  NT  4 steps with 1 rail SBA   ROM BUE:  Defer to OT note  BLE:  WFL     Strength BUE:  Defer to OT note  BLE:  grossly 3/5 -- cognitive limitations limiting exam  WFL   Balance Sitting EOB:  SBA  Dynamic Standing:  NT. Static standing: ModA  Sitting EOB:  Independent   Dynamic Standing:  SBA     Pt is A & O x 3  Sensation:  NT  Edema:  none noted    Vitals:  SPO2 on 3L: 97%    Patient education  Pt educated on role of PT, safety during mobility    Patient response to education:   Pt verbalized understanding Pt demonstrated skill Pt requires further education in this area   yes yes yes     ASSESSMENT:    Conditions Requiring Skilled Therapeutic Intervention:    [x]Decreased strength     [x]Decreased ROM  [x]Decreased functional mobility  [x]Decreased balance   [x]Decreased endurance   [x]Decreased posture  []Decreased sensation  []Decreased coordination   []Decreased vision  []Decreased safety awareness   []Increased pain       Comments:  patient semi-supine in bed upon entry and agreeable to PT evaluation. Pt able to complete bed mobility with light assist to trunk and BLEs. Pt sat EOB for approximately 10 minutes during session with no assist for sitting balance. Pt reported dizziness that did not fully resolve, however, BP taken and WNL throughout. Pt able to stand from EOB with lift assist. Pt immediately returned to sitting prior to attempting any steps. Pt assisted back to bed and positioned in seated with all needs met. Mother educated on continued mobility for improved strength. Patient to benefit from continued skilled PT at [facility] to improve functional mobility and decrease fall risk. Treatment:  Patient practiced and was instructed in the following treatment:    Bed Mobility: VCs provided for sequencing and safety during mobility. Manual assist provided for completion of task. Transfer Training: Verbal and tactile cueing provided for sequencing and safety during mobility. Manual assist provided for completion of task   Therapeutic Activities: pt sat EOB for approximately 10 minutes during session with no assist for static and dynamic sitting balance. Pt's/ family goals   1. None stated    Prognosis is good for reaching above PT goals. Patient and or family understand(s) diagnosis, prognosis, and plan of care. yes    PHYSICAL THERAPY PLAN OF CARE:    PT POC is established based on physician order and patient diagnosis     Referring provider/PT Order:    08/09/22 2319  PT eval and treat  ONE TIME     Complete  Discontinue      08/09/22 2302     Diagnosis:  Weakness [R53.1]  Lesion of vertebra [M89.9]  Fall, initial encounter [W19. XXXA]  Specific instructions for next treatment:  progress mobility as appropriate    Current Treatment Recommendations:     [x] Strengthening to improve independence with functional mobility   [x] ROM to improve independence with functional mobility   [x] Balance Training to improve static/dynamic balance and to reduce fall risk  [x] Endurance Training to improve activity tolerance during functional mobility   [x] Transfer Training to improve safety and independence with all functional transfers   [x] Gait Training to improve gait mechanics, endurance and assess need for appropriate assistive device  [x] Stair Training in preparation for safe discharge home and/or into the community   [] Positioning to prevent skin breakdown and contractures  [x] Safety and Education Training   [x] Patient/Caregiver Education   [] HEP  [x] Other     PT long term treatment goals are located in above grid    Frequency of treatments: 2-5x/week x 1-2 weeks. Time in  1105  Time out  1133    Total Treatment Time  10 minutes     Evaluation Time includes thorough review of current medical information, gathering information on past medical history/social history and prior level of function, completion of standardized testing/informal observation of tasks, assessment of data and education on plan of care and goals.     CPT codes:  [x] Low Complexity PT evaluation 23115  [] Moderate Complexity PT evaluation 41350  [] High Complexity PT evaluation 06289  [] PT Re-evaluation 47499  [] Gait training 91930 - minutes  [] Manual therapy 29913 - minutes  [x] Therapeutic activities 75238 10 minutes  [] Therapeutic exercises 40579 - minutes  [] Neuromuscular reeducation 08253 - minutes     Lexus Blanton PT, DPT  [id number]

## 2022-08-14 NOTE — PLAN OF CARE
Problem: Discharge Planning  Goal: Discharge to home or other facility with appropriate resources  8/14/2022 0420 by Lizabeth Fang RN  Outcome: Progressing     Problem: Pain  Goal: Verbalizes/displays adequate comfort level or baseline comfort level  8/14/2022 0420 by Lizabeth Fang RN  Outcome: Progressing     Problem: Skin/Tissue Integrity  Goal: Absence of new skin breakdown  Description: 1. Monitor for areas of redness and/or skin breakdown  2. Assess vascular access sites hourly  3. Every 4-6 hours minimum:  Change oxygen saturation probe site  4. Every 4-6 hours:  If on nasal continuous positive airway pressure, respiratory therapy assess nares and determine need for appliance change or resting period.   8/14/2022 0420 by Lizabeth Fang RN  Outcome: Progressing     Problem: Safety - Adult  Goal: Free from fall injury  8/13/2022 8116 by Corie Dejesus RN  Outcome: Progressing

## 2022-08-15 ENCOUNTER — APPOINTMENT (OUTPATIENT)
Dept: MRI IMAGING | Age: 62
DRG: 051 | End: 2022-08-15
Payer: COMMERCIAL

## 2022-08-15 PROBLEM — I48.0 PAF (PAROXYSMAL ATRIAL FIBRILLATION) (HCC): Status: ACTIVE | Noted: 2022-08-15

## 2022-08-15 PROBLEM — A87.9 VIRAL MENINGITIS: Status: RESOLVED | Noted: 2022-08-12 | Resolved: 2022-08-15

## 2022-08-15 PROBLEM — E44.0 MODERATE PROTEIN-CALORIE MALNUTRITION (HCC): Chronic | Status: ACTIVE | Noted: 2022-08-15

## 2022-08-15 LAB
ANION GAP SERPL CALCULATED.3IONS-SCNC: 13 MMOL/L (ref 7–16)
ANTI-NUCLEAR ANTIBODY (ANA): NEGATIVE
BUN BLDV-MCNC: 24 MG/DL (ref 6–23)
CALCIUM SERPL-MCNC: 8.5 MG/DL (ref 8.6–10.2)
CHLORIDE BLD-SCNC: 110 MMOL/L (ref 98–107)
CO2: 24 MMOL/L (ref 22–29)
CREAT SERPL-MCNC: 0.4 MG/DL (ref 0.5–1)
GFR AFRICAN AMERICAN: >60
GFR NON-AFRICAN AMERICAN: >60 ML/MIN/1.73
GLUCOSE BLD-MCNC: 199 MG/DL (ref 74–99)
HIV-1 AND HIV-2 ANTIBODIES: NORMAL
POTASSIUM SERPL-SCNC: 3.2 MMOL/L (ref 3.5–5)
SODIUM BLD-SCNC: 147 MMOL/L (ref 132–146)

## 2022-08-15 PROCEDURE — 6370000000 HC RX 637 (ALT 250 FOR IP): Performed by: INTERNAL MEDICINE

## 2022-08-15 PROCEDURE — 36415 COLL VENOUS BLD VENIPUNCTURE: CPT

## 2022-08-15 PROCEDURE — 80048 BASIC METABOLIC PNL TOTAL CA: CPT

## 2022-08-15 PROCEDURE — 6360000002 HC RX W HCPCS: Performed by: INTERNAL MEDICINE

## 2022-08-15 PROCEDURE — 2580000003 HC RX 258: Performed by: INTERNAL MEDICINE

## 2022-08-15 PROCEDURE — 2700000000 HC OXYGEN THERAPY PER DAY

## 2022-08-15 PROCEDURE — 2060000000 HC ICU INTERMEDIATE R&B

## 2022-08-15 PROCEDURE — 6360000002 HC RX W HCPCS: Performed by: NEUROLOGICAL SURGERY

## 2022-08-15 PROCEDURE — 94640 AIRWAY INHALATION TREATMENT: CPT

## 2022-08-15 PROCEDURE — 6360000002 HC RX W HCPCS: Performed by: NURSE PRACTITIONER

## 2022-08-15 PROCEDURE — 99233 SBSQ HOSP IP/OBS HIGH 50: CPT | Performed by: INTERNAL MEDICINE

## 2022-08-15 PROCEDURE — 94660 CPAP INITIATION&MGMT: CPT

## 2022-08-15 PROCEDURE — 2500000003 HC RX 250 WO HCPCS: Performed by: INTERNAL MEDICINE

## 2022-08-15 PROCEDURE — 99233 SBSQ HOSP IP/OBS HIGH 50: CPT | Performed by: NURSE PRACTITIONER

## 2022-08-15 RX ORDER — POTASSIUM CHLORIDE 7.45 MG/ML
10 INJECTION INTRAVENOUS ONCE
Status: COMPLETED | OUTPATIENT
Start: 2022-08-15 | End: 2022-08-15

## 2022-08-15 RX ORDER — POTASSIUM CHLORIDE 7.45 MG/ML
10 INJECTION INTRAVENOUS ONCE
Status: COMPLETED | OUTPATIENT
Start: 2022-08-15 | End: 2022-08-16

## 2022-08-15 RX ADMIN — POTASSIUM CHLORIDE 10 MEQ: 7.46 INJECTION, SOLUTION INTRAVENOUS at 18:38

## 2022-08-15 RX ADMIN — LEVALBUTEROL 0.63 MG: 0.63 SOLUTION RESPIRATORY (INHALATION) at 21:09

## 2022-08-15 RX ADMIN — CLOTRIMAZOLE 10 MG: 10 LOZENGE ORAL at 15:03

## 2022-08-15 RX ADMIN — METOPROLOL TARTRATE 2.5 MG: 1 INJECTION, SOLUTION INTRAVENOUS at 20:58

## 2022-08-15 RX ADMIN — CLOTRIMAZOLE 10 MG: 10 LOZENGE ORAL at 06:12

## 2022-08-15 RX ADMIN — Medication 10 ML: at 08:35

## 2022-08-15 RX ADMIN — LEVALBUTEROL 0.63 MG: 0.63 SOLUTION RESPIRATORY (INHALATION) at 05:25

## 2022-08-15 RX ADMIN — CLOTRIMAZOLE 10 MG: 10 LOZENGE ORAL at 20:52

## 2022-08-15 RX ADMIN — ENOXAPARIN SODIUM 40 MG: 100 INJECTION SUBCUTANEOUS at 08:19

## 2022-08-15 RX ADMIN — METOPROLOL TARTRATE 2.5 MG: 1 INJECTION, SOLUTION INTRAVENOUS at 08:20

## 2022-08-15 RX ADMIN — POTASSIUM CHLORIDE 10 MEQ: 7.46 INJECTION, SOLUTION INTRAVENOUS at 16:19

## 2022-08-15 RX ADMIN — METOPROLOL TARTRATE 2.5 MG: 1 INJECTION, SOLUTION INTRAVENOUS at 01:49

## 2022-08-15 RX ADMIN — LEVETIRACETAM 500 MG: 5 INJECTION INTRAVENOUS at 21:03

## 2022-08-15 RX ADMIN — Medication 10 ML: at 21:06

## 2022-08-15 RX ADMIN — LEVALBUTEROL 0.63 MG: 0.63 SOLUTION RESPIRATORY (INHALATION) at 13:12

## 2022-08-15 RX ADMIN — LEVETIRACETAM 500 MG: 5 INJECTION INTRAVENOUS at 08:34

## 2022-08-15 RX ADMIN — METOPROLOL TARTRATE 2.5 MG: 1 INJECTION, SOLUTION INTRAVENOUS at 15:03

## 2022-08-15 ASSESSMENT — PAIN SCALES - GENERAL
PAINLEVEL_OUTOF10: 0
PAINLEVEL_OUTOF10: 0

## 2022-08-15 ASSESSMENT — PAIN SCALES - WONG BAKER: WONGBAKER_NUMERICALRESPONSE: 0

## 2022-08-15 NOTE — PROGRESS NOTES
Hospital Medicine    Subjective:  pt much more alert today conversive still with altered speech      Current Facility-Administered Medications:     metoprolol (LOPRESSOR) injection 2.5 mg, 2.5 mg, IntraVENous, Q6H, Sixto Coreas MD, 2.5 mg at 08/15/22 0149    clotrimazole (MYCELEX) dayna 10 mg, 10 mg, Oral, 5x Daily, Herman Mcclendon MD, 10 mg at 08/15/22 1349    diphenhydrAMINE (BENADRYL) injection 25 mg, 25 mg, IntraVENous, Q6H PRN, Herman Mcclendon MD, 25 mg at 08/14/22 1345    levalbuterol (XOPENEX) nebulization 0.63 mg, 0.63 mg, Nebulization, q8h, Madyson Rios MD, 0.63 mg at 08/15/22 0525    [Held by provider] 0.9 % sodium chloride infusion, , IntraVENous, Continuous, Sixto Coreas MD, Stopped at 08/14/22 4934    barium sulfate 40 % suspension 15 mL, 15 mL, Oral, ONCE PRN, Ahmet Palma MD    barium sulfate 40 % paste 15 mL, 15 mL, Oral, ONCE PRN, Ahmet Palma MD    barium sulfate 40 % reconsitutable suspension, 5 mL, Oral, ONCE PRN, Ahmet Palma MD    diazePAM (VALIUM) injection 5 mg, 5 mg, IntraVENous, See Admin Instructions, Bibi Barger DO, 5 mg at 08/12/22 1929    [Held by provider] metoprolol succinate (TOPROL XL) extended release tablet 12.5 mg, 12.5 mg, Oral, Daily, Sixto Coreas MD    [Held by provider] aspirin EC tablet 81 mg, 81 mg, Oral, Daily, Bibi Barger DO    [Held by provider] clopidogrel (PLAVIX) tablet 75 mg, 75 mg, Oral, Daily, Shawna Diaz DO    perflutren lipid microspheres (DEFINITY) injection 1.65 mg, 1.5 mL, IntraVENous, ONCE PRN, Shawna Diaz DO    levETIRAcetam (KEPPRA) 500 mg/100 mL IVPB, 500 mg, IntraVENous, Q12H, Merary Avalos MD, Stopped at 08/14/22 2100    anastrozole (ARIMIDEX) tablet 1 mg, 1 mg, Oral, Daily, Shawna Diaz DO, 1 mg at 08/10/22 1028    sodium chloride flush 0.9 % injection 5-40 mL, 5-40 mL, IntraVENous, 2 times per day, Shawna Diaz DO, 10 mL at 08/14/22 2047    sodium chloride flush 0.9 % injection 5-40 mL, 5-40 mL, IntraVENous, PRN, Sita Diaz DO, 10 mL at 08/14/22 1827    0.9 % sodium chloride infusion, , IntraVENous, PRN, Sita Diaz,     enoxaparin (LOVENOX) injection 40 mg, 40 mg, SubCUTAneous, Daily, Perla Tc, APRN - CNP, 40 mg at 08/14/22 0736    ondansetron (ZOFRAN-ODT) disintegrating tablet 4 mg, 4 mg, Oral, Q8H PRN **OR** ondansetron (ZOFRAN) injection 4 mg, 4 mg, IntraVENous, Q6H PRN, Sita Diaz DO    polyethylene glycol (GLYCOLAX) packet 17 g, 17 g, Oral, Daily PRN, Sita Diaz,     acetaminophen (TYLENOL) tablet 650 mg, 650 mg, Oral, Q6H PRN, 650 mg at 08/10/22 1027 **OR** acetaminophen (TYLENOL) suppository 650 mg, 650 mg, Rectal, Q6H PRN, Sita Diaz DO, 650 mg at 08/11/22 2033    midazolam PF (VERSED) injection 2 mg, 2 mg, IntraVENous, Once, Bushra Smith DO    atorvastatin (LIPITOR) tablet 10 mg, 10 mg, Oral, Nightly, Sita Diaz DO, 10 mg at 08/10/22 0007    Objective:    BP (!) 159/79   Pulse 89   Temp 97 °F (36.1 °C) (Temporal)   Resp 17   Wt 116 lb (52.6 kg)   LMP 10/28/2021   SpO2 94%   BMI 24.24 kg/m²     Heart:  reg  Lungs:  ctab  Abd: + bs soft nontender  Extrem:  w/o edema  Skin:  rash trunk arms legs    CBC with Differential:    Lab Results   Component Value Date/Time    WBC 5.4 08/10/2022 08:29 AM    RBC 4.36 08/10/2022 08:29 AM    HGB 12.6 08/10/2022 08:29 AM    HCT 38.8 08/10/2022 08:29 AM     08/10/2022 08:29 AM    MCV 89.0 08/10/2022 08:29 AM    MCH 28.9 08/10/2022 08:29 AM    MCHC 32.5 08/10/2022 08:29 AM    RDW 15.7 08/10/2022 08:29 AM    METASPCT 5.0 01/12/2022 03:16 PM    LYMPHOPCT 4.3 08/09/2022 11:57 AM    PROMYELOPCT 8.0 01/12/2022 03:16 PM    MONOPCT 2.6 08/09/2022 11:57 AM    MYELOPCT 3.0 01/12/2022 03:16 PM    BASOPCT 0.9 08/09/2022 11:57 AM    MONOSABS 0.16 08/09/2022 11:57 AM    LYMPHSABS 0.22 08/09/2022 11:57 AM    EOSABS 0.00 08/09/2022 11:57 AM    BASOSABS 0.05 08/09/2022 11:57 AM     CMP:    Lab Results Component Value Date/Time     08/14/2022 08:04 AM    K 4.0 08/14/2022 08:04 AM    K 4.0 08/09/2022 11:57 AM     08/14/2022 08:04 AM    CO2 19 08/14/2022 08:04 AM    BUN 19 08/14/2022 08:04 AM    CREATININE 0.5 08/14/2022 08:04 AM    GFRAA >60 08/14/2022 08:04 AM    LABGLOM >60 08/14/2022 08:04 AM    GLUCOSE 169 08/14/2022 08:04 AM    PROT 6.0 08/10/2022 08:29 AM    LABALBU 3.4 08/10/2022 08:29 AM    CALCIUM 8.1 08/14/2022 08:04 AM    BILITOT 0.4 08/10/2022 08:29 AM    ALKPHOS 72 08/10/2022 08:29 AM    AST 50 08/10/2022 08:29 AM    ALT 42 08/10/2022 08:29 AM     Warfarin PT/INR:    Lab Results   Component Value Date    INR 1.3 08/11/2022    PROTIME 13.8 (H) 08/11/2022       Assessment:    Principal Problem:    Weakness  Active Problems:    Altered mental status    Dysarthria    Fall    Hypertrophic cardiomyopathy ([address]    Supraventricular tachycardia ([address]    Viral meningitis    Carcinoma in situ of female breast, right    Primary hypertension  Resolved Problems:    * No resolved hospital problems.  *      Plan:  Repeat swallow eval increase activity        Mayo Caraballo DO  6:51 AM  8/15/2022

## 2022-08-15 NOTE — PROCEDURES
Spoke with Dr Adam Flores from Anesthesia, there is no team available for MRI at [address] [place] [address],[address] today for a 2 hour MRI, and no additional team to send throughout the day. There are no openings between IR anesthesia schedule and MRI's schedule today either. Will try tomorrow to schedule 4x MRI with anesthesia. Will notify the ordering physician. Patient is unable to complete the MRIs without anesthesia, she attempted them 3x.

## 2022-08-15 NOTE — CARE COORDINATION
Pt will need MRI under anesthesia. On 3L Oxygen PT 11/24 moderate assist. OT 14/24 max assist. Waiting to see if [facility] is accepting Pt. Discharge Plan is to [facility] when medically stable for discharge. SW/KAYLAH to follow for discharge needs. Pt ff the floor for testing.    ASHIA Damico.  [phone]

## 2022-08-15 NOTE — PROGRESS NOTES
Speech Language Pathology      NAME:  Radha Grace  :  1960  DATE: 8/15/2022  ROOM:  [address] [place] [address]-A    An MBSS is needed to re-evaluate Pt's swallow function. A physician order is required. Will await MBSS order. Weakness [R53.1]  Lesion of vertebra [M89.9]  Fall, initial encounter [W19. [place]

## 2022-08-15 NOTE — PROGRESS NOTES
Attempt to see patient. Patient is off floor for testing will see tomorrow.   Electronically signed by ORLY Meza CNP on 8/15/2022 at 2:24 PM

## 2022-08-15 NOTE — PROGRESS NOTES
Neuro Inpatient Follow Up Note       Jacklyn Cruz is a 58 y.o. female     Neurology is following for weakness    Significant past medical history breast cancer, status post right lumpectomy, HLD, deafness, mild intellectual disability    Synopsis:  Patient was admitted with weakness and slurred speech. She woke up on 8/8 and fell when she tried to walk. She has a history of deafness and mild intellectual disability and lives with her mother--typically very energetic, oriented. .  A couple of hours following the fall she fell again and had slurred speech. SBP was over 200 on arrival.  Patient reportedly has mild speech impediment but the dysarthria was much worse than baseline. She was treated with chemotherapy and radiation for her breast cancer and has been on Arimidex since May 2022. Has a diffuse body rash. HPI:  She received Benadryl and steroids yesterday for her rash and was quite sedated, but is alert for my visit and states she feels better today. Mother agrees that things have improved, though she states the patient is generally weak and speech is not entirely back to baseline. She will have MRI under anesthesia today. ID continues to follow as well. Tachycardia has improved and she has been afebrile.     No chest pain or palpitations  No vertigo, lightheadedness or loss of consciousness  No falls, tripping or stumbling  No incontinence of bowels or bladder  No itching or bruising appreciated  No speech or swallowing troubles    ROS otherwise negative      Current Facility-Administered Medications   Medication Dose Route Frequency Provider Last Rate Last Admin    metoprolol (LOPRESSOR) injection 2.5 mg  2.5 mg IntraVENous Q6H Dieter Irving MD   2.5 mg at 08/15/22 0820    clotrimazole (MYCELEX) dayna 10 mg  10 mg Oral 5x Daily Bartolo Kerns MD   10 mg at 08/15/22 0612    diphenhydrAMINE (BENADRYL) injection 25 mg  25 mg IntraVENous Q6H PRN Bartolo Kerns MD   25 mg at 08/14/22 1345    levalbuterol (XOPENEX) nebulization 0.63 mg  0.63 mg Nebulization q8h Fernanda Whitehead MD   0.63 mg at 08/15/22 0525    [Held by provider] 0.9 % sodium chloride infusion   IntraVENous Continuous Anahi Truong MD   Stopped at 08/14/22 1184    barium sulfate 40 % suspension 15 mL  15 mL Oral ONCE PRN Norm Thompson MD        barium sulfate 40 % paste 15 mL  15 mL Oral ONCE PRN Norm Thompson MD        barium sulfate 40 % reconsitutable suspension  5 mL Oral ONCE PRN Norm Thompson MD        diazePAM (VALIUM) injection 5 mg  5 mg IntraVENous See Admin Instructions Chriss Diaz DO   5 mg at 08/12/22 1929    [Held by provider] metoprolol succinate (TOPROL XL) extended release tablet 12.5 mg  12.5 mg Oral Daily Anahi Truong MD        [facility] by provider] aspirin EC tablet 81 mg  81 mg Oral Daily Cesar Tinoco DO        [Held by provider] clopidogrel (PLAVIX) tablet 75 mg  75 mg Oral Daily Chriss Diaz DO        perflutren lipid microspheres (DEFINITY) injection 1.65 mg  1.5 mL IntraVENous ONCE PRN Chriss Diaz DO        levETIRAcetam (KEPPRA) 500 mg/100 mL IVPB  500 mg IntraVENous Q12H Jonne Boast,  mL/hr at 08/15/22 0834 500 mg at 08/15/22 0834    anastrozole (ARIMIDEX) tablet 1 mg  1 mg Oral Daily Chriss Diaz DO   1 mg at 08/10/22 1028    sodium chloride flush 0.9 % injection 5-40 mL  5-40 mL IntraVENous 2 times per day Chriss Diaz DO   10 mL at 08/15/22 0835    sodium chloride flush 0.9 % injection 5-40 mL  5-40 mL IntraVENous PRN Chriss Diaz DO   10 mL at 08/14/22 1827    0.9 % sodium chloride infusion   IntraVENous PRN Chriss Diaz DO        enoxaparin (LOVENOX) injection 40 mg  40 mg SubCUTAneous Daily ORLY Cardoso CNP   40 mg at 08/15/22 0819    ondansetron (ZOFRAN-ODT) disintegrating tablet 4 mg  4 mg Oral Q8H PRN Chriss Diaz DO        Or    ondansetron [facility]) injection 4 mg  4 mg IntraVENous Q6H PRN Cesar Tinoco DO tongue strength  Normal     Motor:  Generalized weakness--but symmetric  Normal bulk  No abnormal movements    Sensory:  Appreciates LT in all limbs    Coordination:  FFM and FN more coordinated today    DTR:   No Babinskis  No Cho's    No pathological reflexes    Laboratory/Radiology:     CBC with Differential:    Lab Results   Component Value Date/Time    WBC 5.4 08/10/2022 08:29 AM    RBC 4.36 08/10/2022 08:29 AM    HGB 12.6 08/10/2022 08:29 AM    HCT 38.8 08/10/2022 08:29 AM     08/10/2022 08:29 AM    MCV 89.0 08/10/2022 08:29 AM    MCH 28.9 08/10/2022 08:29 AM    MCHC 32.5 08/10/2022 08:29 AM    RDW 15.7 08/10/2022 08:29 AM    METASPCT 5.0 01/12/2022 03:16 PM    LYMPHOPCT 4.3 08/09/2022 11:57 AM    PROMYELOPCT 8.0 01/12/2022 03:16 PM    MONOPCT 2.6 08/09/2022 11:57 AM    MYELOPCT 3.0 01/12/2022 03:16 PM    BASOPCT 0.9 08/09/2022 11:57 AM    MONOSABS 0.16 08/09/2022 11:57 AM    LYMPHSABS 0.22 08/09/2022 11:57 AM    EOSABS 0.00 08/09/2022 11:57 AM    BASOSABS 0.05 08/09/2022 11:57 AM     CMP:    Lab Results   Component Value Date/Time     08/15/2022 07:03 AM    K 3.2 08/15/2022 07:03 AM    K 4.0 08/09/2022 11:57 AM     08/15/2022 07:03 AM    CO2 24 08/15/2022 07:03 AM    BUN 24 08/15/2022 07:03 AM    CREATININE 0.4 08/15/2022 07:03 AM    GFRAA >60 08/15/2022 07:03 AM    LABGLOM >60 08/15/2022 07:03 AM    GLUCOSE 199 08/15/2022 07:03 AM    PROT 6.0 08/10/2022 08:29 AM    LABALBU 3.4 08/10/2022 08:29 AM    CALCIUM 8.5 08/15/2022 07:03 AM    BILITOT 0.4 08/10/2022 08:29 AM    ALKPHOS 72 08/10/2022 08:29 AM    AST 50 08/10/2022 08:29 AM    ALT 42 08/10/2022 08:29 AM     TSH:    Lab Results   Component Value Date/Time    TSH 0.644 08/10/2022 08:29 AM     MRI BRAIN:   No evidence of neoplastic disease. No evidence of recent infarct, acute intracranial hemorrhage, mass effect, or hydrocephalus. MRA HEAD:   No proximal large vessel arterial occlusion or high-grade stenosis.        MRI CERVICAL SPINE:   No evidence of neoplastic disease. Mild to moderate multilevel spinal stenosis secondary to a congenitally narrow cervical spinal canal, as described above. Moderate to severe left neural foraminal stenosis at C5-6. MRI THORACIC SPINE:   No evidence of neoplastic disease. No significant spinal or neural foraminal stenosis     EEG 8/11: This was a normal study during the waking state. No seizures or epileptiform discharges were noted during this study. LP: WBC 5; protein 25  Meningitis/encephalitis panel +HHV-6    Lab Results   Component Value Date    CKTOTAL 159 08/13/2022     Pending labs/CSF:  VDRL  Lyme  HIV  Ach receptor atb  LILIANA  Autoimmune encephalitis reflex panel pending    MRI brain w contrast pending    MRI cervical and lumbar spine with contrast pending    All pertinent labs and images personally reviewed today    Assessment:     Dysarthria and AMS: in a pt with mild intellectual disability and hx breast cancer (on Arimidex). Her exam has improved, but etiology of her speech abnormalities remains unclear at this time. MRI of the brain is pending to rule out ischemic events, though this is less likely in the absence of other focal deficits. With her diffuse body rash must consider other etiologies such as Lyme disease--and ID is following closely. Neurosurgery is also following for metastatic work-up.     Family history of muscular dystrophy: CK negative    Plan:     -Await MRIs ordered by NSGY  -Follow up finals serum and CSF studies  -ID is also following  -Discussed with her mother    Will follow    ORLY Hooper - CNP  9:22 AM  8/15/2022

## 2022-08-15 NOTE — PROGRESS NOTES
INPATIENT CARDIOLOGY FOLLOW-UP    Name: Noris Calabrese    Age: 58 y.o. Date of Admission: 8/9/2022 11:24 AM    Date of Service: 8/15/2022    Primary Cardiologist: Dr. Boo Land    Chief Complaint: Follow-up for PAF with rvr    Interim History:  No new overnight cardiac complaints. Currently with no complaints of CP, SOB, palpitations, dizziness, or lightheadedness. Normal sinus rhythm on telemetry with episodes of sinus tachycardia and PACs. No evidence of any other atrial arrhythmias or atrial fibrillation.     Review of Systems:   Negative except as described above    Problem List:  Patient Active Problem List   Diagnosis    Cancer ([facility]    Malignant neoplasm of upper-inner quadrant of right breast in female, estrogen receptor positive ([address]    Hearing loss    Hypercholesterolemia    Weakness    Altered mental status    Dysarthria    Fall    Hypertrophic cardiomyopathy ([facility]    Supraventricular tachycardia ([facility] [facility]    Carcinoma in situ of female breast, right    Primary hypertension    Moderate protein-calorie malnutrition ([facility] [facility]       Current Medications:    Current Facility-Administered Medications:     metoprolol (LOPRESSOR) injection 2.5 mg, 2.5 mg, IntraVENous, Q6H, Jacklyn Zhu MD, 2.5 mg at 08/15/22 0820    clotrimazole (MYCELEX) dayna 10 mg, 10 mg, Oral, 5x Daily, Dedra Perea MD, 10 mg at 08/15/22 0612    diphenhydrAMINE (BENADRYL) injection 25 mg, 25 mg, IntraVENous, Q6H PRN, Dedra Perea MD, 25 mg at 08/14/22 1345    levalbuterol (XOPENEX) nebulization 0.63 mg, 0.63 mg, Nebulization, q8h, Noemi Guzman MD, 0.63 mg at 08/15/22 1312    [Held by provider] 0.9 % sodium chloride infusion, , IntraVENous, Continuous, Jacklyn Zhu MD, Stopped at 08/14/22 8929    barium sulfate 40 % suspension 15 mL, 15 mL, Oral, ONCE PRN, Rusty Cain MD    barium sulfate 40 % paste 15 mL, 15 mL, Oral, ONCE PRN, Rusty Cain MD    barium sulfate 40 % reconsitutable suspension, 5 mL, Oral, ONCE PRN, Tyron Phillips MD    diazePAM (VALIUM) injection 5 mg, 5 mg, IntraVENous, See Admin Instructions, Vinnie Diaz DO, 5 mg at 08/12/22 1929    [Held by provider] metoprolol succinate (TOPROL XL) extended release tablet 12.5 mg, 12.5 mg, Oral, Daily, Greg Mccormick MD    [facility] by provider] aspirin EC tablet 81 mg, 81 mg, Oral, Daily, Truman Apley, DO    [Held by provider] clopidogrel (PLAVIX) tablet 75 mg, 75 mg, Oral, Daily, Vinnie Diaz DO    perflutren lipid microspheres (DEFINITY) injection 1.65 mg, 1.5 mL, IntraVENous, ONCE PRN, Vinnie Diaz DO    levETIRAcetam (KEPPRA) 500 mg/100 mL IVPB, 500 mg, IntraVENous, Q12H, Wade Villarreal MD, Stopped at 08/15/22 0849    anastrozole (ARIMIDEX) tablet 1 mg, 1 mg, Oral, Daily, Vinnie Diaz DO, 1 mg at 08/10/22 1028    sodium chloride flush 0.9 % injection 5-40 mL, 5-40 mL, IntraVENous, 2 times per day, Truman Apley, DO, 10 mL at 08/15/22 3934    sodium chloride flush 0.9 % injection 5-40 mL, 5-40 mL, IntraVENous, PRN, Vinnie Diaz DO, 10 mL at 08/14/22 1827    0.9 % sodium chloride infusion, , IntraVENous, PRN, Vinnie Diaz DO    enoxaparin (LOVENOX) injection 40 mg, 40 mg, SubCUTAneous, Daily, Leonides Schmidt APRMRYON - CNP, 40 mg at 08/15/22 0819    ondansetron (ZOFRAN-ODT) disintegrating tablet 4 mg, 4 mg, Oral, Q8H PRN **OR** ondansetron (ZOFRAN) injection 4 mg, 4 mg, IntraVENous, Q6H PRN, Vinnie Diaz DO    polyethylene glycol (GLYCOLAX) packet 17 g, 17 g, Oral, Daily PRN, Vinnie Diaz DO    acetaminophen (TYLENOL) tablet 650 mg, 650 mg, Oral, Q6H PRN, 650 mg at 08/10/22 1027 **OR** acetaminophen (TYLENOL) suppository 650 mg, 650 mg, Rectal, Q6H PRN, Vinnie Limer, DO, 650 mg at 08/11/22 2033    midazolam PF (VERSED) injection 2 mg, 2 mg, IntraVENous, Once, Corin Ballesteros DO    atorvastatin (LIPITOR) tablet 10 mg, 10 mg, Oral, Nightly, Vinnie Diaz, , 10 mg at 08/10/22 0007    Physical Exam:  /69 Pulse 87   Temp 97.2 °F (36.2 °C) (Temporal)   Resp 18   Ht 4' 10\" (1.473 m)   Wt 116 lb (52.6 kg)   LMP 10/28/2021   SpO2 96%   BMI 24.24 kg/m²   Wt Readings from Last 3 Encounters:   08/09/22 116 lb (52.6 kg)   08/09/22 116 lb (52.6 kg)   06/22/22 116 lb (52.6 kg)     Appearance: Awake, alert, no acute respiratory distress  Skin: Intact, no rash  Head: Normocephalic, atraumatic  Eyes: EOMI, no conjunctival erythema  ENMT: No pharyngeal erythema, MMM, no rhinorrhea  Neck: Supple, no elevated JVP, no carotid bruits  Lungs: Clear to auscultation bilaterally. No wheezes, rales, or rhonchi. Cardiac: PMI nondisplaced, Regular rhythm with a normal rate, S1 & S2 normal, no murmurs  Abdomen: Soft, nontender, +bowel sounds  Extremities: Moves all extremities x 4, no lower extremity edema  Neurologic: No focal motor deficits apparent, normal mood and affect  Peripheral Pulses: Intact posterior tibial pulses bilaterally    Intake/Output:    Intake/Output Summary (Last 24 hours) at 8/15/2022 1456  Last data filed at 8/15/2022 0928  Gross per 24 hour   Intake 0 ml   Output 0 ml   Net 0 ml     No intake/output data recorded. Laboratory Tests:  Recent Labs     08/13/22  0804 08/14/22  0804 08/15/22  0703    143 147*   K 3.5 4.0 3.2*    110* 110*   CO2 18* 19* 24   BUN 16 19 24*   CREATININE 0.5 0.5 0.4*   GLUCOSE 124* 169* 199*   CALCIUM 7.9* 8.1* 8.5*     No results found for: MG  No results for input(s): ALKPHOS, ALT, AST, PROT, BILITOT, BILIDIR, LABALBU in the last 72 hours. No results for input(s): WBC, RBC, HGB, HCT, MCV, MCH, MCHC, RDW, PLT, MPV in the last 72 hours.   Lab Results   Component Value Date    CKTOTAL 159 08/13/2022     Lab Results   Component Value Date    INR 1.3 08/11/2022    PROTIME 13.8 (H) 08/11/2022     Lab Results   Component Value Date    TSH 0.644 08/10/2022     Lab Results   Component Value Date    LABA1C 6.1 (H) 08/11/2022     No results found for: EAG  Lab Results Component Value Date    CHOL 128 08/11/2022    CHOL 233 (H) 06/11/2015     Lab Results   Component Value Date    TRIG 148 08/11/2022    TRIG 78 06/11/2015     Lab Results   Component Value Date    HDL 35 08/11/2022    HDL 66 06/11/2015     Lab Results   Component Value Date    LDLCALC 63 08/11/2022    LDLCALC 151 (H) 06/11/2015     Lab Results   Component Value Date    LABVLDL 30 08/11/2022    LABVLDL 16 06/11/2015     No results found for: CHOLHDLRATIO  No results for input(s): PROBNP in the last 72 hours. Cardiac Tests:  Echocardiogram from 8/10/2022:   Left ventricle is normal in size . Mild left ventricular concentric hypertrophy noted. No regional wall motion abnormalities seen. Normal left ventricular ejection fraction. Hyperdynamic left ventricular systolic function. Left ventricular outflow tract gradient. The left atrium is mildly dilated. Normal mitral valve leaflet thickness. Mild systolic anterior motion (MANOJ) of anterior mitral leaflet. Mild centrally directed mitral regurgitation. Mild tricuspid regurgitation. Pulmonary hypertension is mild . No intracardiac shunt identified via saline contrast administration. IMPRESSION:  Paroxysmal atrial fibrillation with RVR  Acute encephalopathy  Hypertension  Hyperlipidemia    RECOMMENDATIONS:    She has not had any recurrence of atrial fibrillation. She has failed a swallow eval today. She is still on IV Lopressor 2.5 every 6 for rate control. Transition to Toprol-XL as and when able. I would also recommend initiating anticoagulation with Eliquis 2.5 twice daily as and when able to tolerate p.o. Defer management of encephalopathy to primary and consulting teams. She has an MRI of the brain pending today. She received a dose of Lasix yesterday. Respiratory status is improved. Defer volume status management to primary and consulting teams. Looks more euvolemic on examination today.   We will continue to see her as an needed. Please not hesitate to call with any questions or concerns. She should follow-up with Dr. Rosanne Castillo in 3 to 4 weeks after discharge. Fiona Mason MD, [facility]    NOTE: This report was transcribed using voice recognition software. Every effort was made to ensure accuracy; however, inadvertent computerized transcription errors may be present.

## 2022-08-15 NOTE — PROCEDURES
Anesthesia team in IR is 1.5 hour behind schedule, no longer able to accomodate the MRI anesthesia case in MRI from 1pm-3pm. Patient transported back to unit, we will attempt to schedule MRI tomorrow, rn aware.

## 2022-08-15 NOTE — PROGRESS NOTES
Comprehensive Nutrition Assessment    Type and Reason for Visit:  Initial (LOS)    Nutrition Recommendations/Plan:   Advance diet when medically appropriate. Monitor MBSS results. If patient passes swallow study, then will recommend and start appropriate nutritional supplementation TID to help meet increased nutritional needs. If patient fails swallow study and tube feedings are initiated, then recommend Standard with Fiber (Jevity 1.5) @35/hr plus 1 protein modular daily to provide 840ml, 1260 calories, 54g protein, 515ml water (1364 calories and 80g protein w/ protein modular). Malnutrition Assessment:  Malnutrition Status: Moderate malnutrition (08/15/22 1315)    Context:  Chronic Illness     Findings of the 6 clinical characteristics of malnutrition:  Energy Intake:  75% or less estimated energy requirements for 1 month or longer  Weight Loss:  Unable to assess (d/t no actual weights available since adm)     Body Fat Loss:   (moderate) Orbital, Triceps   Muscle Mass Loss:   (moderate) Temples (temporalis), Clavicles (pectoralis & deltoids)  Fluid Accumulation:  No significant fluid accumulation     Strength:  Not Performed    Nutrition Assessment:    Patient currently NPO ; noted severe oropharyngeal phase dysphagia per speech (pending MBSS); adm w/ AMS/weakness/slurred speech ; noted pulmonary edema and possible encephalitis ; noted lung nodule and vertebra lesion ;  hx of mild intellectual disability and recurrent falls ; hx of breast CA s/p chemotherapy/radiation ; hx of CAD and cardiomyopathy ; pt also meets criteria for moderate malnutrition ; will provide recommendations    Nutrition Related Findings:    +I&Os (+2.1 L), A&O x 2, active BS, muscle/fat wasting, dysphagia, ammonia levels WNL ;  Wound Type: None       Current Nutrition Intake & Therapies:    Average Meal Intake: NPO     Diet NPO    Anthropometric Measures:  Height: 4' 10\" (147.3 cm)  Ideal Body Weight (IBW): 90 lbs (41 kg) Current Body Weight: 116 lb (52.6 kg) (8/9, stated), 128.9 % IBW. Current BMI (kg/m2): 24.3  Usual Body Weight:  (EMR shows past weights of 116# actual on 6/20/22 and 120# actual on 4/20/22)     Weight Adjustment For: No Adjustment                 BMI Categories: Normal Weight (BMI 18.5-24. 9)    Estimated Daily Nutrient Needs:  Energy Requirements Based On: Formula  Weight Used for Energy Requirements: Current  Energy (kcal/day): 6553-1765 ( x 1.3 SF)  Weight Used for Protein Requirements: Current  Protein (g/day): 70-80 (1.3-1.5g/kg CBW)  Method Used for Fluid Requirements: 1 ml/kcal  Fluid (ml/day): 3790-5008    Nutrition Diagnosis:   Moderate malnutrition, In context of chronic illness related to catabolic illness (hx of breast CA s/p chemotherapy/radiation) as evidenced by poor intake prior to admission, moderate loss of subcutaneous fat, moderate muscle loss    Nutrition Interventions:   Food and/or Nutrient Delivery: Continue NPO  Nutrition Education/Counseling: No recommendation at this time  Coordination of Nutrition Care: Continue to monitor while inpatient, Speech Therapy, Swallow Evaluation       Goals:  Previous Goal Met: Progressing toward Goal(s)  Goals: Meet at least 75% of estimated needs, within 2 days       Nutrition Monitoring and Evaluation:   Behavioral-Environmental Outcomes: None Identified  Food/Nutrient Intake Outcomes: Diet Advancement/Tolerance  Physical Signs/Symptoms Outcomes: Biochemical Data, Chewing or Swallowing, GI Status, Fluid Status or Edema, Hemodynamic Status, Nutrition Focused Physical Findings, Skin, Weight    Discharge Planning:     Too soon to determine     Bartholome Bert, TANA, LD  Contact: [phone]

## 2022-08-16 ENCOUNTER — APPOINTMENT (OUTPATIENT)
Dept: GENERAL RADIOLOGY | Age: 62
DRG: 051 | End: 2022-08-16
Payer: COMMERCIAL

## 2022-08-16 LAB
AFB SMEAR: NORMAL
ANION GAP SERPL CALCULATED.3IONS-SCNC: 9 MMOL/L (ref 7–16)
BUN BLDV-MCNC: 33 MG/DL (ref 6–23)
CALCIUM SERPL-MCNC: 8.2 MG/DL (ref 8.6–10.2)
CHLORIDE BLD-SCNC: 112 MMOL/L (ref 98–107)
CO2: 27 MMOL/L (ref 22–29)
CREAT SERPL-MCNC: 0.5 MG/DL (ref 0.5–1)
CSF CULTURE: NORMAL
GFR AFRICAN AMERICAN: >60
GFR NON-AFRICAN AMERICAN: >60 ML/MIN/1.73
GLUCOSE BLD-MCNC: 157 MG/DL (ref 74–99)
GRAM STAIN RESULT: NORMAL
HCT VFR BLD CALC: 34.1 % (ref 34–48)
HEMOGLOBIN: 11.2 G/DL (ref 11.5–15.5)
MAGNESIUM: 2.3 MG/DL (ref 1.6–2.6)
MCH RBC QN AUTO: 28.6 PG (ref 26–35)
MCHC RBC AUTO-ENTMCNC: 32.8 % (ref 32–34.5)
MCV RBC AUTO: 87 FL (ref 80–99.9)
PDW BLD-RTO: 16.3 FL (ref 11.5–15)
PLATELET # BLD: 205 E9/L (ref 130–450)
PMV BLD AUTO: 11 FL (ref 7–12)
POTASSIUM SERPL-SCNC: 3.2 MMOL/L (ref 3.5–5)
RBC # BLD: 3.92 E12/L (ref 3.5–5.5)
RPR: NORMAL
SODIUM BLD-SCNC: 148 MMOL/L (ref 132–146)
WBC # BLD: 10.9 E9/L (ref 4.5–11.5)

## 2022-08-16 PROCEDURE — 6360000002 HC RX W HCPCS: Performed by: INTERNAL MEDICINE

## 2022-08-16 PROCEDURE — 6360000002 HC RX W HCPCS: Performed by: NURSE PRACTITIONER

## 2022-08-16 PROCEDURE — 74230 X-RAY XM SWLNG FUNCJ C+: CPT

## 2022-08-16 PROCEDURE — 6370000000 HC RX 637 (ALT 250 FOR IP): Performed by: INTERNAL MEDICINE

## 2022-08-16 PROCEDURE — 2060000000 HC ICU INTERMEDIATE R&B

## 2022-08-16 PROCEDURE — 86592 SYPHILIS TEST NON-TREP QUAL: CPT

## 2022-08-16 PROCEDURE — 2500000003 HC RX 250 WO HCPCS: Performed by: INTERNAL MEDICINE

## 2022-08-16 PROCEDURE — 92526 ORAL FUNCTION THERAPY: CPT

## 2022-08-16 PROCEDURE — 36415 COLL VENOUS BLD VENIPUNCTURE: CPT

## 2022-08-16 PROCEDURE — 2700000000 HC OXYGEN THERAPY PER DAY

## 2022-08-16 PROCEDURE — 85027 COMPLETE CBC AUTOMATED: CPT

## 2022-08-16 PROCEDURE — 99232 SBSQ HOSP IP/OBS MODERATE 35: CPT | Performed by: NEUROLOGICAL SURGERY

## 2022-08-16 PROCEDURE — 80048 BASIC METABOLIC PNL TOTAL CA: CPT

## 2022-08-16 PROCEDURE — 92610 EVALUATE SWALLOWING FUNCTION: CPT

## 2022-08-16 PROCEDURE — 94660 CPAP INITIATION&MGMT: CPT

## 2022-08-16 PROCEDURE — 2580000003 HC RX 258: Performed by: INTERNAL MEDICINE

## 2022-08-16 PROCEDURE — 83735 ASSAY OF MAGNESIUM: CPT

## 2022-08-16 PROCEDURE — 94640 AIRWAY INHALATION TREATMENT: CPT

## 2022-08-16 RX ORDER — DEXTROSE, SODIUM CHLORIDE, AND POTASSIUM CHLORIDE 5; .45; .15 G/100ML; G/100ML; G/100ML
INJECTION INTRAVENOUS CONTINUOUS
Status: DISCONTINUED | OUTPATIENT
Start: 2022-08-16 | End: 2022-08-17

## 2022-08-16 RX ADMIN — CLOTRIMAZOLE 10 MG: 10 LOZENGE ORAL at 01:55

## 2022-08-16 RX ADMIN — POTASSIUM CHLORIDE, DEXTROSE MONOHYDRATE AND SODIUM CHLORIDE: 150; 5; 450 INJECTION, SOLUTION INTRAVENOUS at 09:09

## 2022-08-16 RX ADMIN — CLOTRIMAZOLE 10 MG: 10 LOZENGE ORAL at 11:39

## 2022-08-16 RX ADMIN — ENOXAPARIN SODIUM 40 MG: 100 INJECTION SUBCUTANEOUS at 09:04

## 2022-08-16 RX ADMIN — METOPROLOL TARTRATE 2.5 MG: 1 INJECTION, SOLUTION INTRAVENOUS at 06:21

## 2022-08-16 RX ADMIN — CLOTRIMAZOLE 10 MG: 10 LOZENGE ORAL at 06:21

## 2022-08-16 RX ADMIN — METOPROLOL TARTRATE 2.5 MG: 1 INJECTION, SOLUTION INTRAVENOUS at 01:59

## 2022-08-16 RX ADMIN — CLOTRIMAZOLE 10 MG: 10 LOZENGE ORAL at 20:56

## 2022-08-16 RX ADMIN — CLOTRIMAZOLE 10 MG: 10 LOZENGE ORAL at 23:10

## 2022-08-16 RX ADMIN — CLOTRIMAZOLE 10 MG: 10 LOZENGE ORAL at 14:58

## 2022-08-16 RX ADMIN — LEVALBUTEROL 0.63 MG: 0.63 SOLUTION RESPIRATORY (INHALATION) at 05:22

## 2022-08-16 RX ADMIN — METOPROLOL TARTRATE 2.5 MG: 1 INJECTION, SOLUTION INTRAVENOUS at 14:58

## 2022-08-16 RX ADMIN — Medication 10 ML: at 20:57

## 2022-08-16 RX ADMIN — METOPROLOL TARTRATE 2.5 MG: 1 INJECTION, SOLUTION INTRAVENOUS at 20:56

## 2022-08-16 RX ADMIN — Medication 10 ML: at 09:05

## 2022-08-16 RX ADMIN — LEVALBUTEROL 0.63 MG: 0.63 SOLUTION RESPIRATORY (INHALATION) at 11:57

## 2022-08-16 RX ADMIN — ATORVASTATIN CALCIUM 10 MG: 10 TABLET, FILM COATED ORAL at 20:56

## 2022-08-16 RX ADMIN — POTASSIUM CHLORIDE 10 MEQ: 7.46 INJECTION, SOLUTION INTRAVENOUS at 01:54

## 2022-08-16 ASSESSMENT — PAIN SCALES - GENERAL
PAINLEVEL_OUTOF10: 0

## 2022-08-16 NOTE — PROGRESS NOTES
SPEECH LANGUAGE PATHOLOGY  DAILY PROGRESS NOTE        PATIENT NAME:  Bessie Zelaya      :  1960          TODAY'S DATE:  2022 ROOM:  [address] [place], [place] [address]O    Patient seen in room for f/u for dysphagia mgmt. Patient's mother and RN at bedside. SLP reviewed results of MBSS and recommendation of puree and honey thick liquids at this time. Patient educated on use of small bites/sips, slow rate, alt solids/liquids, and throat clear after each bite/drink. Patient and family verbalized understanding and agreement. Will cont w/ POC.        CPT code(s) 02883  dysphagia tx  Total minutes :  10 minutes     Feliz Hodge M.S., CCC-SLP  Speech-Language Pathologist  [phone] [phone]

## 2022-08-16 NOTE — PROCEDURES
Pt was being held NPO for a potential MRI with anesthesia at 4:30pm, however Dr Abraham Weaver informed RN he would rather get the swallow study complete today, which would cancel the MRI for today, as she would not remain NPO. We will attempt MRI with anesthesia tomorrow.

## 2022-08-16 NOTE — PROGRESS NOTES
Speech Language Pathology      NAME:  Bret Bowen  :  1960  DATE: 2022  ROOM:  [address] [place] [address]    Chart reviewed, Pt NPO for MRI with anesthesia. Will attempt MBSS tomorrow, . Weakness [R53.1]  Lesion of vertebra [M89.9]  Fall, initial encounter [W19. Aidan Ledesma

## 2022-08-16 NOTE — CARE COORDINATION
[facility] is following Pt. Depending on treatment is whether their accept or not. Made referral to [facility]. Pt is scheduled for MRI under anesthesia around 3pm. Today. Still needs Barium Swallow also. Discharge plan is to [facility] returning home with Mother. ARIAS/KAYLAH to follow for discharge needs.    TRIPP Nair.W.  [phone]

## 2022-08-16 NOTE — PROGRESS NOTES
8/16- Spoke to Dr. Darion Bailey from anesthesia and they have no times available until possibly this afternoon around 4:30-5pm. Spoke to RN to keep patient NPO

## 2022-08-16 NOTE — PROGRESS NOTES
Spoke with speech in regards to modified barium swallow. Unable to complete today due to patient being NPO for MRI. MRI tech unable to advise if patient is going to receive MRI today. Dr. Chikis Pinedo notified of same. Per Dr. Chikis Pinedo, complete modified barium swallow today and complete MRI at another time.

## 2022-08-16 NOTE — PROGRESS NOTES
Pulmonary Subsequent Hospital F/U note    Patient is being followed for: acute hypoxic respiratory failure, right upper lobe 6 mm lung nodule    Interval HPI:  Seen and examined. Respiratory status seems more comfortable, oxygen weaned to 1 L nasal cannula SPO2 99%. Await formal swallow study. ROS:  Unable to obtain - speech garbled   Constitutional: No fever or chills  Skin: No rash or itching  HEENT: Positive for hearing loss,dysarthria  Cardiovascular: Denies palpitations, chest pain, and chest pressure. Respiratory: Denies cough, dyspnea at rest, hemoptysis, apnea, and choking. Gastrointestinal: Denies nausea, vomiting  Genitourinary: Denies dysuria   Musculoskeletal: Right leg and foot pain  Neurological: Intermittent headache   weakness difficulty with ambulation  Hematopoietic/Lymphatic:   History of invasive ductal right breast cancer diagnosed September 2021 treated with chemo, radiation, lumpectomy       Exam:  BP (!) 145/80   Pulse 78   Temp 97.3 °F (36.3 °C) (Temporal)   Resp 18   Ht 4' 10\" (1.473 m)   Wt 116 lb (52.6 kg)   LMP 10/28/2021   SpO2 96%   BMI 24.24 kg/m²    General Appearance: appears comfortable in no acute distress. HEENT: Normocephalic atraumatic without obvious abnormality   Neck: Lips, mucosa, and tongue normal.  Supple, symmetrical, trachea midline, no adenopathy;thyroid:  no enlargement/tenderness/nodules or JVD. Lung: Breath sounds diminished, few crackles, no active wheezing  Symmetrical expansion. no stridor. Heart: RRR, normal S1, S2. No MRG. Abdomen: Soft, NT, ND. BS present x 4 quadrants. No bruit or organomegaly. Extremities: Pedal pulses 2+ symmetric b/l. Extremities normal, no cyanosis, clubbing, or edema. Musculokeletal: No joint swelling, no muscle tenderness. ROM normal in all joints of extremities. Neurologic: Mental status: dysarthria. Alert Following commands.      Data:    Oximetry:  SpO2 Readings from Last 1 Encounters:   08/16/22 96% Imaging personally reviewed by myself:  CXR 8/13/2022     Perihilar lung markings concerning for central vascular congestion versus   peribronchial inflammatory process         CTA chest 8/9/2022  FINDINGS:   No filling defect in the pulmonary arteries to suggest pulmonary arterial   embolism. The heart is normal in size. No pericardial effusion. No   mediastinal lymphadenopathy. Nodule located in right upper lobe adjacent to   the minor fissure on axial image number 56 measures approximately 6 mm. Subsegmental atelectasis present in lung bases. No airspace opacity or   pleural effusion. No pneumothorax. Focal sclerotic lesion located in T8   vertebral body measures 8 x 8 mm. View of the upper abdomen shows normal   bilateral adrenal glands. Postsurgical changes associated with right medial   breast.           Impression   1. No evidence of pulmonary embolism. 2. No pneumonia or pleural effusion. 3. Noncalcified nodule located in right upper lobe measures approximately 6   mm. 4. Focal sclerotic lesion located in T8 vertebral body. Bone scan could be   helpful to exclude active lesion.                  Pertinent labs reviewed and noted:  Lab Results   Component Value Date/Time    WBC 10.9 08/16/2022 06:23 AM    HGB 11.2 08/16/2022 06:23 AM    HCT 34.1 08/16/2022 06:23 AM    MCV 87.0 08/16/2022 06:23 AM    MCH 28.6 08/16/2022 06:23 AM    MCHC 32.8 08/16/2022 06:23 AM    RDW 16.3 08/16/2022 06:23 AM     08/16/2022 06:23 AM    MPV 11.0 08/16/2022 06:23 AM     Lab Results   Component Value Date/Time     08/16/2022 06:23 AM    K 3.2 08/16/2022 06:23 AM    K 4.0 08/09/2022 11:57 AM     08/16/2022 06:23 AM    CO2 27 08/16/2022 06:23 AM    BUN 33 08/16/2022 06:23 AM    CREATININE 0.5 08/16/2022 06:23 AM    LABALBU 3.4 08/10/2022 08:29 AM    CALCIUM 8.2 08/16/2022 06:23 AM    GFRAA >60 08/16/2022 06:23 AM    LABGLOM >60 08/16/2022 06:23 AM     Lab Results   Component Value Date/Time

## 2022-08-16 NOTE — PROGRESS NOTES
Herbert Ly, DO, 10 mL at 08/15/22 2106    sodium chloride flush 0.9 % injection 5-40 mL, 5-40 mL, IntraVENous, PRN, Margo Diaz DO, 10 mL at 08/14/22 1827    0.9 % sodium chloride infusion, , IntraVENous, PRN, Margo Diaz DO    enoxaparin (LOVENOX) injection 40 mg, 40 mg, SubCUTAneous, Daily, ORLY Avendano - CNP, 40 mg at 08/15/22 0819    ondansetron (ZOFRAN-ODT) disintegrating tablet 4 mg, 4 mg, Oral, Q8H PRN **OR** ondansetron (ZOFRAN) injection 4 mg, 4 mg, IntraVENous, Q6H PRN, Margo Diaz DO    polyethylene glycol (GLYCOLAX) packet 17 g, 17 g, Oral, Daily PRN, Margo Diaz DO    acetaminophen (TYLENOL) tablet 650 mg, 650 mg, Oral, Q6H PRN, 650 mg at 08/10/22 1027 **OR** acetaminophen (TYLENOL) suppository 650 mg, 650 mg, Rectal, Q6H PRN, Margo Diaz DO, 650 mg at 08/11/22 2033    midazolam PF (VERSED) injection 2 mg, 2 mg, IntraVENous, Once, Parrish Evans DO    atorvastatin (LIPITOR) tablet 10 mg, 10 mg, Oral, Nightly, Margo Diaz DO, 10 mg at 08/10/22 0007    Objective:    /72   Pulse 82   Temp 97.1 °F (36.2 °C) (Temporal)   Resp 20   Ht 4' 10\" (1.473 m)   Wt 116 lb (52.6 kg)   LMP 10/28/2021   SpO2 96%   BMI 24.24 kg/m²     Heart:  reg  Lungs:  ctab  Abd: + bs soft nontender  Extrem:  w/o edema    CBC with Differential:    Lab Results   Component Value Date/Time    WBC 5.4 08/10/2022 08:29 AM    RBC 4.36 08/10/2022 08:29 AM    HGB 12.6 08/10/2022 08:29 AM    HCT 38.8 08/10/2022 08:29 AM     08/10/2022 08:29 AM    MCV 89.0 08/10/2022 08:29 AM    MCH 28.9 08/10/2022 08:29 AM    MCHC 32.5 08/10/2022 08:29 AM    RDW 15.7 08/10/2022 08:29 AM    METASPCT 5.0 01/12/2022 03:16 PM    LYMPHOPCT 4.3 08/09/2022 11:57 AM    PROMYELOPCT 8.0 01/12/2022 03:16 PM    MONOPCT 2.6 08/09/2022 11:57 AM    MYELOPCT 3.0 01/12/2022 03:16 PM    BASOPCT 0.9 08/09/2022 11:57 AM    MONOSABS 0.16 08/09/2022 11:57 AM    LYMPHSABS 0.22 08/09/2022 11:57 AM    EOSABS 0.00 08/09/2022 11:57 AM    BASOSABS 0.05 08/09/2022 11:57 AM     CMP:    Lab Results   Component Value Date/Time     08/15/2022 07:03 AM    K 3.2 08/15/2022 07:03 AM    K 4.0 08/09/2022 11:57 AM     08/15/2022 07:03 AM    CO2 24 08/15/2022 07:03 AM    BUN 24 08/15/2022 07:03 AM    CREATININE 0.4 08/15/2022 07:03 AM    GFRAA >60 08/15/2022 07:03 AM    LABGLOM >60 08/15/2022 07:03 AM    GLUCOSE 199 08/15/2022 07:03 AM    PROT 6.0 08/10/2022 08:29 AM    LABALBU 3.4 08/10/2022 08:29 AM    CALCIUM 8.5 08/15/2022 07:03 AM    BILITOT 0.4 08/10/2022 08:29 AM    ALKPHOS 72 08/10/2022 08:29 AM    AST 50 08/10/2022 08:29 AM    ALT 42 08/10/2022 08:29 AM     Warfarin PT/INR:    Lab Results   Component Value Date    INR 1.3 08/11/2022    PROTIME 13.8 (H) 08/11/2022       Assessment:    Principal Problem:    Weakness  Active Problems:    Altered mental status    Dysarthria    Fall    Hypertrophic cardiomyopathy ([address]    Supraventricular tachycardia ([address]    Carcinoma in situ of female breast, right    Primary hypertension    Moderate protein-calorie malnutrition ([address]    PAF (paroxysmal atrial fibrillation) ([address]  Resolved Problems:    Viral meningitis      Plan:  Start ivf while npo repeat video swallow study increase activity        Jacky Hallman DO  7:12 AM  8/16/2022

## 2022-08-16 NOTE — PROGRESS NOTES
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 female with history of invasive ductal right breast cancer diagnosed in 2021, off chemotherapy since 01/2022 and currently on anastrazole, presented on 08/09 with weakness accompanied by recurrent falls. Since admission, she had occasional T-max of 100 to 100.3 °F with no leukocytosis. According to family, she developed dysarthria, tongue swelling and rash after admission. Chest CTA showed focal sclerotic lesion in T8 vertebral body, noncalcified nodule in right upper lobe measuring 6 mm, no pneumonia or pleural effusion, no evidence of pulmonary embolism. MRI brain showed no evidence of recent infarct, acute intracranial hemorrhage, mass-effect, hydrocephalus, neoplastic disease. Respiratory pathogen PCR panel was negative. Urinalysis showed no pyuria. Lumbar puncture on 08/12 yielded CSF with WBCs of 4-5 predominantly monocytic at 75%. CSF gram stain and culture showed no polymorphonuclear leukocytes, rare epithelial cells, no organisms, no growth. 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 HIV screen is nonreactive. Acyclovir was started by attending physician. Subjective: Patient was seen and examined. No chills, no abdominal pain, no diarrhea, has resolving rash, has itching. Objective:  BP (!) 145/80   Pulse 78   Temp 97.3 °F (36.3 °C) (Temporal)   Resp 18   Ht 4' 10\" (1.473 m)   Wt 116 lb (52.6 kg)   LMP 10/28/2021   SpO2 96%   BMI 24.24 kg/m²   Constitutional: Alert, not in distress  Respiratory: Clear breath sounds, no crackles, no wheezes  Cardiovascular: Regular rate and rhythm, no murmurs  Gastrointestinal: Bowel sounds present, soft, nontender  Skin: Warm and dry, less erythematous patches over anterior and posterior trunk and buttocks.    Musculoskeletal: No joint swelling, no joint erythema    Labs, imaging, and medical records/notes were personally reviewed. Assessment:  Rash, resolving, suspect allergic reaction. Chest imaging and CSF cell count with differential not suggestive of infection. 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 HHV-6 DNA has been detected by PCR in the brain of about 1/3 of healthy persons (per Ashley Epperson and [employer]'s Principles and Practice of Infectious Diseases). HHV-6 infections in immunocompetent patients are generally not treated, since most cases are self-limited and antiviral therapy has not been studied in such patients (per [employer]). Recommendations:  Monitor clinically off antibiotics for now. Follow up CSF and serum HHV-6 quantitative PCR/viral load. Thank you for involving me in the care of Leatha Escamilla. I will sign off for now. Please do not hesitate to call for any questions, concerns, or new findings.       Electronically signed by Riley Carlisle MD on 8/16/2022 at 9:57 AM

## 2022-08-16 NOTE — PROGRESS NOTES
SPEECH/LANGUAGE PATHOLOGY  VIDEOFLUOROSCOPIC STUDY OF SWALLOWING (MBS)   and PLAN OF CARE    PATIENT NAME:  Kp Medina  (female)     MRN:  [id number]    :  1960  (58 y.o.)  STATUS:  Inpatient: Room 7421/7421-A    TODAY'S DATE:  2022  REFERRING PROVIDER:   Cari Diaz DO   SPECIFIC PROVIDER ORDER: FL modified barium swallow with video  Date of order:  22   REASON FOR REFERRAL: to further assess oropharyngeal function   EVALUATING THERAPIST: MARCOS Baldwin      RESULTS:      DYSPHAGIA DIAGNOSIS:  moderate-severe oropharyngeal phase dysphagia     DIET RECOMMENDATIONS:  Pureed consistency solids (IDDSI level 4) with  honey consistency (moderately thick - IDDSI level 3)  liquids    FEEDING RECOMMENDATIONS:    Assistance level:  Set-up is required for all oral intake, Supervision is needed during all oral intake     Compensatory strategies recommended: Small bites/sips, Alternate solids and liquids, and Throat clear     Discussed recommendations with nursing and/or faxed report to referring provider: Yes    Laryngeal Penetration and Aspiration:  Penetration WITH aspiration was observed in today's study with  thin liquid, mildly thick liquid (nectar)    SPEECH THERAPY  PLAN OF CARE   The dysphagia POC is established based on physician order and dysphagia diagnosis    Skilled SLP intervention for dysphagia management up to 5x per week until goals met, pt plateaus in function and/or discharged from hospital      Conditions Requiring Skilled Therapeutic Intervention for dysphagia:    Patient is performing below functional baseline d/t  current acute condition, Multiple diagnoses, multiple medications, and increased dependency upon caregivers.   Reduced laryngeal closure resulting in penetration  Reduced laryngeal closure resulting in aspiration     SPECIFIC DYSPHAGIA INTERVENTIONS TO INCLUDE:     Compensatory strategy training   Trials of upgraded diet/liquid     Specific instructions for next treatment:  development and training of compensatory swallow strategies to improve airway protection and swallow function, therapeutic po trial to determine safety of advanced diet textures and consistencies, and ongoing PO analysis to upgrade diet and evaluate tolerance of current PO recommendation  Treatment Goals:    Short Term Goals:  Pt will implement identified compensatory swallowing strategies on 90% of opportunities or greater to improve airway protection and swallow function. Pt will participate in ongoing mealtime assessment to provide diet modification and compensatory strategy implementation to minimize risk of aspiration associated with PO intake  Pt will complete PO trials of upgraded diet textures with SLP only to determine the least restrictive PO diet to maintain adequate nutrition/hydration with no more than 1 overt s/s of pen/asp. Long Term Goals:   Pt will maintain adequate nutrition/hydration via PO intake of the least restrictive oral diet with implementation of safe swallow/ compensatory strategies and decrease signs/symptoms of aspiration to less than 1 x/day. Pt will improve oropharyngeal swallow function to ensure airway protection during PO intake to maintain adequate nutrition/hydration and decrease signs/symptoms of aspiration to less than 1 x/day. Patient/family Goal:    To be able to eat regular foods and drink regular liquids    Plan of care discussed with Patient and Family   The Patient and Family understand(s) the diagnosis, prognosis and plan of care     Rehabilitation Potential/Prognosis: fair                      ADMITTING DIAGNOSIS: Weakness [R53.1]  Lesion of vertebra [M89.9]  Fall, initial encounter [W19. XXXA]     VISIT DIAGNOSIS:   Visit Diagnoses         Codes    Fall, initial encounter    -  Primary W19. XXXA    Lesion of vertebra     M89.9                PATIENT REPORT/COMPLAINT: denies difficulty swallowing    PRIOR LEVEL OF SWALLOW FUNCTION:    Past strategies that were beneficial included Small bites/sips, Alternate solids and liquids, and Throat clear      STRUCTURAL/FUNCTIONAL ANOMALIES   No structural/functional anomalies were noted    CERVICAL ESOPHAGEAL STAGE :     The cervical esophagus appeared adequate          ___________    Cognition:   Within functional limits for this exam    Oral Peripheral Examination   Generalized oral weakness    Current Respiratory Status   room air     Parameters of Speech Production  Respiration:  Adequate for speech production  Quality:   Within functional limits  Intensity: Within functional limits    Pain: No pain reported. EDUCATION:   The Speech Language Pathologist (SLP) completed education regarding results of evaluation and that intervention is warranted at this time. Learner: Patient  Education: Reviewed results and recommendations of this evaluation  Evaluation of Education:  Sheila understanding    This plan may be re-evaluated and revised as warranted. Evaluation Time includes thorough review of current medical information, gathering information on past medical history/social history and prior level of function, completion of standardized testing/informal observation of tasks, assessment of data and education on plan of care and goals. [x]The admitting diagnosis and active problem list, have been reviewed prior to initiation of this evaluation.     CPT Code: 32144  dysphagia study    ACTIVE PROBLEM LIST:   Patient Active Problem List   Diagnosis    Cancer ([address]    Malignant neoplasm of upper-inner quadrant of right breast in female, estrogen receptor positive ([address]    Hearing loss    Hypercholesterolemia    Weakness    Altered mental status    Dysarthria    Fall    Hypertrophic cardiomyopathy ([address]    Supraventricular tachycardia ([address]    Carcinoma in situ of female breast, right    Primary hypertension    Moderate protein-calorie malnutrition (HCC)    PAF (paroxysmal atrial fibrillation) ([address] Marine Shallow, M.S., [address] Pathologist  [phone]

## 2022-08-16 NOTE — PLAN OF CARE
Notes reviewed. MRIs under anesthesia planned for later this afternoon. Will f/u upon completion.  Call with any concerns in the interim

## 2022-08-16 NOTE — PROGRESS NOTES
Department of Neurosurgery  Progress Note    CHIEF COMPLAINT: T8 lesion    SUBJECTIVE:  No acute events overnight. Patient remains dysarthric, but is improving. States she has some tingling in her feet. She denies any new complaints. REVIEW OF SYSTEMS :  Constitutional: Negative for chills and fever. Neurological: Negative for dizziness, tremors and speech change.      OBJECTIVE:   VITALS:  /72   Pulse 82   Temp 97.1 °F (36.2 °C) (Temporal)   Resp 20   Ht 4' 10\" (1.473 m)   Wt 116 lb (52.6 kg)   LMP 10/28/2021   SpO2 96%   BMI 24.24 kg/m²     PHYSICAL:  Alert and oriented x3  Dysarthric, but improving    EOMI  PERRLA  CN3-12 intact  VELOZ    DATA:  CBC:   Lab Results   Component Value Date/Time    WBC 10.9 08/16/2022 06:23 AM    RBC 3.92 08/16/2022 06:23 AM    HGB 11.2 08/16/2022 06:23 AM    HCT 34.1 08/16/2022 06:23 AM    MCV 87.0 08/16/2022 06:23 AM    MCH 28.6 08/16/2022 06:23 AM    MCHC 32.8 08/16/2022 06:23 AM    RDW 16.3 08/16/2022 06:23 AM     08/16/2022 06:23 AM    MPV 11.0 08/16/2022 06:23 AM     BMP:    Lab Results   Component Value Date/Time     08/15/2022 07:03 AM    K 3.2 08/15/2022 07:03 AM    K 4.0 08/09/2022 11:57 AM     08/15/2022 07:03 AM    CO2 24 08/15/2022 07:03 AM    BUN 24 08/15/2022 07:03 AM    LABALBU 3.4 08/10/2022 08:29 AM    CREATININE 0.4 08/15/2022 07:03 AM    CALCIUM 8.5 08/15/2022 07:03 AM    GFRAA >60 08/15/2022 07:03 AM    LABGLOM >60 08/15/2022 07:03 AM    GLUCOSE 199 08/15/2022 07:03 AM     PT/INR:    Lab Results   Component Value Date/Time    PROTIME 13.8 08/11/2022 03:41 PM    INR 1.3 08/11/2022 03:41 PM     PTT:  No results found for: APTT, PTT[APTT}    Current Inpatient Medications  Current Facility-Administered Medications: dextrose 5 % and 0.45 % NaCl with KCl 20 mEq infusion, , IntraVENous, Continuous  metoprolol (LOPRESSOR) injection 2.5 mg, 2.5 mg, IntraVENous, Q6H  clotrimazole (MYCELEX) dayna 10 mg, 10 mg, Oral, 5x Daily  diphenhydrAMINE (BENADRYL) injection 25 mg, 25 mg, IntraVENous, Q6H PRN  levalbuterol (XOPENEX) nebulization 0.63 mg, 0.63 mg, Nebulization, q8h  barium sulfate 40 % suspension 15 mL, 15 mL, Oral, ONCE PRN  barium sulfate 40 % paste 15 mL, 15 mL, Oral, ONCE PRN  barium sulfate 40 % reconsitutable suspension, 5 mL, Oral, ONCE PRN  diazePAM (VALIUM) injection 5 mg, 5 mg, IntraVENous, See Admin Instructions  [Held by provider] metoprolol succinate (TOPROL XL) extended release tablet 12.5 mg, 12.5 mg, Oral, Daily  [Held by provider] aspirin EC tablet 81 mg, 81 mg, Oral, Daily  [Held by provider] clopidogrel (PLAVIX) tablet 75 mg, 75 mg, Oral, Daily  perflutren lipid microspheres (DEFINITY) injection 1.65 mg, 1.5 mL, IntraVENous, ONCE PRN  levETIRAcetam (KEPPRA) 500 mg/100 mL IVPB, 500 mg, IntraVENous, Q12H  anastrozole (ARIMIDEX) tablet 1 mg, 1 mg, Oral, Daily  sodium chloride flush 0.9 % injection 5-40 mL, 5-40 mL, IntraVENous, 2 times per day  sodium chloride flush 0.9 % injection 5-40 mL, 5-40 mL, IntraVENous, PRN  0.9 % sodium chloride infusion, , IntraVENous, PRN  enoxaparin (LOVENOX) injection 40 mg, 40 mg, SubCUTAneous, Daily  ondansetron (ZOFRAN-ODT) disintegrating tablet 4 mg, 4 mg, Oral, Q8H PRN **OR** ondansetron (ZOFRAN) injection 4 mg, 4 mg, IntraVENous, Q6H PRN  polyethylene glycol (GLYCOLAX) packet 17 g, 17 g, Oral, Daily PRN  acetaminophen (TYLENOL) tablet 650 mg, 650 mg, Oral, Q6H PRN **OR** acetaminophen (TYLENOL) suppository 650 mg, 650 mg, Rectal, Q6H PRN  midazolam PF (VERSED) injection 2 mg, 2 mg, IntraVENous, Once  atorvastatin (LIPITOR) tablet 10 mg, 10 mg, Oral, Nightly      ASSESSMENT:   -Mary Ellen Garcia is a 59 y/o female who CT shows T8 sclerotic. PLAN:  -Continue current care  -Await MRI results  -Please call with any questions or concerns.        Electronically signed by Adeline Amaro PA-C on 8/16/2022 at 7:38 AM     Nsx Attending:    Patient was seen and examined by me with the team.  I personally reviewed all pertinent radiological images. I concur with Miss Carolina's clinical assessment and plan. Neurologically stable for me on my exam.  Plan as above. Thank you so much for allowing us to participate in the care of this patient. I certify that I spent more than half of the total time on this encounter. NOTE: This report was transcribed using voice recognition software.  Every effort was made to ensure accuracy; however, inadvertent computerized transcription errors may be present

## 2022-08-17 LAB
ANION GAP SERPL CALCULATED.3IONS-SCNC: 11 MMOL/L (ref 7–16)
BUN BLDV-MCNC: 11 MG/DL (ref 6–23)
CALCIUM SERPL-MCNC: 7.8 MG/DL (ref 8.6–10.2)
CHLORIDE BLD-SCNC: 104 MMOL/L (ref 98–107)
CO2: 20 MMOL/L (ref 22–29)
CREAT SERPL-MCNC: 0.4 MG/DL (ref 0.5–1)
GFR AFRICAN AMERICAN: >60
GFR NON-AFRICAN AMERICAN: >60 ML/MIN/1.73
GLUCOSE BLD-MCNC: 167 MG/DL (ref 74–99)
LYME, EIA: 0.08 LIV (ref 0–1.2)
POTASSIUM SERPL-SCNC: 3.9 MMOL/L (ref 3.5–5)
SODIUM BLD-SCNC: 135 MMOL/L (ref 132–146)

## 2022-08-17 PROCEDURE — 36415 COLL VENOUS BLD VENIPUNCTURE: CPT

## 2022-08-17 PROCEDURE — 2500000003 HC RX 250 WO HCPCS: Performed by: INTERNAL MEDICINE

## 2022-08-17 PROCEDURE — 2580000003 HC RX 258: Performed by: INTERNAL MEDICINE

## 2022-08-17 PROCEDURE — 94660 CPAP INITIATION&MGMT: CPT

## 2022-08-17 PROCEDURE — 6360000002 HC RX W HCPCS: Performed by: NURSE PRACTITIONER

## 2022-08-17 PROCEDURE — 6370000000 HC RX 637 (ALT 250 FOR IP): Performed by: INTERNAL MEDICINE

## 2022-08-17 PROCEDURE — 2060000000 HC ICU INTERMEDIATE R&B

## 2022-08-17 PROCEDURE — 80048 BASIC METABOLIC PNL TOTAL CA: CPT

## 2022-08-17 PROCEDURE — 99232 SBSQ HOSP IP/OBS MODERATE 35: CPT | Performed by: PHYSICIAN ASSISTANT

## 2022-08-17 RX ADMIN — METOPROLOL TARTRATE 2.5 MG: 1 INJECTION, SOLUTION INTRAVENOUS at 06:29

## 2022-08-17 RX ADMIN — Medication 10 ML: at 21:41

## 2022-08-17 RX ADMIN — Medication 10 ML: at 11:33

## 2022-08-17 RX ADMIN — ATORVASTATIN CALCIUM 10 MG: 10 TABLET, FILM COATED ORAL at 21:41

## 2022-08-17 RX ADMIN — CLOTRIMAZOLE 10 MG: 10 LOZENGE ORAL at 11:33

## 2022-08-17 RX ADMIN — ENOXAPARIN SODIUM 40 MG: 100 INJECTION SUBCUTANEOUS at 09:11

## 2022-08-17 RX ADMIN — CLOTRIMAZOLE 10 MG: 10 LOZENGE ORAL at 15:30

## 2022-08-17 RX ADMIN — METOPROLOL TARTRATE 2.5 MG: 1 INJECTION, SOLUTION INTRAVENOUS at 00:44

## 2022-08-17 RX ADMIN — CLOTRIMAZOLE 10 MG: 10 LOZENGE ORAL at 06:29

## 2022-08-17 RX ADMIN — ANASTROZOLE 1 MG: 1 TABLET ORAL at 09:11

## 2022-08-17 RX ADMIN — POTASSIUM CHLORIDE, DEXTROSE MONOHYDRATE AND SODIUM CHLORIDE: 150; 5; 450 INJECTION, SOLUTION INTRAVENOUS at 00:48

## 2022-08-17 ASSESSMENT — PAIN SCALES - GENERAL
PAINLEVEL_OUTOF10: 0
PAINLEVEL_OUTOF10: 0

## 2022-08-17 NOTE — CARE COORDINATION
Liaison Ivon Chrissy informed that she has no isolation beds until 08/26/22. Spoke with Mother and Pt. Choice is [facility]. Referral made to Seymour Curran. Judith Medina accepted Pt. See soft chart for Envelope, Ambulance form  and PASRR. SW/CM to follow for discharge needs.    VERENICE BarbourS.W.  [phone]

## 2022-08-17 NOTE — PROGRESS NOTES
Hospital Medicine    Subjective:  pt seen this am more alert today passed swallow exam on mod texture diet      Current Facility-Administered Medications:     clotrimazole (MYCELEX) dayna 10 mg, 10 mg, Oral, 5x Daily, Nicole Flannery MD, 10 mg at 08/17/22 1530    diphenhydrAMINE (BENADRYL) injection 25 mg, 25 mg, IntraVENous, Q6H PRN, Nicole Flannery MD, 25 mg at 08/14/22 1345    barium sulfate 40 % suspension 15 mL, 15 mL, Oral, ONCE PRN, Blanca Mckeon MD    barium sulfate 40 % paste 15 mL, 15 mL, Oral, ONCE PRN, Blanca Mckeon MD    barium sulfate 40 % reconsitutable suspension, 5 mL, Oral, ONCE PRN, Blanca Mckeon MD    diazePAM (VALIUM) injection 5 mg, 5 mg, IntraVENous, See Admin Instructions, Kolleen Gosselin, DO, 5 mg at 08/12/22 1929    metoprolol succinate (TOPROL XL) extended release tablet 12.5 mg, 12.5 mg, Oral, Daily, Ivania Rios MD    aspirin EC tablet 81 mg, 81 mg, Oral, Daily, Marisa Diaz DO    clopidogrel (PLAVIX) tablet 75 mg, 75 mg, Oral, Daily, Marisa Diaz DO    perflutren lipid microspheres (DEFINITY) injection 1.65 mg, 1.5 mL, IntraVENous, ONCE PRN, Marisa Diaz DO    anastrozole (ARIMIDEX) tablet 1 mg, 1 mg, Oral, Daily, Marisa Diaz DO, 1 mg at 08/17/22 6922    sodium chloride flush 0.9 % injection 5-40 mL, 5-40 mL, IntraVENous, 2 times per day, Kolleen Gosselin, DO, 10 mL at 08/17/22 1133    sodium chloride flush 0.9 % injection 5-40 mL, 5-40 mL, IntraVENous, PRN, Marisa Diaz DO, 10 mL at 08/14/22 1827    0.9 % sodium chloride infusion, , IntraVENous, PRN, Marisa Diaz DO    enoxaparin (LOVENOX) injection 40 mg, 40 mg, SubCUTAneous, Daily, ORLY Hogan CNP, 40 mg at 08/17/22 0911    ondansetron (ZOFRAN-ODT) disintegrating tablet 4 mg, 4 mg, Oral, Q8H PRN **OR** ondansetron (ZOFRAN) injection 4 mg, 4 mg, IntraVENous, Q6H PRN, Marisa Diaz DO    polyethylene glycol (GLYCOLAX) packet 17 g, 17 g, Oral, Daily PRN, Marisa All Joe,     acetaminophen (TYLENOL) tablet 650 mg, 650 mg, Oral, Q6H PRN, 650 mg at 08/10/22 1027 **OR** acetaminophen (TYLENOL) suppository 650 mg, 650 mg, Rectal, Q6H PRN, Josi Diaz, DO, 650 mg at 08/11/22 2033    midazolam PF (VERSED) injection 2 mg, 2 mg, IntraVENous, Once, Jonn Whiteside DO    atorvastatin (LIPITOR) tablet 10 mg, 10 mg, Oral, Nightly, Josi Millsherbus Likelsi, DO, 10 mg at 08/16/22 2056    Objective:    /87   Pulse 95   Temp 97.6 °F (36.4 °C) (Temporal)   Resp 18   Ht 4' 10\" (1.473 m)   Wt 116 lb (52.6 kg)   LMP 10/28/2021   SpO2 94%   BMI 24.24 kg/m²     Heart:  reg  Lungs:  ctab  Abd: + bs soft nontender  Extrem:  w/o edema    CBC with Differential:    Lab Results   Component Value Date/Time    WBC 10.9 08/16/2022 06:23 AM    RBC 3.92 08/16/2022 06:23 AM    HGB 11.2 08/16/2022 06:23 AM    HCT 34.1 08/16/2022 06:23 AM     08/16/2022 06:23 AM    MCV 87.0 08/16/2022 06:23 AM    MCH 28.6 08/16/2022 06:23 AM    MCHC 32.8 08/16/2022 06:23 AM    RDW 16.3 08/16/2022 06:23 AM    METASPCT 5.0 01/12/2022 03:16 PM    LYMPHOPCT 4.3 08/09/2022 11:57 AM    PROMYELOPCT 8.0 01/12/2022 03:16 PM    MONOPCT 2.6 08/09/2022 11:57 AM    MYELOPCT 3.0 01/12/2022 03:16 PM    BASOPCT 0.9 08/09/2022 11:57 AM    MONOSABS 0.16 08/09/2022 11:57 AM    LYMPHSABS 0.22 08/09/2022 11:57 AM    EOSABS 0.00 08/09/2022 11:57 AM    BASOSABS 0.05 08/09/2022 11:57 AM     CMP:    Lab Results   Component Value Date/Time     08/17/2022 08:45 AM    K 3.9 08/17/2022 08:45 AM    K 4.0 08/09/2022 11:57 AM     08/17/2022 08:45 AM    CO2 20 08/17/2022 08:45 AM    BUN 11 08/17/2022 08:45 AM    CREATININE 0.4 08/17/2022 08:45 AM    GFRAA >60 08/17/2022 08:45 AM    LABGLOM >60 08/17/2022 08:45 AM    GLUCOSE 167 08/17/2022 08:45 AM    PROT 6.0 08/10/2022 08:29 AM    LABALBU 3.4 08/10/2022 08:29 AM    CALCIUM 7.8 08/17/2022 08:45 AM    BILITOT 0.4 08/10/2022 08:29 AM    ALKPHOS 72 08/10/2022 08:29 AM    AST 50 08/10/2022 08:29 AM    ALT 42 08/10/2022 08:29 AM     Warfarin PT/INR:    Lab Results   Component Value Date    INR 1.3 08/11/2022    PROTIME 13.8 (H) 08/11/2022       Assessment:    Principal Problem:    Weakness  Active Problems:    Altered mental status    Dysarthria    Fall    Hypertrophic cardiomyopathy ([address]    Supraventricular tachycardia ([address]    Carcinoma in situ of female breast, right    Primary hypertension    Moderate protein-calorie malnutrition (HCC)    PAF (paroxysmal atrial fibrillation) ([address]  Resolved Problems:    Viral meningitis      Plan:  Cont modified texture diet dc ivf check with neurology re resuming asa and plavix which have been on hold mri not being done today        Truman Apley, DO  4:31 PM  8/17/2022

## 2022-08-17 NOTE — PROGRESS NOTES
Per MRI they are not going to be able to complete MRI today due to no anesthesia team.    NPO order d/c'd

## 2022-08-17 NOTE — PROGRESS NOTES
10 mg Oral 5x Daily Heri Nicolas MD   10 mg at 08/17/22 1754    diphenhydrAMINE (BENADRYL) injection 25 mg  25 mg IntraVENous Q6H PRN Heri Nicolas MD   25 mg at 08/14/22 1345    barium sulfate 40 % suspension 15 mL  15 mL Oral ONCE PRN Frank Simmonds, MD        barium sulfate 40 % paste 15 mL  15 mL Oral ONCE PRN Frank Simmonds, MD        barium sulfate 40 % reconsitutable suspension  5 mL Oral ONCE PRN Susu Mensah MD        diazePAM (VALIUM) injection 5 mg  5 mg IntraVENous See Admin Instructions Shravan Diaz DO   5 mg at 08/12/22 1929    [Held by provider] metoprolol succinate (TOPROL XL) extended release tablet 12.5 mg  12.5 mg Oral Daily Augustine Singh MD        [facility] [place] by provider] aspirin EC tablet 81 mg  81 mg Oral Daily Love Buck DO        [Held by provider] clopidogrel (PLAVIX) tablet 75 mg  75 mg Oral Daily Shravan Diaz DO        perflutren lipid microspheres (DEFINITY) injection 1.65 mg  1.5 mL IntraVENous ONCE PRN Shravan Diaz DO        anastrozole (ARIMIDEX) tablet 1 mg  1 mg Oral Daily Shravan Diaz DO   1 mg at 08/17/22 5493    sodium chloride flush 0.9 % injection 5-40 mL  5-40 mL IntraVENous 2 times per day Love Buck DO   10 mL at 08/16/22 2057    sodium chloride flush 0.9 % injection 5-40 mL  5-40 mL IntraVENous PRN Shravan Diaz DO   10 mL at 08/14/22 1827    0.9 % sodium chloride infusion   IntraVENous PRN Shravan Diaz DO        enoxaparin (LOVENOX) injection 40 mg  40 mg SubCUTAneous Daily ORLY Gustafson - CNP   40 mg at 08/17/22 0911    ondansetron (ZOFRAN-ODT) disintegrating tablet 4 mg  4 mg Oral Q8H PRN Shravan Diaz DO        Or    ondansetron [facility]) injection 4 mg  4 mg IntraVENous Q6H PRN Shravan Diaz DO        polyethylene glycol (GLYCOLAX) packet 17 g  17 g Oral Daily PRN Shravan Diaz DO        acetaminophen (TYLENOL) tablet 650 mg  650 mg Oral Q6H PRN Shravan Diaz DO   650 mg at 08/10/22 1021 Differential:    Lab Results   Component Value Date/Time    WBC 10.9 08/16/2022 06:23 AM    RBC 3.92 08/16/2022 06:23 AM    HGB 11.2 08/16/2022 06:23 AM    HCT 34.1 08/16/2022 06:23 AM     08/16/2022 06:23 AM    MCV 87.0 08/16/2022 06:23 AM    MCH 28.6 08/16/2022 06:23 AM    MCHC 32.8 08/16/2022 06:23 AM    RDW 16.3 08/16/2022 06:23 AM    METASPCT 5.0 01/12/2022 03:16 PM    LYMPHOPCT 4.3 08/09/2022 11:57 AM    PROMYELOPCT 8.0 01/12/2022 03:16 PM    MONOPCT 2.6 08/09/2022 11:57 AM    MYELOPCT 3.0 01/12/2022 03:16 PM    BASOPCT 0.9 08/09/2022 11:57 AM    MONOSABS 0.16 08/09/2022 11:57 AM    LYMPHSABS 0.22 08/09/2022 11:57 AM    EOSABS 0.00 08/09/2022 11:57 AM    BASOSABS 0.05 08/09/2022 11:57 AM     CMP:    Lab Results   Component Value Date/Time     08/17/2022 08:45 AM    K 3.9 08/17/2022 08:45 AM    K 4.0 08/09/2022 11:57 AM     08/17/2022 08:45 AM    CO2 20 08/17/2022 08:45 AM    BUN 11 08/17/2022 08:45 AM    CREATININE 0.4 08/17/2022 08:45 AM    GFRAA >60 08/17/2022 08:45 AM    LABGLOM >60 08/17/2022 08:45 AM    GLUCOSE 167 08/17/2022 08:45 AM    PROT 6.0 08/10/2022 08:29 AM    LABALBU 3.4 08/10/2022 08:29 AM    CALCIUM 7.8 08/17/2022 08:45 AM    BILITOT 0.4 08/10/2022 08:29 AM    ALKPHOS 72 08/10/2022 08:29 AM    AST 50 08/10/2022 08:29 AM    ALT 42 08/10/2022 08:29 AM     TSH:    Lab Results   Component Value Date/Time    TSH 0.644 08/10/2022 08:29 AM     Lyme- negative     HIV- non reactive     LILIANA- negative     MRI BRAIN:   No evidence of neoplastic disease. No evidence of recent infarct, acute intracranial hemorrhage, mass effect, or hydrocephalus. MRA HEAD:   No proximal large vessel arterial occlusion or high-grade stenosis. MRI CERVICAL SPINE:   No evidence of neoplastic disease. Mild to moderate multilevel spinal stenosis secondary to a congenitally narrow cervical spinal canal, as described above. Moderate to severe left neural foraminal stenosis at C5-6.        MRI THORACIC SPINE:   No evidence of neoplastic disease. No significant spinal or neural foraminal stenosis     EEG 8/11: This was a normal study during the waking state. No seizures or epileptiform discharges were noted during this study. LP: WBC 5; protein 25  Meningitis/encephalitis panel +HHV-6      Lab Results   Component Value Date    CKTOTAL 159 08/13/2022     Pending labs/CSF:  VDRL  Ach receptor atb  Autoimmune encephalitis reflex panel pending    MRI brain w contrast pending    MRI cervical and lumbar spine with contrast pending    All pertinent labs and images personally reviewed today    Assessment:     Dysarthria and AMS: in a pt with mild intellectual disability, chronic hearing loss, and hx breast cancer (on Arimidex). Her exam has improved, but not back to baseline per patient's mother. Her SBP was > 200 on admission and I question if her AMS and worsening speech was related to a HTN urgency. Does not appear to be infectious in etiology and ID is monitoring off abx and felt rash was an allergic reaction.  MRIs of the neuroaxis are pending ordered by NSGY     Family history of muscular dystrophy: CK negative    Plan:     Await MRIs ordered by NSGY    Some labs still pending and will f/u results     Discussed with her mother    Will follow    Maricarmen Okeefe PA-C  11:24 AM  8/17/2022

## 2022-08-17 NOTE — PROGRESS NOTES
8/17- Not able to be done during the day- went to try for afternoon, but patient wanted to eat- try again 8/18.

## 2022-08-17 NOTE — PLAN OF CARE
Problem: Discharge Planning  Goal: Discharge to home or other facility with appropriate resources  8/17/2022 1229 by Natalie Rooney RN  Outcome: Progressing  Flowsheets (Taken 8/17/2022 0827)  Discharge to home or other facility with appropriate resources: Identify barriers to discharge with patient and caregiver  8/17/2022 0009 by Batsheva Grijalva RN  Outcome: Progressing     Problem: Pain  Goal: Verbalizes/displays adequate comfort level or baseline comfort level  8/17/2022 1229 by Natalie Rooney RN  Outcome: Progressing  Flowsheets  Taken 8/17/2022 0909  Verbalizes/displays adequate comfort level or baseline comfort level: Encourage patient to monitor pain and request assistance  Taken 8/17/2022 0827  Verbalizes/displays adequate comfort level or baseline comfort level: Encourage patient to monitor pain and request assistance  8/17/2022 0009 by Batsheva Grijalva RN  Outcome: Progressing  Flowsheets (Taken 8/16/2022 1536 by Natalie Rooney RN)  Verbalizes/displays adequate comfort level or baseline comfort level: Encourage patient to monitor pain and request assistance

## 2022-08-18 ENCOUNTER — APPOINTMENT (OUTPATIENT)
Dept: MRI IMAGING | Age: 62
DRG: 051 | End: 2022-08-18
Payer: COMMERCIAL

## 2022-08-18 ENCOUNTER — ANESTHESIA EVENT (OUTPATIENT)
Dept: MRI IMAGING | Age: 62
DRG: 051 | End: 2022-08-18
Payer: COMMERCIAL

## 2022-08-18 ENCOUNTER — ANESTHESIA (OUTPATIENT)
Dept: MRI IMAGING | Age: 62
DRG: 051 | End: 2022-08-18
Payer: COMMERCIAL

## 2022-08-18 LAB
ACETYLCHOLINE BINDING ANTIBODY: 0 NMOL/L (ref 0–0.4)
ACETYLCHOLINE BLOCKING AB: 2 % (ref 0–26)

## 2022-08-18 PROCEDURE — 6370000000 HC RX 637 (ALT 250 FOR IP): Performed by: INTERNAL MEDICINE

## 2022-08-18 PROCEDURE — 6360000004 HC RX CONTRAST MEDICATION: Performed by: RADIOLOGY

## 2022-08-18 PROCEDURE — 3700000001 HC ADD 15 MINUTES (ANESTHESIA)

## 2022-08-18 PROCEDURE — 94660 CPAP INITIATION&MGMT: CPT

## 2022-08-18 PROCEDURE — 2060000000 HC ICU INTERMEDIATE R&B

## 2022-08-18 PROCEDURE — 97535 SELF CARE MNGMENT TRAINING: CPT

## 2022-08-18 PROCEDURE — 97530 THERAPEUTIC ACTIVITIES: CPT

## 2022-08-18 PROCEDURE — 7100000000 HC PACU RECOVERY - FIRST 15 MIN

## 2022-08-18 PROCEDURE — 99232 SBSQ HOSP IP/OBS MODERATE 35: CPT | Performed by: NEUROLOGICAL SURGERY

## 2022-08-18 PROCEDURE — 72142 MRI NECK SPINE W/DYE: CPT

## 2022-08-18 PROCEDURE — 6360000002 HC RX W HCPCS: Performed by: NURSE ANESTHETIST, CERTIFIED REGISTERED

## 2022-08-18 PROCEDURE — 6360000002 HC RX W HCPCS: Performed by: NURSE PRACTITIONER

## 2022-08-18 PROCEDURE — 2580000003 HC RX 258: Performed by: NURSE ANESTHETIST, CERTIFIED REGISTERED

## 2022-08-18 PROCEDURE — 72158 MRI LUMBAR SPINE W/O & W/DYE: CPT

## 2022-08-18 PROCEDURE — 3700000000 HC ANESTHESIA ATTENDED CARE

## 2022-08-18 PROCEDURE — 72157 MRI CHEST SPINE W/O & W/DYE: CPT

## 2022-08-18 PROCEDURE — 70552 MRI BRAIN STEM W/DYE: CPT

## 2022-08-18 PROCEDURE — 2580000003 HC RX 258: Performed by: INTERNAL MEDICINE

## 2022-08-18 PROCEDURE — A9577 INJ MULTIHANCE: HCPCS | Performed by: RADIOLOGY

## 2022-08-18 PROCEDURE — 7100000001 HC PACU RECOVERY - ADDTL 15 MIN

## 2022-08-18 RX ORDER — PROPOFOL 10 MG/ML
INJECTION, EMULSION INTRAVENOUS CONTINUOUS PRN
Status: DISCONTINUED | OUTPATIENT
Start: 2022-08-18 | End: 2022-08-18 | Stop reason: SDUPTHER

## 2022-08-18 RX ORDER — SODIUM CHLORIDE 9 MG/ML
INJECTION, SOLUTION INTRAVENOUS CONTINUOUS PRN
Status: DISCONTINUED | OUTPATIENT
Start: 2022-08-18 | End: 2022-08-18 | Stop reason: SDUPTHER

## 2022-08-18 RX ORDER — FENTANYL CITRATE 50 UG/ML
INJECTION, SOLUTION INTRAMUSCULAR; INTRAVENOUS PRN
Status: DISCONTINUED | OUTPATIENT
Start: 2022-08-18 | End: 2022-08-18 | Stop reason: SDUPTHER

## 2022-08-18 RX ORDER — MIDAZOLAM HYDROCHLORIDE 1 MG/ML
INJECTION INTRAMUSCULAR; INTRAVENOUS PRN
Status: DISCONTINUED | OUTPATIENT
Start: 2022-08-18 | End: 2022-08-18 | Stop reason: SDUPTHER

## 2022-08-18 RX ADMIN — CLOPIDOGREL BISULFATE 75 MG: 75 TABLET ORAL at 09:53

## 2022-08-18 RX ADMIN — PHENYLEPHRINE HYDROCHLORIDE 100 MCG: 10 INJECTION INTRAVENOUS at 18:07

## 2022-08-18 RX ADMIN — ATORVASTATIN CALCIUM 10 MG: 10 TABLET, FILM COATED ORAL at 22:14

## 2022-08-18 RX ADMIN — GADOBENATE DIMEGLUMINE 10 ML: 529 INJECTION, SOLUTION INTRAVENOUS at 19:34

## 2022-08-18 RX ADMIN — CLOTRIMAZOLE 10 MG: 10 LOZENGE ORAL at 10:07

## 2022-08-18 RX ADMIN — FENTANYL CITRATE 100 MCG: 50 INJECTION INTRAMUSCULAR; INTRAVENOUS at 17:53

## 2022-08-18 RX ADMIN — CLOTRIMAZOLE 10 MG: 10 LOZENGE ORAL at 16:11

## 2022-08-18 RX ADMIN — CLOTRIMAZOLE 10 MG: 10 LOZENGE ORAL at 00:25

## 2022-08-18 RX ADMIN — ANASTROZOLE 1 MG: 1 TABLET ORAL at 09:53

## 2022-08-18 RX ADMIN — ASPIRIN 81 MG: 81 TABLET, COATED ORAL at 09:53

## 2022-08-18 RX ADMIN — MIDAZOLAM 2 MG: 1 INJECTION INTRAMUSCULAR; INTRAVENOUS at 17:47

## 2022-08-18 RX ADMIN — METOPROLOL SUCCINATE 12.5 MG: 25 TABLET, EXTENDED RELEASE ORAL at 09:53

## 2022-08-18 RX ADMIN — PHENYLEPHRINE HYDROCHLORIDE 200 MCG: 10 INJECTION INTRAVENOUS at 18:03

## 2022-08-18 RX ADMIN — ENOXAPARIN SODIUM 40 MG: 100 INJECTION SUBCUTANEOUS at 09:53

## 2022-08-18 RX ADMIN — CLOTRIMAZOLE 10 MG: 10 LOZENGE ORAL at 22:14

## 2022-08-18 RX ADMIN — Medication 10 ML: at 22:14

## 2022-08-18 RX ADMIN — SODIUM CHLORIDE: 9 INJECTION, SOLUTION INTRAVENOUS at 17:34

## 2022-08-18 RX ADMIN — PROPOFOL 60 MCG/KG/MIN: 10 INJECTION, EMULSION INTRAVENOUS at 17:56

## 2022-08-18 ASSESSMENT — PAIN SCALES - GENERAL
PAINLEVEL_OUTOF10: 0

## 2022-08-18 NOTE — PROGRESS NOTES
Physical Therapy    Treatment Note    Name: Nelli Hanley  : 1960  MRN: [id number]      Date of Service: 2022    Evaluating PT:  Lexus Macis PT, DPT [id number]    Room #:  9501/3063-J  Diagnosis:  Weakness [R53.1]  Lesion of vertebra [M89.9]  Fall, initial encounter [W19. XXXA]  PMHx/PSHx:  breast CA, hearing loss, HLD  Procedure/Surgery:  none this admission  Precautions:  Falls, [place], speech impaired, knees buckle  Equipment Needs:  TBD    SUBJECTIVE:    Pt lives with mother in a 1 story home with 2+2 stairs to enter and 1+0 rail. Bed is on 1st floor and bath is on 1st floor. Pt ambulated with no AD PTA. OBJECTIVE:   Initial Evaluation  Date: 22 Treatment  Date: 22 Short Term/ Long Term   Goals   AM-PAC 6 Clicks 51/86     Was pt agreeable to Eval/treatment? yes Yes     Does pt have pain? R foot pain No current complaints of pain    Bed Mobility  Rolling: [place]  Supine to sit: [place]  Sit to supine: [place]  Scooting: [place] Rolling: NT  Supine to sit: Mod A  Sit to supine: Mod A  Scooting: Mod A Rolling: Independent   Supine to sit: Independent   Sit to supine: Independent   Scooting: Independent    Transfers Sit to stand: ModA  Stand to sit: ModA  Stand pivot: NT Sit to stand: Mod A  Stand to sit: Mod A  Stand pivot: Max A with HHA Sit to stand: SBA  Stand to sit: SBA  Stand pivot: SBA with AAD   Ambulation    NT, pt unable to step NT 50 feet with AAD SBA   Stair negotiation: ascended and descended  NT NT 4 steps with 1 rail SBA   ROM BUE:  Defer to OT note  BLE:  WFL     Strength BUE:  Defer to OT note  BLE:  grossly 3/5 -- cognitive limitations limiting exam  WFL   Balance Sitting EOB:  SBA  Dynamic Standing:  NT. Static standing: ModA Sitting EOB:  SBA  Dynamic Standing: Mod A with HHA Sitting EOB:  Independent   Dynamic Standing:  SBA     Pt is A & O x: 4 to person, place, time and situation  Sensation: Pt denies numbness or tingling. Edema: Unremarkable.     Therapeutic 06083 0 minutes  [] Neuromuscular reeducation 17888 0 minutes      Chloe Ybarra, PT, DPT   [id number]      Angelina Nash, SPT

## 2022-08-18 NOTE — CARE COORDINATION
Care coordination:  MRI under anesthesia  will be done at 5 pm today. Discharge plan remains to [facility]   when medically stable. Sw  spoke to [facility] at [facility]. They will accept with pre cert pending. PT OT evaluations in chart for today. Jim Patten will submit  the pre cert today. COVID test is needed day of discharge. Envelop, ambulance form and PASR in soft chart. Continue to follow.

## 2022-08-18 NOTE — PROGRESS NOTES
Patient arrived to PACU post MRI with anesthesia , no cart only ticket to ride with patient MRi called and verified no cart with patient just the ticker to ride and patient telemetry monitor with patient

## 2022-08-18 NOTE — PROGRESS NOTES
OCCUPATIONAL THERAPY TREATMENT NOTE    [facility] TREATMENT NOTE      Date:2022  Patient Name: Sixto Hameed  MRN: [id number]  : 1960  Room: [address] [place] [address]       Per OT Eval:     Evaluating OT: Mejia Blue OTR/L #[id number]     Referring Provider: Radha Arreola DO  Specific Provider Orders/Date: OT eval and treat 22     Diagnosis: Weakness [R53.1]  Lesion of vertebra [M89.9]  Fall, initial encounter [W19. XXXA]   Pt admitted to hospital with weakness, AMS and dysarthria      Pertinent Medical History:  has a past medical history of Breast cancer ([facility] Cancer ([facility] Hearing impaired, Hearing loss, and Hyperlipidemia.          Precautions:  Fall Risk, [place], bed alarm,  Intellectual Disability, purewick     Assessment of current deficits   [x] Functional mobility          [x]ADLs           [x] Strength                  [x]Cognition   [x] Functional transfers        [x] IADLs         [x] Safety Awareness   [x]Endurance   [] Fine Coordination                        [x] Balance      [] Vision/perception   []Sensation      []Gross Motor Coordination            [] ROM           [] Delirium                   [] Motor Control     OT PLAN OF CARE   OT POC based on physician orders, patient diagnosis and results of clinical assessment     Frequency/Duration 1-3 days/wk for 2 weeks PRN  Specific OT Treatment Interventions to include:   * Instruction/training on adapted ADL techniques and AE recommendations to increase functional independence within precautions       * Training on energy conservation strategies, correct breathing pattern and techniques to improve independence/tolerance for self-care routine  * Functional transfer/mobility training/DME recommendations for increased independence, safety, and fall prevention  * Patient/Family education to increase follow through with safety techniques and functional independence  * Recommendation of environmental modifications for increased safety with functional transfers/mobility and ADLs  * Cognitive retraining/development of therapeutic activities to improve problem solving, judgement, memory, and attention for increased safety/participation in ADL/IADL tasks  * Therapeutic exercise to improve motor endurance, ROM, and functional strength for ADLs/functional transfers  * Therapeutic activities to facilitate/challenge dynamic balance, stand tolerance for increased safety and independence with ADLs  * Therapeutic activities to facilitate gross/fine motor skills for increased independence with ADLs  * Neuro-muscular re-education: facilitation of righting/equilibrium reactions, midline orientation, scapular stability/mobility, normalization of muscle tone, and facilitation of volitional active controled movement     Recommended Adaptive Equipment:  TBD     Home Living: Pt lives with mother in a 1 story home with 2+2 XIMENA and 1+0 rails. Bathroom setup: tub/shower combo 1st floor; walk-in shower in basement    Equipment owned: shower chair     Prior Level of Function: Independent with ADLs , mod I with IADLs; ambulated without AD   Occupation: na     Pain Level: Pt with no c/o pain throughout session     Cognition: A&O: x3 ; Follows 1 step directions with occasional cues             Memory:  P+             Sequencing:  P+             Problem solving:  P+             Judgement/safety:  P+               Functional Assessment:  AM-PAC Daily Activity Raw Score: 14/24    Initial Eval Status  Date: 8/14/22 Treatment Status  Date: 8/18/22 STGs = LTGs  Time frame: 10-14 days   Feeding NPO   NPO     Grooming Minimal Assist  Minimal Assist  Assist to maintain dynamic sitting balance seated EOB Modified [place]    UB Dressing Minimal  Assist Minimal Assist  Assist to tie gown seated EOB  Supervision    LB Dressing Maximal Assist Max A  Assist to don pants & briefs seated EOB & manage over hips once standing.   Minimal Assist    Bathing Maximal Assist Per previous session Max A  Minimal Assist    Toileting Dependent Per previous session Dependent  Minimal Assist    Bed Mobility Supine to sit: Minimal Assist   Sit to supine: Minimal Assist   Supine to sit: Moderate Assist   Sit to supine: NT  Supine to sit: Modified [place]   Sit to supine: Modified [place]    Functional Transfers Moderate Assist      Sit to stands   Sit to stand: Moderate Assist  Stand to sit: Moderate Assist  Stand pivot: Maximal Assist Stand by Assist    Functional Mobility NT     Unable to complete side steps due to R LE pain. NT  Deferred d/t decreased safety awareness & mitzy knee buckling. Minimal Assist    Balance Sitting:    Static:  SBA    Dynamic: min A  Standing: mod A with HHA Sitting:    Static: Min A    Dynamic: Min A  Standing: Max A with use of ww     Activity Tolerance F-  Fair- F+   Visual/  Perceptual Glasses: reading  wfl                          Hand Dominance right    Strength ROM Additional Info:   RUE   3+/5 wfl F+  and fair FMC/dexterity noted during ADL tasks      LUE 3+/5 wfl F+  and fair FMC/dexterity noted during ADL tasks      Hearing: wfl  Sensation:wfl  Tone: wfl  Edema:none noted       Comments: Upon arrival pt supine in bed & agreeable to OT session with mother in room. Pt required assist for trunk control &  BLE management to sit EOB. Pt sat EOB ~5 minutes to promote core engagement & pelvic stability throughout functional activities while seated unsupported. Pt required assist to don briefs & pants seated EOB then to manage over hips once standing. Pt required assist to complete standing pivot transfer from bed to bedside chair. Pt demo'd mitzy knee buckling requiring assist to maintain upright posture, proper body mechanics, & implementation of fall prevention strategies. Pt on RA upon arrival - SpO2 94% & above throughout duration of session.  At end of session pt seated in bedside chair with +chair alarm, all lines and tubes intact, call light within reach. RN made aware. Pt has made fair- progress towards set goals.    Continue with current plan of care      Treatment Time In: 12:55p            Treatment Time Out: 1:25p                Treatment Charges: Mins Units   Ther Ex  20296     Manual Therapy [id number] [facility]     Thera Activities 82527 15 1   ADL/Home Mgt 39594 15 1   Neuro Re-ed 92362     Group Therapy      Orthotic manage/training  88408     Non-Billable Time     Total Timed Treatment 30 6677 [facility] OTR/L; [id number]

## 2022-08-18 NOTE — PROGRESS NOTES
[facility] Medicine    Subjective:  pt alert conversive stefan diet      Current Facility-Administered Medications:     clotrimazole (MYCELEX) dayna 10 mg, 10 mg, Oral, 5x Daily, Deacon David MD, 10 mg at 08/18/22 0025    diphenhydrAMINE (BENADRYL) injection 25 mg, 25 mg, IntraVENous, Q6H PRN, Deacon David MD, 25 mg at 08/14/22 1345    barium sulfate 40 % suspension 15 mL, 15 mL, Oral, ONCE PRN, Arcadio Avina MD    barium sulfate 40 % paste 15 mL, 15 mL, Oral, ONCE PRN, Arcadio Avina MD    barium sulfate 40 % reconsitutable suspension, 5 mL, Oral, ONCE PRN, Arcadio Avina MD    diazePAM (VALIUM) injection 5 mg, 5 mg, IntraVENous, See Admin Instructions, Sherryle Christians, DO, 5 mg at 08/12/22 1929    metoprolol succinate (TOPROL XL) extended release tablet 12.5 mg, 12.5 mg, Oral, Daily, Curt Closs, MD    aspirin EC tablet 81 mg, 81 mg, Oral, Daily, Harvinder Diaz DO    clopidogrel (PLAVIX) tablet 75 mg, 75 mg, Oral, Daily, Harvinder Diaz DO    perflutren lipid microspheres (DEFINITY) injection 1.65 mg, 1.5 mL, IntraVENous, ONCE PRN, Harvinder Diaz DO    anastrozole (ARIMIDEX) tablet 1 mg, 1 mg, Oral, Daily, Harvinder Diaz DO, 1 mg at 08/17/22 4606    sodium chloride flush 0.9 % injection 5-40 mL, 5-40 mL, IntraVENous, 2 times per day, Sherryle Christians, DO, 10 mL at 08/17/22 2141    sodium chloride flush 0.9 % injection 5-40 mL, 5-40 mL, IntraVENous, PRN, Harvinder Diaz DO, 10 mL at 08/14/22 1827    0.9 % sodium chloride infusion, , IntraVENous, PRN, Harvinder Diaz DO    enoxaparin (LOVENOX) injection 40 mg, 40 mg, SubCUTAneous, Daily, Anais [place], APRN - CNP, 40 mg at 08/17/22 0911    ondansetron (ZOFRAN-ODT) disintegrating tablet 4 mg, 4 mg, Oral, Q8H PRN **OR** ondansetron (ZOFRAN) injection 4 mg, 4 mg, IntraVENous, Q6H PRN, Harvinder Diaz,     polyethylene glycol (GLYCOLAX) packet 17 g, 17 g, Oral, Daily PRN, Harvinder Diaz DO    acetaminophen (TYLENOL) tablet 650 mg, 650 mg, Oral, Q6H PRN, 650 mg at 08/10/22 1027 **OR** acetaminophen (TYLENOL) suppository 650 mg, 650 mg, Rectal, Q6H PRN, Carly Limer, DO, 650 mg at 08/11/22 2033    midazolam PF (VERSED) injection 2 mg, 2 mg, IntraVENous, Once, Grisel Harris,     atorvastatin (LIPITOR) tablet 10 mg, 10 mg, Oral, Nightly, Carly Diaz, DO, 10 mg at 08/17/22 2141    Objective:    BP (!) 140/80   Pulse 98   Temp 97.4 °F (36.3 °C) (Temporal)   Resp 18   Ht 4' 10\" (1.473 m)   Wt 116 lb (52.6 kg)   LMP 10/28/2021   SpO2 93%   BMI 24.24 kg/m²     Heart:  reg  Lungs:  ctab  Abd: + bs soft nontender  Extrem:  w/o edema    CBC with Differential:    Lab Results   Component Value Date/Time    WBC 10.9 08/16/2022 06:23 AM    RBC 3.92 08/16/2022 06:23 AM    HGB 11.2 08/16/2022 06:23 AM    HCT 34.1 08/16/2022 06:23 AM     08/16/2022 06:23 AM    MCV 87.0 08/16/2022 06:23 AM    MCH 28.6 08/16/2022 06:23 AM    MCHC 32.8 08/16/2022 06:23 AM    RDW 16.3 08/16/2022 06:23 AM    METASPCT 5.0 01/12/2022 03:16 PM    LYMPHOPCT 4.3 08/09/2022 11:57 AM    PROMYELOPCT 8.0 01/12/2022 03:16 PM    MONOPCT 2.6 08/09/2022 11:57 AM    MYELOPCT 3.0 01/12/2022 03:16 PM    BASOPCT 0.9 08/09/2022 11:57 AM    MONOSABS 0.16 08/09/2022 11:57 AM    LYMPHSABS 0.22 08/09/2022 11:57 AM    EOSABS 0.00 08/09/2022 11:57 AM    BASOSABS 0.05 08/09/2022 11:57 AM     CMP:    Lab Results   Component Value Date/Time     08/17/2022 08:45 AM    K 3.9 08/17/2022 08:45 AM    K 4.0 08/09/2022 11:57 AM     08/17/2022 08:45 AM    CO2 20 08/17/2022 08:45 AM    BUN 11 08/17/2022 08:45 AM    CREATININE 0.4 08/17/2022 08:45 AM    GFRAA >60 08/17/2022 08:45 AM    LABGLOM >60 08/17/2022 08:45 AM    GLUCOSE 167 08/17/2022 08:45 AM    PROT 6.0 08/10/2022 08:29 AM    LABALBU 3.4 08/10/2022 08:29 AM    CALCIUM 7.8 08/17/2022 08:45 AM    BILITOT 0.4 08/10/2022 08:29 AM    ALKPHOS 72 08/10/2022 08:29 AM    AST 50 08/10/2022 08:29 AM    ALT 42 08/10/2022 08:29 AM     Warfarin PT/INR:    Lab Results   Component Value Date    INR 1.3 08/11/2022    PROTIME 13.8 (H) 08/11/2022       Assessment:    Principal Problem:    Weakness  Active Problems:    Altered mental status    Dysarthria    Fall    Hypertrophic cardiomyopathy ([address]    Supraventricular tachycardia ([address]    Carcinoma in situ of female breast, right    Primary hypertension    Moderate protein-calorie malnutrition (HCC)    PAF (paroxysmal atrial fibrillation) ([address]  Resolved Problems:    Viral meningitis      Plan:  Dc planning increase activity to rehab once arrangements made / mri orders still active        Kolleen Gosselin, DO  7:05 AM  8/18/2022

## 2022-08-18 NOTE — PLAN OF CARE
Notes reviewed. No new neuro events overnight. Still has not had MRIs under anesthesia completed. Anesthesia does not have openings today, possibly this evening. If not going to be completed today, as she is otherwise stable, agree with NSGY that a PET can be obtained as OP. No further testing planned from neurology POV at this time. She can be discharged when okay with others with OP follow up.  A couple labs are still pending- will f/u results as OP

## 2022-08-18 NOTE — ANESTHESIA PRE PROCEDURE
Department of Anesthesiology  Preprocedure Note       Name:  Noris Calabrese   Age:  58 y.o.  :  1960                                          MRN:  [id number]         Date:  2022      Surgeon: * No surgeons listed *    Procedure: MRI with anesthesia    Medications prior to admission:   Prior to Admission medications    Medication Sig Start Date End Date Taking? Authorizing Provider   anastrozole (ARIMIDEX) 1 MG tablet Take 1 tablet by mouth daily 22   Miles Dawson MD   simvastatin (ZOCOR) 20 MG tablet Take 20 mg by mouth nightly  3/23/22   Historical Provider, MD   triamcinolone (KENALOG) 0.1 % cream Apply topically 2 times daily. 22   Miles Dawson MD   Garlic (GARLIQUE) 345 MG TBEC Take by mouth daily     Historical Provider, MD   Cholecalciferol (VITAMIN D3) 50 MCG (2000) CAPS Take by mouth daily     Historical Provider, MD   loratadine (CLARITIN) 10 MG capsule Take 10 mg by mouth daily    Historical Provider, MD   acetaminophen (TYLENOL) 500 MG tablet Take 500 mg by mouth every 6 hours as needed for Pain     Historical Provider, MD       Current medications:    No current facility-administered medications for this visit. No current outpatient medications on file.      Facility-Administered Medications Ordered in Other Visits   Medication Dose Route Frequency Provider Last Rate Last Admin    clotrimazole (MYCELEX) dayna 10 mg  10 mg Oral 5x Daily Dedra Perea MD   10 mg at 22 1611    diphenhydrAMINE (BENADRYL) injection 25 mg  25 mg IntraVENous Q6H PRN Dedra Perea MD   25 mg at 22 1345    barium sulfate 40 % suspension 15 mL  15 mL Oral ONCE PRN Rusty Cain MD        barium sulfate 40 % paste 15 mL  15 mL Oral ONCE PRN Rusty Cain MD        barium sulfate 40 % reconsitutable suspension  5 mL Oral ONCE PRN Jarrett Mensah MD        diazePAM (VALIUM) injection 5 mg  5 mg IntraVENous See Admin Instructions Jignesh Diaz DO   5 mg positive ([facility] C50.211, Z17.0    Hearing loss H91.90    Hypercholesterolemia E78.00    Weakness R53.1    Altered mental status R41.82    Dysarthria R47.1    Fall W19. Cordella Beech    Hypertrophic cardiomyopathy ([facility] I42.2    Supraventricular tachycardia (HCC) I47.1    Carcinoma in situ of female breast, right D05.91    Primary hypertension I10    Moderate protein-calorie malnutrition (HCC) E44.0    PAF (paroxysmal atrial fibrillation) (HCC) I48.0       Past Medical History:        Diagnosis Date    [facility] ([facility]     Right breast    Cancer ([facility]     Hearing impaired     Hearing loss     Hyperlipidemia        Past Surgical History:        Procedure Laterality Date    BREAST LUMPECTOMY Right 11/3/2021    RIGHT BREAST LUMPECTOMY, BLUE DYE INJECTION, RIGHT AXILLARY SENTINEL LYMPH NODE INCISION performed by Marion Oakley MD at [facility] LUMPECTOMY Right 12/1/2021    RE-EXCISION RIGHT BREAST INFERIOR MARGIN performed by Marion Oakley MD at [address] [place] [address].  2021       Social History:    Social History     Tobacco Use    Smoking status: Never    Smokeless tobacco: Never   Substance Use Topics    Alcohol use: Never                                Counseling given: Not Answered      Vital Signs (Current): There were no vitals filed for this visit.                                            BP Readings from Last 3 Encounters:   08/18/22 127/78   06/22/22 (!) 145/84   06/20/22 128/68       NPO Status:                                                                                 BMI:   Wt Readings from Last 3 Encounters:   08/09/22 116 lb (52.6 kg)   08/09/22 116 lb (52.6 kg)   06/22/22 116 lb (52.6 kg)     There is no height or weight on file to calculate BMI.    CBC:   Lab Results   Component Value Date/Time    WBC 10.9 08/16/2022 06:23 AM    RBC 3.92 08/16/2022 06:23 AM    HGB 11.2 08/16/2022 06:23 AM    HCT 34.1 08/16/2022 06:23 AM    MCV 87.0 08/16/2022 06:23 AM    RDW 16.3 08/16/2022 06:23 AM     08/16/2022 06:23 AM       CMP:   Lab Results   Component Value Date/Time     08/17/2022 08:45 AM    K 3.9 08/17/2022 08:45 AM    K 4.0 08/09/2022 11:57 AM     08/17/2022 08:45 AM    CO2 20 08/17/2022 08:45 AM    BUN 11 08/17/2022 08:45 AM    CREATININE 0.4 08/17/2022 08:45 AM    GFRAA >60 08/17/2022 08:45 AM    LABGLOM >60 08/17/2022 08:45 AM    GLUCOSE 167 08/17/2022 08:45 AM    PROT 6.0 08/10/2022 08:29 AM    CALCIUM 7.8 08/17/2022 08:45 AM    BILITOT 0.4 08/10/2022 08:29 AM    ALKPHOS 72 08/10/2022 08:29 AM    AST 50 08/10/2022 08:29 AM    ALT 42 08/10/2022 08:29 AM       POC Tests: No results for input(s): POCGLU, POCNA, POCK, POCCL, POCBUN, POCHEMO, POCHCT in the last 72 hours.     Coags:   Lab Results   Component Value Date/Time    PROTIME 13.8 08/11/2022 03:41 PM    INR 1.3 08/11/2022 03:41 PM       HCG (If Applicable): No results found for: PREGTESTUR, PREGSERUM, HCG, HCGQUANT     ABGs: No results found for: PHART, PO2ART, ART6YOF, RCN4WXA, BEART, W8EEWJJQ     Type & Screen (If Applicable):  No results found for: LABABO, LABRH    Drug/Infectious Status (If Applicable):  No results found for: HIV, HEPCAB    COVID-19 Screening (If Applicable):   Lab Results   Component Value Date/Time    COVID19 Not Detected 08/10/2022 03:25 PM       EKG 12 Lead  Order: [id number]   Status: Final result     Visible to patient: No (not released)     Next appt: 09/14/2022 at 11:00 AM in Infusion Therapy ([facility] ONC FAST TRACK 2)     0 Result Notes    Component Ref Range & Units 8/9/22 1148    Ventricular Rate     Atrial Rate     P-R Interval ms 116    QRS Duration ms 72    Q-T Interval ms 350    QTc Calculation (Bazett) ms 471    P Axis degrees 63    R Axis degrees 53    T Axis degrees -64    Resulting Agency  [facility]             Narrative & Impression    Sinus tachycardia  Left ventricular hypertrophy with repolarization abnormality  Abnormal ECG  No previous ECGs available  Confirmed by Jeni Murillo ([id number]) on 8/9/2022 1:28:44 PM             TTE:  Summary   Left ventricle is normal in size . Mild left ventricular concentric hypertrophy noted. No regional wall motion abnormalities seen. Normal left ventricular ejection fraction. Hyperdynamic left ventricular systolic function. Left ventricular outflow tract gradient. The left atrium is mildly dilated. Normal mitral valve leaflet thickness. Mild systolic anterior motion (MANOJ) of anterior mitral leaflet. Mild centrally directed mitral regurgitation. Mild tricuspid regurgitation. Pulmonary hypertension is mild . No intracardiac shunt identified via saline contrast administration. Signature      ----------------------------------------------------------------   Electronically signed by Tj Bentley MD(Interpreting   physician) on 08/10/2022 05:47 PM    Anesthesia Evaluation  Patient summary reviewed and Nursing notes reviewed no history of anesthetic complications:   Airway: Mallampati: III  TM distance: <3 FB   Neck ROM: full  Mouth opening: > = 3 FB   Dental:      Comment: Pt denies any loose, chipped or missing teeth    Pulmonary:Negative Pulmonary ROS breath sounds clear to auscultation  (+) decreased breath sounds                            Cardiovascular:  Exercise tolerance: good (>4 METS),   (+) hypertension:, valvular problems/murmurs (TR): MR, dysrhythmias: SVT and atrial fibrillation, pulmonary hypertension: mild, hyperlipidemia      ECG reviewed  Rhythm: regular  Rate: normal  Echocardiogram reviewed         Beta Blocker:  Not on Beta Blocker      ROS comment: Hypertrophic cardiomyopathy     Neuro/Psych:                ROS comment: AMS, dysarthria, history of viral meningitis GI/Hepatic/Renal: Neg GI/Hepatic/Renal ROS            Endo/Other:    (+) malignancy/cancer (breast ca).                   ROS comment: Hard of hearing  Abdominal:             Vascular: negative vascular ROS. Other Findings:             Anesthesia Plan      MAC     ASA 3       Induction: intravenous. Anesthetic plan and risks discussed with patient. Plan discussed with attending.                     Lehman Osgood, APRN - CRNA   8/18/2022

## 2022-08-18 NOTE — DISCHARGE INSTR - COC
Continuity of Care Form    Patient Name: Bessie Zelaya   :  1960  MRN:  [id number]    Admit date:  2022  Discharge date:  2022    Code Status Order: Full Code   Advance Directives:     Admitting Physician:  Fidelia Stephenson DO  PCP: Fidelia Stephenson DO    Discharging Nurse: [facility]/Room#: 8482/8778-X  Discharging Unit Phone Number: [phone]    Emergency Contact:   Extended Emergency Contact Information  Primary Emergency Contact: Kim Guzman  Address: [address] [place] [address] Phone: [phone]  Relation: Parent    Past Surgical History:  Past Surgical History:   Procedure Laterality Date    BREAST LUMPECTOMY Right 11/3/2021    RIGHT BREAST LUMPECTOMY, BLUE DYE INJECTION, RIGHT AXILLARY SENTINEL LYMPH NODE INCISION performed by Bc Wallace MD at [place] [address] LUMPECTOMY Right 2021    RE-EXCISION RIGHT BREAST INFERIOR MARGIN performed by Bc Wallace MD at [facility]         Immunization History:   Immunization History   Administered Date(s) Administered    COVID-19, MODERNA BLUE border, Primary or Immunocompromised, (age 12y+), IM, 100 mcg/0.5mL 2021, 2021       Active Problems:  Patient Active Problem List   Diagnosis Code    Cancer ([facility] C80.1    Malignant neoplasm of upper-inner quadrant of right breast in female, estrogen receptor positive ([facility] C50.211, Z17.0    Hearing loss H91.90    Hypercholesterolemia E78.00    Weakness R53.1    Altered mental status R41.82    Dysarthria R47.1    Fall W19. XXXA    Hypertrophic cardiomyopathy ([facility] I42.2    Supraventricular tachycardia (HCC) I47.1    Carcinoma in situ of female breast, right D05.91    Primary hypertension I10    Moderate protein-calorie malnutrition (HCC) E44.0    PAF (paroxysmal atrial fibrillation) (HCC) I48.0       Isolation/Infection:   Isolation            No Isolation Patient Infection Status       Infection Onset Added Last Indicated Last Indicated By Review Planned Expiration Resolved Resolved By    None active    Resolved    COVID-19 (Rule Out) 08/10/22 08/10/22 08/10/22 Respiratory Panel, Molecular, with COVID-19 (Restricted: peds pts or suitable admitted adults) (Ordered)   08/10/22 Rule-Out Test Resulted            Nurse Assessment:  Last Vital Signs: /78   Pulse (!) 102   Temp 97.4 °F (36.3 °C) (Temporal)   Resp 18   Ht 4' 10\" (1.473 m)   Wt 116 lb (52.6 kg)   LMP 10/28/2021   SpO2 94%   BMI 24.24 kg/m²     Last documented pain score (0-10 scale): Pain Level: 0  Last Weight:   Wt Readings from Last 1 Encounters:   08/09/22 116 lb (52.6 kg)     Mental Status:   Patient has MRDD    IV Access:  - None    Nursing Mobility/ADLs:  Walking   Assisted  Transfer  Assisted  Bathing  Assisted  Dressing  Assisted  Toileting  Assisted  Feeding  Assisted  Med Admin  Assisted  Med Delivery   prefers mixed with applesauce or pudding    Wound Care Documentation and Therapy:        Elimination:  Continence: Bowel: Yes  Bladder: Yes  Urinary Catheter: None   Colostomy/Ileostomy/Ileal Conduit: No       Date of Last BM: 08/17/2022    Intake/Output Summary (Last 24 hours) at 8/18/2022 1315  Last data filed at 8/18/2022 1009  Gross per 24 hour   Intake 260 ml   Output 575 ml   Net -315 ml     I/O last 3 completed shifts:   In: 12 [P.O.:260]  Out: -     Safety Concerns:     Generalized weakness    Impairments/Disabilities:      Speech    Nutrition Therapy:  Current Nutrition Therapy:   - Oral Diet:  Dysphagia 1 pureed    Routes of Feeding: Oral  Liquids: Honey Thick Liquids  Daily Fluid Restriction: no  Last Modified Barium Swallow with Video (Video Swallowing Test): not done    Treatments at the Time of Hospital Discharge:   Respiratory Treatments: none  Oxygen Therapy:  {Therapy; copd oxygen:69846}  Ventilator:    {MH CC Vent DWVM:560349058}    Rehab Therapies: none  Weight Bearing Status/Restrictions: Partial weight bearing (30-50%) only on leg right leg  Other Medical Equipment (for information only, NOT a DME order):  wheelchair and walker  Other Treatments: none      Patient's personal belongings (please select all that are sent with patient):  None    RN SIGNATURE:  Electronically signed by Shirley Devine RN on 8/19/22 at 11:07 AM EDT    CASE MANAGEMENT/SOCIAL WORK SECTION    Inpatient Status Date: ***    Readmission Risk Assessment Score:  Readmission Risk              Risk of Unplanned Readmission:  19           Discharging to Facility/ Agency   Name:   Address:  Phone:  Fax:    Dialysis Facility (if applicable)   Name:  Address:  Dialysis Schedule:  Phone:  Fax:    / signature: {Esignature:[id number]}    PHYSICIAN SECTION    Prognosis: {Prognosis:1280232048}    Condition at Discharge: [address] [place] [address] Patient Condition:[id number]}    Rehab Potential (if transferring to Rehab): {Prognosis:6202186969}    Recommended Labs or Other Treatments After Discharge: ***    Physician Certification: I certify the above information and transfer of Mae Munson  is necessary for the continuing treatment of the diagnosis listed and that she requires {Admit to Appropriate Level of Care:39783} for {GREATER/LESS:663962504} 30 days.      Update Admission H&P: {CHP DME Changes in NWUFT:902151124}    PHYSICIAN SIGNATURE:  {Esignature:[id number]}Electronically signed by Monster Sharp DO on 8/18/2022 at 1:16 PM

## 2022-08-18 NOTE — PROGRESS NOTES
Department of Neurosurgery  Progress Note    CHIEF COMPLAINT: T8 lesion    SUBJECTIVE:  No acute events overnight. Patient still with baseline slurred speech. Denies any back pain. No new complaints. REVIEW OF SYSTEMS :  Constitutional: Negative for chills and fever. Neurological: Negative for dizziness, tremors and speech change.      OBJECTIVE:   VITALS:  /78   Pulse (!) 102   Temp 97.4 °F (36.3 °C) (Temporal)   Resp 18   Ht 4' 10\" (1.473 m)   Wt 116 lb (52.6 kg)   LMP 10/28/2021   SpO2 96%   BMI 24.24 kg/m²     PHYSICAL:  Alert and oriented x3  Dysarthric  EOMI  PERRLA  CN3-12 intact  VELOZ    DATA:  CBC:   Lab Results   Component Value Date/Time    WBC 10.9 08/16/2022 06:23 AM    RBC 3.92 08/16/2022 06:23 AM    HGB 11.2 08/16/2022 06:23 AM    HCT 34.1 08/16/2022 06:23 AM    MCV 87.0 08/16/2022 06:23 AM    MCH 28.6 08/16/2022 06:23 AM    MCHC 32.8 08/16/2022 06:23 AM    RDW 16.3 08/16/2022 06:23 AM     08/16/2022 06:23 AM    MPV 11.0 08/16/2022 06:23 AM     BMP:    Lab Results   Component Value Date/Time     08/17/2022 08:45 AM    K 3.9 08/17/2022 08:45 AM    K 4.0 08/09/2022 11:57 AM     08/17/2022 08:45 AM    CO2 20 08/17/2022 08:45 AM    BUN 11 08/17/2022 08:45 AM    LABALBU 3.4 08/10/2022 08:29 AM    CREATININE 0.4 08/17/2022 08:45 AM    CALCIUM 7.8 08/17/2022 08:45 AM    GFRAA >60 08/17/2022 08:45 AM    LABGLOM >60 08/17/2022 08:45 AM    GLUCOSE 167 08/17/2022 08:45 AM     PT/INR:    Lab Results   Component Value Date/Time    PROTIME 13.8 08/11/2022 03:41 PM    INR 1.3 08/11/2022 03:41 PM     PTT:  No results found for: APTT, PTT[APTT}    Current Inpatient Medications  Current Facility-Administered Medications: clotrimazole (MYCELEX) dayna 10 mg, 10 mg, Oral, 5x Daily  diphenhydrAMINE (BENADRYL) injection 25 mg, 25 mg, IntraVENous, Q6H PRN  barium sulfate 40 % suspension 15 mL, 15 mL, Oral, ONCE PRN  barium sulfate 40 % paste 15 mL, 15 mL, Oral, ONCE PRN  barium sulfate 40 % reconsitutable suspension, 5 mL, Oral, ONCE PRN  diazePAM (VALIUM) injection 5 mg, 5 mg, IntraVENous, See Admin Instructions  metoprolol succinate (TOPROL XL) extended release tablet 12.5 mg, 12.5 mg, Oral, Daily  aspirin EC tablet 81 mg, 81 mg, Oral, Daily  clopidogrel (PLAVIX) tablet 75 mg, 75 mg, Oral, Daily  perflutren lipid microspheres (DEFINITY) injection 1.65 mg, 1.5 mL, IntraVENous, ONCE PRN  anastrozole (ARIMIDEX) tablet 1 mg, 1 mg, Oral, Daily  sodium chloride flush 0.9 % injection 5-40 mL, 5-40 mL, IntraVENous, 2 times per day  sodium chloride flush 0.9 % injection 5-40 mL, 5-40 mL, IntraVENous, PRN  0.9 % sodium chloride infusion, , IntraVENous, PRN  enoxaparin (LOVENOX) injection 40 mg, 40 mg, SubCUTAneous, Daily  ondansetron (ZOFRAN-ODT) disintegrating tablet 4 mg, 4 mg, Oral, Q8H PRN **OR** ondansetron (ZOFRAN) injection 4 mg, 4 mg, IntraVENous, Q6H PRN  polyethylene glycol (GLYCOLAX) packet 17 g, 17 g, Oral, Daily PRN  acetaminophen (TYLENOL) tablet 650 mg, 650 mg, Oral, Q6H PRN **OR** acetaminophen (TYLENOL) suppository 650 mg, 650 mg, Rectal, Q6H PRN  midazolam PF (VERSED) injection 2 mg, 2 mg, IntraVENous, Once  atorvastatin (LIPITOR) tablet 10 mg, 10 mg, Oral, Nightly      ASSESSMENT:   -Judith Riddle is a 59 y/o female who CT shows T8 sclerotic. PLAN:  -Continue current care  -Await MRI results, if unable to obtain, have patient obtain PET scan as an outpatient, patient can follow up in Neurosurgery clinic as an outpatient once results are obtained.   -Please call with any questions or concerns. Electronically signed by Angela Puente PA-C on 8/18/2022 at 8:47 AM     Nsx Attending:    Patient was seen and examined by me with the team.  I personally reviewed all pertinent radiological images. I concur with Miss Figueroa's clinical assessment and plan. Brighter for me today. She remains dysarthric. Denies any back pain currently. Plan as above.     Thank you so much for allowing us to participate in the care of this patient. I certify that I spent more than half of the total time on this encounter. NOTE: This report was transcribed using voice recognition software.  Every effort was made to ensure accuracy; however, inadvertent computerized transcription errors may be present

## 2022-08-19 VITALS
OXYGEN SATURATION: 98 % | TEMPERATURE: 97.6 F | BODY MASS INDEX: 24.35 KG/M2 | RESPIRATION RATE: 16 BRPM | WEIGHT: 116 LBS | HEIGHT: 58 IN | HEART RATE: 102 BPM | DIASTOLIC BLOOD PRESSURE: 87 MMHG | SYSTOLIC BLOOD PRESSURE: 136 MMHG

## 2022-08-19 LAB — SARS-COV-2, NAAT: NOT DETECTED

## 2022-08-19 PROCEDURE — 2580000003 HC RX 258: Performed by: INTERNAL MEDICINE

## 2022-08-19 PROCEDURE — 87635 SARS-COV-2 COVID-19 AMP PRB: CPT

## 2022-08-19 PROCEDURE — 6370000000 HC RX 637 (ALT 250 FOR IP): Performed by: INTERNAL MEDICINE

## 2022-08-19 PROCEDURE — 99232 SBSQ HOSP IP/OBS MODERATE 35: CPT | Performed by: PHYSICIAN ASSISTANT

## 2022-08-19 PROCEDURE — 6360000002 HC RX W HCPCS: Performed by: NURSE PRACTITIONER

## 2022-08-19 PROCEDURE — 94660 CPAP INITIATION&MGMT: CPT

## 2022-08-19 PROCEDURE — 99232 SBSQ HOSP IP/OBS MODERATE 35: CPT | Performed by: NEUROLOGICAL SURGERY

## 2022-08-19 RX ORDER — ASPIRIN 81 MG/1
81 TABLET ORAL DAILY
Qty: 30 TABLET | Refills: 3 | COMMUNITY
Start: 2022-08-19

## 2022-08-19 RX ORDER — METOPROLOL SUCCINATE 25 MG/1
12.5 TABLET, EXTENDED RELEASE ORAL DAILY
Qty: 30 TABLET | Refills: 3
Start: 2022-08-19

## 2022-08-19 RX ORDER — POLYETHYLENE GLYCOL 3350 17 G/17G
17 POWDER, FOR SOLUTION ORAL DAILY PRN
Qty: 527 G | Refills: 1 | COMMUNITY
Start: 2022-08-19 | End: 2022-09-18

## 2022-08-19 RX ORDER — CLOPIDOGREL BISULFATE 75 MG/1
75 TABLET ORAL DAILY
Qty: 20 TABLET | Refills: 0
Start: 2022-08-19 | End: 2022-09-08

## 2022-08-19 RX ORDER — CLOTRIMAZOLE 10 MG/1
10 LOZENGE ORAL; TOPICAL
Qty: 25 TABLET | Refills: 0
Start: 2022-08-19 | End: 2022-08-24

## 2022-08-19 RX ADMIN — Medication 10 ML: at 08:12

## 2022-08-19 RX ADMIN — METOPROLOL SUCCINATE 12.5 MG: 25 TABLET, EXTENDED RELEASE ORAL at 08:11

## 2022-08-19 RX ADMIN — ASPIRIN 81 MG: 81 TABLET, COATED ORAL at 08:11

## 2022-08-19 RX ADMIN — CLOTRIMAZOLE 10 MG: 10 LOZENGE ORAL at 08:12

## 2022-08-19 RX ADMIN — CLOPIDOGREL BISULFATE 75 MG: 75 TABLET ORAL at 08:11

## 2022-08-19 RX ADMIN — ANASTROZOLE 1 MG: 1 TABLET ORAL at 08:11

## 2022-08-19 RX ADMIN — ENOXAPARIN SODIUM 40 MG: 100 INJECTION SUBCUTANEOUS at 08:11

## 2022-08-19 RX ADMIN — CLOTRIMAZOLE 10 MG: 10 LOZENGE ORAL at 10:56

## 2022-08-19 NOTE — ANESTHESIA POSTPROCEDURE EVALUATION
Department of Anesthesiology  Postprocedure Note    Patient: Noel Vergara  MRN: [id number]  YOB: 1960  Date of evaluation: 8/19/2022      Procedure Summary     Date: 08/18/22 Room / Location: [address] [place] [address] MRI    Anesthesia Start: 1734 Anesthesia Stop: 1940    Procedures:       MRI THORACIC SPINE W WO CONTRAST      MRI with anesthesia Diagnosis: (t8 lesion)    Scheduled Providers:  Responsible Provider: Cecile Quezada MD    Anesthesia Type: MAC ASA Status: 3          Anesthesia Type: MAC    Carola Phase I: Carola Score: 10    Carola Phase II:        Anesthesia Post Evaluation    Patient location during evaluation: PACU  Patient participation: complete - patient participated  Level of consciousness: awake  Pain score: 0  Airway patency: patent  Nausea & Vomiting: no nausea  Complications: no  Cardiovascular status: hemodynamically stable  Respiratory status: acceptable  Hydration status: stable

## 2022-08-19 NOTE — CARE COORDINATION
Approached by staff nurse that patient returned to room after transportation could not provide a wheelchair. PAS unable to do because of insurance. must go through [facility]. Spoke to multiple persons from [facility] and was transferred to multiple departments to inquire how one of their patients can get a w/c or ambulance transport set up. No one was able to assist. Called Lynda from [facility] and she will arrange transport via [facility] and they will over the cost. Await time of . [facility] will provide w/c transport to [facility]  at 4 PM Staff nurse and patient informed.

## 2022-08-19 NOTE — PROGRESS NOTES
[facility] Medicine    Subjective:  pt seen this am alert conversive stefan diet      Current Facility-Administered Medications:     clotrimazole (MYCELEX) dayna 10 mg, 10 mg, Oral, 5x Daily, Mitra Adhikari MD, 10 mg at 08/19/22 1056    diphenhydrAMINE (BENADRYL) injection 25 mg, 25 mg, IntraVENous, Q6H PRN, Mitra Adhikari MD, 25 mg at 08/14/22 1345    barium sulfate 40 % suspension 15 mL, 15 mL, Oral, ONCE PRN, Robyn Monroe MD    barium sulfate 40 % paste 15 mL, 15 mL, Oral, ONCE PRN, Robyn Monroe MD    barium sulfate 40 % reconsitutable suspension, 5 mL, Oral, ONCE PRN, Robyn Monroe MD    diazePAM (VALIUM) injection 5 mg, 5 mg, IntraVENous, See Admin Instructions, Isabela Diaz DO, 5 mg at 08/12/22 1929    metoprolol succinate (TOPROL XL) extended release tablet 12.5 mg, 12.5 mg, Oral, Daily, Caroline Downs MD, 12.5 mg at 08/19/22 1638    aspirin EC tablet 81 mg, 81 mg, Oral, Daily, Isabela Diaz DO, 81 mg at 08/19/22 8503    clopidogrel (PLAVIX) tablet 75 mg, 75 mg, Oral, Daily, Isabela Diaz DO, 75 mg at 08/19/22 8550    perflutren lipid microspheres (DEFINITY) injection 1.65 mg, 1.5 mL, IntraVENous, ONCE PRN, Isabela Diaz DO    anastrozole (ARIMIDEX) tablet 1 mg, 1 mg, Oral, Daily, Isabela Diaz DO, 1 mg at 08/19/22 4352    sodium chloride flush 0.9 % injection 5-40 mL, 5-40 mL, IntraVENous, 2 times per day, Patrice RevelDO, 10 mL at 08/19/22 7294    sodium chloride flush 0.9 % injection 5-40 mL, 5-40 mL, IntraVENous, PRN, Isabela Diaz DO, 10 mL at 08/14/22 1827    0.9 % sodium chloride infusion, , IntraVENous, PRN, Isabela Diaz DO    enoxaparin (LOVENOX) injection 40 mg, 40 mg, SubCUTAneous, Daily, Mary Zapien, APRN - CNP, 40 mg at 08/19/22 0811    ondansetron (ZOFRAN-ODT) disintegrating tablet 4 mg, 4 mg, Oral, Q8H PRN **OR** ondansetron (ZOFRAN) injection 4 mg, 4 mg, IntraVENous, Q6H PRN, Isabela Diaz DO    polyethylene glycol (GLYCOLAX) packet 17 g, 17 g, Oral, Daily PRN, Phuc Diaz, DO    acetaminophen (TYLENOL) tablet 650 mg, 650 mg, Oral, Q6H PRN, 650 mg at 08/10/22 1027 **OR** acetaminophen (TYLENOL) suppository 650 mg, 650 mg, Rectal, Q6H PRN, Phuc Diaz, DO, 650 mg at 08/11/22 2033    midazolam PF (VERSED) injection 2 mg, 2 mg, IntraVENous, Once, Alesia Munguia, DO    atorvastatin (LIPITOR) tablet 10 mg, 10 mg, Oral, Nightly, Phuc Diaz, DO, 10 mg at 08/18/22 2214    Objective:    /87   Pulse (!) 102   Temp 97.6 °F (36.4 °C) (Skin)   Resp 16   Ht 4' 10\" (1.473 m)   Wt 116 lb (52.6 kg)   LMP 10/28/2021   SpO2 98%   BMI 24.24 kg/m²     Heart:  reg  Lungs:  ctab  Abd: + bs soft nontender  Extrem:  w/o edema    CBC with Differential:    Lab Results   Component Value Date/Time    WBC 10.9 08/16/2022 06:23 AM    RBC 3.92 08/16/2022 06:23 AM    HGB 11.2 08/16/2022 06:23 AM    HCT 34.1 08/16/2022 06:23 AM     08/16/2022 06:23 AM    MCV 87.0 08/16/2022 06:23 AM    MCH 28.6 08/16/2022 06:23 AM    MCHC 32.8 08/16/2022 06:23 AM    RDW 16.3 08/16/2022 06:23 AM    METASPCT 5.0 01/12/2022 03:16 PM    LYMPHOPCT 4.3 08/09/2022 11:57 AM    PROMYELOPCT 8.0 01/12/2022 03:16 PM    MONOPCT 2.6 08/09/2022 11:57 AM    MYELOPCT 3.0 01/12/2022 03:16 PM    BASOPCT 0.9 08/09/2022 11:57 AM    MONOSABS 0.16 08/09/2022 11:57 AM    LYMPHSABS 0.22 08/09/2022 11:57 AM    EOSABS 0.00 08/09/2022 11:57 AM    BASOSABS 0.05 08/09/2022 11:57 AM     CMP:    Lab Results   Component Value Date/Time     08/17/2022 08:45 AM    K 3.9 08/17/2022 08:45 AM    K 4.0 08/09/2022 11:57 AM     08/17/2022 08:45 AM    CO2 20 08/17/2022 08:45 AM    BUN 11 08/17/2022 08:45 AM    CREATININE 0.4 08/17/2022 08:45 AM    GFRAA >60 08/17/2022 08:45 AM    LABGLOM >60 08/17/2022 08:45 AM    GLUCOSE 167 08/17/2022 08:45 AM    PROT 6.0 08/10/2022 08:29 AM    LABALBU 3.4 08/10/2022 08:29 AM    CALCIUM 7.8 08/17/2022 08:45 AM    BILITOT 0.4 08/10/2022 08:29 AM ALKPHOS 72 08/10/2022 08:29 AM    AST 50 08/10/2022 08:29 AM    ALT 42 08/10/2022 08:29 AM     Warfarin PT/INR:    Lab Results   Component Value Date    INR 1.3 08/11/2022    PROTIME 13.8 (H) 08/11/2022       Assessment:    Principal Problem:    Weakness  Active Problems:    Altered mental status    Dysarthria    Fall    Hypertrophic cardiomyopathy ([address]    Supraventricular tachycardia ([address]    Carcinoma in situ of female breast, right    Primary hypertension    Moderate protein-calorie malnutrition (HCC)    PAF (paroxysmal atrial fibrillation) ([address]  Resolved Problems:    Viral meningitis      Plan:  Dc to rehab        Jaclyn Mariee DO  1:51 PM  8/19/2022

## 2022-08-19 NOTE — PROGRESS NOTES
Neuro Inpatient Follow Up Note       Leatha Escamilla is a 58 y.o. female     Neurology is following for weakness    Significant past medical history breast cancer, status post right lumpectomy, HLD, deafness, mild intellectual disability    Synopsis:  Patient was admitted with weakness and slurred speech. She woke up on 8/8 and fell when she tried to walk. She has a history of deafness and mild intellectual disability and lives with her mother--typically very energetic, oriented. .  A couple of hours following the fall she fell again and had slurred speech. SBP was over 200 on arrival.  Patient reportedly has mild speech impediment but the dysarthria was much worse than baseline. She was treated with chemotherapy and radiation for her breast cancer and has been on Arimidex since May 2022. Has a diffuse body rash. HPI:  Had MBSS done showed a moderate-severe oropharyngeal phase dysphagia. MRIs neuroaxis W contast showed no abnormal post contrast enhancement. ID following and suspects rash was an allergic reaction. 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 Monitoring off abx for now and following up CSF and serum HHV-6 quantitative PCR/viral load. They have signed off. Patient is mostly back to baseline. But patient's mother feels her legs are still weak and speech not back to baseline. She is being discharged soon. Ach receptor ab negative     VDRL and encephalitis panel still pending.      No new neuro sx     No chest pain or palpitations  No vertigo, lightheadedness or loss of consciousness  No falls, tripping or stumbling  No incontinence of bowels or bladder  No itching or bruising appreciated  No speech or swallowing troubles    ROS otherwise negative      Current Facility-Administered Medications   Medication Dose Route Frequency Provider Last Rate Last Admin    clotrimazole (MYCELEX) dayna 10 mg  10 mg Oral 5x Daily Carlos Johnson MD   10 mg at 08/19/22 3686    diphenhydrAMINE (BENADRYL) injection 25 mg  25 mg IntraVENous Q6H PRN Roseline Sorto MD   25 mg at 08/14/22 1345    barium sulfate 40 % suspension 15 mL  15 mL Oral ONCE PRN Angela Velez MD        barium sulfate 40 % paste 15 mL  15 mL Oral ONCE PRN Angela Velez MD        barium sulfate 40 % reconsitutable suspension  5 mL Oral ONCE PRN Angela Velez MD        diazePAM (VALIUM) injection 5 mg  5 mg IntraVENous See Admin Instructions Bailey Diaz DO   5 mg at 08/12/22 1929    metoprolol succinate (TOPROL XL) extended release tablet 12.5 mg  12.5 mg Oral Daily Fátima Roach MD   12.5 mg at 08/19/22 5554    aspirin EC tablet 81 mg  81 mg Oral Daily Bailey Diaz DO   81 mg at 08/19/22 1933    clopidogrel (PLAVIX) tablet 75 mg  75 mg Oral Daily Bailey Diaz DO   75 mg at 08/19/22 0622    perflutren lipid microspheres (DEFINITY) injection 1.65 mg  1.5 mL IntraVENous ONCE PRN Bailey Diaz DO        anastrozole (ARIMIDEX) tablet 1 mg  1 mg Oral Daily Bailey Diaz DO   1 mg at 08/19/22 2713    sodium chloride flush 0.9 % injection 5-40 mL  5-40 mL IntraVENous 2 times per day Bailey Diaz DO   10 mL at 08/19/22 7378    sodium chloride flush 0.9 % injection 5-40 mL  5-40 mL IntraVENous PRN Bailey Diaz DO   10 mL at 08/14/22 1827    0.9 % sodium chloride infusion   IntraVENous PRN Bailey Diaz DO        enoxaparin (LOVENOX) injection 40 mg  40 mg SubCUTAneous Daily ORLY Rivera CNP   40 mg at 08/19/22 0811    ondansetron (ZOFRAN-ODT) disintegrating tablet 4 mg  4 mg Oral Q8H PRN Bailey Diaz DO        Or    ondansetron [facility]) injection 4 mg  4 mg IntraVENous Q6H PRN Bailey Diaz DO        polyethylene glycol (GLYCOLAX) packet 17 g  17 g Oral Daily PRN Bailey Diaz DO        acetaminophen (TYLENOL) tablet 650 mg  650 mg Oral Q6H PRN Bailey Diaz DO   650 mg at 08/10/22 1027    Or    acetaminophen (TYLENOL) above.  Moderate to severe left neural foraminal stenosis at C5-6. MRI THORACIC SPINE:   No evidence of neoplastic disease. No significant spinal or neural foraminal stenosis     EEG 8/11: This was a normal study during the waking state. No seizures or epileptiform discharges were noted during this study. LP: WBC 5; protein 25  Meningitis/encephalitis panel +HHV-6      Lab Results   Component Value Date    CKTOTAL 159 08/13/2022     Pending labs/CSF:  VDRL  Autoimmune encephalitis reflex panel pending    MRI brain, cervical and lumbar w contrast - no abnormal post-contrast enhancement     All pertinent labs and images personally reviewed today    Assessment:     Dysarthria and AMS: in a pt with mild intellectual disability, chronic hearing loss, and hx breast cancer (on Arimidex). Her exam has improved, but not back to baseline per patient's mother. Her SBP was > 200 on admission and I question if her AMS and worsening speech was related to a HTN urgency. Does not appear to be infectious in etiology and ID is monitoring off abx and felt rash was an allergic reaction.  MRIs of the neuroaxis WWO contrast were unrevealing for metastatic disease     Family history of muscular dystrophy: CK negative    Plan:     Some labs still pending and will f/u results as OP     Discussed with her mother    Consider EMG OP if leg weakness continues after working with therapy     Okay to d/c from neuro- OP follow up     Will sign off, please call with questions     Eileen Bailon PA-C  10:35 AM  8/19/2022

## 2022-08-19 NOTE — PROGRESS NOTES
Department of Neurosurgery  Progress Note    CHIEF COMPLAINT: T8 lesion    SUBJECTIVE:  No acute events overnight. Patient remains dysarthric, denies any back pain. MRIs reviewed. REVIEW OF SYSTEMS :  Constitutional: Negative for chills and fever. Neurological: Negative for dizziness, tremors and speech change.      OBJECTIVE:   VITALS:  /87   Pulse (!) 102   Temp 97.6 °F (36.4 °C) (Skin)   Resp 16   Ht 4' 10\" (1.473 m)   Wt 116 lb (52.6 kg)   LMP 10/28/2021   SpO2 98%   BMI 24.24 kg/m²     PHYSICAL:  Alert and oriented x3  Dysarthric  EOMI  PERRLA  CN3-12 intact  VELOZ    DATA:  CBC:   Lab Results   Component Value Date/Time    WBC 10.9 08/16/2022 06:23 AM    RBC 3.92 08/16/2022 06:23 AM    HGB 11.2 08/16/2022 06:23 AM    HCT 34.1 08/16/2022 06:23 AM    MCV 87.0 08/16/2022 06:23 AM    MCH 28.6 08/16/2022 06:23 AM    MCHC 32.8 08/16/2022 06:23 AM    RDW 16.3 08/16/2022 06:23 AM     08/16/2022 06:23 AM    MPV 11.0 08/16/2022 06:23 AM     BMP:    Lab Results   Component Value Date/Time     08/17/2022 08:45 AM    K 3.9 08/17/2022 08:45 AM    K 4.0 08/09/2022 11:57 AM     08/17/2022 08:45 AM    CO2 20 08/17/2022 08:45 AM    BUN 11 08/17/2022 08:45 AM    LABALBU 3.4 08/10/2022 08:29 AM    CREATININE 0.4 08/17/2022 08:45 AM    CALCIUM 7.8 08/17/2022 08:45 AM    GFRAA >60 08/17/2022 08:45 AM    LABGLOM >60 08/17/2022 08:45 AM    GLUCOSE 167 08/17/2022 08:45 AM     PT/INR:    Lab Results   Component Value Date/Time    PROTIME 13.8 08/11/2022 03:41 PM    INR 1.3 08/11/2022 03:41 PM     PTT:  No results found for: APTT, PTT[APTT}    Current Inpatient Medications  Current Facility-Administered Medications: clotrimazole (MYCELEX) dayna 10 mg, 10 mg, Oral, 5x Daily  diphenhydrAMINE (BENADRYL) injection 25 mg, 25 mg, IntraVENous, Q6H PRN  barium sulfate 40 % suspension 15 mL, 15 mL, Oral, ONCE PRN  barium sulfate 40 % paste 15 mL, 15 mL, Oral, ONCE PRN  barium sulfate 40 % reconsitutable suspension, 5 mL, Oral, ONCE PRN  diazePAM (VALIUM) injection 5 mg, 5 mg, IntraVENous, See Admin Instructions  metoprolol succinate (TOPROL XL) extended release tablet 12.5 mg, 12.5 mg, Oral, Daily  aspirin EC tablet 81 mg, 81 mg, Oral, Daily  clopidogrel (PLAVIX) tablet 75 mg, 75 mg, Oral, Daily  perflutren lipid microspheres (DEFINITY) injection 1.65 mg, 1.5 mL, IntraVENous, ONCE PRN  anastrozole (ARIMIDEX) tablet 1 mg, 1 mg, Oral, Daily  sodium chloride flush 0.9 % injection 5-40 mL, 5-40 mL, IntraVENous, 2 times per day  sodium chloride flush 0.9 % injection 5-40 mL, 5-40 mL, IntraVENous, PRN  0.9 % sodium chloride infusion, , IntraVENous, PRN  enoxaparin (LOVENOX) injection 40 mg, 40 mg, SubCUTAneous, Daily  ondansetron (ZOFRAN-ODT) disintegrating tablet 4 mg, 4 mg, Oral, Q8H PRN **OR** ondansetron (ZOFRAN) injection 4 mg, 4 mg, IntraVENous, Q6H PRN  polyethylene glycol (GLYCOLAX) packet 17 g, 17 g, Oral, Daily PRN  acetaminophen (TYLENOL) tablet 650 mg, 650 mg, Oral, Q6H PRN **OR** acetaminophen (TYLENOL) suppository 650 mg, 650 mg, Rectal, Q6H PRN  midazolam PF (VERSED) injection 2 mg, 2 mg, IntraVENous, Once  atorvastatin (LIPITOR) tablet 10 mg, 10 mg, Oral, Nightly    Imagin2022 MRI Brain, Cervical, Thoracic, Lumbar   Impression       Brain MRI: Only post-contrast images of the brain are obtained. No focus of abnormal enhancement is detected. No mass lesion. No evidence   of osseous metastatic disease. Contrast enhanced cervical spine MRI: Only post-contrast images of cervical   spine is obtained. No focus of abnormal enhancement. No evidence of marrow infiltrative lesion. Thoracic spine MRI:       14 mm nonenhancing T1 and T2 hypointense lesion involving the left lateral   aspect of T8 vertebral body mostly consistent with a bone island. Treated   osseous metastatic disease could have a similar appearance.   However this   diagnosis is less likely as there are no other sites of osseous involvement   and there has been no treatment for osseous metastatic disease. .  No other   focus of abnormal marrow lesion is noted. No high-grade canal or foraminal stenosis. No nerve impingement. Posterior   disc protrusion at T5-T6 and T8-T9 as described       Spine lumbar spine MRI:       No focus of abnormal enhancement. No marrow infiltrative lesion. No   evidence of osseous metastatic disease. At L5-S1, right foraminal disc protrusion. At L4-L5, mild bilateral neural foraminal narrowing       RECOMMENDATIONS:   Unavailable             ASSESSMENT:   -Mary Ellen Garcia is a 59 y/o female who MRI shows bone island on T8, No signs of metastatic disease. PLAN:  -Continue current care  -Reviewed MRI-no signs of metastatic disease,No neurosurgical intervention.   -No need to follow up in clinic  -Please call with any questions or concerns. Electronically signed by Adeline Amaro PA-C on 8/19/2022 at 8:10 AM     Nsx Attending:    Patient was seen and examined by me with the team.  I personally reviewed all pertinent radiological images. I concur with Miss Figueroa's clinical assessment and plan. No new events ON. Remains with preserved power for me on my exam.  MRI benign for CA. Plan as above. Thank you so much for allowing us to participate in the care of this patient. I certify that I spent more than half of the total time on this encounter. NOTE: This report was transcribed using voice recognition software.  Every effort was made to ensure accuracy; however, inadvertent computerized transcription errors may be present

## 2022-08-19 NOTE — PROGRESS NOTES
Called report to [facility] to JABARI, questions answered. VSS. Will discharge via [facility].

## 2022-08-22 LAB
HHV 6 QUANT COPY/ML: <1000 CPY/ML
HHV 6 QUANT LOG COPY/ML: <3 LOG
HHV 6 SOURCE: NORMAL
HHV 6 TYPE: NORMAL
HUMAN HERPES VIRUS 6 DNA DET.: NOT DETECTED
Lab: NORMAL
REPORT: NORMAL
THIS TEST SENT TO: NORMAL

## 2022-08-24 NOTE — PROGRESS NOTES
Physician Progress Note      Derik Lynn  KOSTAS #:                  [id number]  :                       1960  ADMIT DATE:       2022 11:24 AM  DISCH DATE:        2022 2:16 PM  RESPONDING  PROVIDER #:        Korey Peña        QUERY TEXT:    Type of Encephalopathy: Please provide further specificity, if known. Clinical indicators include: altered mental status, acute, encephalopathy  Options provided:  -- Anoxic/hypoxic encephalopathy  -- Metabolic encephalopathy  -- Toxic encephalopathy  -- Hepatic encephalopathy  -- Hypertensive encephalopathy  -- Other - I will add my own diagnosis  -- Disagree - Not applicable / Not valid  -- Disagree - Clinically Unable to determine / Unknown        PROVIDER RESPONSE TEXT:    The patient has metabolic encephalopathy.       Electronically signed by:  Korey Peña 2022 9:38 AM

## 2022-08-26 LAB
HHV 6 QUANT COPY/ML: <1000 CPY/ML
HHV 6 QUANT LOG COPY/ML: <3 LOG
HHV 6 SOURCE: NORMAL
HHV 6 TYPE: NORMAL
HUMAN HERPES VIRUS 6 DNA DET.: NOT DETECTED
VDRL CSF SCREEN: NON REACTIVE
VDRL TITER CSF: NON REACTIVE
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
## 2022-09-28 ENCOUNTER — TELEPHONE (OUTPATIENT)
Dept: CASE MANAGEMENT | Age: 62
End: 2022-09-28

## 2022-09-28 ENCOUNTER — OFFICE VISIT (OUTPATIENT)
Dept: ONCOLOGY | Age: 62
End: 2022-09-28
Payer: COMMERCIAL

## 2022-09-28 ENCOUNTER — HOSPITAL ENCOUNTER (OUTPATIENT)
Dept: INFUSION THERAPY | Age: 62
Discharge: HOME | End: 2022-09-28

## 2022-09-28 VITALS
TEMPERATURE: 98.1 F | BODY MASS INDEX: 22.67 KG/M2 | DIASTOLIC BLOOD PRESSURE: 83 MMHG | HEIGHT: 58 IN | HEART RATE: 94 BPM | WEIGHT: 108 LBS | SYSTOLIC BLOOD PRESSURE: 147 MMHG | OXYGEN SATURATION: 100 %

## 2022-09-28 DIAGNOSIS — R29.898 WEAKNESS OF BOTH LOWER EXTREMITIES: Primary | ICD-10-CM

## 2022-09-28 PROCEDURE — 99213 OFFICE O/P EST LOW 20 MIN: CPT | Performed by: INTERNAL MEDICINE

## 2022-09-28 PROCEDURE — 99214 OFFICE O/P EST MOD 30 MIN: CPT | Performed by: INTERNAL MEDICINE

## 2022-09-28 PROCEDURE — 1036F TOBACCO NON-USER: CPT | Performed by: INTERNAL MEDICINE

## 2022-09-28 PROCEDURE — 3017F COLORECTAL CA SCREEN DOC REV: CPT | Performed by: INTERNAL MEDICINE

## 2022-09-28 PROCEDURE — G8427 DOCREV CUR MEDS BY ELIG CLIN: HCPCS | Performed by: INTERNAL MEDICINE

## 2022-09-28 PROCEDURE — G8420 CALC BMI NORM PARAMETERS: HCPCS | Performed by: INTERNAL MEDICINE

## 2022-09-28 RX ORDER — ANASTROZOLE 1 MG/1
1 TABLET ORAL DAILY
Qty: 90 TABLET | Refills: 3 | Status: SHIPPED | OUTPATIENT
Start: 2022-09-28 | End: 2022-11-02 | Stop reason: SDUPTHER

## 2022-09-28 NOTE — PROGRESS NOTES
[address] [facility] MEDICAL ONCOLOGY  [address] [place], [place] [address] [address]  Dept: [phone]  Loc: [phone]  Attending Progress Note      Reason for Visit: Right breast cancer. Referring Physician: Roosevelt Sellers MD    PCP:  Maira Barboza DO    History of Present Illness:     Carly Vasquez is a pleasant 72-year-old female patient, with a past medical history significant for hearing loss and hyperlipidemia, who had presented with an abnormal screening mammogram,  MAMMOGRAM ULTRASOUND BIOPSY; [facility]       9/14/2021: MAMMOGRAM, RIGHT BREAST ULTRASOUND: [facility]                9/14/2021: LEFT BREAST ULTRASOUND: [facility]    She underwent an US guided right breast core biopsy at 3 o'clock position on September 20 , 2021. 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 Preliminary Allan   score 3+2+1 = 6     Comment:   Tumor cells stain strongly positive for e-cadherin   immunostain. Intradepartmental consultation is obtained.      Breast Cancer Marker Studies:         Estrogen Receptors (ER):       -Positive (>10% of cells demonstrate nuclear positivity):       Percentage of cells positive: >90%       Intensity: Strong         Progesterone Receptors (SD):       -Positive:       Percentage of cells positive: 2%       Intensity: Weak         Her-2/marquise (c-erb B-2) protein expression: Negative (1+)     The patient underwent on 11/3/2021 right breast lumpectomy with axillary sentinel lymph node biopsy, pathology:    CANCER CASE SUMMARY   Procedure -excision   Specimen Laterality -right   Tumor Site, Invasive Carcinoma-3:00   Histologic Type-invasive carcinoma, no special type (ductal)   Histologic Grade ([place] Histologic Score):                    Tubule differentiation- score-3 Nuclear pleomorphism- score 2                    Mitotic rate- score 2                    Overall Grade- grade 2, (score 7)   Tumor Size: Size of Largest Invasive Carcinoma-30 mm   Ductal Carcinoma In Situ (DCIS)-not identified   Tumor Extent-carcinoma invades skeletal muscle   Treatment Effect in the Breast-no known presurgical therapy   Margin status for invasive carcinoma: Inferior margin involved by   invasive carcinoma. Posterior margin less than 1 mm from invasive   carcinoma. Regional Lymph Nodes: All regional lymph nodes negative for tumor   Total number of lymph nodes examined-1   Number of sentinel nodes examined-1   Pathologic Stage Classification (AJCC 8th Edition)- pT2 p(sn)N0   Additional Pathologic Findings-lymphovascular and perineural space   invasion   Special studies-ER positive, NV positive, HER-2 negative     The patient underwent on 12/1/2021 lumpectomy margin reexcision, was negative for residual carcinoma. Oncotype DX was done, recurrence score is 26, distant recurrence risk at 9 years with endocrine therapy alone is 16%, absolute chemotherapy benefit is greater than 15%. Patient was started on adjuvant chemotherapy with Taxotere and Cytoxan on 1/5/2022, she completed 4 cycles on 3/16/2022. She completed adjuvant RT on 5/18/2022, she was started on adjuvant endocrine therapy with Arimidex on 5/25/2022, tolerating it well,. Patient was admitted to the hospital and August 2022 after she collapsed, she had slurred speech, was weak and was having recurrent falls, she had extensive work-up done she was found to have on chest CTA from 8/9/2022 focal sclerotic lesion located in T8 vertebral body, she had an MRI done subsequently, that was negative, was seen by neurosurgery, work-up was negative for metastatic disease to the bones. Patient remains in the facility, she still has weakness of the legs, right greater than left. She is having physical therapy done.   She is accompanied by her mother. Review of Systems;  CONSTITUTIONAL: No fever, chills. Good appetite, for weakness. ENMT: Eyes: No diplopia; Nose: No epistaxis. Mouth: No sore throat. RESPIRATORY: No hemoptysis, shortness of breath, cough. CARDIOVASCULAR: No chest pain, palpitations. GASTROINTESTINAL: No nausea or vomiting at this time, no abdominal pain, diarrhea/constipation. GENITOURINARY: No dysuria, urinary frequency, hematuria. NEURO: As per HPI. Remainder:  ROS NEGATIVE    Past Medical History:      Diagnosis Date    Breast cancer ([address]     Right breast    Cancer ([address]     Hearing impaired     Hearing loss     Hyperlipidemia      Patient Active Problem List   Diagnosis    Cancer ([address]    Malignant neoplasm of upper-inner quadrant of right breast in female, estrogen receptor positive ([address]    Hearing loss    Hypercholesterolemia    Weakness    Altered mental status    Dysarthria    Hypertrophic cardiomyopathy ([address]    Supraventricular tachycardia ([address]    Carcinoma in situ of female breast, right    Primary hypertension    Moderate protein-calorie malnutrition (HCC)    PAF (paroxysmal atrial fibrillation) ([address]        Past Surgical History:      Procedure Laterality Date    BREAST LUMPECTOMY Right 11/3/2021    RIGHT BREAST LUMPECTOMY, BLUE DYE INJECTION, RIGHT AXILLARY SENTINEL LYMPH NODE INCISION performed by Hair Gaspar MD at [place] [address] LUMPECTOMY Right 12/1/2021    RE-EXCISION RIGHT BREAST INFERIOR MARGIN performed by Hair Gaspar MD at [facility]  2021       Family History:  Family History   Problem Relation Age of Onset    Heart Disease Mother     High Blood Pressure Mother     High Cholesterol Mother     Other Father         COPD    Dementia Maternal Grandmother     Cancer Maternal Cousin         colon       Medications:  Reviewed and reconciled.     Social History:  Social History     Socioeconomic History    Marital status: Single     Spouse name: Not on file Number of children: 0    Years of education: Not on file    Highest education level: Not on file   Occupational History    Not on file   Tobacco Use    Smoking status: Never    Smokeless tobacco: Never   Vaping Use    Vaping Use: Never used   Substance and Sexual Activity    Alcohol use: Never    Drug use: Never    Sexual activity: Not on file   Other Topics Concern    Not on file   Social History Narrative    Not on file     Social Determinants of Health     Financial Resource Strain: Not on file   Food Insecurity: Not on file   Transportation Needs: Not on file   Physical Activity: Not on file   Stress: Not on file   Social Connections: Not on file   Intimate Partner Violence: Not on file   Housing Stability: Not on file       Allergies:  No Known Allergies    Physical Exam:  BP (!) 147/83   Pulse 94   Temp 98.1 °F (36.7 °C)   Ht 4' 10\" (1.473 m)   Wt 108 lb (49 kg)   LMP 10/28/2021   SpO2 100%   BMI 22.57 kg/m²   GENERAL: Alert, oriented x 3, not in acute distress. HEENT: PERRLA; EOMI. Oropharynx clear. NECK: Supple. No palpable cervical or supraclavicular lymphadenopathy. LUNGS: Good air entry bilaterally. No wheezing, crackles or rhonchi. BREASTS: The left breast exam is negative for any skin changes, no nipple discharge, no palpable masses, no palpable left axillary adenopathy, right breast exam is remarkable for a scar from her lumpectomy, mild radiation changes, no nipple discharge, no palpable mass, no palpable right axilla lymphadenopathy. CARDIOVASCULAR: Regular rhythm, tachycardic. ABDOMEN: Soft. Non-tender, non-distended. Positive bowel sounds. EXTREMITIES: Without clubbing, cyanosis, or edema. NEUROLOGIC: As of the lower extremities, right greater than left.     ECOG PS 2      Impression/Plan:    Blossom Mckeon is a pleasant 58-year-old female patient, with a past medical history significant for hearing loss and hyperlipidemia, who had presented with an abnormal screening mammogram, she was diagnosed with a right breast invasive ductal carcinoma, she underwent on 11/3/2021 right breast lumpectomy with axillary sentinel lymph node biopsy, tumor size was 3 cm, grade 2, carcinoma invaded skeletal muscle, inferior margin was positive, posterior margin was less than 1 mm from invasive carcinoma, she had margins reexcision done, was negative for residual carcinoma, 1 sentinel lymph node was removed, was negative for metastatic disease, final pathologic stage pT2 p(sn)N0, ER positive greater than 90%, MT +2%, HER-2/marquise negative, 1+ by IHC, Oncotype DX was done, recurrence score is 26, risk of distant recurrence at 9 years with endocrine therapy alone is 16%, chemotherapy benefit is greater than 15%. I discussed with the patient and her mother her diagnosis, characteristics of her tumor, prognosis and recommendations for treatment, adjuvant endocrine therapy with an AI is recommended, the side effects of the Arimidex were reviewed with the patient. We reviewed the Oncotype DX results, recurrence score is 26, adjuvant chemotherapy is recommended, benefit is greater than 15%, I discussed with the patient TC regimen, the side effects and the schedule were reviewed with her. The patient was started on adjuvant chemotherapy with Taxotere and Cytoxan on 1/5/2022, she completed 4 cycles on 3/16/2022. The patient completed adjuvant radiation therapy on5/18/2022. I discussed with the patient adjuvant endocrine therapy, Arimidex, was on 5/25/2022, she is tolerating it well, continue Arimidex. Patient was admitted to the hospital and August 2022 after she collapsed, she had slurred speech, was weak and was having recurrent falls, she had extensive work-up done she was found to have on chest CTA from 8/9/2022 focal sclerotic lesion located in T8 vertebral body, she had an MRI done subsequently, that was negative, was seen by neurosurgery, work-up was negative for metastatic disease to the bones. Patient remains in the facility, she still has weakness of the legs, right greater than left. She is having physical therapy done. We will refer the patient to neurology, as she still has leg weakness. DEXA scan was done on 4/20/2022, revealing osteopenia, continue calcium and vitamin D. I discussed with the patient importance of monthly BSE and routine screening mammograms. She was not able to have the mammogram done due to the hospitalization, the order was given to the patient to have the facility scheduled late and arrange for transportation. The surveillance guidelines were reviewed with the patient and her mother. RTC in 1 month. Thank you for allowing us to participate in the care of Ms. Guzman.     Gerald Peña MD   HEMATOLOGY/MEDICAL [address] MEDICAL ONCOLOGY  [address] [place] [id number]  Dept: [phone]: [phone]

## 2022-09-28 NOTE — TELEPHONE ENCOUNTER
Met with patient during her follow up appointment with Dr. Colette Johnson today. Patient completed her active treatment for her right breast cancer. States that she is doing well besides some mild fatigue. Reports that her appetite is good and she is sleeping well. Upon inquiring, states that she is doing well with the Arimidex medication daily. Denies any hot flashes or joint pain side effects. Per patient and her daughter, the patient is currently having a very hard time at the nursing home she is staying at. They are very displeased with her care. I gave them our 's card and offered assistance, but they state they are worn out and will call when they are ready for help. Reviewed daily Calcium and Vitamin D supplement recommendations and NCCN DEXA Scan guidelines while on hormone therapy for breast cancer. Provided support and encouragement. Instructed patient in detail on her Cancer Treatment Summary and Survivorship Care Plan. Provided with a written copy. Aware that a copy will be sent to her PCP as well. Provided written copies of Patient Resource Booklet: Cancer Survivorship, Nutrition Following Cancer Treatment: Oncology Nutrition Guide, [facility] support group pamphlet, and Thriving and Surviving Post-Cancer Treatment: Nutritional and Emotional Support Services packet. Patient aware of mammogram due annually. Instructed patient to call with any questions or concerns. Verbally agreed. Patient appreciative of visit and information.  Ita THAO, RN, Oncology Nurse Navigator

## 2022-09-29 NOTE — DISCHARGE SUMMARY
[address]                  [address] [facility],  [facility]                               DISCHARGE SUMMARY    PATIENT NAME: Kush Mayer                  :        1960  MED REC NO:   [id number]                            ROOM:       [id number]  ACCOUNT NO:   [de-identified]                           ADMIT DATE: 2022  PROVIDER:     Gradie Landau, DO                  DISCHARGE DATE:  2022    DISCHARGE DIAGNOSES:  1. Dysarthria. 2.  Dysphagia. 3.  Lesion of thoracic spine. 4.  Hard of hearing. 5.  Breast CA. 6.  Hyperlipidemia. 7.  Lung nodule. 8.  Hypertrophic cardiomyopathy. HOSPITAL COURSE:  The patient is a 80-year-old  female who  presented to the emergency room complaining of weakness, slurred speech,  recurrent falls. Diagnostic evaluation revealed a lesion of the  thoracic spine. She was admitted to the hospital.  She was seen by  Neurology, Neurosurgery, Pulmonary and Infectious Disease. She had  significant diagnostic evaluation including EEG, echocardiogram, MRI,  lumbar puncture. No specific diagnosis was found. She had dysphagia  with abnormal swallow eval, requiring modified texture diet. Her status  slowly improved during the hospital stay, and she was eventually  discharged to rehab in stable condition on 2022. DISCHARGE MEDICATIONS:  As per discharge med rec which include,  1. Aspirin. 2.  Plavix per Neurology. 3.  Metoprolol succinate. 4.  Tylenol p.r.n.  5.  Loratadine. 6.  Simvastatin. 7.  Vitamin D3. DISCHARGE INSTRUCTIONS:  The patient would require surveillance of lung  nodule by Pulmonary as an outpatient as per surveillance guidelines.         Arya Guan DO    D: 2022 09:53:03       T: 2022 20:58:55     [id number]  Job#: [id number]     Doc#: [id number]    CC:

## 2022-11-02 ENCOUNTER — OFFICE VISIT (OUTPATIENT)
Dept: ONCOLOGY | Age: 62
End: 2022-11-02
Payer: COMMERCIAL

## 2022-11-02 ENCOUNTER — HOSPITAL ENCOUNTER (OUTPATIENT)
Dept: INFUSION THERAPY | Age: 62
Discharge: HOME | End: 2022-11-02

## 2022-11-02 ENCOUNTER — TELEPHONE (OUTPATIENT)
Dept: INFUSION THERAPY | Age: 62
End: 2022-11-02

## 2022-11-02 ENCOUNTER — TELEPHONE (OUTPATIENT)
Dept: ONCOLOGY | Age: 62
End: 2022-11-02

## 2022-11-02 VITALS
HEIGHT: 58 IN | DIASTOLIC BLOOD PRESSURE: 83 MMHG | WEIGHT: 105.8 LBS | RESPIRATION RATE: 18 BRPM | OXYGEN SATURATION: 98 % | HEART RATE: 98 BPM | SYSTOLIC BLOOD PRESSURE: 138 MMHG | BODY MASS INDEX: 22.21 KG/M2 | TEMPERATURE: 99.2 F

## 2022-11-02 DIAGNOSIS — R29.898 WEAKNESS OF BOTH LOWER EXTREMITIES: ICD-10-CM

## 2022-11-02 DIAGNOSIS — Z17.0 MALIGNANT NEOPLASM OF UPPER-INNER QUADRANT OF RIGHT BREAST IN FEMALE, ESTROGEN RECEPTOR POSITIVE (HCC): Primary | ICD-10-CM

## 2022-11-02 DIAGNOSIS — C50.211 MALIGNANT NEOPLASM OF UPPER-INNER QUADRANT OF RIGHT BREAST IN FEMALE, ESTROGEN RECEPTOR POSITIVE (HCC): Primary | ICD-10-CM

## 2022-11-02 PROCEDURE — 3074F SYST BP LT 130 MM HG: CPT | Performed by: INTERNAL MEDICINE

## 2022-11-02 PROCEDURE — G8420 CALC BMI NORM PARAMETERS: HCPCS | Performed by: INTERNAL MEDICINE

## 2022-11-02 PROCEDURE — G8427 DOCREV CUR MEDS BY ELIG CLIN: HCPCS | Performed by: INTERNAL MEDICINE

## 2022-11-02 PROCEDURE — 99212 OFFICE O/P EST SF 10 MIN: CPT

## 2022-11-02 PROCEDURE — 3017F COLORECTAL CA SCREEN DOC REV: CPT | Performed by: INTERNAL MEDICINE

## 2022-11-02 PROCEDURE — G8484 FLU IMMUNIZE NO ADMIN: HCPCS | Performed by: INTERNAL MEDICINE

## 2022-11-02 PROCEDURE — 3078F DIAST BP <80 MM HG: CPT | Performed by: INTERNAL MEDICINE

## 2022-11-02 PROCEDURE — 1036F TOBACCO NON-USER: CPT | Performed by: INTERNAL MEDICINE

## 2022-11-02 PROCEDURE — 99214 OFFICE O/P EST MOD 30 MIN: CPT | Performed by: INTERNAL MEDICINE

## 2022-11-02 RX ORDER — ANASTROZOLE 1 MG/1
1 TABLET ORAL DAILY
Qty: 90 TABLET | Refills: 3 | Status: SHIPPED | OUTPATIENT
Start: 2022-11-02

## 2022-11-02 NOTE — TELEPHONE ENCOUNTER
Met with pt and pt's mother at request of cancer center staff in conjunction with medical oncology visit. Pt is 77-year-old female being managed for breast cancer. Pt's mood appeared euthymic with full affect, she appeared A&Ox4, and she was willing/able to participate in session. She appeared appropriately dressed/groomed and was seated in a wheelchair. Pt and pt's mother discussed pt's recent hospitalization and SNF stay following collapse at home. Pt's mother expressed frustration due to not knowing cause of collapse and pt's functional decline. Noted that she is having extreme difficulty caring for pt in the home due to her own functional and medical issues. She did indicated that pt will be starting with home PT on following date and does have upcoming appointment with PCP. Encouraged pt to discuss adding bath aide services to in-home care. [facility] and pt provided with contact number for follow up. Pt's mother confirms that they do have adequate DME to assist with maintaining safety at this time. No additional needs identified at this time. Reviewed role of oncology SW and encouraged pt to notify this provider if additional needs arise.     Oliver Alvarez, MSW, BEBEW-S  Oncology Social Worker

## 2022-11-02 NOTE — PROGRESS NOTES
[address] [facility] MEDICAL ONCOLOGY  [address] [place], [place] [address] [address]  Dept: [phone]  Loc: [phone]  Attending Progress Note      Reason for Visit: Right breast cancer. Referring Physician: Sophie Burciaga MD    PCP:  Valentín Ricks DO    History of Present Illness:     Karla Veronica is a pleasant 66-year-old female patient, with a past medical history significant for hearing loss and hyperlipidemia, who had presented with an abnormal screening mammogram,  MAMMOGRAM ULTRASOUND BIOPSY; [facility]       9/14/2021: MAMMOGRAM, RIGHT BREAST ULTRASOUND: [facility]                9/14/2021: LEFT BREAST ULTRASOUND: [facility]    She underwent an US guided right breast core biopsy at 3 o'clock position on September 20 , 2021. Pathological evaluation completed at THE [facility]:  PATHOLOGY  Diagnosis:   Right breast, 3 o'clock position, core biopsies: Invasive carcinoma of no   special type (ductal with lobular features). Preliminary [place]   score 3+2+1 = 6     Comment:   Tumor cells stain strongly positive for e-cadherin   immunostain. Intradepartmental consultation is obtained.      Breast Cancer Marker Studies:         Estrogen Receptors (ER):       -Positive (>10% of cells demonstrate nuclear positivity):       Percentage of cells positive: >90%       Intensity: Strong         Progesterone Receptors (KS):       -Positive:       Percentage of cells positive: 2%       Intensity: Weak         Her-2/marquise (c-erb B-2) protein expression: Negative (1+)     The patient underwent on 11/3/2021 right breast lumpectomy with axillary sentinel lymph node biopsy, pathology:    CANCER CASE SUMMARY   Procedure -excision   Specimen Laterality -right   Tumor Site, Invasive Carcinoma-3:00   Histologic Type-invasive carcinoma, no special type (ductal)   Histologic Grade ([place] Histologic Score):                    Tubule differentiation- score-3 Nuclear pleomorphism- score 2                    Mitotic rate- score 2                    Overall Grade- grade 2, (score 7)   Tumor Size: Size of Largest Invasive Carcinoma-30 mm   Ductal Carcinoma In Situ (DCIS)-not identified   Tumor Extent-carcinoma invades skeletal muscle   Treatment Effect in the Breast-no known presurgical therapy   Margin status for invasive carcinoma: Inferior margin involved by   invasive carcinoma. Posterior margin less than 1 mm from invasive   carcinoma. Regional Lymph Nodes: All regional lymph nodes negative for tumor   Total number of lymph nodes examined-1   Number of sentinel nodes examined-1   Pathologic Stage Classification (AJCC 8th Edition)- pT2 p(sn)N0   Additional Pathologic Findings-lymphovascular and perineural space   invasion   Special studies-ER positive, CT positive, HER-2 negative     The patient underwent on 12/1/2021 lumpectomy margin reexcision, was negative for residual carcinoma. Oncotype DX was done, recurrence score is 26, distant recurrence risk at 9 years with endocrine therapy alone is 16%, absolute chemotherapy benefit is greater than 15%. Patient was started on adjuvant chemotherapy with Taxotere and Cytoxan on 1/5/2022, she completed 4 cycles on 3/16/2022. She completed adjuvant RT on 5/18/2022, she was started on adjuvant endocrine therapy with Arimidex on 5/25/2022, tolerating it well,. Patient was admitted to the hospital and August 2022 after she collapsed, she had slurred speech, was weak and was having recurrent falls, she had extensive work-up done she was found to have on chest CTA from 8/9/2022 focal sclerotic lesion located in T8 vertebral body, she had an MRI done subsequently, that was negative, was seen by neurosurgery, work-up was negative for metastatic disease to the bones. The patient was discharged home, she started physical therapy, still has weakness of the legs, right greater than left. Uses the wheelchair.   Taking the Arimidex, no reported side effects. She did not see neurology in follow-up yet. Review of Systems;  CONSTITUTIONAL: No fever, chills. Good appetite, for weakness. ENMT: Eyes: No diplopia; Nose: No epistaxis. Mouth: No sore throat. RESPIRATORY: No hemoptysis, shortness of breath, cough. CARDIOVASCULAR: No chest pain, palpitations. GASTROINTESTINAL: No nausea or vomiting at this time, no abdominal pain, diarrhea/constipation. GENITOURINARY: No dysuria, urinary frequency, hematuria. NEURO: As per HPI. Remainder:  ROS NEGATIVE    Past Medical History:      Diagnosis Date    Breast cancer ([address]     Right breast    Cancer ([address]     Hearing impaired     Hearing loss     Hyperlipidemia      Patient Active Problem List   Diagnosis    Cancer ([address]    Malignant neoplasm of upper-inner quadrant of right breast in female, estrogen receptor positive ([address]    Hearing loss    Hypercholesterolemia    Weakness    Altered mental status    Dysarthria    Hypertrophic cardiomyopathy ([address]    Supraventricular tachycardia ([address]    Carcinoma in situ of female breast, right    Primary hypertension    Moderate protein-calorie malnutrition (HCC)    PAF (paroxysmal atrial fibrillation) ([address]        Past Surgical History:      Procedure Laterality Date    BREAST LUMPECTOMY Right 11/3/2021    RIGHT BREAST LUMPECTOMY, BLUE DYE INJECTION, RIGHT AXILLARY SENTINEL LYMPH NODE INCISION performed by Chapo Bueno MD at [place] [address] LUMPECTOMY Right 12/1/2021    RE-EXCISION RIGHT BREAST INFERIOR MARGIN performed by Chapo Bueno MD at [facility]  2021       Family History:  Family History   Problem Relation Age of Onset    Heart Disease Mother     High Blood Pressure Mother     High Cholesterol Mother     Other Father         COPD    Dementia Maternal Grandmother     Cancer Maternal Cousin         colon       Medications:  Reviewed and reconciled.     Social History:  Social History Socioeconomic History    Marital status: Single     Spouse name: Not on file    Number of children: 0    Years of education: Not on file    Highest education level: Not on file   Occupational History    Not on file   Tobacco Use    Smoking status: Never    Smokeless tobacco: Never   Vaping Use    Vaping Use: Never used   Substance and Sexual Activity    Alcohol use: Never    Drug use: Never    Sexual activity: Not on file   Other Topics Concern    Not on file   Social History Narrative    Not on file     Social Determinants of Health     Financial Resource Strain: Not on file   Food Insecurity: Not on file   Transportation Needs: Not on file   Physical Activity: Not on file   Stress: Not on file   Social Connections: Not on file   Intimate Partner Violence: Not on file   Housing Stability: Not on file       Allergies:  No Known Allergies    Physical Exam:  /83   Pulse 98   Temp 99.2 °F (37.3 °C)   Resp 18   Ht 4' 10\" (1.473 m)   Wt 105 lb 12.8 oz (48 kg)   LMP 10/28/2021   SpO2 98%   BMI 22.11 kg/m²   GENERAL: Alert, oriented x 3, not in acute distress. HEENT: PERRLA; EOMI. Oropharynx clear. NECK: Supple. No palpable cervical or supraclavicular lymphadenopathy. LUNGS: Good air entry bilaterally. No wheezing, crackles or rhonchi. BREASTS: The left breast exam is negative for any skin changes, no nipple discharge, no palpable masses, no palpable left axillary adenopathy, right breast exam is remarkable for a scar from her lumpectomy, mild radiation changes, no nipple discharge, no palpable mass, no palpable right axilla lymphadenopathy. CARDIOVASCULAR: Regular rhythm, tachycardic. ABDOMEN: Soft. Non-tender, non-distended. Positive bowel sounds. EXTREMITIES: Without clubbing, cyanosis, or edema. NEUROLOGIC: Weakness of the lower extremities, right greater than left.   ECOG PS 3      Impression/Plan:    Maria Guadalupe Hallman is a pleasant 49-year-old female patient, with a past medical history significant for hearing loss and hyperlipidemia, who had presented with an abnormal screening mammogram, she was diagnosed with a right breast invasive ductal carcinoma, she underwent on 11/3/2021 right breast lumpectomy with axillary sentinel lymph node biopsy, tumor size was 3 cm, grade 2, carcinoma invaded skeletal muscle, inferior margin was positive, posterior margin was less than 1 mm from invasive carcinoma, she had margins reexcision done, was negative for residual carcinoma, 1 sentinel lymph node was removed, was negative for metastatic disease, final pathologic stage pT2 p(sn)N0, ER positive greater than 90%, AR +2%, HER-2/marquise negative, 1+ by IHC, Oncotype DX was done, recurrence score is 26, risk of distant recurrence at 9 years with endocrine therapy alone is 16%, chemotherapy benefit is greater than 15%. I discussed with the patient and her mother her diagnosis, characteristics of her tumor, prognosis and recommendations for treatment, adjuvant endocrine therapy with an AI is recommended, the side effects of the Arimidex were reviewed with the patient. We reviewed the Oncotype DX results, recurrence score is 26, adjuvant chemotherapy is recommended, benefit is greater than 15%, I discussed with the patient TC regimen, the side effects and the schedule were reviewed with her. The patient was started on adjuvant chemotherapy with Taxotere and Cytoxan on 1/5/2022, she completed 4 cycles on 3/16/2022. The patient completed adjuvant radiation therapy on5/18/2022. I discussed with the patient adjuvant endocrine therapy, Arimidex, was on 5/25/2022, she is tolerating it well, continue Arimidex.   Patient was admitted to the hospital and August 2022 after she collapsed, she had slurred speech, was weak and was having recurrent falls, she had extensive work-up done she was found to have on chest CTA from 8/9/2022 focal sclerotic lesion located in T8 vertebral body, she had an MRI done subsequently, that was negative, was seen by neurosurgery, work-up was negative for metastatic disease to the bones. Patient remains in the facility, she still has weakness of the legs, right greater than left. She is having physical therapy done. Patient was referred to neurology, will call the office to get her scheduled. DEXA scan was done on 4/20/2022, revealing osteopenia, continue calcium and vitamin D. I discussed with the patient importance of monthly BSE and routine screening mammograms. She was not able to have the mammogram done due to the hospitalization, will get it scheduled when able. Chest CT for follow-up of the lung nodule to be done in February 2023 and follow-up with pulmonary after. The surveillance guidelines were reviewed with the patient and her mother. RTC in 3 months. Thank you for allowing us to participate in the care of Ms. Guzman.     Marla Ortiz MD   HEMATOLOGY/MEDICAL [address] MEDICAL ONCOLOGY  [address] [place] [id number]  Dept: [phone] [address]: [phone]

## 2022-11-02 NOTE — TELEPHONE ENCOUNTER
Pts mother, Natalie Avalos, was notified that this nurse made pt an appointment per [facility] EMERGENCY MEDICAL [place] request with SELENE Armas with neurology on 12/13/22 at 1400 at [address] [facility] in [place] phone number is [phone] is to bring license and insurance Pepe Ching voiced understanding of all instructions-ALBERT Barfield RN

## 2022-12-08 RX ORDER — ANASTROZOLE 1 MG/1
TABLET ORAL
Qty: 30 TABLET | Refills: 3 | Status: SHIPPED | OUTPATIENT
Start: 2022-12-08

## 2022-12-13 ENCOUNTER — OFFICE VISIT (OUTPATIENT)
Dept: NEUROLOGY | Age: 62
End: 2022-12-13
Payer: COMMERCIAL

## 2022-12-13 VITALS
OXYGEN SATURATION: 97 % | SYSTOLIC BLOOD PRESSURE: 142 MMHG | BODY MASS INDEX: 22.88 KG/M2 | WEIGHT: 109 LBS | DIASTOLIC BLOOD PRESSURE: 86 MMHG | HEIGHT: 58 IN | TEMPERATURE: 98.8 F | HEART RATE: 88 BPM

## 2022-12-13 DIAGNOSIS — R20.0 NUMBNESS AND TINGLING OF BOTH LEGS: Primary | ICD-10-CM

## 2022-12-13 DIAGNOSIS — R20.0 NUMBNESS AND TINGLING OF BOTH LEGS: ICD-10-CM

## 2022-12-13 DIAGNOSIS — R20.2 NUMBNESS AND TINGLING OF BOTH LEGS: Primary | ICD-10-CM

## 2022-12-13 DIAGNOSIS — R26.89 IMPAIRMENT OF BALANCE: ICD-10-CM

## 2022-12-13 DIAGNOSIS — R20.2 NUMBNESS AND TINGLING OF BOTH LEGS: ICD-10-CM

## 2022-12-13 LAB
FOLATE: 13.5 NG/ML (ref 4.8–24.2)
VITAMIN B-12: 325 PG/ML (ref 211–946)

## 2022-12-13 PROCEDURE — 3078F DIAST BP <80 MM HG: CPT | Performed by: PHYSICIAN ASSISTANT

## 2022-12-13 PROCEDURE — 1036F TOBACCO NON-USER: CPT | Performed by: PHYSICIAN ASSISTANT

## 2022-12-13 PROCEDURE — G8427 DOCREV CUR MEDS BY ELIG CLIN: HCPCS | Performed by: PHYSICIAN ASSISTANT

## 2022-12-13 PROCEDURE — 3017F COLORECTAL CA SCREEN DOC REV: CPT | Performed by: PHYSICIAN ASSISTANT

## 2022-12-13 PROCEDURE — 3074F SYST BP LT 130 MM HG: CPT | Performed by: PHYSICIAN ASSISTANT

## 2022-12-13 PROCEDURE — G8484 FLU IMMUNIZE NO ADMIN: HCPCS | Performed by: PHYSICIAN ASSISTANT

## 2022-12-13 PROCEDURE — 99204 OFFICE O/P NEW MOD 45 MIN: CPT | Performed by: PHYSICIAN ASSISTANT

## 2022-12-13 PROCEDURE — G8420 CALC BMI NORM PARAMETERS: HCPCS | Performed by: PHYSICIAN ASSISTANT

## 2022-12-13 NOTE — PROGRESS NOTES
[facility] Neurology - office visit, hospital follow up      Thony Rolon is a 58 y.o. female     She presents for follow up of hospital admission in 8/2022     Significant past medical history breast cancer, status post right lumpectomy, HLD, deafness, mild intellectual disability    Synopsis:  Patient was admitted with weakness and slurred speech. She woke up on 8/8 and fell when she tried to walk. She has a history of deafness and mild intellectual disability and lives with her mother--typically very energetic, oriented. .  A couple of hours following the fall she fell again and had slurred speech. SBP was over 200 on arrival.  Patient reportedly has mild speech impediment but the dysarthria was much worse than baseline. She was treated with chemotherapy and radiation for her breast cancer and has been on Arimidex since May 2022. Extensive work-up was completed including MRI of the neural axis with and without contrast, lumbar puncture as well as various miscellaneous handouts. Work-up was largely unrevealing. CSF did come back with HHV-6, however ID was not impressed with this and serum viral load came back negative. Interval history  Since leaving the hospital her speech and swallowing has returned to baseline. She does not have any weakness in the legs but her walking has not returned. Her mother states that she is a very off balanced and hesitant with her walking. She does work with therapy. While in the nursing home she was noted to have cerumen impaction in both ears. After having this resolved she does report some minimal improvement in her balance difficulties. She does continue on aspirin and statin for secondary stroke prevention. BP minimally elevated at the office today.       No chest pain or palpitations  No vertigo, lightheadedness or loss of consciousness  No falls, tripping or stumbling  No incontinence of bowels or bladder  No itching or bruising appreciated  No speech or swallowing troubles    ROS otherwise negative      Current Outpatient Medications   Medication Sig Dispense Refill    anastrozole (ARIMIDEX) 1 MG tablet Take 1 tablet by mouth daily Start on on 5/25 30 tablet 3    calcium citrate-vitamin D (CITRICAL + D) 315-250 MG-UNIT TABS per tablet Take 1 tablet by mouth 2 times daily (with meals)      aspirin 81 MG EC tablet Take 1 tablet by mouth daily 30 tablet 3    metoprolol succinate (TOPROL XL) 25 MG extended release tablet Take 0.5 tablets by mouth daily 30 tablet 3    simvastatin (ZOCOR) 20 MG tablet Take 20 mg by mouth nightly       Cholecalciferol (VITAMIN D3) 50 MCG (2000 UT) CAPS Take by mouth daily       loratadine (CLARITIN) 10 MG capsule Take 10 mg by mouth daily      acetaminophen (TYLENOL) 500 MG tablet Take 500 mg by mouth every 6 hours as needed for Pain       clopidogrel (PLAVIX) 75 MG tablet Take 1 tablet by mouth daily for 20 doses 20 tablet 0     No current facility-administered medications for this visit. Objective:     BP (!) 142/86   Pulse 88   Temp 98.8 °F (37.1 °C) (Temporal)   Ht 4' 10\" (1.473 m)   Wt 109 lb (49.4 kg)   LMP 10/28/2021   SpO2 97%   BMI 22.78 kg/m²     General appearance: alert, appears stated age, cooperative no distress today  Head: Normocephalic atraumatic  Eyes: conjunctivae/corneas clear. .  Neck: Trachea midline. Supple   Lungs: Normal effort on room air   Extremities: normal, atraumatic, no cyanosis or edema  Skin: Diffuse rash, mainly truncal and abdominal      Mental Status: alert, oriented to person, place, year, and family.       Good attention/concentration  Evidence of mild intellectual delay--follows all commands  Speech: Mildly Dysarthric--hyponasal/deaf speech  Language: laconic     Cranial Nerves:  I: smell    II: visual acuity     II: visual fields Full to finger counting   II: pupils JALEN   III,VII: ptosis None   III,IV,VI: extraocular muscles  Full ROM   V: mastication Normal   V: facial light touch sensation     V,VII: corneal reflex     VII: facial muscle function - upper  Normal   VII: facial muscle function - lower Normal   VIII: hearing Nearly deaf-has bilateral hearing aids   IX: soft palate elevation  Normal   IX,X: gag reflex    XI: trapezius strength  5/5   XI: sternocleidomastoid strength 5/5   XI: neck extension strength  5/5   XII: tongue strength  Normal     Motor:  5/5 throughout  Normal bulk  No abnormal movements    Sensory:  Appreciates LT in all limbs  Pinprick normal in all limbs  Vibration moderately decreased at the ankles bilaterally and mild to moderately at the knees    Coordination:  FFM and FN symmetrical     DTR:     +2 throughout    No pathological reflexes    Laboratory/Radiology:     CBC with Differential:    Lab Results   Component Value Date/Time    WBC 10.9 08/16/2022 06:23 AM    RBC 3.92 08/16/2022 06:23 AM    HGB 11.2 08/16/2022 06:23 AM    HCT 34.1 08/16/2022 06:23 AM     08/16/2022 06:23 AM    MCV 87.0 08/16/2022 06:23 AM    MCH 28.6 08/16/2022 06:23 AM    MCHC 32.8 08/16/2022 06:23 AM    RDW 16.3 08/16/2022 06:23 AM    METASPCT 5.0 01/12/2022 03:16 PM    LYMPHOPCT 4.3 08/09/2022 11:57 AM    PROMYELOPCT 8.0 01/12/2022 03:16 PM    MONOPCT 2.6 08/09/2022 11:57 AM    MYELOPCT 3.0 01/12/2022 03:16 PM    BASOPCT 0.9 08/09/2022 11:57 AM    MONOSABS 0.16 08/09/2022 11:57 AM    LYMPHSABS 0.22 08/09/2022 11:57 AM    EOSABS 0.00 08/09/2022 11:57 AM    BASOSABS 0.05 08/09/2022 11:57 AM     CMP:    Lab Results   Component Value Date/Time     08/17/2022 08:45 AM    K 3.9 08/17/2022 08:45 AM    K 4.0 08/09/2022 11:57 AM     08/17/2022 08:45 AM    CO2 20 08/17/2022 08:45 AM    BUN 11 08/17/2022 08:45 AM    CREATININE 0.4 08/17/2022 08:45 AM    GFRAA >60 08/17/2022 08:45 AM    LABGLOM >60 08/17/2022 08:45 AM    GLUCOSE 167 08/17/2022 08:45 AM    PROT 6.0 08/10/2022 08:29 AM    LABALBU 3.4 08/10/2022 08:29 AM    CALCIUM 7.8 08/17/2022 08:45 AM    BILITOT 0.4 08/10/2022 08:29 AM    ALKPHOS 72 08/10/2022 08:29 AM    AST 50 08/10/2022 08:29 AM    ALT 42 08/10/2022 08:29 AM     TSH:    Lab Results   Component Value Date/Time    TSH 0.644 08/10/2022 08:29 AM     Lyme- negative     HIV- non reactive     LILIANA- negative     Ach receptor ab- negative     MRI BRAIN:   No evidence of neoplastic disease. No evidence of recent infarct, acute intracranial hemorrhage, mass effect, or hydrocephalus. MRA HEAD:   No proximal large vessel arterial occlusion or high-grade stenosis. MRI CERVICAL SPINE:   No evidence of neoplastic disease. Mild to moderate multilevel spinal stenosis secondary to a congenitally narrow cervical spinal canal, as described above. Moderate to severe left neural foraminal stenosis at C5-6. MRI THORACIC SPINE:   No evidence of neoplastic disease. No significant spinal or neural foraminal stenosis     EEG 8/11: This was a normal study during the waking state. No seizures or epileptiform discharges were noted during this study. LP: WBC 5; protein 25  Meningitis/encephalitis panel +HHV-6      Lab Results   Component Value Date    CKTOTAL 159 08/13/2022     VDRL- negative   Autoimmune encephalitis reflex panel pending- negative     MRI brain, cervical and lumbar w contrast - no abnormal post-contrast enhancement     All pertinent labs and images personally reviewed today    Assessment:     Dysarthria and AMS: Extensive work-up in August 2022 largely unrevealing. Dysarthria and dysphagia have returned to baseline. Suspect these symptoms were related to hypertensive urgency with systolic blood pressure greater than 200 on admission. These issues have not returned. Balance and gait impairment with evidence of decreased vibration at the ankles and knees on exam, I question underlying neuropathy. TSH, A1c, LILIANA, ESR and CRP were unrevealing when checked in the hospital in August.  I will send B12, folate, SPEP and obtain EMG at this time.   Fall precautions advised.   Recommend continue working with therapy    Family history of muscular dystrophy: CK negative    Plan:     Labs    EMG    Discussed extensively with family today     Return to office 2 months or sooner as needed    Crista Gunn PA-C  1:58 PM  12/13/2022

## 2022-12-14 ENCOUNTER — TELEPHONE (OUTPATIENT)
Dept: NEUROLOGY | Age: 62
End: 2022-12-14

## 2022-12-14 LAB
ALBUMIN SERPL-MCNC: 3.1 G/DL (ref 3.5–4.7)
ALPHA-1-GLOBULIN: 0.4 G/DL (ref 0.2–0.4)
ALPHA-2-GLOBULIN: 0.8 G/DL (ref 0.5–1)
BETA GLOBULIN: 1.4 G/DL (ref 0.8–1.3)
ELECTROPHORESIS: ABNORMAL
GAMMA GLOBULIN: 1.6 G/DL (ref 0.7–1.6)
TOTAL PROTEIN: 7.3 G/DL (ref 6.4–8.3)

## 2022-12-14 NOTE — TELEPHONE ENCOUNTER
----- Message from Stacey Buitragoma sent at 12/14/2022 12:39 PM EST -----  Regarding: Low B12    Please inform patient that her B12 is low at 325, this could cause a peripheral neuropathy (damage to the nerve endings) and result in balance impairment. Recommend replacement. Would like to see B12 > 500.  Thank you   ----- Message -----  From: Tal Cruz Incoming Results From [facility]  Sent: 12/13/2022  10:11 PM EST  To: SELENE Buitrago

## 2022-12-16 ENCOUNTER — TELEPHONE (OUTPATIENT)
Dept: RADIATION ONCOLOGY | Age: 62
End: 2022-12-16

## 2022-12-16 ENCOUNTER — HOSPITAL ENCOUNTER (OUTPATIENT)
Dept: RADIATION ONCOLOGY | Age: 62
Discharge: HOME | End: 2022-12-16
Payer: COMMERCIAL

## 2022-12-16 VITALS
TEMPERATURE: 98 F | WEIGHT: 105.6 LBS | HEART RATE: 95 BPM | RESPIRATION RATE: 20 BRPM | SYSTOLIC BLOOD PRESSURE: 148 MMHG | BODY MASS INDEX: 22.07 KG/M2 | DIASTOLIC BLOOD PRESSURE: 80 MMHG

## 2022-12-16 DIAGNOSIS — C50.211 MALIGNANT NEOPLASM OF UPPER-INNER QUADRANT OF RIGHT BREAST IN FEMALE, ESTROGEN RECEPTOR POSITIVE (HCC): Primary | ICD-10-CM

## 2022-12-16 DIAGNOSIS — Z17.0 MALIGNANT NEOPLASM OF UPPER-INNER QUADRANT OF RIGHT BREAST IN FEMALE, ESTROGEN RECEPTOR POSITIVE (HCC): Primary | ICD-10-CM

## 2022-12-16 PROCEDURE — 99212 OFFICE O/P EST SF 10 MIN: CPT

## 2022-12-16 PROCEDURE — 99212 OFFICE O/P EST SF 10 MIN: CPT | Performed by: RADIOLOGY

## 2022-12-16 NOTE — PROGRESS NOTES
DEPARTMENT OF RADIATION ONCOLOGY   Follow up visit        2022    NAME:  Joi Falcon    :  1960 58 y.o. female     PCP: Marisela Aden DO    DIAGNOSIS:  1. Malignant neoplasm of upper-inner quadrant of right breast in female, estrogen receptor positive ([facility]    Pathological stage pT2, pN0, M0, G2, invasive ductal carcinoma of the right breast, 3 cm in maximum diameter at 3 o'clock position, SP partial mastectomy and sentinel node biopsy, 1 out of 1 negative sentinel lymph nodes, lymphovascular and perineural space invasion, ER positive (strong), CO positive (weak), HER 2 -, Oncotype DEX score at 26, SP 4 cycles of TC the last 1 on 3/16/2022      STAGING: Cancer Staging  Malignant neoplasm of upper-inner quadrant of right breast in female, estrogen receptor positive ([facility]  Staging form: Breast, AJCC 8th Edition  - Clinical: No stage assigned - Unsigned  - Pathologic stage from 2021: Stage IA (pT2, pN0(sn), cM0, G2, ER+, CO+, HER2-) - Signed by Charo Ridley MD on 2021      RECENT HISTORY: Joi Falcon is now 7 months out from completion of RT to the right breast.  She had a recent neurological issue when she temporarily lost the use of the legs. She was rushed to the emergency room, but no clear cause was found, at the present time she is undergoing physical therapy with benefit and good improvement of her lower extremities motor function. Past medical, surgical, social and family histories reviewed and updated as indicated. ALLERGIES:  Patient has no known allergies.        MEDICATIONS:   Current Outpatient Medications:     anastrozole (ARIMIDEX) 1 MG tablet, Take 1 tablet by mouth daily Start on on , Disp: 30 tablet, Rfl: 3    calcium citrate-vitamin D (CITRICAL + D) 315-250 MG-UNIT TABS per tablet, Take 1 tablet by mouth 2 times daily (with meals), Disp: , Rfl:     aspirin 81 MG EC tablet, Take 1 tablet by mouth daily, Disp: 30 tablet, Rfl: 3    metoprolol succinate (TOPROL XL) 25 MG extended release tablet, Take 0.5 tablets by mouth daily, Disp: 30 tablet, Rfl: 3    simvastatin (ZOCOR) 20 MG tablet, Take 20 mg by mouth nightly , Disp: , Rfl:     Cholecalciferol (VITAMIN D3) 50 MCG (2000 UT) CAPS, Take by mouth daily , Disp: , Rfl:     loratadine (CLARITIN) 10 MG capsule, Take 10 mg by mouth daily, Disp: , Rfl:     acetaminophen (TYLENOL) 500 MG tablet, Take 500 mg by mouth every 6 hours as needed for Pain , Disp: , Rfl:     REVIEW OF SYSTEMS: Obtained from the patient, chart review and nursing assessment. 10-point ROS reviewed and negative except as per above. PHYSICAL EXAMINATION:    Vitals:    12/16/22 1357 12/16/22 1445   BP: (!) 173/82 (!) 148/80   Pulse: 95    Resp: 20    Temp: 98 °F (36.7 °C)    TempSrc: Tympanic    Weight: 105 lb 9.6 oz (47.9 kg)        Wt Readings from Last 3 Encounters:   12/16/22 105 lb 9.6 oz (47.9 kg)   12/13/22 109 lb (49.4 kg)   11/02/22 105 lb 12.8 oz (48 kg)       General: Well developed, no acute distress. HEENT: Normocephalic and atraumatic. Moist mucosae. Neck: No thyromegaly. Trachea at midline. No lymphadenopathy. Cardiorespiratory: Unlabored breathing. No edema. Abdomen: Nontender and nondistended. No hepatosplenomegaly appreciated. Back: No tenderness to palpation. Neuro: CNs grossly intact. Spontaneous movement with all limbs. Psych: Normal affect. Alert & oriented x3. Skin: No abnormal lesions throughout. ASSESSMENT/PLAN:      The patient is doing well from an oncologic point of view with no signs of tumor relapse nor of tumor progression. New follow-up in 6 months.     Elina Ortiz MD    Department of Radiation Oncology  [facility]) [facility]: [phone] (TAX: [phone])  [facility]) [facility]: [phone] (FKV: [phone])  [facility]:  [phone] (D:  [phone])

## 2022-12-16 NOTE — TELEPHONE ENCOUNTER
This RN called Dr Jess Flores (PCP) re: patient's elevated BP today pt. asymptomatic, spoke to [facility].

## 2022-12-16 NOTE — PROGRESS NOTES
Colten Guzman  12/16/2022  2:10 PM      Vitals:    12/16/22 1357   BP: (!) 173/82   Pulse: 95   Resp: 20   Temp: 98 °F (36.7 °C)    : Wt Readings from Last 3 Encounters:   12/16/22 105 lb 9.6 oz (47.9 kg)   12/13/22 109 lb (49.4 kg)   11/02/22 105 lb 12.8 oz (48 kg)                Current Outpatient Medications:     anastrozole (ARIMIDEX) 1 MG tablet, Take 1 tablet by mouth daily Start on on 5/25, Disp: 30 tablet, Rfl: 3    calcium citrate-vitamin D (CITRICAL + D) 315-250 MG-UNIT TABS per tablet, Take 1 tablet by mouth 2 times daily (with meals), Disp: , Rfl:     aspirin 81 MG EC tablet, Take 1 tablet by mouth daily, Disp: 30 tablet, Rfl: 3    metoprolol succinate (TOPROL XL) 25 MG extended release tablet, Take 0.5 tablets by mouth daily, Disp: 30 tablet, Rfl: 3    simvastatin (ZOCOR) 20 MG tablet, Take 20 mg by mouth nightly , Disp: , Rfl:     Cholecalciferol (VITAMIN D3) 50 MCG (2000 UT) CAPS, Take by mouth daily , Disp: , Rfl:     loratadine (CLARITIN) 10 MG capsule, Take 10 mg by mouth daily, Disp: , Rfl:     acetaminophen (TYLENOL) 500 MG tablet, Take 500 mg by mouth every 6 hours as needed for Pain , Disp: , Rfl:       Patient is seen today in follow up for Right breast cancer    Kimi Mathew is here today with her mom Memo Ramirez and cousin Cleo for a follow up after completed radiation therapy to right breast.  She is alert and oriented x 4, denies pain, ambulating with a walker. Memo Ramirez states Kimi Mathew had fallen sometime in August, she had a stroke like symptoms, was hospitalized for 1 week went to rehabilitation and still under rehabilitation at home. She is on Anastrozole, she is tolerating well without side effects. No other complaints, Dr Germania Veliz updated. FALLS RISK SCREENING ASSESSMENT    Instructions:  Assess the patient and enter the appropriate indicators that are present for fall risk identification.    Total the numbers entered and assign a fall risk score from Table 2.  Reassess patient at a Call-light/bell within patient's reach  6. Chair/bed in low position, stretcher/bed with siderails up except when performing patient care activities  7. Educate patient/family/caregiver on falls prevention  8. Falls risk precaution (Yellow sticker Level III) placed on patient chart           MALNUTRITION RISK SCREENING ASSESSMENT    12/16/2022   Patient:  Thony Rolon  Sex:  female    Instructions:  Assess the patient and enter the appropriate indicators that are present for nutrition risk identification. Total the numbers entered and assign a risk score. Follow the appropriate action for total score listed below. Assessment   Date  12/16/2022     Have you lost weight without trying? 2- Yes, 5.1 kg to 10 kg/ 13 to 15 lbs since June 2022     Have you been eating poorly because of a decreased appetite? 1- Yes   3. Do you have a diagnosis of head and neck cancer? 0- No                                                                                    TOTAL 3          Score of 0-1: No action  Score 2 or greater:   For Non-Diabetic Patient: Recommend adding Ensure Complete 2 x daily and provide patient with Ensure wellness bag with coupons  For Diabetic Patient: Recommend adding Glucerna Shake 2 x daily and provide patient with Glucerna Wellness bag with coupons  Route to the dietitian via Abdulkadir Llanes RN

## 2023-01-30 DIAGNOSIS — C50.211 MALIGNANT NEOPLASM OF UPPER-INNER QUADRANT OF RIGHT BREAST IN FEMALE, ESTROGEN RECEPTOR POSITIVE (HCC): Primary | ICD-10-CM

## 2023-01-30 DIAGNOSIS — Z17.0 MALIGNANT NEOPLASM OF UPPER-INNER QUADRANT OF RIGHT BREAST IN FEMALE, ESTROGEN RECEPTOR POSITIVE (HCC): Primary | ICD-10-CM

## 2023-02-01 ENCOUNTER — HOSPITAL ENCOUNTER (OUTPATIENT)
Dept: INFUSION THERAPY | Age: 63
Discharge: HOME | End: 2023-02-01
Payer: COMMERCIAL

## 2023-02-01 ENCOUNTER — TELEPHONE (OUTPATIENT)
Dept: INFUSION THERAPY | Age: 63
End: 2023-02-01

## 2023-02-01 ENCOUNTER — OFFICE VISIT (OUTPATIENT)
Dept: ONCOLOGY | Age: 63
End: 2023-02-01
Payer: COMMERCIAL

## 2023-02-01 VITALS
OXYGEN SATURATION: 99 % | TEMPERATURE: 97.8 F | BODY MASS INDEX: 23.34 KG/M2 | DIASTOLIC BLOOD PRESSURE: 90 MMHG | HEART RATE: 108 BPM | WEIGHT: 111.2 LBS | SYSTOLIC BLOOD PRESSURE: 145 MMHG | HEIGHT: 58 IN

## 2023-02-01 DIAGNOSIS — Z17.0 MALIGNANT NEOPLASM OF UPPER-INNER QUADRANT OF RIGHT BREAST IN FEMALE, ESTROGEN RECEPTOR POSITIVE (HCC): ICD-10-CM

## 2023-02-01 DIAGNOSIS — Z17.0 MALIGNANT NEOPLASM OF UPPER-INNER QUADRANT OF RIGHT BREAST IN FEMALE, ESTROGEN RECEPTOR POSITIVE (HCC): Primary | ICD-10-CM

## 2023-02-01 DIAGNOSIS — C50.211 MALIGNANT NEOPLASM OF UPPER-INNER QUADRANT OF RIGHT BREAST IN FEMALE, ESTROGEN RECEPTOR POSITIVE (HCC): ICD-10-CM

## 2023-02-01 DIAGNOSIS — C50.211 MALIGNANT NEOPLASM OF UPPER-INNER QUADRANT OF RIGHT BREAST IN FEMALE, ESTROGEN RECEPTOR POSITIVE (HCC): Primary | ICD-10-CM

## 2023-02-01 LAB
ALBUMIN SERPL-MCNC: 4.2 G/DL (ref 3.5–5.2)
ALP BLD-CCNC: 71 U/L (ref 35–104)
ALT SERPL-CCNC: 15 U/L (ref 0–32)
ANION GAP SERPL CALCULATED.3IONS-SCNC: 12 MMOL/L (ref 7–16)
AST SERPL-CCNC: 18 U/L (ref 0–31)
BASOPHILS ABSOLUTE: 0.03 E9/L (ref 0–0.2)
BASOPHILS RELATIVE PERCENT: 0.3 % (ref 0–2)
BILIRUB SERPL-MCNC: 0.3 MG/DL (ref 0–1.2)
BUN BLDV-MCNC: 18 MG/DL (ref 6–23)
CALCIUM SERPL-MCNC: 9.8 MG/DL (ref 8.6–10.2)
CHLORIDE BLD-SCNC: 103 MMOL/L (ref 98–107)
CO2: 25 MMOL/L (ref 22–29)
CREAT SERPL-MCNC: 0.8 MG/DL (ref 0.5–1)
EOSINOPHILS ABSOLUTE: 0.07 E9/L (ref 0.05–0.5)
EOSINOPHILS RELATIVE PERCENT: 0.8 % (ref 0–6)
GFR SERPL CREATININE-BSD FRML MDRD: >60 ML/MIN/1.73
GLUCOSE BLD-MCNC: 107 MG/DL (ref 74–99)
HCT VFR BLD CALC: 43 % (ref 34–48)
HEMOGLOBIN: 13.9 G/DL (ref 11.5–15.5)
IMMATURE GRANULOCYTES #: 0.02 E9/L
IMMATURE GRANULOCYTES %: 0.2 % (ref 0–5)
LYMPHOCYTES ABSOLUTE: 1.43 E9/L (ref 1.5–4)
LYMPHOCYTES RELATIVE PERCENT: 15.9 % (ref 20–42)
MCH RBC QN AUTO: 28.5 PG (ref 26–35)
MCHC RBC AUTO-ENTMCNC: 32.3 % (ref 32–34.5)
MCV RBC AUTO: 88.3 FL (ref 80–99.9)
MONOCYTES ABSOLUTE: 0.51 E9/L (ref 0.1–0.95)
MONOCYTES RELATIVE PERCENT: 5.7 % (ref 2–12)
NEUTROPHILS ABSOLUTE: 6.96 E9/L (ref 1.8–7.3)
NEUTROPHILS RELATIVE PERCENT: 77.1 % (ref 43–80)
PDW BLD-RTO: 14.2 FL (ref 11.5–15)
PLATELET # BLD: 239 E9/L (ref 130–450)
PMV BLD AUTO: 10.3 FL (ref 7–12)
POTASSIUM SERPL-SCNC: 3.8 MMOL/L (ref 3.5–5)
RBC # BLD: 4.87 E12/L (ref 3.5–5.5)
SODIUM BLD-SCNC: 140 MMOL/L (ref 132–146)
TOTAL PROTEIN: 7.3 G/DL (ref 6.4–8.3)
WBC # BLD: 9 E9/L (ref 4.5–11.5)

## 2023-02-01 PROCEDURE — 99212 OFFICE O/P EST SF 10 MIN: CPT

## 2023-02-01 PROCEDURE — 3077F SYST BP >= 140 MM HG: CPT | Performed by: INTERNAL MEDICINE

## 2023-02-01 PROCEDURE — 1036F TOBACCO NON-USER: CPT | Performed by: INTERNAL MEDICINE

## 2023-02-01 PROCEDURE — G8420 CALC BMI NORM PARAMETERS: HCPCS | Performed by: INTERNAL MEDICINE

## 2023-02-01 PROCEDURE — 3080F DIAST BP >= 90 MM HG: CPT | Performed by: INTERNAL MEDICINE

## 2023-02-01 PROCEDURE — 80053 COMPREHEN METABOLIC PANEL: CPT

## 2023-02-01 PROCEDURE — 3017F COLORECTAL CA SCREEN DOC REV: CPT | Performed by: INTERNAL MEDICINE

## 2023-02-01 PROCEDURE — G8427 DOCREV CUR MEDS BY ELIG CLIN: HCPCS | Performed by: INTERNAL MEDICINE

## 2023-02-01 PROCEDURE — 85025 COMPLETE CBC W/AUTO DIFF WBC: CPT

## 2023-02-01 PROCEDURE — 99214 OFFICE O/P EST MOD 30 MIN: CPT | Performed by: INTERNAL MEDICINE

## 2023-02-01 PROCEDURE — G8484 FLU IMMUNIZE NO ADMIN: HCPCS | Performed by: INTERNAL MEDICINE

## 2023-02-01 PROCEDURE — 36415 COLL VENOUS BLD VENIPUNCTURE: CPT

## 2023-02-01 RX ORDER — ANASTROZOLE 1 MG/1
TABLET ORAL
Qty: 90 TABLET | Refills: 3 | Status: SHIPPED | OUTPATIENT
Start: 2023-02-01

## 2023-02-01 NOTE — PROGRESS NOTES
[address] [facility] MEDICAL ONCOLOGY  [address] [facility] [address]  Dept: [phone]  Loc: [phone]  Attending Progress Note      Reason for Visit: Right breast cancer. Referring Physician: Charles Jay MD    PCP:  Sandra Thao DO    History of Present Illness:     Kathrin Tijerina is a pleasant 20-year-old female patient, with a past medical history significant for hearing loss and hyperlipidemia, who had presented with an abnormal screening mammogram,  MAMMOGRAM ULTRASOUND BIOPSY; [facility]       9/14/2021: MAMMOGRAM, RIGHT BREAST ULTRASOUND: [facility]                9/14/2021: LEFT BREAST ULTRASOUND: [facility]    She underwent an US guided right breast core biopsy at 3 o'clock position on September 20 , 2021. Pathological evaluation completed at [facility]:  PATHOLOGY  Diagnosis:   Right breast, 3 o'clock position, core biopsies: Invasive carcinoma of no   special type (ductal with lobular features). Preliminary Allan   score 3+2+1 = 6     Comment:   Tumor cells stain strongly positive for e-cadherin   immunostain. Intradepartmental consultation is obtained.      Breast Cancer Marker Studies:         Estrogen Receptors (ER):       -Positive (>10% of cells demonstrate nuclear positivity):       Percentage of cells positive: >90%       Intensity: Strong         Progesterone Receptors (SC):       -Positive:       Percentage of cells positive: 2%       Intensity: Weak         Her-2/marquise (c-erb B-2) protein expression: Negative (1+)     The patient underwent on 11/3/2021 right breast lumpectomy with axillary sentinel lymph node biopsy, pathology:    CANCER CASE SUMMARY   Procedure -excision   Specimen Laterality -right   Tumor Site, Invasive Carcinoma-3:00   Histologic Type-invasive carcinoma, no special type (ductal)   Histologic Grade ([place] Histologic Score):                    Tubule differentiation- score-3 Nuclear pleomorphism- score 2                    Mitotic rate- score 2                    Overall Grade- grade 2, (score 7)   Tumor Size: Size of Largest Invasive Carcinoma-30 mm   Ductal Carcinoma In Situ (DCIS)-not identified   Tumor Extent-carcinoma invades skeletal muscle   Treatment Effect in the Breast-no known presurgical therapy   Margin status for invasive carcinoma: Inferior margin involved by   invasive carcinoma. Posterior margin less than 1 mm from invasive   carcinoma. Regional Lymph Nodes: All regional lymph nodes negative for tumor   Total number of lymph nodes examined-1   Number of sentinel nodes examined-1   Pathologic Stage Classification (AJCC 8th Edition)- pT2 p(sn)N0   Additional Pathologic Findings-lymphovascular and perineural space   invasion   Special studies-ER positive, TN positive, HER-2 negative     The patient underwent on 12/1/2021 lumpectomy margin reexcision, was negative for residual carcinoma. Oncotype DX was done, recurrence score is 26, distant recurrence risk at 9 years with endocrine therapy alone is 16%, absolute chemotherapy benefit is greater than 15%. Patient was started on adjuvant chemotherapy with Taxotere and Cytoxan on 1/5/2022, she completed 4 cycles on 3/16/2022. She completed adjuvant RT on 5/18/2022, she was started on adjuvant endocrine therapy with Arimidex on 5/25/2022, tolerating it well,. Patient was admitted to the hospital and August 2022 after she collapsed, she had slurred speech, was weak and was having recurrent falls, she had extensive work-up done she was found to have on chest CTA from 8/9/2022 focal sclerotic lesion located in T8 vertebral body, she had an MRI done subsequently, that was negative, was seen by neurosurgery, work-up was negative for metastatic disease to the bones. The patient returns for a follow-up visit, she is doing better overall, the weakness of the legs had improved, she is working with physical therapy. She was seen by neuro. An EMG was ordered. Tolerating the Arimidex well, no reported side effects, taking the calcium and vitamin D. Review of Systems;  CONSTITUTIONAL: No fever, chills. Good appetite, positive for weakness. ENMT: Eyes: No diplopia; Nose: No epistaxis. Mouth: No sore throat. RESPIRATORY: No hemoptysis, shortness of breath, cough. CARDIOVASCULAR: No chest pain, palpitations. GASTROINTESTINAL: No nausea or vomiting at this time, no abdominal pain, diarrhea/constipation. GENITOURINARY: No dysuria, urinary frequency, hematuria. NEURO: As per HPI.   Remainder:  ROS NEGATIVE    Past Medical History:      Diagnosis Date    Breast cancer ([address]     Right breast    Cancer ([address] 11/2021    right breast cancer    Hearing impaired     Hearing loss     Hyperlipidemia      Patient Active Problem List   Diagnosis    Cancer ([address]    Malignant neoplasm of upper-inner quadrant of right breast in female, estrogen receptor positive ([address]    Hearing loss    Hypercholesterolemia    Weakness    Altered mental status    Dysarthria    Hypertrophic cardiomyopathy ([address]    Supraventricular tachycardia ([address]    Carcinoma in situ of female breast, right    Primary hypertension    Moderate protein-calorie malnutrition (HCC)    PAF (paroxysmal atrial fibrillation) ([address]        Past Surgical History:      Procedure Laterality Date    BREAST LUMPECTOMY Right 11/3/2021    RIGHT BREAST LUMPECTOMY, BLUE DYE INJECTION, RIGHT AXILLARY SENTINEL LYMPH NODE INCISION performed by Raiza Gomez MD at [facility] [address] LUMPECTOMY Right 12/1/2021    RE-EXCISION RIGHT BREAST INFERIOR MARGIN performed by Raiza Gomez MD at [facility] RIGHT  2021       Family History:  Family History   Problem Relation Age of Onset    Heart Disease Mother     High Blood Pressure Mother     High Cholesterol Mother     Other Father         COPD    Dementia Maternal Grandmother     Cancer Maternal Cousin         colon       Medications:  Reviewed and reconciled. Social History:  Social History     Socioeconomic History    Marital status: Single     Spouse name: Not on file    Number of children: 0    Years of education: Not on file    Highest education level: Not on file   Occupational History    Not on file   Tobacco Use    Smoking status: Never    Smokeless tobacco: Never   Vaping Use    Vaping Use: Never used   Substance and Sexual Activity    Alcohol use: Never    Drug use: Never    Sexual activity: Not on file   Other Topics Concern    Not on file   Social History Narrative    Not on file     Social Determinants of Health     Financial Resource Strain: Not on file   Food Insecurity: Not on file   Transportation Needs: Not on file   Physical Activity: Not on file   Stress: Not on file   Social Connections: Not on file   Intimate Partner Violence: Not on file   Housing Stability: Not on file       Allergies:  No Known Allergies    Physical Exam:  BP (!) 145/90   Pulse (!) 108   Temp 97.8 °F (36.6 °C)   Ht 4' 10\" (1.473 m)   Wt 111 lb 3.2 oz (50.4 kg)   LMP 10/28/2021   SpO2 99%   BMI 23.24 kg/m²   GENERAL: Alert, oriented x 3, not in acute distress. HEENT: PERRLA; EOMI. Oropharynx clear. NECK: Supple. No palpable cervical or supraclavicular lymphadenopathy. LUNGS: Good air entry bilaterally. No wheezing, crackles or rhonchi. BREASTS: The left breast exam is negative for any skin changes, no nipple discharge, no palpable masses, no palpable left axillary adenopathy, right breast exam is remarkable for a scar from her lumpectomy, mild radiation changes, no nipple discharge, no palpable mass, no palpable right axilla lymphadenopathy. CARDIOVASCULAR: Regular rhythm, tachycardic. ABDOMEN: Soft. Non-tender, non-distended. Positive bowel sounds. EXTREMITIES: Without clubbing, cyanosis, or edema. NEUROLOGIC: Weakness of the lower extremities, right greater than left.   ECOG PS 3      Impression/Plan:    Satish Lee is a pleasant 57-year-old female patient, with a past medical history significant for hearing loss and hyperlipidemia, who had presented with an abnormal screening mammogram, she was diagnosed with a right breast invasive ductal carcinoma, she underwent on 11/3/2021 right breast lumpectomy with axillary sentinel lymph node biopsy, tumor size was 3 cm, grade 2, carcinoma invaded skeletal muscle, inferior margin was positive, posterior margin was less than 1 mm from invasive carcinoma, she had margins reexcision done, was negative for residual carcinoma, 1 sentinel lymph node was removed, was negative for metastatic disease, final pathologic stage pT2 p(sn)N0, ER positive greater than 90%, MA +2%, HER-2/marquise negative, 1+ by IHC, Oncotype DX was done, recurrence score is 26, risk of distant recurrence at 9 years with endocrine therapy alone is 16%, chemotherapy benefit is greater than 15%. I discussed with the patient and her mother her diagnosis, characteristics of her tumor, prognosis and recommendations for treatment, adjuvant endocrine therapy with an AI is recommended, the side effects of the Arimidex were reviewed with the patient. We reviewed the Oncotype DX results, recurrence score is 26, adjuvant chemotherapy is recommended, benefit is greater than 15%, I discussed with the patient TC regimen, the side effects and the schedule were reviewed with her. The patient was started on adjuvant chemotherapy with Taxotere and Cytoxan on 1/5/2022, she completed 4 cycles on 3/16/2022. The patient completed adjuvant radiation therapy on5/18/2022. I discussed with the patient adjuvant endocrine therapy, Arimidex, was on 5/25/2022, she is tolerating it well, continue Arimidex.     Patient was admitted to the hospital and August 2022 after she collapsed, she had slurred speech, was weak and was having recurrent falls, she had extensive work-up done she was found to have on chest CTA from 8/9/2022 focal sclerotic lesion located in T8 vertebral body, she had an MRI done subsequently, that was negative, was seen by neurosurgery, work-up was negative for metastatic disease to the bones.      She is doing better overall, the weakness of the legs had improved, she is working with physical therapy.  She was seen by neuro.  An EMG was ordered.  Await results, continue physical therapy.  Neuro wanted her vitamin B12 to be greater than 500, prescribed oral vitamin B12 1000 MCG daily.    DEXA scan was done on 4/20/2022, revealing osteopenia, continue calcium and vitamin D.    I discussed with the patient importance of monthly BSE and routine screening mammograms.  She was not able to have the mammogram done due to the hospitalization, will get it scheduled when able.  Recommended right breast massage.    Chest CT for follow-up of the lung nodule to be done in February 2023 and follow-up with pulmonary after.    The surveillance guidelines were reviewed with the patient and her mother.    RTC in 3 months.    Thank you for allowing us to participate in the care of Ms. Guzman.    UZMA DURAN MD   HEMATOLOGY/MEDICAL ONCOLOGY  [facility] [place] [facility] MEDICAL ONCOLOGY  [address] [place] [address]  Dept: [phone]  Loc: [phone]

## 2023-02-02 ENCOUNTER — TELEPHONE (OUTPATIENT)
Dept: NEUROLOGY | Age: 63
End: 2023-02-02

## 2023-02-02 ENCOUNTER — TELEPHONE (OUTPATIENT)
Dept: ONCOLOGY | Age: 63
End: 2023-02-02

## 2023-02-02 ENCOUNTER — TELEPHONE (OUTPATIENT)
Dept: INFUSION THERAPY | Age: 63
End: 2023-02-02

## 2023-02-02 NOTE — TELEPHONE ENCOUNTER
This nurse called and spoke with the office of Briseida Hair, [facility] to check on status of scheduling pts EMG that was ordered in December of 2022-Office staff to reach out to pt to Betsy Freeman RN

## 2023-02-02 NOTE — TELEPHONE ENCOUNTER
Met with pt, pt's mother, and family member in conjunction with medical oncology visit as social work follow up. Pt is 22-year-old female being managed for breast cancer. Pt's mood appeared euthymic with congruent affect, she appeared A&Ox4, and she was willing/able to participate in session. She appeared appropriately dressed/groomed and was able to ambulate/transfer with the use of a walker. Noted pt's significantly improved functional status since prior visit. Pt's mother indicated that pt continues to receive home PT and noted that she is being scheduled for a test to evaluate for nerve damage. They reported that they were able to obtain services from [address] [place] [address], [address] but expressed concerns surrounding aide services. Provided support and education surrounding role of aide services and encourage them to reach out to [facility]  if concerns persist.  They did not that they receive [employer] and are very happy with this service. No additional needs identified at this time. Reviewed role of oncology SW and encouraged pt to notify this provider if additional needs arise.     Lee Rahman, MSW, LISW-S  Oncology Social Worker

## 2023-02-27 ASSESSMENT — ENCOUNTER SYMPTOMS
RESPIRATORY NEGATIVE: 1
BACK PAIN: 0

## 2023-02-27 NOTE — PROGRESS NOTES
Subjective: This note was copied forward from the last encounter. Essential components for this patient record were reviewed and verified on this visit including:  recent hospitalizations, recent imaging, PMH, PSH, FH, SOC HX, Allergies, and Medications were reviewed and updated as appropriate. In addition, the assessment and plan were copied from prior office note and updated accordingly. Patient ID: Mae Munson is a 58 y.o. female. NOEMY Serra is a pleasant 58 y.o. female who was found to have a right breast mass on screening mammogram done at [address] [place] [address] 08/24/2021.    09/20/2021 she underwent a right breast ultrasound-guided core core biopsy at 3 o'clock position. Pathology completed at [facility]):    Diagnosis:   Right breast, 3 o'clock position, core biopsies: Invasive carcinoma of no   special type (ductal with lobular features). Preliminary Allan   score 3+2+1 = 6     Comment:   Tumor cells stain strongly positive for e-cadherin immunostain. Intradepartmental consultation is obtained. Breast Cancer Marker Studies:   Estrogen Receptors (ER): Positive. Percentage of cells positive: >90%, Intensity: Strong   Progesterone Receptors (SC): Positive.   Percentage of cells positive: 2%  Intensity: Weak   Her-2/marquise (c-erb B-2) protein expression: Negative (1+)      11/03/2021 right breast lumpectomy with axillary sentinel lymph node biopsy, pathology:     CANCER CASE SUMMARY   Procedure -excision   Specimen Laterality -right   Tumor Site, Invasive Carcinoma-3:00   Histologic Type-invasive carcinoma, no special type (ductal)   Histologic Grade ([place] Histologic Score):                    Tubule differentiation- score-3                    Nuclear pleomorphism- score 2                    Mitotic rate- score 2                    Overall Grade- grade 2, (score 7)   Tumor Size: Size of Largest Invasive Carcinoma-30 mm   Ductal Carcinoma In Situ (DCIS)-not identified Tumor Extent-carcinoma invades skeletal muscle   Treatment Effect in the Breast-no known presurgical therapy   Margin status for invasive carcinoma: Inferior margin involved by   invasive carcinoma. Posterior margin less than 1 mm from invasive   carcinoma. Regional Lymph Nodes: All regional lymph nodes negative for tumor   Total number of lymph nodes examined-1   Number of sentinel nodes examined-1   Pathologic Stage Classification (AJCC 8th Edition)- pT2 p(sn)N0   Additional Pathologic Findings-lymphovascular and perineural space   invasion   Special studies-ER positive, AL positive, HER-2 negative      12/1/2021 lumpectomy margin reexcision, was negative for residual carcinoma. Oncotype DX Recurrence score 26; absolute chemotherapy benefit greater than 15%.    03/16/2022 completed adjuvant chemotherapy with TC x4 cycles per medical oncology, Dr. Colette Johnson.  05/18/2022 completed adjuvant radiation therapy. 5/25/2022 endocrine therapy with Arimidex 1 mg by mouth was started. Review of Systems   Constitutional:         Feeling stronger. Accompanied by family. Still with intermittent vertigo. Tolerating AI well. HENT: Negative. Respiratory: Negative. Negative for cough and shortness of breath. Cardiovascular:  Negative for chest pain and palpitations. Musculoskeletal:  Negative for arthralgias, back pain and myalgias. Neurological: Negative. Objective:   Physical Exam  Vitals and nursing note reviewed. Constitutional:       General: She is not in acute distress. Appearance: She is well-developed. Comments: ECOG 0. No apparent distress. Pleasant and conversant. HENT:      Head: Normocephalic and atraumatic. Neck:      Comments: No JVD. Cardiovascular:      Rate and Rhythm: Regular rhythm. Tachycardia present. Heart sounds: Murmur heard. Pulmonary:      Effort: Pulmonary effort is normal. No respiratory distress. Breath sounds: Normal breath sounds.  No stridor. No wheezing, rhonchi or rales. Chest:      Chest wall: No mass, lacerations, deformity, swelling, tenderness or edema. Breasts:     Breasts are symmetrical.      Right: No inverted nipple, mass, nipple discharge, skin change or tenderness. Left: No inverted nipple, mass, nipple discharge, skin change or tenderness. Comments: Left breast is without evidence of disease. Musculoskeletal:         General: Normal range of motion. Right shoulder: Normal.      Left shoulder: Normal.      Cervical back: Normal range of motion and neck supple. Skin:     General: Skin is warm and dry. Neurological:      Mental Status: She is alert and oriented to person, place, and time. Mental status is at baseline. Psychiatric:         Behavior: Behavior normal.         Thought Content: Thought content normal.         Judgment: Judgment normal.       Assessment:    Jose Luis Phoenix is a 58 y.o. pleasant female with a right breast invasive ductal carcinoma. 11/03/2021 right breast lumpectomy with axillary sentinel lymph node biopsy, pathology:     CANCER CASE SUMMARY right breast, 3 o'clock position. Tumor size was 3 cm. Grade 2 disease. Carcinoma invaded skeletal muscle, inferior margin was positive, posterior margin was less than 1 mm from invasive carcinoma. Reexcision was negative for residual carcinoma. 1 sentinel lymph node was removed and was negative for metastatic disease. No DCIS. Final pathologic stage pT2 p(sn)N0. Additional Pathologic Findings-lymphovascular and perineural space   Invasion. Special studies-ER positive, [place] positive, HER-2 negative      12/1/2021 lumpectomy margin reexcision, was negative for residual carcinoma. Oncotype DX Recurrence score 26; absolute chemotherapy benefit greater than 15%.       Adjuvant therapy:  No recommendation for annual US.  03/16/2022 completed adjuvant chemotherapy with TC x4 cycles per medical oncology, Dr. Christa Campoverde.  05/18/2022 completed adjuvant radiation therapy. 2022 endocrine therapy with Arimidex 1 mg by mouth was started. Post treatment Imagin2023 Bilateral screening mammogram:  Negative, BI-RADS 1. Key clinical points:  2022 clinical follow-up is without evidence of recurrent disease. She has grade 1 radiation dermatitis of the right breast/chest wall. Tolerating Arimidex well. We discussed use of Benadryl/cortisone cream as needed for pruritus. We also reviewed the importance of wearing a good supportive bra and breast massage twice daily. We reviewed NCCN guidelines for breast cancer follow-up. All questions were answered to their apparent satisfaction. She will be due for a screening mammogram in August with office visit same day. 2022: Patient was admitted to the hospital and 2022 after she collapsed, she had slurred speech, was weak and was having recurrent falls, she had extensive work-up done she was found to have on chest CTA from 2022 focal sclerotic lesion located in T8 vertebral body, she had an MRI done subsequently, that was negative, was seen by neurosurgery, work-up was negative for metastatic disease to the bones  2023 clinical follow-up is without evidence of recurrent disease. We reviewed her breast imaging today for educational (not interpretive) purposes on this visit. We discussed breast anatomy, breast density as assigned by radiology, the bi-rads result, and reviewed the purpose of any biopsy or surgical clips (did not verify placement) noted on imaging. In addition, we discussed any changes on imaging as they relate to post procedure/post treatment. It was clearly stated to Sharlot Osgood and her family that interpretation of imaging and the final result of imaging is at the discretion of the reading radiologist.  On her visit today, she has asymptomatic tachycardia with an apical rate of 104 and a loud murmur.   She has previously seen cardiology, Dr. Rosanne Castillo when she was admitted to [facility] in August 2022. I called the office today and they are agreeable to see her in the office in follow-up. Dr. Paul Vazquez office offered an appointment on 03/08/2023; this was declined by the niece who is her . They accepted an appointment on 3/17/2023 at 1:30 PM.  They were advised to dial 911 or go to the emergency room in the event they would notice any new symptoms including but not limited to cough, shortness of breath, progressive vertigo, or edema of the lower extremities etc.  Verbalized understanding and is agreeable. We will plan to see her in 6 months for clinical exam.         Plan:     Continue monthly breast/chest wall self examination; detailed instructions reviewed today. Bring any changes to your physician's attention. Continue healthy diet and exercise routinely as tolerated. Maintaining ideal body weight (20-25 BMI) may lead to optimal breast cancer outcomes. Avoid alcohol. Repeat mammogram 1 year  Continue Arimidex 1 mg daily at the discretion of medical oncology team.  Ca+/VitD while on Arimidex. Continue follow up with Medical Oncology, Primary Care, and all specialties as directed. RTC 6 months. I spent a total of 26 minutes on the date of the service which included preparing to see the patient, face-to-face patient care, completing clinical documentation, obtaining and/or reviewing separately obtained history, performing a medically appropriate examination, counseling and educating the patient/family/caregiver, ordering medications, tests, or procedures, communicating with other HCPs (not separately reported), independently interpreting results (not separately reported), communicating results to the patient/family/caregiver and care coordination (not separately reported).      [facility] (Carmelita Bower) Byron Navarro, RN, MSN, APRN-CNP, [address] [place]  Advanced Oncology Certified Nurse Practitioner  Department of Breast Surgery  Mandi Asif [facility] Breast [facility]/  [place] in collaboration with Dr. Heather Alan.  Triny/Dr. Brittney Traylor/Dr. Petra Vaughan APRN-CNP  3/7/23

## 2023-03-02 ENCOUNTER — HOSPITAL ENCOUNTER (OUTPATIENT)
Dept: NEUROLOGY | Age: 63
Discharge: HOME | End: 2023-03-02
Payer: COMMERCIAL

## 2023-03-02 VITALS — HEIGHT: 58 IN | BODY MASS INDEX: 23.3 KG/M2 | WEIGHT: 111 LBS

## 2023-03-02 PROCEDURE — 95885 MUSC TST DONE W/NERV TST LIM: CPT

## 2023-03-02 PROCEDURE — 95913 NRV CNDJ TEST 13/> STUDIES: CPT

## 2023-03-02 NOTE — PROCEDURES
[address]   Electrodiagnostic Laboratory  [place]        Full Name: Ryan Adan Gender: Female  MRN: [id number] YOB: 1960  Location: Outpt. Visit Date: 3/2/2023 12:56  Age: 58 Years 8 Months Old  Examining Physician: Dr. Teresa Cantu  Referring Physician: SELENE Lion  Technician: Amanuel Colindres   Height: 4 feet 10 inch  Weight: 111 lbs  BMI: 23.2  Notes: Numbness and tingling both legs R20.0, R20.2; Impairmant of balance R26.89        Motor NCS      Nerve / Sites Lat. Amplitude Distance Lat Diff Velocity Temp. Amp. 1-2    ms mV cm ms m/s °C %   L Median - APB      Wrist 3.02 11.6 8   32 100      Elbow 5.99 11.6 18 2.97 61 32 99.9   L Ulnar - ADM      Wrist 3.02 8.3 8   32.1 100      B. Elbow 5.21 6.8 14 2.19 64 32.1 82.7      A. Elbow 6.51 6.8 10 1.30 77 32.1 81.7   L Peroneal - EDB      Ankle 4.27 5.9 8   31 100      Pop fossa 10.42 5.6 31 6.15 50 31 94.7   R Peroneal - EDB      Ankle 3.54 6.9 8   31 100      Pop fossa 9.48 6.6 31 5.94 52 31 96   L Tibial - AH      Ankle 3.49 21.2 8   31 100      Pop fossa 11.20 18.1 34 7.71 44 31 85.2   R Tibial - AH      Ankle 3.85 17.1 8   31 100      Pop fossa 11.25 14.6 35 7.40 47 31 85.3       Sensory NCS      Nerve / Sites Onset Lat Peak Lat PP Amp Distance Velocity Temp.    ms ms µV cm m/s °C   L Median - Digit II (Antidromic)      Mid Palm 1.09 1.67 70.8 7 64 32      Wrist 2.24 3.07 57.9 14 63 32   L Ulnar - Digit V (Antidromic)      Wrist 2.14 2.81 79.9 14 66 32.1   L Radial - Anatomical snuff box (Forearm)      Forearm 1.77 2.40 28.7 10 56 32   L Superficial peroneal - Ankle      Lat leg 2.71 3.33 17.3 10 37 31   R Superficial peroneal - Ankle      Lat leg 2.14 2.92 27.7 10 47 31   L Sural - Ankle (Calf)      Calf 2.92 3.75 20.5 14 48 31   R Sural - Ankle (Calf)      Calf 2.92 3.80 23.4 14 48 31.3       F  Wave      Nerve F Lat M Lat F-M Lat    ms ms ms   L Peroneal - EDB 41.0 4.7 36.4   L Tibial - AH 43.2 3.6 39.6   R Peroneal - EDB 39.6 4.3 35.4   R Tibial - AH 42.9 4.3 38.6   L Median - APB 22.6 1.7 20.9   L Ulnar - ADM 22.5 2.0 20.5       H Reflex      Nerve Lat Hmax    ms   L Tibial - Soleus 26.6   R Tibial - Soleus 27. 1       EMG         EMG Summary Table     Spontaneous MUAP Recruitment   Muscle IA Fib PSW Fasc H.F. Amp Dur. PPP Pattern   L. Tibialis anterior N None None None None N N N N   L. Gastrocnemius (Medial head) N None None None None N N N N   L. Vastus lateralis N None None None None N N N N         Nerve conduction studies in the left arm and both legs were unremarkable. These findings were compared to the referential values in this laboratory, available upon request.    Limited monopolar examination of the left leg was also revealing. The patient could not tolerate additional needle insertions. Electrodiagnostic examination of the left arm and both legs was normal at this time. There were no peripheral neuropathies. There was no suggestion of lumbosacral motor radiculopathies or intracanalicular lesions. Sensory radiculopathies cannot be evaluated by electrodiagnostic means. Clinically, the patient was reportedly not walking for months. Her mother now felt her gait remained abnormal.  However, her mother noted a lifelong history of cognitive and motor deficits. On brief neurological examination, she displayed a congenital spastic paraparetic gait. This disorder may have been aggravated by recent illness. Clinical correlation was highly advised.

## 2023-03-07 ENCOUNTER — OFFICE VISIT (OUTPATIENT)
Dept: BREAST CENTER | Age: 63
End: 2023-03-07
Payer: COMMERCIAL

## 2023-03-07 ENCOUNTER — HOSPITAL ENCOUNTER (OUTPATIENT)
Dept: GENERAL RADIOLOGY | Age: 63
Discharge: HOME | End: 2023-03-09
Payer: COMMERCIAL

## 2023-03-07 VITALS
OXYGEN SATURATION: 98 % | BODY MASS INDEX: 22.67 KG/M2 | HEIGHT: 58 IN | WEIGHT: 108 LBS | SYSTOLIC BLOOD PRESSURE: 128 MMHG | TEMPERATURE: 98.2 F | RESPIRATION RATE: 18 BRPM | HEART RATE: 97 BPM | DIASTOLIC BLOOD PRESSURE: 80 MMHG

## 2023-03-07 DIAGNOSIS — C50.211 MALIGNANT NEOPLASM OF UPPER-INNER QUADRANT OF RIGHT BREAST IN FEMALE, ESTROGEN RECEPTOR POSITIVE (HCC): Primary | ICD-10-CM

## 2023-03-07 DIAGNOSIS — Z12.31 VISIT FOR SCREENING MAMMOGRAM: ICD-10-CM

## 2023-03-07 DIAGNOSIS — Z17.0 MALIGNANT NEOPLASM OF UPPER-INNER QUADRANT OF RIGHT BREAST IN FEMALE, ESTROGEN RECEPTOR POSITIVE (HCC): Primary | ICD-10-CM

## 2023-03-07 DIAGNOSIS — R00.0 TACHYCARDIA, UNSPECIFIED: ICD-10-CM

## 2023-03-07 PROBLEM — R53.81 PHYSICAL DECONDITIONING: Status: ACTIVE | Noted: 2023-03-07

## 2023-03-07 PROBLEM — I51.7 LEFT VENTRICULAR HYPERTROPHY: Status: ACTIVE | Noted: 2023-03-07

## 2023-03-07 PROBLEM — R01.1 HEART MURMUR: Status: ACTIVE | Noted: 2023-03-07

## 2023-03-07 PROCEDURE — 77067 SCR MAMMO BI INCL CAD: CPT

## 2023-03-07 PROCEDURE — G8484 FLU IMMUNIZE NO ADMIN: HCPCS | Performed by: NURSE PRACTITIONER

## 2023-03-07 PROCEDURE — 99213 OFFICE O/P EST LOW 20 MIN: CPT | Performed by: NURSE PRACTITIONER

## 2023-03-07 PROCEDURE — 3017F COLORECTAL CA SCREEN DOC REV: CPT | Performed by: NURSE PRACTITIONER

## 2023-03-07 PROCEDURE — G8427 DOCREV CUR MEDS BY ELIG CLIN: HCPCS | Performed by: NURSE PRACTITIONER

## 2023-03-07 PROCEDURE — 3079F DIAST BP 80-89 MM HG: CPT | Performed by: NURSE PRACTITIONER

## 2023-03-07 PROCEDURE — 1036F TOBACCO NON-USER: CPT | Performed by: NURSE PRACTITIONER

## 2023-03-07 PROCEDURE — 3074F SYST BP LT 130 MM HG: CPT | Performed by: NURSE PRACTITIONER

## 2023-03-07 PROCEDURE — G8420 CALC BMI NORM PARAMETERS: HCPCS | Performed by: NURSE PRACTITIONER

## 2023-03-07 ASSESSMENT — ENCOUNTER SYMPTOMS
COUGH: 0
SHORTNESS OF BREATH: 0

## 2023-03-13 NOTE — PROGRESS NOTES
Outpatient Cardiology Office Visit    Primary Cardiologist: Dr Susanne Munoz    Reason for Visit: Sinus Tachycardia      HISTORY OF PRESENT ILLNESS: 59 yo  female who presents to office for tachycardia at request of Chandler VILLALBA. Now ambulating with walker (was previously using wheelchair), increasing her activity without SOB, dizziness or chest pain. Her EKG today was SR with a rate of 79. PMH: Hypertrophic CMP, Mild intellectual disability, PAF diagnosed 8/2022, VHD, [place] wears hearing aides, and R breast carcinoma in 2021 s/p lumpectomy, chemotherapy, and XRT. Problem List  Patient Active Problem List    Diagnosis Date Noted    Heart murmur 03/07/2023     Priority: Medium    Left ventricular hypertrophy 03/07/2023     Priority: Medium    Physical deconditioning 03/07/2023     Priority: Medium    Moderate protein-calorie malnutrition ([address] 08/15/2022     Priority: Medium    PAF (paroxysmal atrial fibrillation) ([address] 08/15/2022     Priority: Medium    Hypertrophic cardiomyopathy ([address] 08/12/2022     Priority: Medium    Supraventricular tachycardia ([address] 08/12/2022     Priority: Medium    Carcinoma in situ of female breast, right 08/12/2022     Priority: Medium    Primary hypertension 08/12/2022     Priority: Medium    Altered mental status 08/11/2022     Priority: Medium    Dysarthria 08/11/2022     Priority: Medium    Weakness 08/09/2022     Priority: Medium    Hearing loss 06/20/2022     Priority: Medium    Hypercholesterolemia 06/20/2022     Priority: Medium    Malignant neoplasm of upper-inner quadrant of right breast in female, estrogen receptor positive ([address] 11/16/2021    Cancer ([address]        Please note: past medical records were reviewed per electronic medical record (EMR) - see detailed reports under Past Medical/ Surgical History. Past Medical History:    Hypertrophic CMP  8/2022 TTE Dr Susanne Munoz: EF 70-75%. NWM. Mild CLVH.  LV outflow  tract gradient (45 mmHg)  Transient PSVT during admission 8/2022  PAF during admission 8/2022  HLD on Zocor  2021 R breast carcinoma s/p lumpectomy s/p chemotherapy (TC x 4 cycles) s/p adjuvant XRT On Arimidex  8/11/2022 EEG: This was a normal study during the waking state. No seizures or epileptiform discharges were noted during this study. 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 Placed on acyclovir   Mild intellectual disability  [place] wears hearing aides  Long rehabilitation from admission 8/2022 just recently started ambulating with walker (had been using wheelchair since 8/2022)      Past Surgical History:  R breast lumpectomy      Home Medications   Outpatient Medications Marked as Taking for the 3/17/23 encounter (Office Visit) with MANDI Rojas   Medication Sig Dispense Refill    Multiple Vitamins-Minerals (THERAPEUTIC MULTIVITAMIN-MINERALS) tablet Take 1 tablet by mouth daily      cyanocobalamin ([employer] VITAMIN B12) 1000 MCG tablet Take 1 tablet by mouth daily 30 tablet 3    anastrozole (ARIMIDEX) 1 MG tablet Take 1 tablet by mouth daily Start on on 5/25 90 tablet 3    calcium citrate-vitamin D (CITRICAL + D) 315-250 MG-UNIT TABS per tablet Take 1 tablet by mouth 2 times daily (with meals)      aspirin 81 MG EC tablet Take 1 tablet by mouth daily 30 tablet 3    metoprolol succinate (TOPROL XL) 25 MG extended release tablet Take 0.5 tablets by mouth daily 30 tablet 3    simvastatin (ZOCOR) 20 MG tablet Take 20 mg by mouth nightly       Cholecalciferol (VITAMIN D3) 50 MCG (2000 UT) CAPS Take by mouth daily       loratadine (CLARITIN) 10 MG capsule Take 10 mg by mouth daily      acetaminophen (TYLENOL) 500 MG tablet Take 500 mg by mouth every 6 hours as needed for Pain            Allergies:  NKDA per patient report    Social History:    Lifelong non smoker  Denies ETOH/Illicit Drugs  Activity: Lives with her mother. Ambulates with walker. No CP or SOB with ambulation.   Code Status: Full Code          REVIEW OF SYSTEMS:   See HPI    PHYSICAL EXAM:   Wt Readings from Last 3 Encounters:   03/17/23 108 lb 9.6 oz (49.3 kg)   03/07/23 108 lb (49 kg)   03/02/23 111 lb (50.3 kg)     /72   Pulse 79   Resp 18   Ht 4' 10\" (1.473 m)   Wt 108 lb 9.6 oz (49.3 kg)   LMP 10/28/2021   BMI 22.70 kg/m²   CONST:  Well developed, well nourished  female who appears of stated age. Awake, alert and cooperative. No apparent distress. HEENT:   Head- Normocephalic, atraumatic   Eyes- Conjunctivae pink, anicteric  Throat- Oral mucosa pink and moist  Neck-  No stridor, trachea midline, no jugular venous distention. No carotid bruit. CHEST: Chest symmetrical and non-tender to palpation. No accessory muscle use or intercostal retractions  RESPIRATORY: Lung sounds - clear throughout fields   CARDIOVASCULAR:     Heart Ausculation- Regular rate and rhythm, II/VI ALFIE. PV: No lower extremity edema. No varicosities. Pedal pulses palpable, no clubbing or cyanosis   ABDOMEN: Soft, non-tender to light palpation. Bowel sounds present. No palpable masses; no abdominal bruit  MS: Good muscle strength and tone. No atrophy or abnormal movements. : Deferred  SKIN: Warm and dry no statis dermatitis or ulcers   NEURO / PSYCH: Oriented to person, place and time. Speech clear and appropriate. Follows all commands.  Pleasant affect     DATA:      EKG: SR rate 79 with LAE. LVH with repolarization        Diagnostic:      Labs:   BMP:   Lab Results   Component Value Date/Time     02/01/2023 02:10 PM     08/17/2022 08:45 AM    K 3.8 02/01/2023 02:10 PM    K 3.9 08/17/2022 08:45 AM    K 4.0 08/09/2022 11:57 AM    CO2 25 02/01/2023 02:10 PM    CO2 20 08/17/2022 08:45 AM    BUN 18 02/01/2023 02:10 PM    BUN 11 08/17/2022 08:45 AM    BUN 33 08/16/2022 06:23 AM    CREATININE 0.8 02/01/2023 02:10 PM    CREATININE 0.4 08/17/2022 08:45 AM    CREATININE 0.5 08/16/2022 06:23 AM    LABGLOM >60 02/01/2023 02:10 PM    LABGLOM >60 08/17/2022 08:45 AM    LABGLOM >60 08/16/2022 06:23 AM    CALCIUM 9.8 02/01/2023 02:10 PM    CALCIUM 7.8 08/17/2022 08:45 AM     TFT:   Lab Results   Component Value Date    TSH 0.644 08/10/2022    FT3 1.7 (L) 08/10/2022    T4FREE 1.03 08/10/2022      HgA1c:   Lab Results   Component Value Date    LABA1C 6.1 (H) 08/11/2022       FASTING LIPID PANEL:  Lab Results   Component Value Date/Time    CHOL 128 08/11/2022 06:26 AM    HDL 35 08/11/2022 06:26 AM    LDLCALC 63 08/11/2022 06:26 AM    TRIG 148 08/11/2022 06:26 AM       ASSESSMENT  Hx Sinus tachycardia SR during visit today  Hypertrophic CMP  PAF 8/2022 not on [address]  Hx pSVT  HTN: controlled  HLD: on statin  HgbA1c 6.1  8/2022  Cognitive impairment  Hx R breast carcinoma in 2021 s/p chemotherapy/XRT/lumpectomy  [place] wears hearing aides  Currently ambulating with walker after prolonged rehabilitation starting in 8/2022    PLAN:  Zio XT 14 days for assess for any recurrence of AF  Continue BB, statin, and ASA  Further recommendations following results of Zio XT  Follow-up with Dr Brett Maier or [facility] in 6 months, sooner if any needs arise    Electronically signed by Westley Martínez.  MANDI Maya on 3/17/2023 at 12:28 PM

## 2023-03-17 ENCOUNTER — OFFICE VISIT (OUTPATIENT)
Dept: CARDIOLOGY CLINIC | Age: 63
End: 2023-03-17
Payer: COMMERCIAL

## 2023-03-17 VITALS
RESPIRATION RATE: 18 BRPM | BODY MASS INDEX: 22.8 KG/M2 | WEIGHT: 108.6 LBS | HEIGHT: 58 IN | SYSTOLIC BLOOD PRESSURE: 138 MMHG | HEART RATE: 79 BPM | DIASTOLIC BLOOD PRESSURE: 72 MMHG

## 2023-03-17 DIAGNOSIS — I42.2 HYPERTROPHIC CARDIOMYOPATHY (HCC): ICD-10-CM

## 2023-03-17 DIAGNOSIS — I47.1 SUPRAVENTRICULAR TACHYCARDIA (HCC): Primary | ICD-10-CM

## 2023-03-17 DIAGNOSIS — I48.0 PAF (PAROXYSMAL ATRIAL FIBRILLATION) (HCC): ICD-10-CM

## 2023-03-17 PROCEDURE — G8484 FLU IMMUNIZE NO ADMIN: HCPCS | Performed by: NURSE PRACTITIONER

## 2023-03-17 PROCEDURE — G8420 CALC BMI NORM PARAMETERS: HCPCS | Performed by: NURSE PRACTITIONER

## 2023-03-17 PROCEDURE — 3017F COLORECTAL CA SCREEN DOC REV: CPT | Performed by: NURSE PRACTITIONER

## 2023-03-17 PROCEDURE — 1036F TOBACCO NON-USER: CPT | Performed by: NURSE PRACTITIONER

## 2023-03-17 PROCEDURE — 3075F SYST BP GE 130 - 139MM HG: CPT | Performed by: NURSE PRACTITIONER

## 2023-03-17 PROCEDURE — 99213 OFFICE O/P EST LOW 20 MIN: CPT | Performed by: NURSE PRACTITIONER

## 2023-03-17 PROCEDURE — 3078F DIAST BP <80 MM HG: CPT | Performed by: NURSE PRACTITIONER

## 2023-03-17 PROCEDURE — G8427 DOCREV CUR MEDS BY ELIG CLIN: HCPCS | Performed by: NURSE PRACTITIONER

## 2023-03-17 RX ORDER — M-VIT,TX,IRON,MINS/CALC/FOLIC 27MG-0.4MG
1 TABLET ORAL DAILY
COMMUNITY

## 2023-03-17 NOTE — PATIENT INSTRUCTIONS
14 day event monitor  Continue current medications  Will follow up with Dr Ekaterina Rascon or [facility] after event monitor.

## 2023-03-17 NOTE — PROGRESS NOTES
14 day Zio XT Monitor was placed per Abbie Perez. Serial number [id number]. Instructions were given & patient verbalized understanding.

## 2023-04-02 ENCOUNTER — HOSPITAL ENCOUNTER (OUTPATIENT)
Dept: CT IMAGING | Age: 63
Discharge: HOME | End: 2023-04-04
Payer: COMMERCIAL

## 2023-04-02 DIAGNOSIS — R91.1 PULMONARY NODULE: ICD-10-CM

## 2023-04-02 PROCEDURE — 71250 CT THORAX DX C-: CPT

## 2023-04-04 RX ORDER — ANASTROZOLE 1 MG/1
TABLET ORAL
Qty: 30 TABLET | Refills: 3 | Status: SHIPPED | OUTPATIENT
Start: 2023-04-04

## 2023-04-11 DIAGNOSIS — I47.1 SUPRAVENTRICULAR TACHYCARDIA (HCC): ICD-10-CM

## 2023-04-11 DIAGNOSIS — I48.0 PAF (PAROXYSMAL ATRIAL FIBRILLATION) (HCC): ICD-10-CM

## 2023-05-10 ENCOUNTER — HOSPITAL ENCOUNTER (OUTPATIENT)
Dept: INFUSION THERAPY | Age: 63
Discharge: HOME | End: 2023-05-10

## 2023-05-10 ENCOUNTER — OFFICE VISIT (OUTPATIENT)
Dept: ONCOLOGY | Age: 63
End: 2023-05-10
Payer: COMMERCIAL

## 2023-05-10 VITALS
TEMPERATURE: 98 F | OXYGEN SATURATION: 100 % | HEIGHT: 58 IN | BODY MASS INDEX: 23.3 KG/M2 | HEART RATE: 94 BPM | WEIGHT: 111 LBS | DIASTOLIC BLOOD PRESSURE: 76 MMHG | SYSTOLIC BLOOD PRESSURE: 159 MMHG

## 2023-05-10 DIAGNOSIS — R92.2 DENSE BREASTS: ICD-10-CM

## 2023-05-10 DIAGNOSIS — R49.9 CHANGE IN VOICE: Primary | ICD-10-CM

## 2023-05-10 PROCEDURE — 3017F COLORECTAL CA SCREEN DOC REV: CPT | Performed by: INTERNAL MEDICINE

## 2023-05-10 PROCEDURE — G8427 DOCREV CUR MEDS BY ELIG CLIN: HCPCS | Performed by: INTERNAL MEDICINE

## 2023-05-10 PROCEDURE — 3074F SYST BP LT 130 MM HG: CPT | Performed by: INTERNAL MEDICINE

## 2023-05-10 PROCEDURE — 1036F TOBACCO NON-USER: CPT | Performed by: INTERNAL MEDICINE

## 2023-05-10 PROCEDURE — 3078F DIAST BP <80 MM HG: CPT | Performed by: INTERNAL MEDICINE

## 2023-05-10 PROCEDURE — G8420 CALC BMI NORM PARAMETERS: HCPCS | Performed by: INTERNAL MEDICINE

## 2023-05-10 PROCEDURE — 99214 OFFICE O/P EST MOD 30 MIN: CPT | Performed by: INTERNAL MEDICINE

## 2023-05-10 PROCEDURE — 99213 OFFICE O/P EST LOW 20 MIN: CPT | Performed by: INTERNAL MEDICINE

## 2023-05-10 RX ORDER — ANASTROZOLE 1 MG/1
TABLET ORAL
Qty: 30 TABLET | Refills: 3 | Status: SHIPPED | OUTPATIENT
Start: 2023-05-10

## 2023-05-10 NOTE — PROGRESS NOTES
Breast ultrasound ordered. Patient given order and scheduling instructions.
Patient provided with discharge instructions. All questions answered. Patient understands follow up plan of care.
Nuclear pleomorphism- score 2                    Mitotic rate- score 2                    Overall Grade- grade 2, (score 7)   Tumor Size: Size of Largest Invasive Carcinoma-30 mm   Ductal Carcinoma In Situ (DCIS)-not identified   Tumor Extent-carcinoma invades skeletal muscle   Treatment Effect in the Breast-no known presurgical therapy   Margin status for invasive carcinoma: Inferior margin involved by   invasive carcinoma. Posterior margin less than 1 mm from invasive   carcinoma. Regional Lymph Nodes: All regional lymph nodes negative for tumor   Total number of lymph nodes examined-1   Number of sentinel nodes examined-1   Pathologic Stage Classification (AJCC 8th Edition)- pT2 p(sn)N0   Additional Pathologic Findings-lymphovascular and perineural space   invasion   Special studies-ER positive, [place] positive, HER-2 negative     The patient underwent on 12/1/2021 lumpectomy margin reexcision, was negative for residual carcinoma. Oncotype DX was done, recurrence score is 26, distant recurrence risk at 9 years with endocrine therapy alone is 16%, absolute chemotherapy benefit is greater than 15%. Patient was started on adjuvant chemotherapy with Taxotere and Cytoxan on 1/5/2022, she completed 4 cycles on 3/16/2022. She completed adjuvant RT on 5/18/2022, she was started on adjuvant endocrine therapy with Arimidex on 5/25/2022, tolerating it well,. Patient was admitted to the hospital and August 2022 after she collapsed, she had slurred speech, was weak and was having recurrent falls, she had extensive work-up done she was found to have on chest CTA from 8/9/2022 focal sclerotic lesion located in T8 vertebral body, she had an MRI done subsequently, that was negative, was seen by neurosurgery, work-up was negative for metastatic disease to the bones.       The patient returns for a follow-up visit, she is doing better overall, she is feeling stronger, tolerating the Arimidex well, no reported side effects,

## 2023-05-11 ENCOUNTER — TELEPHONE (OUTPATIENT)
Dept: ONCOLOGY | Age: 63
End: 2023-05-11

## 2023-05-11 NOTE — TELEPHONE ENCOUNTER
Met with pt, pt's mother, and family member in conjunction with medical oncology visit as social work follow up. Pt is 70-year-old female being managed for breast cancer. Pt's mood appeared euthymic with congruent affect, she appeared A&Ox4, and she was willing/able to participate in session. She appeared appropriately dressed/groomed and was able to ambulate/transfer with the use of a walker. Pt and pt's mother noted continued improvements in pt's functioning. Stated that she is no longer receiving therapy services but family friend has been helping her continue with exercises. They indicated that overall she has been doing better managing ADLs and discussed difficulties when they attempted to utilize aide services. No additional needs identified at this time. Reviewed role of oncology SW and encouraged pt to notify this provider if additional needs arise.     SHANNAN Meza, JUDITH-S  Oncology Social Worker

## 2023-06-05 RX ORDER — LANOLIN ALCOHOL/MO/W.PET/CERES
CREAM (GRAM) TOPICAL
Qty: 30 TABLET | Refills: 3 | Status: SHIPPED | OUTPATIENT
Start: 2023-06-05

## 2023-06-20 ENCOUNTER — HOSPITAL ENCOUNTER (OUTPATIENT)
Dept: RADIATION ONCOLOGY | Age: 63
Discharge: HOME | End: 2023-06-20
Payer: COMMERCIAL

## 2023-06-20 VITALS
TEMPERATURE: 97.7 F | RESPIRATION RATE: 20 BRPM | HEART RATE: 92 BPM | SYSTOLIC BLOOD PRESSURE: 146 MMHG | BODY MASS INDEX: 23.25 KG/M2 | DIASTOLIC BLOOD PRESSURE: 91 MMHG | WEIGHT: 111.25 LBS

## 2023-06-20 DIAGNOSIS — C50.211 MALIGNANT NEOPLASM OF UPPER-INNER QUADRANT OF RIGHT BREAST IN FEMALE, ESTROGEN RECEPTOR POSITIVE (HCC): Primary | ICD-10-CM

## 2023-06-20 DIAGNOSIS — Z17.0 MALIGNANT NEOPLASM OF UPPER-INNER QUADRANT OF RIGHT BREAST IN FEMALE, ESTROGEN RECEPTOR POSITIVE (HCC): Primary | ICD-10-CM

## 2023-06-20 PROCEDURE — 99212 OFFICE O/P EST SF 10 MIN: CPT

## 2023-06-20 PROCEDURE — 99212 OFFICE O/P EST SF 10 MIN: CPT | Performed by: RADIOLOGY

## 2023-06-20 NOTE — PROGRESS NOTES
DEPARTMENT OF RADIATION ONCOLOGY   Follow up visit        2023    NAME:  Melanie Steven    :  1960 61 y.o. female     PCP: Jabari Sevilla DO    DIAGNOSIS:  No diagnosis found. Pathological stage pT2, pN0, M0, G2, invasive ductal carcinoma of the right breast, 3 cm in maximum diameter at 3 o'clock position, SP partial mastectomy and sentinel node biopsy, 1 out of 1 negative sentinel lymph nodes, lymphovascular and perineural space invasion, ER positive (strong), NC positive (weak), HER 2 -, Oncotype DEX score at 26, SP 4 cycles of TC the last 1 on 3/16/2022      STAGING: Cancer Staging   Malignant neoplasm of upper-inner quadrant of right breast in female, estrogen receptor positive ([facility] [address]  Staging form: Breast, AJCC 8th Edition  - Clinical: No stage assigned - Unsigned  - Pathologic stage from 2021: Stage IA (pT2, pN0(sn), cM0, G2, ER+, NC+, HER2-) - Signed by Sienna Kebede MD on 2021      RECENT HISTORY: Melanie Steven is now 14 months out from completion of RT to the breast.  She is here for routine follow-up. She is doing well in terms of oncologic follow-up. There is still some discoloration and few small vessels at the level of the inner quadrant of the right breast.  The breast is otherwise soft and pliable. The patient is still undergoing physical therapy for her previous stroke. Past medical, surgical, social and family histories reviewed and updated as indicated. ALLERGIES:  Patient has no known allergies. MEDICATIONS:   Current Outpatient Medications:     vitamin B-12 (CYANOCOBALAMIN) 1000 MCG tablet, Take 1 tablet by mouth daily, Disp: 30 tablet, Rfl: 3    anastrozole (ARIMIDEX) 1 MG tablet, TAKE ONE (1) TABLET DAILY. , Disp: 30 tablet, Rfl: 3    Multiple Vitamins-Minerals (THERAPEUTIC MULTIVITAMIN-MINERALS) tablet, Take 1 tablet by mouth daily, Disp: , Rfl:     calcium citrate-vitamin D (CITRICAL + D) 315-250 MG-UNIT TABS per tablet, Take 1 tablet by

## 2023-06-20 NOTE — PROGRESS NOTES
Joan Garciaky  6/20/2023  1:00 PM      There were no vitals filed for this visit. :    Wt Readings from Last 3 Encounters:   05/10/23 111 lb (50.3 kg)   03/17/23 108 lb 9.6 oz (49.3 kg)   03/07/23 108 lb (49 kg)                Current Outpatient Medications:     vitamin B-12 (CYANOCOBALAMIN) 1000 MCG tablet, Take 1 tablet by mouth daily, Disp: 30 tablet, Rfl: 3    anastrozole (ARIMIDEX) 1 MG tablet, TAKE ONE (1) TABLET DAILY. , Disp: 30 tablet, Rfl: 3    Multiple Vitamins-Minerals (THERAPEUTIC MULTIVITAMIN-MINERALS) tablet, Take 1 tablet by mouth daily, Disp: , Rfl:     calcium citrate-vitamin D (CITRICAL + D) 315-250 MG-UNIT TABS per tablet, Take 1 tablet by mouth 2 times daily (with meals), Disp: , Rfl:     aspirin 81 MG EC tablet, Take 1 tablet by mouth daily, Disp: 30 tablet, Rfl: 3    metoprolol succinate (TOPROL XL) 25 MG extended release tablet, Take 0.5 tablets by mouth daily, Disp: 30 tablet, Rfl: 3    simvastatin (ZOCOR) 20 MG tablet, Take 1 tablet by mouth nightly, Disp: , Rfl:     Cholecalciferol (VITAMIN D3) 50 MCG (2000 UT) CAPS, Take by mouth daily , Disp: , Rfl:     loratadine (CLARITIN) 10 MG capsule, Take 1 capsule by mouth daily, Disp: , Rfl:     acetaminophen (TYLENOL) 500 MG tablet, Take 1 tablet by mouth every 6 hours as needed for Pain, Disp: , Rfl:       Patient is seen today in follow up for Right breast cancer S/P radiation therapy    Niya Elliott is here today with her mom Felisha Zamora for a follow up after completed radiation therapy to right breast completed 5/18/2022. She is alert and oriented x 3, ambulating with a walker for safety. Radiation site with discoloration, no other issues. She is tolerating her Anastrozole. She follows with  Dr Marta Guo oncology her last office visit was 5/4/23. She will have her next mammogram in September 2023. Patient is active with Physical therapy almost daily. No other complaints, Dr Bee Angry updated.           FALLS RISK SCREENING

## 2023-07-27 ENCOUNTER — OFFICE VISIT (OUTPATIENT)
Dept: ENT CLINIC | Age: 63
End: 2023-07-27
Payer: COMMERCIAL

## 2023-07-27 VITALS
BODY MASS INDEX: 24.77 KG/M2 | HEART RATE: 88 BPM | SYSTOLIC BLOOD PRESSURE: 138 MMHG | HEIGHT: 58 IN | DIASTOLIC BLOOD PRESSURE: 82 MMHG | WEIGHT: 118 LBS | OXYGEN SATURATION: 98 % | TEMPERATURE: 98 F

## 2023-07-27 DIAGNOSIS — R49.0 HOARSENESS: Primary | ICD-10-CM

## 2023-07-27 PROCEDURE — G8427 DOCREV CUR MEDS BY ELIG CLIN: HCPCS | Performed by: OTOLARYNGOLOGY

## 2023-07-27 PROCEDURE — 3074F SYST BP LT 130 MM HG: CPT | Performed by: OTOLARYNGOLOGY

## 2023-07-27 PROCEDURE — 31575 DIAGNOSTIC LARYNGOSCOPY: CPT | Performed by: OTOLARYNGOLOGY

## 2023-07-27 PROCEDURE — 3017F COLORECTAL CA SCREEN DOC REV: CPT | Performed by: OTOLARYNGOLOGY

## 2023-07-27 PROCEDURE — 3078F DIAST BP <80 MM HG: CPT | Performed by: OTOLARYNGOLOGY

## 2023-07-27 PROCEDURE — G8420 CALC BMI NORM PARAMETERS: HCPCS | Performed by: OTOLARYNGOLOGY

## 2023-07-27 PROCEDURE — 1036F TOBACCO NON-USER: CPT | Performed by: OTOLARYNGOLOGY

## 2023-07-27 PROCEDURE — 99204 OFFICE O/P NEW MOD 45 MIN: CPT | Performed by: OTOLARYNGOLOGY

## 2023-07-27 ASSESSMENT — ENCOUNTER SYMPTOMS
SORE THROAT: 0
WHEEZING: 0
TROUBLE SWALLOWING: 0
CHOKING: 0
SINUS PRESSURE: 0
RHINORRHEA: 0
SINUS PAIN: 0
COUGH: 0
COLOR CHANGE: 0
GASTROINTESTINAL NEGATIVE: 1
STRIDOR: 0
VOICE CHANGE: 1

## 2023-07-27 ASSESSMENT — VISUAL ACUITY: OU: 1

## 2023-07-27 NOTE — PROGRESS NOTES
Left Ear: Tympanic membrane, ear canal and external ear normal. No drainage. Nose: Nose normal. No nasal deformity or septal deviation. Mouth/Throat:      Mouth: Mucous membranes are moist.   Eyes:      General: Lids are normal. Vision grossly intact. Extraocular Movements: Extraocular movements intact. Conjunctiva/sclera: Conjunctivae normal.      Pupils: Pupils are equal, round, and reactive to light. Cardiovascular:      Rate and Rhythm: Normal rate. Pulmonary:      Effort: Pulmonary effort is normal.   Musculoskeletal:         General: Normal range of motion. Cervical back: Normal range of motion. Lymphadenopathy:      Cervical: No cervical adenopathy. Skin:     Capillary Refill: Capillary refill takes less than 2 seconds. Neurological:      Mental Status: She is alert. Psychiatric:         Mood and Affect: Mood normal.         Endoscopy Procedure Note    Pre-operative Diagnosis: Hoarseness    Post-operative Diagnosis: normal    Indications: Hoarseness, dysphagiaor aspiration - not able to be clearly evaluated by indirect laryngoscopy    Anesthesia: Lidocaine 4% and Khang-Synephrine 1/2%    Endoscopy Type:  nasal endoscopy, nasopharyngoscopy,laryngoscopy    ProcedureDetails   With the patient sitting upright in the examining chair informed consent was obtained. The bilateral side(s) of the nose was topicallyanesthetized with spray. After waiting an appropriate period of time for anesthesia/vasoconstriction to become effective, the 0-degree  flexible laryngoscope was passedthrough the right side(s) of the nose, andthe nose, nasopharynx, oropharynx, hypopharynx and larynx were examined. Anidentical procedure was performed on the contralateral side. Examination was performedduring quiet respiration and with phonation. I was present for the entire procedure. The following findings were noted.     Findings:  Mucosa:  without erythema or discharge   Nasal septum:  normal

## 2023-07-28 ENCOUNTER — TELEPHONE (OUTPATIENT)
Dept: ENT CLINIC | Age: 63
End: 2023-07-28

## 2023-07-28 NOTE — TELEPHONE ENCOUNTER
MA called patient x2 to find out where she would like to go for Speech Therapy. Line rang busy x2. It is best if patient checks with her insurance and lets our office know.      Electronically signed by Kat Ball MA on 7/28/23 at 2:52 PM EDT

## 2023-08-01 NOTE — TELEPHONE ENCOUNTER
MA lvm for patient to find out where she would like to go for therapy.      Electronically signed by Sharon Perez MA on 8/1/23 at 4:03 PM EDT

## 2023-08-02 ENCOUNTER — HOSPITAL ENCOUNTER (OUTPATIENT)
Dept: INFUSION THERAPY | Age: 63
Discharge: HOME | End: 2023-08-02
Payer: COMMERCIAL

## 2023-08-02 ENCOUNTER — OFFICE VISIT (OUTPATIENT)
Dept: ONCOLOGY | Age: 63
End: 2023-08-02
Payer: COMMERCIAL

## 2023-08-02 VITALS
HEART RATE: 87 BPM | DIASTOLIC BLOOD PRESSURE: 85 MMHG | RESPIRATION RATE: 18 BRPM | WEIGHT: 112.13 LBS | OXYGEN SATURATION: 100 % | TEMPERATURE: 98.4 F | SYSTOLIC BLOOD PRESSURE: 162 MMHG | BODY MASS INDEX: 23.43 KG/M2

## 2023-08-02 DIAGNOSIS — C50.211 MALIGNANT NEOPLASM OF UPPER-INNER QUADRANT OF RIGHT BREAST IN FEMALE, ESTROGEN RECEPTOR POSITIVE (HCC): Primary | ICD-10-CM

## 2023-08-02 DIAGNOSIS — Z17.0 MALIGNANT NEOPLASM OF UPPER-INNER QUADRANT OF RIGHT BREAST IN FEMALE, ESTROGEN RECEPTOR POSITIVE (HCC): Primary | ICD-10-CM

## 2023-08-02 DIAGNOSIS — Z17.0 MALIGNANT NEOPLASM OF UPPER-INNER QUADRANT OF RIGHT BREAST IN FEMALE, ESTROGEN RECEPTOR POSITIVE (HCC): ICD-10-CM

## 2023-08-02 DIAGNOSIS — C50.211 MALIGNANT NEOPLASM OF UPPER-INNER QUADRANT OF RIGHT BREAST IN FEMALE, ESTROGEN RECEPTOR POSITIVE (HCC): ICD-10-CM

## 2023-08-02 LAB
ALBUMIN SERPL-MCNC: 4.2 G/DL (ref 3.5–5.2)
ALP SERPL-CCNC: 70 U/L (ref 35–104)
ALT SERPL-CCNC: 16 U/L (ref 0–32)
ANION GAP SERPL CALCULATED.3IONS-SCNC: 10 MMOL/L (ref 7–16)
AST SERPL-CCNC: 19 U/L (ref 0–31)
BASOPHILS # BLD: 0.04 K/UL (ref 0–0.2)
BASOPHILS NFR BLD: 1 % (ref 0–2)
BILIRUB SERPL-MCNC: 0.2 MG/DL (ref 0–1.2)
BUN SERPL-MCNC: 15 MG/DL (ref 6–23)
CALCIUM SERPL-MCNC: 9.5 MG/DL (ref 8.6–10.2)
CHLORIDE SERPL-SCNC: 104 MMOL/L (ref 98–107)
CO2 SERPL-SCNC: 27 MMOL/L (ref 22–29)
CREAT SERPL-MCNC: 0.8 MG/DL (ref 0.5–1)
EOSINOPHIL # BLD: 0.06 K/UL (ref 0.05–0.5)
EOSINOPHILS RELATIVE PERCENT: 1 % (ref 0–6)
ERYTHROCYTE [DISTWIDTH] IN BLOOD BY AUTOMATED COUNT: 13.8 % (ref 11.5–15)
GFR SERPL CREATININE-BSD FRML MDRD: >60 ML/MIN/1.73M2
GLUCOSE SERPL-MCNC: 119 MG/DL (ref 74–99)
HCT VFR BLD AUTO: 43.1 % (ref 34–48)
HGB BLD-MCNC: 14.1 G/DL (ref 11.5–15.5)
IMM GRANULOCYTES # BLD AUTO: 0.04 K/UL (ref 0–0.58)
IMM GRANULOCYTES NFR BLD: 1 % (ref 0–5)
LYMPHOCYTES NFR BLD: 1.38 K/UL (ref 1.5–4)
LYMPHOCYTES RELATIVE PERCENT: 20 % (ref 20–42)
MCH RBC QN AUTO: 29.4 PG (ref 26–35)
MCHC RBC AUTO-ENTMCNC: 32.7 G/DL (ref 32–34.5)
MCV RBC AUTO: 89.8 FL (ref 80–99.9)
MONOCYTES NFR BLD: 0.46 K/UL (ref 0.1–0.95)
MONOCYTES NFR BLD: 7 % (ref 2–12)
NEUTROPHILS NFR BLD: 72 % (ref 43–80)
NEUTS SEG NFR BLD: 4.98 K/UL (ref 1.8–7.3)
PLATELET # BLD AUTO: 250 K/UL (ref 130–450)
PMV BLD AUTO: 9.6 FL (ref 7–12)
POTASSIUM SERPL-SCNC: 3.9 MMOL/L (ref 3.5–5)
PROT SERPL-MCNC: 6.9 G/DL (ref 6.4–8.3)
RBC # BLD AUTO: 4.8 M/UL (ref 3.5–5.5)
SODIUM SERPL-SCNC: 141 MMOL/L (ref 132–146)
WBC OTHER # BLD: 7 K/UL (ref 4.5–11.5)

## 2023-08-02 PROCEDURE — 85025 COMPLETE CBC W/AUTO DIFF WBC: CPT

## 2023-08-02 PROCEDURE — G8427 DOCREV CUR MEDS BY ELIG CLIN: HCPCS | Performed by: INTERNAL MEDICINE

## 2023-08-02 PROCEDURE — 80053 COMPREHEN METABOLIC PANEL: CPT

## 2023-08-02 PROCEDURE — 36415 COLL VENOUS BLD VENIPUNCTURE: CPT

## 2023-08-02 PROCEDURE — 3074F SYST BP LT 130 MM HG: CPT | Performed by: INTERNAL MEDICINE

## 2023-08-02 PROCEDURE — G8420 CALC BMI NORM PARAMETERS: HCPCS | Performed by: INTERNAL MEDICINE

## 2023-08-02 PROCEDURE — 99214 OFFICE O/P EST MOD 30 MIN: CPT | Performed by: INTERNAL MEDICINE

## 2023-08-02 PROCEDURE — 1036F TOBACCO NON-USER: CPT | Performed by: INTERNAL MEDICINE

## 2023-08-02 PROCEDURE — 3078F DIAST BP <80 MM HG: CPT | Performed by: INTERNAL MEDICINE

## 2023-08-02 PROCEDURE — 3017F COLORECTAL CA SCREEN DOC REV: CPT | Performed by: INTERNAL MEDICINE

## 2023-08-02 RX ORDER — LANOLIN ALCOHOL/MO/W.PET/CERES
1000 CREAM (GRAM) TOPICAL DAILY
Qty: 90 TABLET | Refills: 3 | Status: SHIPPED | OUTPATIENT
Start: 2023-08-02

## 2023-08-02 RX ORDER — ANASTROZOLE 1 MG/1
TABLET ORAL
Qty: 90 TABLET | Refills: 3 | Status: SHIPPED | OUTPATIENT
Start: 2023-08-02

## 2023-08-02 NOTE — PROGRESS NOTES
[address] [place] [address] Drive [address] [place] [address] MEDICAL ONCOLOGY  [address]  [facility] [address]  Dept: [phone]  Loc: [phone]  Attending Progress Note      Reason for Visit: Right breast cancer. Referring Physician: Kari Mari MD    PCP:  Yamilex Marroquin DO    History of Present Illness:     Johanna Nguyễn is a pleasant 60-year-old female patient, with a past medical history significant for hearing loss and hyperlipidemia, who had presented with an abnormal screening mammogram,  MAMMOGRAM ULTRASOUND BIOPSY; [address] [place] [address]       9/14/2021: MAMMOGRAM, RIGHT BREAST ULTRASOUND: [address] [place] [address]                9/14/2021: LEFT BREAST ULTRASOUND: [facility]    She underwent an US guided right breast core biopsy at 3 o'clock position on September 20 , 2021. Pathological evaluation completed at [address] [place] [address]:  PATHOLOGY  Diagnosis:   Right breast, 3 o'clock position, core biopsies: Invasive carcinoma of no   special type (ductal with lobular features). Preliminary Allan   score 3+2+1 = 6     Comment:   Tumor cells stain strongly positive for e-cadherin   immunostain. Intradepartmental consultation is obtained.      Breast Cancer Marker Studies:         Estrogen Receptors (ER):       -Positive (>10% of cells demonstrate nuclear positivity):       Percentage of cells positive: >90%       Intensity: Strong         Progesterone Receptors (SD):       -Positive:       Percentage of cells positive: 2%       Intensity: Weak         Her-2/marquise (c-erb B-2) protein expression: Negative (1+)     The patient underwent on 11/3/2021 right breast lumpectomy with axillary sentinel lymph node biopsy, pathology:    CANCER CASE SUMMARY   Procedure -excision   Specimen Laterality -right   Tumor Site, Invasive Carcinoma-3:00   Histologic Type-invasive carcinoma, no special type (ductal)   Histologic Grade (Allan Histologic Score):                    Tubule differentiation- score-3

## 2023-08-03 ENCOUNTER — TELEPHONE (OUTPATIENT)
Dept: ENT CLINIC | Age: 63
End: 2023-08-03

## 2023-08-03 NOTE — TELEPHONE ENCOUNTER
Pt's mother called in wanting to let the office know, she is going to put the pt's speech therapy on hold for right now, she is dealing with medical issues with herself right now.

## 2023-08-04 NOTE — TELEPHONE ENCOUNTER
Will close referral until patient is ready to persue    Electronically signed by Greg William MA on 8/4/23 at 10:48 AM EDT

## 2023-08-28 ASSESSMENT — ENCOUNTER SYMPTOMS
RESPIRATORY NEGATIVE: 1
COUGH: 0
BACK PAIN: 0
SHORTNESS OF BREATH: 0

## 2023-08-28 NOTE — PROGRESS NOTES
Subjective: This note was copied forward from the last encounter. Essential components for this patient record were reviewed and verified on this visit including:  recent hospitalizations, recent imaging, PMH, PSH, FH, SOC HX, Allergies, and Medications were reviewed and updated as appropriate. In addition, the assessment and plan were copied from prior office note and updated accordingly. Patient ID: Vitaliy Solano is a 61 y.o. female. NOEMY De La Cruz is a pleasant 61 y.o. female who was found to have a right breast mass on screening mammogram done at [facility] 08/24/2021.    09/20/2021 she underwent a right breast ultrasound-guided core core biopsy at 3 o'clock position. Pathology completed at [facility]):    Diagnosis:   Right breast, 3 o'clock position, core biopsies: Invasive carcinoma of no   special type (ductal with lobular features). Preliminary Allan   score 3+2+1 = 6     Comment:   Tumor cells stain strongly positive for e-cadherin immunostain. Intradepartmental consultation is obtained. Breast Cancer Marker Studies:   Estrogen Receptors (ER): Positive. Percentage of cells positive: >90%, Intensity: Strong   Progesterone Receptors (MI): Positive.   Percentage of cells positive: 2%  Intensity: Weak   Her-2/marquise (c-erb B-2) protein expression: Negative (1+)      11/03/2021 right breast lumpectomy with axillary sentinel lymph node biopsy, pathology:     CANCER CASE SUMMARY   Procedure -excision   Specimen Laterality -right   Tumor Site, Invasive Carcinoma-3:00   Histologic Type-invasive carcinoma, no special type (ductal)   Histologic Grade ([place] Histologic Score):                    Tubule differentiation- score-3                    Nuclear pleomorphism- score 2                    Mitotic rate- score 2                    Overall Grade- grade 2, (score 7)   Tumor Size: Size of Largest Invasive Carcinoma-30 mm   Ductal Carcinoma In Situ (DCIS)-not identified

## 2023-10-06 ENCOUNTER — HOSPITAL ENCOUNTER (OUTPATIENT)
Dept: GENERAL RADIOLOGY | Age: 63
End: 2023-10-06
Payer: COMMERCIAL

## 2023-10-06 DIAGNOSIS — R92.2 DENSE BREASTS: ICD-10-CM

## 2023-10-06 DIAGNOSIS — R92.30 DENSE BREASTS: ICD-10-CM

## 2023-10-06 PROCEDURE — 76641 ULTRASOUND BREAST COMPLETE: CPT

## 2023-10-10 ENCOUNTER — OFFICE VISIT (OUTPATIENT)
Dept: BREAST CENTER | Age: 63
End: 2023-10-10
Payer: COMMERCIAL

## 2023-10-10 VITALS
RESPIRATION RATE: 20 BRPM | DIASTOLIC BLOOD PRESSURE: 80 MMHG | BODY MASS INDEX: 24.14 KG/M2 | SYSTOLIC BLOOD PRESSURE: 142 MMHG | HEART RATE: 89 BPM | OXYGEN SATURATION: 98 % | WEIGHT: 115 LBS | HEIGHT: 58 IN | TEMPERATURE: 98.4 F

## 2023-10-10 DIAGNOSIS — Z85.3 PERSONAL HISTORY OF BREAST CANCER: ICD-10-CM

## 2023-10-10 DIAGNOSIS — R92.8 ABNORMAL FINDINGS ON DIAGNOSTIC IMAGING OF BREAST: Primary | ICD-10-CM

## 2023-10-10 DIAGNOSIS — Z12.31 VISIT FOR SCREENING MAMMOGRAM: ICD-10-CM

## 2023-10-10 PROCEDURE — 3079F DIAST BP 80-89 MM HG: CPT | Performed by: NURSE PRACTITIONER

## 2023-10-10 PROCEDURE — 99213 OFFICE O/P EST LOW 20 MIN: CPT | Performed by: NURSE PRACTITIONER

## 2023-10-10 PROCEDURE — G8484 FLU IMMUNIZE NO ADMIN: HCPCS | Performed by: NURSE PRACTITIONER

## 2023-10-10 PROCEDURE — 99214 OFFICE O/P EST MOD 30 MIN: CPT | Performed by: NURSE PRACTITIONER

## 2023-10-10 PROCEDURE — 3077F SYST BP >= 140 MM HG: CPT | Performed by: NURSE PRACTITIONER

## 2023-10-10 PROCEDURE — 1036F TOBACCO NON-USER: CPT | Performed by: NURSE PRACTITIONER

## 2023-10-10 PROCEDURE — G8427 DOCREV CUR MEDS BY ELIG CLIN: HCPCS | Performed by: NURSE PRACTITIONER

## 2023-10-10 PROCEDURE — G8420 CALC BMI NORM PARAMETERS: HCPCS | Performed by: NURSE PRACTITIONER

## 2023-10-10 PROCEDURE — 3017F COLORECTAL CA SCREEN DOC REV: CPT | Performed by: NURSE PRACTITIONER

## 2023-10-10 ASSESSMENT — ENCOUNTER SYMPTOMS
ABDOMINAL PAIN: 0
WHEEZING: 0
CONSTIPATION: 0
ANAL BLEEDING: 0
CHOKING: 0
DIARRHEA: 0
ABDOMINAL DISTENTION: 0
NAUSEA: 0
VOMITING: 0
CHEST TIGHTNESS: 0

## 2023-10-20 DIAGNOSIS — Z17.0 MALIGNANT NEOPLASM OF UPPER-INNER QUADRANT OF RIGHT BREAST IN FEMALE, ESTROGEN RECEPTOR POSITIVE (HCC): Primary | ICD-10-CM

## 2023-10-20 DIAGNOSIS — C50.211 MALIGNANT NEOPLASM OF UPPER-INNER QUADRANT OF RIGHT BREAST IN FEMALE, ESTROGEN RECEPTOR POSITIVE (HCC): Primary | ICD-10-CM

## 2023-10-20 DIAGNOSIS — R92.8 ABNORMAL FINDING ON BREAST IMAGING: ICD-10-CM

## 2023-11-01 ENCOUNTER — OFFICE VISIT (OUTPATIENT)
Dept: ONCOLOGY | Age: 63
End: 2023-11-01
Payer: COMMERCIAL

## 2023-11-01 ENCOUNTER — HOSPITAL ENCOUNTER (OUTPATIENT)
Dept: INFUSION THERAPY | Age: 63
Discharge: HOME | End: 2023-11-01

## 2023-11-01 VITALS
WEIGHT: 118.2 LBS | TEMPERATURE: 97.8 F | SYSTOLIC BLOOD PRESSURE: 159 MMHG | HEIGHT: 58 IN | HEART RATE: 89 BPM | BODY MASS INDEX: 24.81 KG/M2 | OXYGEN SATURATION: 99 % | DIASTOLIC BLOOD PRESSURE: 90 MMHG

## 2023-11-01 DIAGNOSIS — R92.2 DENSE BREASTS: ICD-10-CM

## 2023-11-01 DIAGNOSIS — Z17.0 MALIGNANT NEOPLASM OF UPPER-INNER QUADRANT OF RIGHT BREAST IN FEMALE, ESTROGEN RECEPTOR POSITIVE (HCC): Primary | ICD-10-CM

## 2023-11-01 DIAGNOSIS — R92.30 DENSE BREASTS: ICD-10-CM

## 2023-11-01 DIAGNOSIS — C50.211 MALIGNANT NEOPLASM OF UPPER-INNER QUADRANT OF RIGHT BREAST IN FEMALE, ESTROGEN RECEPTOR POSITIVE (HCC): Primary | ICD-10-CM

## 2023-11-01 PROCEDURE — 3074F SYST BP LT 130 MM HG: CPT | Performed by: INTERNAL MEDICINE

## 2023-11-01 PROCEDURE — 3078F DIAST BP <80 MM HG: CPT | Performed by: INTERNAL MEDICINE

## 2023-11-01 PROCEDURE — G8420 CALC BMI NORM PARAMETERS: HCPCS | Performed by: INTERNAL MEDICINE

## 2023-11-01 PROCEDURE — 3017F COLORECTAL CA SCREEN DOC REV: CPT | Performed by: INTERNAL MEDICINE

## 2023-11-01 PROCEDURE — G8427 DOCREV CUR MEDS BY ELIG CLIN: HCPCS | Performed by: INTERNAL MEDICINE

## 2023-11-01 PROCEDURE — 99214 OFFICE O/P EST MOD 30 MIN: CPT | Performed by: INTERNAL MEDICINE

## 2023-11-01 PROCEDURE — 1036F TOBACCO NON-USER: CPT | Performed by: INTERNAL MEDICINE

## 2023-11-01 PROCEDURE — G8484 FLU IMMUNIZE NO ADMIN: HCPCS | Performed by: INTERNAL MEDICINE

## 2023-11-01 RX ORDER — ANASTROZOLE 1 MG/1
TABLET ORAL
Qty: 90 TABLET | Refills: 3 | Status: SHIPPED | OUTPATIENT
Start: 2023-11-01

## 2023-11-01 NOTE — PROGRESS NOTES
[address] [facility] MEDICAL ONCOLOGY  [address]  [facility] [address]  Dept: [phone]  Loc: [phone]  Attending Progress Note      Reason for Visit: Right breast cancer. Referring Physician: Anastasiia Ardon MD    PCP:  Rock Alex DO    History of Present Illness:     Noel Runner is a pleasant 60-year-old female patient, with a past medical history significant for hearing loss and hyperlipidemia, who had presented with an abnormal screening mammogram,  MAMMOGRAM ULTRASOUND BIOPSY; [address] [place] [address]       9/14/2021: MAMMOGRAM, RIGHT BREAST ULTRASOUND: [address] [place] [address]                9/14/2021: LEFT BREAST ULTRASOUND: [facility]    She underwent an US guided right breast core biopsy at 3 o'clock position on September 20 , 2021. Pathological evaluation completed at [address] [place] [address]:  PATHOLOGY  Diagnosis:   Right breast, 3 o'clock position, core biopsies: Invasive carcinoma of no   special type (ductal with lobular features). Preliminary [place]   score 3+2+1 = 6     Comment:   Tumor cells stain strongly positive for e-cadherin   immunostain. Intradepartmental consultation is obtained.      Breast Cancer Marker Studies:         Estrogen Receptors (ER):       -Positive (>10% of cells demonstrate nuclear positivity):       Percentage of cells positive: >90%       Intensity: Strong         Progesterone Receptors (ME):       -Positive:       Percentage of cells positive: 2%       Intensity: Weak         Her-2/marquise (c-erb B-2) protein expression: Negative (1+)     The patient underwent on 11/3/2021 right breast lumpectomy with axillary sentinel lymph node biopsy, pathology:    CANCER CASE SUMMARY   Procedure -excision   Specimen Laterality -right   Tumor Site, Invasive Carcinoma-3:00   Histologic Type-invasive carcinoma, no special type (ductal)   Histologic Grade ([place] Histologic Score):                    Tubule differentiation- score-3

## 2024-02-23 NOTE — PROGRESS NOTES
pleasant and conversant.  Again able to follow simple directions and answers simple questions appropriately.   Unless otherwise stated in this report the patient's positive and negative responses for review of systems for constitutional, eyes, ENT, cardiovascular, respiratory, gastrointestinal, neurological, , musculoskeletal, and integument systems and related systems to the presenting problem are either stated in the history of present illness or were not pertinent or were negative for the symptoms and/or complaints related to the presenting medical problem.  Positives and pertinent negatives as per HPI.  All others reviewed and are negative.    Objective:   Physical Exam  Vitals and nursing note reviewed.   Constitutional:       General: She is not in acute distress.     Appearance: She is well-developed. She is not ill-appearing or toxic-appearing.      Comments: ECOG 2.  She is pleasant and conversant and in no apparent distress.  Able to get onto the exam table with 2 assist.   HENT:      Head: Normocephalic and atraumatic.   Neck:      Comments: No JVD.  Cardiovascular:      Rate and Rhythm: Regular rhythm. Tachycardia present.      Heart sounds: Murmur heard.      Comments: Apical is 88 and regular.  Chronic murmur is present.  Pulmonary:      Effort: Pulmonary effort is normal. No respiratory distress.      Breath sounds: Normal breath sounds. No stridor. No wheezing, rhonchi or rales.   Chest:      Chest wall: No mass, lacerations, deformity, swelling, tenderness or edema.   Breasts:     Breasts are symmetrical.      Right: No inverted nipple, mass, nipple discharge, skin change or tenderness.      Left: No inverted nipple, mass, nipple discharge, skin change or tenderness.          Comments: Left breast is stable and without evidence of disease.  Musculoskeletal:         General: Normal range of motion.      Right shoulder: Normal.      Left shoulder: Normal.      Cervical back: Normal range of motion

## 2024-03-13 ENCOUNTER — HOSPITAL ENCOUNTER (OUTPATIENT)
Dept: GENERAL RADIOLOGY | Age: 64
Discharge: HOME | End: 2024-03-15
Payer: COMMERCIAL

## 2024-03-13 ENCOUNTER — OFFICE VISIT (OUTPATIENT)
Dept: BREAST CENTER | Age: 64
End: 2024-03-13
Payer: COMMERCIAL

## 2024-03-13 VITALS
SYSTOLIC BLOOD PRESSURE: 138 MMHG | HEART RATE: 94 BPM | WEIGHT: 117 LBS | TEMPERATURE: 97.3 F | BODY MASS INDEX: 24.56 KG/M2 | OXYGEN SATURATION: 99 % | RESPIRATION RATE: 20 BRPM | DIASTOLIC BLOOD PRESSURE: 80 MMHG | HEIGHT: 58 IN

## 2024-03-13 VITALS — WEIGHT: 118 LBS | HEIGHT: 58 IN | BODY MASS INDEX: 24.77 KG/M2

## 2024-03-13 DIAGNOSIS — Z85.3 PERSONAL HISTORY OF BREAST CANCER: Primary | ICD-10-CM

## 2024-03-13 DIAGNOSIS — Z01.818 PREOP TESTING: ICD-10-CM

## 2024-03-13 DIAGNOSIS — R92.2 DENSE BREASTS: ICD-10-CM

## 2024-03-13 DIAGNOSIS — R92.8 ABNORMAL FINDINGS ON DIAGNOSTIC IMAGING OF BREAST: ICD-10-CM

## 2024-03-13 DIAGNOSIS — Z85.3 PERSONAL HISTORY OF BREAST CANCER: ICD-10-CM

## 2024-03-13 DIAGNOSIS — R92.30 DENSE BREASTS: ICD-10-CM

## 2024-03-13 PROCEDURE — G0279 TOMOSYNTHESIS, MAMMO: HCPCS

## 2024-03-13 PROCEDURE — 3017F COLORECTAL CA SCREEN DOC REV: CPT | Performed by: NURSE PRACTITIONER

## 2024-03-13 PROCEDURE — 99214 OFFICE O/P EST MOD 30 MIN: CPT | Performed by: NURSE PRACTITIONER

## 2024-03-13 PROCEDURE — G8427 DOCREV CUR MEDS BY ELIG CLIN: HCPCS | Performed by: NURSE PRACTITIONER

## 2024-03-13 PROCEDURE — 3075F SYST BP GE 130 - 139MM HG: CPT | Performed by: NURSE PRACTITIONER

## 2024-03-13 PROCEDURE — G8420 CALC BMI NORM PARAMETERS: HCPCS | Performed by: NURSE PRACTITIONER

## 2024-03-13 PROCEDURE — 3079F DIAST BP 80-89 MM HG: CPT | Performed by: NURSE PRACTITIONER

## 2024-03-13 PROCEDURE — 1036F TOBACCO NON-USER: CPT | Performed by: NURSE PRACTITIONER

## 2024-03-13 PROCEDURE — 99213 OFFICE O/P EST LOW 20 MIN: CPT | Performed by: NURSE PRACTITIONER

## 2024-03-13 PROCEDURE — G8484 FLU IMMUNIZE NO ADMIN: HCPCS | Performed by: NURSE PRACTITIONER

## 2024-03-14 DIAGNOSIS — Z17.0 MALIGNANT NEOPLASM OF UPPER-INNER QUADRANT OF RIGHT BREAST IN FEMALE, ESTROGEN RECEPTOR POSITIVE (HCC): Primary | ICD-10-CM

## 2024-03-14 DIAGNOSIS — C50.211 MALIGNANT NEOPLASM OF UPPER-INNER QUADRANT OF RIGHT BREAST IN FEMALE, ESTROGEN RECEPTOR POSITIVE (HCC): Primary | ICD-10-CM

## 2024-03-18 DIAGNOSIS — R93.89 ABNORMAL ULTRASOUND: Primary | ICD-10-CM

## 2024-03-20 ENCOUNTER — OFFICE VISIT (OUTPATIENT)
Dept: ONCOLOGY | Age: 64
End: 2024-03-20
Payer: COMMERCIAL

## 2024-03-20 ENCOUNTER — HOSPITAL ENCOUNTER (OUTPATIENT)
Dept: INFUSION THERAPY | Age: 64
Discharge: HOME | End: 2024-03-20

## 2024-03-20 VITALS
WEIGHT: 121.2 LBS | SYSTOLIC BLOOD PRESSURE: 162 MMHG | TEMPERATURE: 97.9 F | OXYGEN SATURATION: 99 % | HEIGHT: 58 IN | HEART RATE: 100 BPM | BODY MASS INDEX: 25.44 KG/M2 | DIASTOLIC BLOOD PRESSURE: 88 MMHG

## 2024-03-20 DIAGNOSIS — Z17.0 MALIGNANT NEOPLASM OF UPPER-INNER QUADRANT OF RIGHT BREAST IN FEMALE, ESTROGEN RECEPTOR POSITIVE (HCC): ICD-10-CM

## 2024-03-20 DIAGNOSIS — C50.211 MALIGNANT NEOPLASM OF UPPER-INNER QUADRANT OF RIGHT BREAST IN FEMALE, ESTROGEN RECEPTOR POSITIVE (HCC): ICD-10-CM

## 2024-03-20 DIAGNOSIS — R49.9 CHANGE IN VOICE: ICD-10-CM

## 2024-03-20 DIAGNOSIS — R91.1 LUNG NODULE: Primary | ICD-10-CM

## 2024-03-20 DIAGNOSIS — Z78.0 POSTMENOPAUSAL: ICD-10-CM

## 2024-03-20 PROCEDURE — G8427 DOCREV CUR MEDS BY ELIG CLIN: HCPCS | Performed by: INTERNAL MEDICINE

## 2024-03-20 PROCEDURE — 3017F COLORECTAL CA SCREEN DOC REV: CPT | Performed by: INTERNAL MEDICINE

## 2024-03-20 PROCEDURE — 99214 OFFICE O/P EST MOD 30 MIN: CPT | Performed by: INTERNAL MEDICINE

## 2024-03-20 PROCEDURE — 3077F SYST BP >= 140 MM HG: CPT | Performed by: INTERNAL MEDICINE

## 2024-03-20 PROCEDURE — 1036F TOBACCO NON-USER: CPT | Performed by: INTERNAL MEDICINE

## 2024-03-20 PROCEDURE — G8484 FLU IMMUNIZE NO ADMIN: HCPCS | Performed by: INTERNAL MEDICINE

## 2024-03-20 PROCEDURE — 3079F DIAST BP 80-89 MM HG: CPT | Performed by: INTERNAL MEDICINE

## 2024-03-20 PROCEDURE — G8419 CALC BMI OUT NRM PARAM NOF/U: HCPCS | Performed by: INTERNAL MEDICINE

## 2024-03-20 RX ORDER — ANASTROZOLE 1 MG/1
TABLET ORAL
Qty: 90 TABLET | Refills: 3 | Status: SHIPPED | OUTPATIENT
Start: 2024-03-20

## 2024-03-20 NOTE — PROGRESS NOTES
Patient provided with discharge instructions.  All questions answered.  Patient understands follow up plan of care.     
right axilla lymphadenopathy.  CARDIOVASCULAR: Regular rhythm, tachycardic.  ABDOMEN: Soft. Non-tender, non-distended. Positive bowel sounds.  EXTREMITIES: Without clubbing, cyanosis, or edema.   NEUROLOGIC: Improved weakness of the lower extremities.  ECOG PS 1-2.      Impression/Plan:    Cherrie is a pleasant 63-year-old female patient, with a past medical history significant for hearing loss and hyperlipidemia, who had presented with an abnormal screening mammogram, she was diagnosed with a right breast invasive ductal carcinoma, she underwent on 11/3/2021 right breast lumpectomy with axillary sentinel lymph node biopsy, tumor size was 3 cm, grade 2, carcinoma invaded skeletal muscle, inferior margin was positive, posterior margin was less than 1 mm from invasive carcinoma, she had margins reexcision done, was negative for residual carcinoma, 1 sentinel lymph node was removed, was negative for metastatic disease, final pathologic stage pT2 p(sn)N0, ER positive greater than 90%, TN +2%, HER-2/marquise negative, 1+ by IHC, Oncotype DX was done, recurrence score is 26, risk of distant recurrence at 9 years with endocrine therapy alone is 16%, chemotherapy benefit is greater than 15%.        I discussed with the patient and her mother her diagnosis, characteristics of her tumor, prognosis and recommendations for treatment, adjuvant endocrine therapy with an AI is recommended, the side effects of the Arimidex were reviewed with the patient.  We reviewed the Oncotype DX results, recurrence score is 26, adjuvant chemotherapy is recommended, benefit is greater than 15%, I discussed with the patient TC regimen, the side effects and the schedule were reviewed with her.      The patient was started on adjuvant chemotherapy with Taxotere and Cytoxan on 1/5/2022, she completed 4 cycles on 3/16/2022.  The patient completed adjuvant radiation therapy on5/18/2022.    I discussed with the patient adjuvant endocrine therapy, Arimidex,

## 2024-03-21 ENCOUNTER — CLINICAL DOCUMENTATION (OUTPATIENT)
Dept: GENERAL RADIOLOGY | Age: 64
End: 2024-03-21

## 2024-03-21 NOTE — PROGRESS NOTES
Per Dr Pereira, patient still needs 6 month follow up right breast US.  (Patient had normal mammogram 3-13-24.)

## 2024-03-22 ENCOUNTER — TELEPHONE (OUTPATIENT)
Dept: ONCOLOGY | Age: 64
End: 2024-03-22

## 2024-03-22 NOTE — TELEPHONE ENCOUNTER
This nurse received a call form Kim, patient's mother. Kim is frustrated with finding transportation for multiple appointments. This nurse offered for the  to reach out to her fir possible assistance. Kim was receptive. Message sent to  to follow up with patient.

## 2024-03-28 ENCOUNTER — TELEPHONE (OUTPATIENT)
Dept: ONCOLOGY | Age: 64
End: 2024-03-28

## 2024-03-28 NOTE — TELEPHONE ENCOUNTER
Attempted to contact pt's mother, Kim, at request of cancer center staff re: transportation.  Message left requesting callback.    Nadiya Membreno MSW, LISW-S  Oncology Social Worker

## 2024-04-02 ENCOUNTER — TELEPHONE (OUTPATIENT)
Dept: ONCOLOGY | Age: 64
End: 2024-04-02

## 2024-04-02 NOTE — PROGRESS NOTES
Geronimo Guzman  6/1/2022  2:30 PM      Vitals:    06/01/22 1429   Resp: 20   Temp: 98.2 °F (36.8 °C)   SpO2: 97%    : Wt Readings from Last 3 Encounters:   06/01/22 118 lb 4 oz (53.6 kg)   05/18/22 118 lb 6.4 oz (53.7 kg)   05/16/22 118 lb 12.8 oz (53.9 kg)                Current Outpatient Medications:     anastrozole (ARIMIDEX) 1 MG tablet, Take 1 tablet by mouth daily Start on on 5/25, Disp: 30 tablet, Rfl: 3    silver sulfADIAZINE (SILVADENE) 1 % cream, Apply 1 applicator topically 2 times daily Apply topically daily. , Disp: 50 g, Rfl: 1    betamethasone dipropionate (DIPROLENE) 0.05 % ointment, Apply topically daily for no more than 3 weeks. ., Disp: 50 g, Rfl: 1    simvastatin (ZOCOR) 20 MG tablet, Take 20 mg by mouth nightly , Disp: , Rfl:     cephALEXin (KEFLEX) 500 MG capsule, Take 1 capsule by mouth 4 times daily, Disp: 28 capsule, Rfl: 0    triamcinolone (KENALOG) 0.1 % cream, Apply topically 2 times daily. , Disp: 80 g, Rfl: 1    prochlorperazine (COMPAZINE) 10 MG tablet, Take 1 tablet by mouth every 6 hours as needed (nausea, vomiting), Disp: 30 tablet, Rfl: 2    ondansetron (ZOFRAN) 8 MG tablet, Take 1 tablet by mouth every 12 hours as needed for Nausea or Vomiting, Disp: 30 tablet, Rfl: 1    Garlic (GARLIQUE) 023 MG TBEC, Take by mouth daily , Disp: , Rfl:     Cholecalciferol (VITAMIN D3) 50 MCG (2000 UT) CAPS, Take by mouth daily , Disp: , Rfl:     loratadine (CLARITIN) 10 MG capsule, Take 10 mg by mouth daily, Disp: , Rfl:     acetaminophen (TYLENOL) 500 MG tablet, Take 500 mg by mouth every 6 hours as needed for Pain, Disp: , Rfl:       Patient is seen today in follow up for Right breast cane/ skin check    Lia Vinson mom called to see if Karri Agudelo can have her follow up for skin check today instead of the scheduled time on Friday, so they are here today for the scheduled skin check. Karri Agudelo completed her radiation to breast 5/18/22 22 fractions; 5756 cGy.   Her right breast is red, and moist desquamation notes, draining small amount of light yellow drainage. Area cleaned with normal saline and 4 x 4 dressings, dried then non-adherent dressings applied secured with  Rolled gauge. Instructions re: silvadene, neosporin and dressing changes given by Dr Julio C Donald with my presence. I gave them dressing supplies to take home, [facility] ( Celeste's mom) verbalizes understanding. We sill them back in 1 week. FALLS RISK SCREENING ASSESSMENT    Instructions:  Assess the patient and enter the appropriate indicators that are present for fall risk identification. Total the numbers entered and assign a fall risk score from Table 2.  Reassess patient at a minimum every 12 weeks or with status change. Assessment   Date  6/1/2022     1. Mental Ability: confusion/cognitively impaired No - 0/       2. Elimination Issues: incontinence, frequency No - 0       3. Ambulatory: use of assistive devices (walker, cane, off-loading devices), attached to equipment (IV pole, oxygen) No - 0     4. Sensory Limitations: dizziness, vertigo, impaired vision No - 0       5. Age Less than 65 years - 0       6. Medication: diuretics, strong analgesics, hypnotics, sedatives, antihypertensive agents   No - 0   7. Falls:  recent history of falls within the last 3 months (not to include slipping or tripping)   No - 0   TOTAL 0    If score of 4 or greater was education given? No       TABLE 2   Risk Score Risk Level Plan of Care   0-3 Little or  No Risk 1. Provide assistance as indicated for ambulation activities  2. Reorient confused/cognitively impaired patient  3. Call-light/bell within patient's reach  4. Chair/bed in low position, stretcher/bed with siderails up except when performing patient care activities  5. Educate patient/family/caregiver on falls prevention  6.  Reassess in 12 weeks or with any noted change in patient condition which places them at a risk for a fall   4-6 Moderate Risk 1.   Provide assistance as indicated for ambulation activities  2. Reorient confused/cognitively impaired patient  3. Call-light/bell within patient's reach  4. Chair/bed in low position, stretcher/bed with siderails up except when performing patient care activities  5. Educate patient/family/caregiver on falls prevention  6. Falls risk precaution (Yellow sticker Level II) placed on patient chart   7 or   Higher High Risk 1. Place patient in easily observable treatment room  2. Patient attended at all times by family member or staff  3. Provide assistance as indicated for ambulation activities  4. Reorient confused/cognitively impaired patient  5. Call-light/bell within patient's reach  6. Chair/bed in low position, stretcher/bed with siderails up except when performing patient care activities  7. Educate patient/family/caregiver on falls prevention  8. Falls risk precaution (Yellow sticker Level III) placed on patient chart           MALNUTRITION RISK SCREENING ASSESSMENT    6/1/2022   Patient:  Preston Huddleston  Sex:  female    Instructions:  Assess the patient and enter the appropriate indicators that are present for nutrition risk identification. Total the numbers entered and assign a risk score. Follow the appropriate action for total score listed below. Assessment   Date  6/1/2022     1. Have you lost weight without trying? 0- No     2. Have you been eating poorly because of a decreased appetite? 0- No   3. Do you have a diagnosis of head and neck cancer?       0- No                                                                                    TOTAL 0          Score of 0-1: No action  Score 2 or greater:  · For Non-Diabetic Patient: Recommend adding Ensure Complete 2 x daily and provide patient with Ensure wellness bag with coupons  · For Diabetic Patient: Recommend adding Glucerna Shake 2 x daily and provide patient with Glucerna Wellness bag with coupons  · Route to the dietitian via Russ Lind, RN There are no Wet Read(s) to document.

## 2024-04-02 NOTE — TELEPHONE ENCOUNTER
Returned call to pt's mother, Kim, re: transportation.  Kim discussed recent appointment at [facility] stating that she has extreme difficulty traveling to [place].  She noted frustration that breast ultrasound was not complete at the time of that appointment and inquired about need for ultrasound prior to scheduled appointment in 9/2024.  Kim indicated that she does have family that is willing to transport them but at this time pt is declining ultrasound and MRI.  She noted that pt has high anxiety with testing (particularly closed MRI) and expressed frustration that she does not feel medical providers listen to her and pt's concerns.  Advised that this provider will discuss above concerns with nursing to provide additional education on rationale for testing in order to ensure that pt is making informed decision about care.  Will remain available.    SHANNAN Quiles, JUDITH-S  Oncology Social Worker

## 2024-05-22 ENCOUNTER — HOSPITAL ENCOUNTER (OUTPATIENT)
Dept: MAMMOGRAPHY | Age: 64
Discharge: HOME | End: 2024-05-24
Attending: INTERNAL MEDICINE
Payer: COMMERCIAL

## 2024-05-22 DIAGNOSIS — Z78.0 POSTMENOPAUSAL: ICD-10-CM

## 2024-05-22 PROCEDURE — 77080 DXA BONE DENSITY AXIAL: CPT

## 2024-06-13 ENCOUNTER — HOSPITAL ENCOUNTER (OUTPATIENT)
Dept: CT IMAGING | Age: 64
Discharge: HOME | End: 2024-06-15
Payer: COMMERCIAL

## 2024-06-13 DIAGNOSIS — R91.1 LUNG NODULE: ICD-10-CM

## 2024-06-13 PROCEDURE — 71250 CT THORAX DX C-: CPT

## 2024-07-18 ENCOUNTER — HOSPITAL ENCOUNTER (OUTPATIENT)
Dept: RADIATION ONCOLOGY | Age: 64
Discharge: HOME | End: 2024-07-18

## 2024-07-18 VITALS
WEIGHT: 120.25 LBS | OXYGEN SATURATION: 95 % | TEMPERATURE: 97.4 F | HEART RATE: 98 BPM | RESPIRATION RATE: 18 BRPM | SYSTOLIC BLOOD PRESSURE: 155 MMHG | DIASTOLIC BLOOD PRESSURE: 94 MMHG | BODY MASS INDEX: 27.95 KG/M2

## 2024-07-18 DIAGNOSIS — Z92.3 S/P RADIATION THERAPY > 12 WKS AGO: ICD-10-CM

## 2024-07-18 DIAGNOSIS — Z17.0 MALIGNANT NEOPLASM OF UPPER-INNER QUADRANT OF RIGHT BREAST IN FEMALE, ESTROGEN RECEPTOR POSITIVE (HCC): Primary | ICD-10-CM

## 2024-07-18 DIAGNOSIS — C50.211 MALIGNANT NEOPLASM OF UPPER-INNER QUADRANT OF RIGHT BREAST IN FEMALE, ESTROGEN RECEPTOR POSITIVE (HCC): Primary | ICD-10-CM

## 2024-07-18 NOTE — PROGRESS NOTES
155/94   Site: Left Upper Arm   Position: Sitting   Cuff Size: Medium Adult   Pulse: 98   Resp: 18   Temp: 97.4 °F (36.3 °C)   TempSrc: Oral   SpO2: 95%   Weight: 54.5 kg (120 lb 4 oz)       Body mass index is 27.95 kg/m².    Appearance: Well-developed, well-nourished 64 year old female.  Skin: Warm and dry, no rash or hives.   Head: Normocephalic, atraumatic  Eyes: EOMI, no conjunctival erythema  ENMT: No pharyngeal erythema, MMM, no rhinorrhea.   Neck: Supple, no elevated JVP  Chest: Right breast with post radiation skin changes- telangiectasia.    Lungs: Clear to auscultation bilaterally. No wheezes, rales or rhonchi.  Cardiac: Regular rate and rhythm, +S1S2, no murmurs apparent  Abdomen: Soft, round and non-tender. Bowel sounds present x 4.  Extremities: Moves all extremities x 4, no lower extremity edema  Neurologic: Alert and oriented x 3. No focal motor deficits apparent         IMAGIN2024 Digital Bilateral Mammogram:(Breast Surgery/ Clinic ordering).  FINDINGS:  Density: Focally heterogeneously dense     In the right CC view medially and posterior to the nipple there is some  asymmetry.  On the left MLO view there is an asymmetry in the retroareolar  region centrally.  On coned-down compression imaging and repositioning, these  areas are no longer of concern.     No solid lesion/persistent asymmetry is suspected.  There is no abnormal  calcification in either breast.     IMPRESSION:  No mammographic evidence of malignancy.     BIRADS:  BIRADS - CATEGORY 1     Negative Mammogram.  Normal interval follow-up is recommended in 12 months.     OVERALL ASSESSMENT - NEGATIVE      ASSESSMENT/PLAN:    IDC of the lower-inner right breast, Gr 2, ER positive > 90%, PA positive 2%; HER2 negative. S/p right breast lumpectomy with axillary SLNBx on 2021. S/p re-excision right breast lumpectomy for positive inferior margin on 2021. S/p adjuvant chemotherapy 4 cycles TC from 2022-2022. S/p

## 2024-07-29 RX ORDER — LANOLIN ALCOHOL/MO/W.PET/CERES
1000 CREAM (GRAM) TOPICAL DAILY
Qty: 30 TABLET | Refills: 3 | Status: SHIPPED | OUTPATIENT
Start: 2024-07-29

## 2024-09-25 ENCOUNTER — HOSPITAL ENCOUNTER (OUTPATIENT)
Dept: GENERAL RADIOLOGY | Age: 64
Discharge: HOME | End: 2024-09-27
Payer: COMMERCIAL

## 2024-09-25 ENCOUNTER — OFFICE VISIT (OUTPATIENT)
Dept: BREAST CENTER | Age: 64
End: 2024-09-25
Payer: COMMERCIAL

## 2024-09-25 VITALS
SYSTOLIC BLOOD PRESSURE: 142 MMHG | HEART RATE: 110 BPM | DIASTOLIC BLOOD PRESSURE: 72 MMHG | OXYGEN SATURATION: 97 % | WEIGHT: 118 LBS | HEIGHT: 55 IN | RESPIRATION RATE: 22 BRPM | TEMPERATURE: 97.3 F | BODY MASS INDEX: 27.31 KG/M2

## 2024-09-25 DIAGNOSIS — Z12.31 VISIT FOR SCREENING MAMMOGRAM: ICD-10-CM

## 2024-09-25 DIAGNOSIS — Z85.3 HISTORY OF BREAST CANCER: Primary | ICD-10-CM

## 2024-09-25 DIAGNOSIS — R92.30 DENSE BREASTS: ICD-10-CM

## 2024-09-25 DIAGNOSIS — Z85.3 PERSONAL HISTORY OF BREAST CANCER: ICD-10-CM

## 2024-09-25 PROCEDURE — 99213 OFFICE O/P EST LOW 20 MIN: CPT | Performed by: NURSE PRACTITIONER

## 2024-09-25 PROCEDURE — 3077F SYST BP >= 140 MM HG: CPT | Performed by: NURSE PRACTITIONER

## 2024-09-25 PROCEDURE — 76641 ULTRASOUND BREAST COMPLETE: CPT

## 2024-09-25 PROCEDURE — 3017F COLORECTAL CA SCREEN DOC REV: CPT | Performed by: NURSE PRACTITIONER

## 2024-09-25 PROCEDURE — 1036F TOBACCO NON-USER: CPT | Performed by: NURSE PRACTITIONER

## 2024-09-25 PROCEDURE — G8427 DOCREV CUR MEDS BY ELIG CLIN: HCPCS | Performed by: NURSE PRACTITIONER

## 2024-09-25 PROCEDURE — G8419 CALC BMI OUT NRM PARAM NOF/U: HCPCS | Performed by: NURSE PRACTITIONER

## 2024-09-25 PROCEDURE — 3078F DIAST BP <80 MM HG: CPT | Performed by: NURSE PRACTITIONER

## 2024-10-23 ENCOUNTER — OFFICE VISIT (OUTPATIENT)
Dept: CARDIOLOGY CLINIC | Age: 64
End: 2024-10-23
Payer: COMMERCIAL

## 2024-10-23 VITALS
SYSTOLIC BLOOD PRESSURE: 130 MMHG | RESPIRATION RATE: 18 BRPM | BODY MASS INDEX: 28 KG/M2 | HEART RATE: 80 BPM | HEIGHT: 55 IN | WEIGHT: 121 LBS | DIASTOLIC BLOOD PRESSURE: 80 MMHG

## 2024-10-23 DIAGNOSIS — I10 PRIMARY HYPERTENSION: ICD-10-CM

## 2024-10-23 DIAGNOSIS — I42.1 HYPERTROPHIC OBSTRUCTIVE CARDIOMYOPATHY (HCC): ICD-10-CM

## 2024-10-23 DIAGNOSIS — I48.0 PAF (PAROXYSMAL ATRIAL FIBRILLATION) (HCC): ICD-10-CM

## 2024-10-23 DIAGNOSIS — I47.10 SUPRAVENTRICULAR TACHYCARDIA (HCC): ICD-10-CM

## 2024-10-23 DIAGNOSIS — I42.2 HYPERTROPHIC CARDIOMYOPATHY (HCC): Primary | ICD-10-CM

## 2024-10-23 DIAGNOSIS — E78.00 HYPERCHOLESTEROLEMIA: ICD-10-CM

## 2024-10-23 PROCEDURE — 3017F COLORECTAL CA SCREEN DOC REV: CPT | Performed by: INTERNAL MEDICINE

## 2024-10-23 PROCEDURE — 3079F DIAST BP 80-89 MM HG: CPT | Performed by: INTERNAL MEDICINE

## 2024-10-23 PROCEDURE — 1036F TOBACCO NON-USER: CPT | Performed by: INTERNAL MEDICINE

## 2024-10-23 PROCEDURE — 3075F SYST BP GE 130 - 139MM HG: CPT | Performed by: INTERNAL MEDICINE

## 2024-10-23 PROCEDURE — G8484 FLU IMMUNIZE NO ADMIN: HCPCS | Performed by: INTERNAL MEDICINE

## 2024-10-23 PROCEDURE — 93000 ELECTROCARDIOGRAM COMPLETE: CPT | Performed by: INTERNAL MEDICINE

## 2024-10-23 PROCEDURE — G8419 CALC BMI OUT NRM PARAM NOF/U: HCPCS | Performed by: INTERNAL MEDICINE

## 2024-10-23 PROCEDURE — G8427 DOCREV CUR MEDS BY ELIG CLIN: HCPCS | Performed by: INTERNAL MEDICINE

## 2024-10-23 PROCEDURE — 99214 OFFICE O/P EST MOD 30 MIN: CPT | Performed by: INTERNAL MEDICINE

## 2024-10-23 NOTE — PROGRESS NOTES
CHIEF COMPLAINT:   Chief Complaint   Patient presents with    Cardiomyopathy     Annual..pt has no c/c        HISTORY OF PRESENT ILLNESS: Patient is a 64 y.o. female who is here for a follow up.   She has a history of HTN, HLD, Paroxysmal A.Fib, transient PSVT, Rt Breast CA s/p  lumpectomy, chemotherapy, and XRT.     At today's office visit, She states that she feels overall well although she endorses Increased unsteadiness and trouble with ambulating. There is no dizziness, near syncope, and  no LOC since first episode 2 years ago. She has not followed with Cardiology since holter monitor placement in 03/24. She has home PT weekly. Today she denies any chest pain, shortness of breath, palpitations, dizziness, pedal edema.  There is no dyspnea with moderate exertion. There is no orthopnea or PND. She is compliant with medications as well as diet. BP in Clinic today is elevated in the 140s systolic. She does not monitor BP at home.    Prior Cardiac workup:  Echo from 08/22 with EF 70-75%, mild Lt ventricular concentric hypertrophy, indeterminate diastolic function. Mild LA dilation, Mild systolic anterior motion (MANOJ) of anterior mitral leaflet. Mild centrally directed mitral regurgitation, Mild Pulmonary HTN.      Past Medical History:   Diagnosis Date    Breast cancer (HCC)     Right breast    Cancer (HCC) 11/2021    right breast cancer    Collapse 08/2022    Hearing impaired     Hearing loss     History of therapeutic radiation     Hx antineoplastic chemo     Hyperlipidemia     Hypertension        No Known Allergies    Current Outpatient Medications   Medication Sig Dispense Refill    vitamin B-12 (CYANOCOBALAMIN) 1000 MCG tablet Take 1 tablet by mouth daily 30 tablet 3    loratadine (CLARITIN) 10 MG tablet Take 1 tablet by mouth daily 30 tablet 5    anastrozole (ARIMIDEX) 1 MG tablet TAKE ONE (1) TABLET DAILY. 90 tablet 3    Multiple Vitamins-Minerals (THERAPEUTIC MULTIVITAMIN-MINERALS) tablet Take 1 tablet by

## 2024-10-23 NOTE — PATIENT INSTRUCTIONS
Continue all your medications at current doses.   Restrict sodium intake to less than 2-2.5 g/day. Restrict fluid intake to less than 2.2 L/day. Goal BP is less than 130/80.   Please try to exercise for 150 minutes a week.   I will see you back in the office in 6 months. Please call the office at ([phone], option [phone]) if you have any questions.

## 2024-11-19 PROBLEM — Z85.3 HISTORY OF MALIGNANT NEOPLASM OF BREAST: Status: ACTIVE | Noted: 2024-11-19

## 2024-12-04 RX ORDER — LANOLIN ALCOHOL/MO/W.PET/CERES
1000 CREAM (GRAM) TOPICAL DAILY
Qty: 30 TABLET | Refills: 3 | Status: SHIPPED | OUTPATIENT
Start: 2024-12-04

## 2024-12-19 ENCOUNTER — HOSPITAL ENCOUNTER (OUTPATIENT)
Dept: CARDIOLOGY | Age: 64
Discharge: HOME | End: 2024-12-21
Attending: INTERNAL MEDICINE
Payer: COMMERCIAL

## 2024-12-19 VITALS — HEIGHT: 56 IN | WEIGHT: 118 LBS | BODY MASS INDEX: 26.54 KG/M2

## 2024-12-19 DIAGNOSIS — I42.1 HYPERTROPHIC OBSTRUCTIVE CARDIOMYOPATHY (HCC): ICD-10-CM

## 2024-12-19 PROCEDURE — 93306 TTE W/DOPPLER COMPLETE: CPT

## 2024-12-21 LAB
ECHO AO ASC DIAM: 2.9 CM
ECHO AO ASCENDING AORTA INDEX: 2.04 CM/M2
ECHO AV AREA PEAK VELOCITY: 2.9 CM2
ECHO AV AREA VTI: 3.5 CM2
ECHO AV AREA/BSA PEAK VELOCITY: 2 CM2/M2
ECHO AV AREA/BSA VTI: 2.5 CM2/M2
ECHO AV CUSP MM: 1.9 CM
ECHO AV MEAN GRADIENT: 20 MMHG
ECHO AV MEAN VELOCITY: 2.2 M/S
ECHO AV PEAK GRADIENT: 29 MMHG
ECHO AV PEAK VELOCITY: 2.7 M/S
ECHO AV VELOCITY RATIO: 0.93
ECHO AV VTI: 37.3 CM
ECHO BSA: 1.45 M2
ECHO EST RA PRESSURE: 3 MMHG
ECHO LA DIAMETER INDEX: 2.82 CM/M2
ECHO LA DIAMETER: 4 CM
ECHO LA VOL A-L A2C: 58 ML (ref 22–52)
ECHO LA VOL A-L A4C: 34 ML (ref 22–52)
ECHO LA VOL MOD A2C: 55 ML (ref 22–52)
ECHO LA VOL MOD A4C: 31 ML (ref 22–52)
ECHO LA VOLUME AREA LENGTH: 50 ML
ECHO LA VOLUME INDEX A-L A2C: 41 ML/M2 (ref 16–34)
ECHO LA VOLUME INDEX A-L A4C: 24 ML/M2 (ref 16–34)
ECHO LA VOLUME INDEX AREA LENGTH: 35 ML/M2 (ref 16–34)
ECHO LA VOLUME INDEX MOD A2C: 39 ML/M2 (ref 16–34)
ECHO LA VOLUME INDEX MOD A4C: 22 ML/M2 (ref 16–34)
ECHO LV EF PHYSICIAN: 60 %
ECHO LV FRACTIONAL SHORTENING: 40 % (ref 28–44)
ECHO LV INTERNAL DIMENSION DIASTOLE INDEX: 2.11 CM/M2
ECHO LV INTERNAL DIMENSION DIASTOLIC: 3 CM (ref 3.9–5.3)
ECHO LV INTERNAL DIMENSION SYSTOLIC INDEX: 1.27 CM/M2
ECHO LV INTERNAL DIMENSION SYSTOLIC: 1.8 CM
ECHO LV ISOVOLUMETRIC RELAXATION TIME (IVRT): 72.7 MS
ECHO LV IVSD: 1.4 CM (ref 0.6–0.9)
ECHO LV IVSS: 1.6 CM
ECHO LV MASS 2D: 132.2 G (ref 67–162)
ECHO LV MASS INDEX 2D: 93.1 G/M2 (ref 43–95)
ECHO LV POSTERIOR WALL DIASTOLIC: 1.3 CM (ref 0.6–0.9)
ECHO LV POSTERIOR WALL SYSTOLIC: 1.6 CM
ECHO LV RELATIVE WALL THICKNESS RATIO: 0.87
ECHO LVOT AREA: 3.1 CM2
ECHO LVOT AV VTI INDEX: 1.13
ECHO LVOT DIAM: 2 CM
ECHO LVOT MEAN GRADIENT: 15 MMHG
ECHO LVOT PEAK GRADIENT: 26 MMHG
ECHO LVOT PEAK VELOCITY: 2.5 M/S
ECHO LVOT STROKE VOLUME INDEX: 93.3 ML/M2
ECHO LVOT SV: 132.5 ML
ECHO LVOT VTI: 42.2 CM
ECHO MV "A" WAVE DURATION: 128 MSEC
ECHO MV A VELOCITY: 1.07 M/S
ECHO MV AREA PHT: 4.5 CM2
ECHO MV AREA VTI: 7.1 CM2
ECHO MV E DECELERATION TIME (DT): 211.5 MS
ECHO MV E VELOCITY: 0.67 M/S
ECHO MV E/A RATIO: 0.63
ECHO MV LVOT VTI INDEX: 0.44
ECHO MV MAX VELOCITY: 1.1 M/S
ECHO MV MEAN GRADIENT: 2 MMHG
ECHO MV MEAN VELOCITY: 0.7 M/S
ECHO MV PEAK GRADIENT: 5 MMHG
ECHO MV PRESSURE HALF TIME (PHT): 48.5 MS
ECHO MV VTI: 18.7 CM
ECHO PULMONARY ARTERY SYSTOLIC PRESSURE (PASP): 37 MMHG
ECHO PV MAX VELOCITY: 0.9 M/S
ECHO PV MEAN GRADIENT: 2 MMHG
ECHO PV MEAN VELOCITY: 0.7 M/S
ECHO PV PEAK GRADIENT: 3 MMHG
ECHO PV VTI: 18.7 CM
ECHO PVEIN A DURATION: 131.5 MS
ECHO PVEIN A VELOCITY: 0.3 M/S
ECHO PVEIN PEAK D VELOCITY: 0.2 M/S
ECHO PVEIN PEAK S VELOCITY: 0.5 M/S
ECHO PVEIN S/D RATIO: 2.5
ECHO RIGHT VENTRICULAR SYSTOLIC PRESSURE (RVSP): 37 MMHG
ECHO RV INTERNAL DIMENSION: 2 CM
ECHO TV REGURGITANT MAX VELOCITY: 2.91 M/S
ECHO TV REGURGITANT PEAK GRADIENT: 34 MMHG

## 2024-12-30 ENCOUNTER — TELEPHONE (OUTPATIENT)
Dept: CARDIOLOGY CLINIC | Age: 64
End: 2024-12-30

## 2024-12-30 DIAGNOSIS — I51.7 LEFT VENTRICULAR HYPERTROPHY: ICD-10-CM

## 2024-12-30 DIAGNOSIS — I42.1 HYPERTROPHIC OBSTRUCTIVE CARDIOMYOPATHY (HCC): Primary | ICD-10-CM

## 2024-12-30 RX ORDER — METOPROLOL SUCCINATE 50 MG/1
50 TABLET, EXTENDED RELEASE ORAL DAILY
Qty: 90 TABLET | Refills: 1 | Status: SHIPPED | OUTPATIENT
Start: 2024-12-30

## 2024-12-30 NOTE — TELEPHONE ENCOUNTER
----- Message from Dr. Jassi Whitmore MD sent at 12/30/2024  9:06 AM EST -----  Echo findings suggestive of hypertrophic cardiomyopathy.  Will need a cardiac MRI for confirmation.  If the blood pressures are above 120/70, lets increase the Toprol-XL to 50 mg daily.  I will add her to the list.

## 2024-12-30 NOTE — TELEPHONE ENCOUNTER
Cardiac MRI scheduled [facility] MRI CARDIAC W WO CONTRAST  Scheduled first available 2-25-25  arrival 8:30 am    Patient will confirm with mother       * No jewelry at the appointment. ALL jewelry will need to be removed before having the MRI.  * Nothing to eat or drink 4-6 hours prior to exam  * If port access is to be used as the access site for IV contrast, please secure an order from the ordering physician for the port access and heparin flush. Only schedule early morning or early afternoon.  * Please wear comfortable clothing for your appointment but refrain from clothing that may contain any metal such as buttons, zippers or metallic fibers (i.e., Yoga Pants.

## 2025-02-21 ENCOUNTER — TELEPHONE (OUTPATIENT)
Dept: CARDIOLOGY CLINIC | Age: 65
End: 2025-02-21

## 2025-02-21 NOTE — TELEPHONE ENCOUNTER
Dariana Whitmore insurance denied cardiac MRI and appointment was canceled by that department     Patient notified

## 2025-02-26 ENCOUNTER — HOSPITAL ENCOUNTER (OUTPATIENT)
Dept: INFUSION THERAPY | Age: 65
Discharge: HOME | End: 2025-02-26

## 2025-02-26 ENCOUNTER — OFFICE VISIT (OUTPATIENT)
Dept: ONCOLOGY | Age: 65
End: 2025-02-26
Payer: COMMERCIAL

## 2025-02-26 VITALS
HEART RATE: 108 BPM | DIASTOLIC BLOOD PRESSURE: 104 MMHG | WEIGHT: 120 LBS | SYSTOLIC BLOOD PRESSURE: 154 MMHG | TEMPERATURE: 97.8 F | BODY MASS INDEX: 26.99 KG/M2 | OXYGEN SATURATION: 100 % | HEIGHT: 56 IN

## 2025-02-26 DIAGNOSIS — C50.211 MALIGNANT NEOPLASM OF UPPER-INNER QUADRANT OF RIGHT BREAST IN FEMALE, ESTROGEN RECEPTOR POSITIVE (HCC): Primary | ICD-10-CM

## 2025-02-26 DIAGNOSIS — Z17.0 MALIGNANT NEOPLASM OF UPPER-INNER QUADRANT OF RIGHT BREAST IN FEMALE, ESTROGEN RECEPTOR POSITIVE (HCC): Primary | ICD-10-CM

## 2025-02-26 PROCEDURE — 3017F COLORECTAL CA SCREEN DOC REV: CPT | Performed by: INTERNAL MEDICINE

## 2025-02-26 PROCEDURE — G8419 CALC BMI OUT NRM PARAM NOF/U: HCPCS | Performed by: INTERNAL MEDICINE

## 2025-02-26 PROCEDURE — 99212 OFFICE O/P EST SF 10 MIN: CPT

## 2025-02-26 PROCEDURE — 3080F DIAST BP >= 90 MM HG: CPT | Performed by: INTERNAL MEDICINE

## 2025-02-26 PROCEDURE — 3077F SYST BP >= 140 MM HG: CPT | Performed by: INTERNAL MEDICINE

## 2025-02-26 PROCEDURE — 1036F TOBACCO NON-USER: CPT | Performed by: INTERNAL MEDICINE

## 2025-02-26 PROCEDURE — G8427 DOCREV CUR MEDS BY ELIG CLIN: HCPCS | Performed by: INTERNAL MEDICINE

## 2025-02-26 PROCEDURE — 99214 OFFICE O/P EST MOD 30 MIN: CPT | Performed by: INTERNAL MEDICINE

## 2025-02-26 RX ORDER — ANASTROZOLE 1 MG/1
TABLET ORAL
Qty: 90 TABLET | Refills: 3 | Status: SHIPPED | OUTPATIENT
Start: 2025-02-26

## 2025-02-26 RX ORDER — ALENDRONATE SODIUM 70 MG/1
70 TABLET ORAL
Qty: 13 TABLET | Refills: 0 | Status: SHIPPED | OUTPATIENT
Start: 2025-02-26

## 2025-02-26 NOTE — PROGRESS NOTES
Patient provided discharge AVS, all questions answered  
it.      RTC in 3 months.    Thank you for allowing us to participate in the care of Ms. Guzman.    UZMA DURAN MD   HEMATOLOGY/MEDICAL ONCOLOGY  [facility] MEDICAL ONCOLOGY  [address] [place] [address]  Dept: [phone]  Loc: [phone]

## 2025-03-07 NOTE — PROGRESS NOTES
[facility]/  [facility] in collaboration with Dr. Cata Agosto/Dr. Radha Traylor/Dr. Joss VILLALBA-CNP      
patient/family

## 2025-03-18 ENCOUNTER — OFFICE VISIT (OUTPATIENT)
Dept: BREAST CENTER | Age: 65
End: 2025-03-18
Payer: COMMERCIAL

## 2025-03-18 ENCOUNTER — HOSPITAL ENCOUNTER (OUTPATIENT)
Dept: GENERAL RADIOLOGY | Age: 65
Discharge: HOME | End: 2025-03-20
Payer: COMMERCIAL

## 2025-03-18 VITALS
TEMPERATURE: 97 F | WEIGHT: 116 LBS | OXYGEN SATURATION: 98 % | HEIGHT: 56 IN | BODY MASS INDEX: 26.1 KG/M2 | RESPIRATION RATE: 22 BRPM | DIASTOLIC BLOOD PRESSURE: 70 MMHG | HEART RATE: 86 BPM | SYSTOLIC BLOOD PRESSURE: 138 MMHG

## 2025-03-18 VITALS — BODY MASS INDEX: 40.49 KG/M2 | WEIGHT: 180 LBS | HEIGHT: 56 IN

## 2025-03-18 DIAGNOSIS — Z17.0 MALIGNANT NEOPLASM OF UPPER-INNER QUADRANT OF RIGHT BREAST IN FEMALE, ESTROGEN RECEPTOR POSITIVE: Primary | ICD-10-CM

## 2025-03-18 DIAGNOSIS — Z12.31 VISIT FOR SCREENING MAMMOGRAM: ICD-10-CM

## 2025-03-18 DIAGNOSIS — Z01.818 PREOP TESTING: ICD-10-CM

## 2025-03-18 DIAGNOSIS — C50.211 MALIGNANT NEOPLASM OF UPPER-INNER QUADRANT OF RIGHT BREAST IN FEMALE, ESTROGEN RECEPTOR POSITIVE: Primary | ICD-10-CM

## 2025-03-18 PROCEDURE — G8427 DOCREV CUR MEDS BY ELIG CLIN: HCPCS | Performed by: NURSE PRACTITIONER

## 2025-03-18 PROCEDURE — G8419 CALC BMI OUT NRM PARAM NOF/U: HCPCS | Performed by: NURSE PRACTITIONER

## 2025-03-18 PROCEDURE — 3017F COLORECTAL CA SCREEN DOC REV: CPT | Performed by: NURSE PRACTITIONER

## 2025-03-18 PROCEDURE — 77067 SCR MAMMO BI INCL CAD: CPT

## 2025-03-18 PROCEDURE — 99213 OFFICE O/P EST LOW 20 MIN: CPT | Performed by: NURSE PRACTITIONER

## 2025-03-18 PROCEDURE — 1036F TOBACCO NON-USER: CPT | Performed by: NURSE PRACTITIONER

## 2025-03-18 PROCEDURE — 3075F SYST BP GE 130 - 139MM HG: CPT | Performed by: NURSE PRACTITIONER

## 2025-03-18 PROCEDURE — 3078F DIAST BP <80 MM HG: CPT | Performed by: NURSE PRACTITIONER

## 2025-03-18 ASSESSMENT — ENCOUNTER SYMPTOMS
APNEA: 0
STRIDOR: 0
GASTROINTESTINAL NEGATIVE: 1

## 2025-04-08 RX ORDER — LANOLIN ALCOHOL/MO/W.PET/CERES
1000 CREAM (GRAM) TOPICAL DAILY
Qty: 30 TABLET | Refills: 4 | Status: SHIPPED | OUTPATIENT
Start: 2025-04-08

## 2025-04-30 ENCOUNTER — OFFICE VISIT (OUTPATIENT)
Dept: CARDIOLOGY CLINIC | Age: 65
End: 2025-04-30
Payer: COMMERCIAL

## 2025-04-30 VITALS
SYSTOLIC BLOOD PRESSURE: 128 MMHG | HEART RATE: 85 BPM | RESPIRATION RATE: 18 BRPM | HEIGHT: 56 IN | BODY MASS INDEX: 26.82 KG/M2 | WEIGHT: 119.2 LBS | DIASTOLIC BLOOD PRESSURE: 76 MMHG

## 2025-04-30 DIAGNOSIS — I42.1 HYPERTROPHIC OBSTRUCTIVE CARDIOMYOPATHY (HCC): Primary | ICD-10-CM

## 2025-04-30 DIAGNOSIS — I10 PRIMARY HYPERTENSION: ICD-10-CM

## 2025-04-30 DIAGNOSIS — I47.10 SUPRAVENTRICULAR TACHYCARDIA: ICD-10-CM

## 2025-04-30 PROCEDURE — G2211 COMPLEX E/M VISIT ADD ON: HCPCS | Performed by: INTERNAL MEDICINE

## 2025-04-30 PROCEDURE — 1123F ACP DISCUSS/DSCN MKR DOCD: CPT | Performed by: INTERNAL MEDICINE

## 2025-04-30 PROCEDURE — G8427 DOCREV CUR MEDS BY ELIG CLIN: HCPCS | Performed by: INTERNAL MEDICINE

## 2025-04-30 PROCEDURE — G8419 CALC BMI OUT NRM PARAM NOF/U: HCPCS | Performed by: INTERNAL MEDICINE

## 2025-04-30 PROCEDURE — 3078F DIAST BP <80 MM HG: CPT | Performed by: INTERNAL MEDICINE

## 2025-04-30 PROCEDURE — G8399 PT W/DXA RESULTS DOCUMENT: HCPCS | Performed by: INTERNAL MEDICINE

## 2025-04-30 PROCEDURE — 1036F TOBACCO NON-USER: CPT | Performed by: INTERNAL MEDICINE

## 2025-04-30 PROCEDURE — 1090F PRES/ABSN URINE INCON ASSESS: CPT | Performed by: INTERNAL MEDICINE

## 2025-04-30 PROCEDURE — 99214 OFFICE O/P EST MOD 30 MIN: CPT | Performed by: INTERNAL MEDICINE

## 2025-04-30 PROCEDURE — 93000 ELECTROCARDIOGRAM COMPLETE: CPT | Performed by: INTERNAL MEDICINE

## 2025-04-30 PROCEDURE — 3074F SYST BP LT 130 MM HG: CPT | Performed by: INTERNAL MEDICINE

## 2025-04-30 PROCEDURE — 3017F COLORECTAL CA SCREEN DOC REV: CPT | Performed by: INTERNAL MEDICINE

## 2025-04-30 RX ORDER — METOPROLOL SUCCINATE 50 MG/1
50 TABLET, EXTENDED RELEASE ORAL 2 TIMES DAILY
Qty: 90 TABLET | Refills: 1 | Status: SHIPPED | OUTPATIENT
Start: 2025-04-30

## 2025-04-30 NOTE — PROGRESS NOTES
CHIEF COMPLAINT:   Chief Complaint   Patient presents with    Cardiomyopathy     6 month OV- patient has no cardiac complaints        History of Present Illness  The patient presents for evaluation of hypertrophic cardiomyopathy. She is accompanied by her mother.  She has a history of HTN, HLD, Paroxysmal A.Fib, transient PSVT, Rt Breast CA s/p  lumpectomy, chemotherapy, and XRT.   The patient's mother reports that the patient's blood pressure has been well-managed since the increase in metoprolol dosage from 25 mg to 50 mg. Prior to this adjustment, her blood pressure was consistently elevated during medical appointments. The patient does not experience any episodes of dizziness or lightheadedness. She also does not report any mid-day fatigue or the need for a nap. The patient's physical activity is limited due to poor balance, necessitating the use of a walker. She is primarily wheelchair-bound but is able to independently navigate to the bathroom at night and perform some household tasks from her wheelchair. A friend assists her with ambulation, encouraging her to walk to the end of the driveway and back. The patient's mother expresses concern about potential falls, noting that the patient will vocalize if she feels unsteady.    The patient has a history of claustrophobia, which has previously interfered with her ability to undergo an MRI apart from authorization issues.  The patient's mother reports that the patient will lose her healthcare coverage as of midnight due to Medicaid discontinuation. The patient's mother has filed an appeal for Medicare and is awaiting a decision.    Since the last visit, she underwent an echocardiogram which revealed evidence of hypertrophic cardiomyopathy with dynamic LVOT obstruction with a peak resting gradient of 82 mmHg.  I increased the metoprolol to 50 mg daily.  She seems to have responded very well to it  MEDICATIONS  Metoprolol                Prior Cardiac workup:  Echo

## 2025-04-30 NOTE — PATIENT INSTRUCTIONS
Continue all your medications at current doses.   Increase the toprol xl to 50 mg twice daily.   Please call me when medicare is sorted.   Restrict sodium intake to less than 2-2.5 g/day. Restrict fluid intake to less than 2.2 L/day. Goal BP is less than 130/80.   If your weight increases by > 2 lbs in a day or >5 lbs in more than a day, take an extra lasix pill.   Please try to exercise for 150 minutes a week.   I will see you back in the office in 6 months. Please call the office at [phone] for Lesa) if you have any questions.

## 2025-05-21 NOTE — PROGRESS NOTES
DEPARTMENT OF RADIATION ONCOLOGY   ON TREATMENT VISIT       5/2/2022      NAME:  Karen Guzman    YOB: 1960    DIAGNOSIS: Pathological stage pT2, pN0, M0, G2, invasive ductal carcinoma of the right breast, 3 cm in maximum diameter at 3 o'clock position, SP partial mastectomy and sentinel node biopsy, 1 out of 1 negative sentinel lymph nodes, lymphovascular and perineural space invasion, ER positive (strong), ND positive (weak), HER 2 -, Oncotype DEX score at 26, SP 4 cycles of TC the last 1 on 3/16/2022       SUBJECTIVE:   Russ Hall has now received 2600 cGy in 10 fractions directed to the right breast.      Past medical, surgical, social and family histories reviewed and updated as indicated. PAIN: No    ALLERGIES:  Patient has no known allergies. Current Outpatient Medications   Medication Sig Dispense Refill    simvastatin (ZOCOR) 20 MG tablet Take 20 mg by mouth nightly       cephALEXin (KEFLEX) 500 MG capsule Take 1 capsule by mouth 4 times daily 28 capsule 0    triamcinolone (KENALOG) 0.1 % cream Apply topically 2 times daily. 80 g 1    prochlorperazine (COMPAZINE) 10 MG tablet Take 1 tablet by mouth every 6 hours as needed (nausea, vomiting) 30 tablet 2    ondansetron (ZOFRAN) 8 MG tablet Take 1 tablet by mouth every 12 hours as needed for Nausea or Vomiting 30 tablet 1    Garlic (GARLIQUE) 597 MG TBEC Take by mouth daily       Cholecalciferol (VITAMIN D3) 50 MCG (2000 UT) CAPS Take by mouth daily       loratadine (CLARITIN) 10 MG capsule Take 10 mg by mouth daily      acetaminophen (TYLENOL) 500 MG tablet Take 500 mg by mouth every 6 hours as needed for Pain       No current facility-administered medications for this encounter. OBJECTIVE:  Alert and fully ambulatory. Pleasant and conversant.       Physical Examination:General appearance - alert, well appearing, and in no distress, normal appearing weight and well hydrated:  Constitutional: A well Family history of CAD in fathe  Orders:    Lipid Panel; Future     developed, well nourished 58 y.o. female who is alert, oriented, cooperative and in no apparent distress. HEENT:   Skin:  Warm and dry. No obvious rashes. Vitals:    05/02/22 1028   BP: 130/86   Pulse: 96   Resp: 18   Temp: 97.3 °F (36.3 °C)   TempSrc: Temporal   SpO2: 96%   Weight: 119 lb (54 kg)       Wt Readings from Last 3 Encounters:   05/02/22 119 lb (54 kg)   04/25/22 120 lb 2 oz (54.5 kg)   04/20/22 120 lb (54.4 kg)       ASSESSMENT/PLAN:     Patient is tolerating treatments well with expected toxicities. Current and planned dose reviewed. Goals of treatment and potential side effects were reviewed with the patient. Treatment imaging has been personally reviewed for accuracy and precision. Questions answered to apparent satisfaction. Treatments will continue as planned. Thank you for the opportunity to participate in multidisciplinary management of this remarkable and pleasant patient.       Karin Guan MD    Department of Radiation Oncology  [facility]) [facility]: [phone] (SIR: [phone])  [address] [place] [address]: [phone] (M: [phone])  [facility]) [facility]:  [phone] (QQF:  [phone])

## 2025-06-03 ENCOUNTER — HOSPITAL ENCOUNTER (OUTPATIENT)
Dept: INFUSION THERAPY | Age: 65
Discharge: HOME | End: 2025-06-03
Payer: COMMERCIAL

## 2025-06-03 DIAGNOSIS — Z17.0 MALIGNANT NEOPLASM OF UPPER-INNER QUADRANT OF RIGHT BREAST IN FEMALE, ESTROGEN RECEPTOR POSITIVE (HCC): ICD-10-CM

## 2025-06-03 DIAGNOSIS — Z17.0 MALIGNANT NEOPLASM OF UPPER-INNER QUADRANT OF RIGHT BREAST IN FEMALE, ESTROGEN RECEPTOR POSITIVE (HCC): Primary | ICD-10-CM

## 2025-06-03 DIAGNOSIS — C50.211 MALIGNANT NEOPLASM OF UPPER-INNER QUADRANT OF RIGHT BREAST IN FEMALE, ESTROGEN RECEPTOR POSITIVE (HCC): Primary | ICD-10-CM

## 2025-06-03 DIAGNOSIS — C50.211 MALIGNANT NEOPLASM OF UPPER-INNER QUADRANT OF RIGHT BREAST IN FEMALE, ESTROGEN RECEPTOR POSITIVE (HCC): ICD-10-CM

## 2025-06-03 LAB
ALBUMIN SERPL-MCNC: 3.9 G/DL (ref 3.5–5.2)
ALP SERPL-CCNC: 60 U/L (ref 35–104)
ALT SERPL-CCNC: 14 U/L (ref 0–32)
ANION GAP SERPL CALCULATED.3IONS-SCNC: 17 MMOL/L (ref 7–16)
AST SERPL-CCNC: 17 U/L (ref 0–31)
BASOPHILS # BLD: 0.02 K/UL (ref 0–0.2)
BASOPHILS NFR BLD: 0 % (ref 0–2)
BILIRUB SERPL-MCNC: 0.2 MG/DL (ref 0–1.2)
BUN SERPL-MCNC: 17 MG/DL (ref 6–23)
CALCIUM SERPL-MCNC: 9.4 MG/DL (ref 8.6–10.2)
CHLORIDE SERPL-SCNC: 104 MMOL/L (ref 98–107)
CO2 SERPL-SCNC: 22 MMOL/L (ref 22–29)
CREAT SERPL-MCNC: 1 MG/DL (ref 0.5–1)
EOSINOPHIL # BLD: 0.07 K/UL (ref 0.05–0.5)
EOSINOPHILS RELATIVE PERCENT: 1 % (ref 0–6)
ERYTHROCYTE [DISTWIDTH] IN BLOOD BY AUTOMATED COUNT: 13.9 % (ref 11.5–15)
GFR, ESTIMATED: 66 ML/MIN/1.73M2
GLUCOSE SERPL-MCNC: 140 MG/DL (ref 74–99)
HCT VFR BLD AUTO: 44.1 % (ref 34–48)
HGB BLD-MCNC: 14.2 G/DL (ref 11.5–15.5)
IMM GRANULOCYTES # BLD AUTO: <0.03 K/UL (ref 0–0.58)
IMM GRANULOCYTES NFR BLD: 0 % (ref 0–5)
LYMPHOCYTES NFR BLD: 1.51 K/UL (ref 1.5–4)
LYMPHOCYTES RELATIVE PERCENT: 19 % (ref 20–42)
MCH RBC QN AUTO: 28.8 PG (ref 26–35)
MCHC RBC AUTO-ENTMCNC: 32.2 G/DL (ref 32–34.5)
MCV RBC AUTO: 89.5 FL (ref 80–99.9)
MONOCYTES NFR BLD: 0.47 K/UL (ref 0.1–0.95)
MONOCYTES NFR BLD: 6 % (ref 2–12)
NEUTROPHILS NFR BLD: 73 % (ref 43–80)
NEUTS SEG NFR BLD: 5.8 K/UL (ref 1.8–7.3)
PLATELET # BLD AUTO: 226 K/UL (ref 130–450)
PMV BLD AUTO: 10.8 FL (ref 7–12)
POTASSIUM SERPL-SCNC: 3.9 MMOL/L (ref 3.5–5)
PROT SERPL-MCNC: 6.7 G/DL (ref 6.4–8.3)
RBC # BLD AUTO: 4.93 M/UL (ref 3.5–5.5)
SODIUM SERPL-SCNC: 143 MMOL/L (ref 132–146)
WBC OTHER # BLD: 7.9 K/UL (ref 4.5–11.5)

## 2025-06-03 PROCEDURE — 36415 COLL VENOUS BLD VENIPUNCTURE: CPT

## 2025-06-03 PROCEDURE — 80053 COMPREHEN METABOLIC PANEL: CPT

## 2025-06-03 PROCEDURE — 85025 COMPLETE CBC W/AUTO DIFF WBC: CPT

## 2025-06-04 ENCOUNTER — OFFICE VISIT (OUTPATIENT)
Age: 65
End: 2025-06-04
Payer: COMMERCIAL

## 2025-06-04 VITALS
TEMPERATURE: 97.9 F | HEART RATE: 68 BPM | DIASTOLIC BLOOD PRESSURE: 89 MMHG | WEIGHT: 117 LBS | SYSTOLIC BLOOD PRESSURE: 142 MMHG | OXYGEN SATURATION: 98 % | HEIGHT: 56 IN | BODY MASS INDEX: 26.32 KG/M2

## 2025-06-04 DIAGNOSIS — R91.1 LUNG NODULE: ICD-10-CM

## 2025-06-04 DIAGNOSIS — C50.211 MALIGNANT NEOPLASM OF UPPER-INNER QUADRANT OF RIGHT BREAST IN FEMALE, ESTROGEN RECEPTOR POSITIVE (HCC): Primary | ICD-10-CM

## 2025-06-04 DIAGNOSIS — Z17.0 MALIGNANT NEOPLASM OF UPPER-INNER QUADRANT OF RIGHT BREAST IN FEMALE, ESTROGEN RECEPTOR POSITIVE (HCC): Primary | ICD-10-CM

## 2025-06-04 PROCEDURE — 3077F SYST BP >= 140 MM HG: CPT | Performed by: INTERNAL MEDICINE

## 2025-06-04 PROCEDURE — 3017F COLORECTAL CA SCREEN DOC REV: CPT | Performed by: INTERNAL MEDICINE

## 2025-06-04 PROCEDURE — G8419 CALC BMI OUT NRM PARAM NOF/U: HCPCS | Performed by: INTERNAL MEDICINE

## 2025-06-04 PROCEDURE — G8427 DOCREV CUR MEDS BY ELIG CLIN: HCPCS | Performed by: INTERNAL MEDICINE

## 2025-06-04 PROCEDURE — G8399 PT W/DXA RESULTS DOCUMENT: HCPCS | Performed by: INTERNAL MEDICINE

## 2025-06-04 PROCEDURE — 1036F TOBACCO NON-USER: CPT | Performed by: INTERNAL MEDICINE

## 2025-06-04 PROCEDURE — 99213 OFFICE O/P EST LOW 20 MIN: CPT | Performed by: INTERNAL MEDICINE

## 2025-06-04 PROCEDURE — 3079F DIAST BP 80-89 MM HG: CPT | Performed by: INTERNAL MEDICINE

## 2025-06-04 PROCEDURE — 1090F PRES/ABSN URINE INCON ASSESS: CPT | Performed by: INTERNAL MEDICINE

## 2025-06-04 PROCEDURE — 99214 OFFICE O/P EST MOD 30 MIN: CPT | Performed by: INTERNAL MEDICINE

## 2025-06-04 PROCEDURE — 1123F ACP DISCUSS/DSCN MKR DOCD: CPT | Performed by: INTERNAL MEDICINE

## 2025-06-04 RX ORDER — ANASTROZOLE 1 MG/1
TABLET ORAL
Qty: 90 TABLET | Refills: 3 | Status: SHIPPED | OUTPATIENT
Start: 2025-06-04

## 2025-06-04 NOTE — PROGRESS NOTES
[facility] MEDICAL ONCOLOGY  [address] [address]  Dept: [phone]  Loc: [phone]  Attending Progress Note      Reason for Visit: Right breast cancer.    Referring Physician: Cata Agosto MD    PCP:  Robert Diaz DO    History of Present Illness:     Cherrie is a pleasant 65-year-old female patient, with a past medical history significant for hearing loss and hyperlipidemia, who had presented with an abnormal screening mammogram,  MAMMOGRAM ULTRASOUND BIOPSY; [facility]       9/14/2021: MAMMOGRAM, RIGHT BREAST ULTRASOUND: [facility]                9/14/2021: LEFT BREAST ULTRASOUND: [facility]    She underwent an US guided right breast core biopsy at 3 o'clock position on September 20 , 2021. Pathological evaluation completed at [facility]:  PATHOLOGY  Diagnosis:   Right breast, 3 o'clock position, core biopsies: Invasive carcinoma of no   special type (ductal with lobular features).  Preliminary Wallace   score 3+2+1 = 6     Comment:   Tumor cells stain strongly positive for e-cadherin   immunostain. Intradepartmental consultation is obtained.     Breast Cancer Marker Studies:         Estrogen Receptors (ER):       -Positive (>10% of cells demonstrate nuclear positivity):       Percentage of cells positive: >90%       Intensity: Strong         Progesterone Receptors (DC):       -Positive:       Percentage of cells positive: 2%       Intensity: Weak         Her-2/marquise (c-erb B-2) protein expression: Negative (1+)     The patient underwent on 11/3/2021 right breast lumpectomy with axillary sentinel lymph node biopsy, pathology:    CANCER CASE SUMMARY   Procedure -excision   Specimen Laterality -right   Tumor Site, Invasive Carcinoma-3:00   Histologic Type-invasive carcinoma, no special type (ductal)   Histologic Grade (Wallace Histologic Score):                    Tubule differentiation- score-3

## 2025-08-20 RX ORDER — METOPROLOL SUCCINATE 50 MG/1
50 TABLET, EXTENDED RELEASE ORAL 2 TIMES DAILY
Qty: 90 TABLET | Refills: 3 | Status: SHIPPED | OUTPATIENT
Start: 2025-08-20

## 2025-09-04 RX ORDER — LANOLIN ALCOHOL/MO/W.PET/CERES
1000 CREAM (GRAM) TOPICAL DAILY
Qty: 30 TABLET | Refills: 4 | Status: SHIPPED | OUTPATIENT
Start: 2025-09-04

## (undated) DEVICE — SET INST MINOR PROCEDURE

## (undated) DEVICE — APPLIER LIG CLP M L11IN TI STR RNG HNDL FOR 20 CLP DISP

## (undated) DEVICE — GLOVE SURG L12IN SZ 65FNGR THK94MIL TRNSLUC YEL LTX

## (undated) DEVICE — PLASMABLADE PS210-030S 3.0S LOCK: Brand: PLASMABLADE™

## (undated) DEVICE — GOWN,SIRUS,FABRNF,L,20/CS: Brand: MEDLINE

## (undated) DEVICE — UNIVERSAL DRAPE: Brand: MEDLINE INDUSTRIES, INC.

## (undated) DEVICE — Device

## (undated) DEVICE — STANDARD HYPODERMIC NEEDLE,ALUMINUM HUB: Brand: MONOJECT

## (undated) DEVICE — TUBING, SUCTION, 3/16" X 12', STRAIGHT: Brand: MEDLINE

## (undated) DEVICE — STRIP,CLOSURE,WOUND,MEDI-STRIP,1/2X4: Brand: MEDLINE

## (undated) DEVICE — 3M(TM) MEDIPORE(TM) +PAD SOFT CLOTH ADHESIVE WOUND DRESSING 3571: Brand: 3M™ MEDIPORE™

## (undated) DEVICE — MARKER SURG MARGIN STD 6 CLR INK ASST CORR CLP

## (undated) DEVICE — 3M™ STERI-STRIP™ REINFORCED ADHESIVE SKIN CLOSURES, R1548, 1 IN X 5 IN (25 MM X 125 MM), 4 STRIPS/ENVELOPE: Brand: 3M™ STERI-STRIP™

## (undated) DEVICE — SYRINGE MED 10ML TRNSLUC BRL PLUNG BLK MRK POLYPR CTRL

## (undated) DEVICE — SYSTEM SURG HEMSTAT PWD 1 GM POLYSACCHARIDE HEMOSPHERES

## (undated) DEVICE — 3M(TM) MEDIPORE(TM) +PAD SOFT CLOTH ADHESIVE WOUND DRESSING 3569: Brand: 3M™ MEDIPORE™